# Patient Record
Sex: FEMALE | Race: WHITE | NOT HISPANIC OR LATINO | Employment: OTHER | URBAN - METROPOLITAN AREA
[De-identification: names, ages, dates, MRNs, and addresses within clinical notes are randomized per-mention and may not be internally consistent; named-entity substitution may affect disease eponyms.]

---

## 2017-09-28 ENCOUNTER — OFFICE VISIT (OUTPATIENT)
Dept: OBSTETRICS AND GYNECOLOGY | Facility: CLINIC | Age: 37
End: 2017-09-28
Payer: OTHER GOVERNMENT

## 2017-09-28 VITALS
HEIGHT: 66 IN | HEART RATE: 85 BPM | SYSTOLIC BLOOD PRESSURE: 138 MMHG | WEIGHT: 170.44 LBS | DIASTOLIC BLOOD PRESSURE: 92 MMHG | BODY MASS INDEX: 27.39 KG/M2

## 2017-09-28 DIAGNOSIS — N39.41 URGE INCONTINENCE: ICD-10-CM

## 2017-09-28 DIAGNOSIS — Z01.419 ENCOUNTER FOR WELL WOMAN EXAM: Primary | ICD-10-CM

## 2017-09-28 PROCEDURE — 88175 CYTOPATH C/V AUTO FLUID REDO: CPT

## 2017-09-28 PROCEDURE — 99999 PR PBB SHADOW E&M-NEW PATIENT-LVL III: CPT | Mod: PBBFAC,,, | Performed by: NURSE PRACTITIONER

## 2017-09-28 PROCEDURE — 99385 PREV VISIT NEW AGE 18-39: CPT | Mod: S$PBB,,, | Performed by: NURSE PRACTITIONER

## 2017-10-02 ENCOUNTER — OFFICE VISIT (OUTPATIENT)
Dept: UROLOGY | Facility: CLINIC | Age: 37
End: 2017-10-02
Payer: COMMERCIAL

## 2017-10-02 VITALS
TEMPERATURE: 98 F | BODY MASS INDEX: 29.12 KG/M2 | WEIGHT: 174.81 LBS | SYSTOLIC BLOOD PRESSURE: 139 MMHG | DIASTOLIC BLOOD PRESSURE: 82 MMHG | HEIGHT: 65 IN | HEART RATE: 93 BPM

## 2017-10-02 DIAGNOSIS — N39.41 URGE INCONTINENCE OF URINE: Primary | ICD-10-CM

## 2017-10-02 DIAGNOSIS — R39.15 URINARY URGENCY: ICD-10-CM

## 2017-10-02 LAB
BILIRUB SERPL-MCNC: ABNORMAL MG/DL
BLOOD URINE, POC: ABNORMAL
COLOR, POC UA: YELLOW
GLUCOSE UR QL STRIP: ABNORMAL
KETONES UR QL STRIP: ABNORMAL
LEUKOCYTE ESTERASE URINE, POC: ABNORMAL
NITRITE, POC UA: ABNORMAL
PH, POC UA: 6
PROTEIN, POC: ABNORMAL
SPECIFIC GRAVITY, POC UA: 1.01
UROBILINOGEN, POC UA: ABNORMAL

## 2017-10-02 PROCEDURE — 3008F BODY MASS INDEX DOCD: CPT | Mod: ,,, | Performed by: NURSE PRACTITIONER

## 2017-10-02 PROCEDURE — 81001 URINALYSIS AUTO W/SCOPE: CPT | Mod: PBBFAC,PN | Performed by: NURSE PRACTITIONER

## 2017-10-02 PROCEDURE — 99213 OFFICE O/P EST LOW 20 MIN: CPT | Mod: PBBFAC,PN | Performed by: NURSE PRACTITIONER

## 2017-10-02 PROCEDURE — 99204 OFFICE O/P NEW MOD 45 MIN: CPT | Mod: S$PBB,,, | Performed by: NURSE PRACTITIONER

## 2017-10-02 PROCEDURE — 99999 PR PBB SHADOW E&M-EST. PATIENT-LVL III: CPT | Mod: PBBFAC,,, | Performed by: NURSE PRACTITIONER

## 2017-10-02 PROCEDURE — 87086 URINE CULTURE/COLONY COUNT: CPT

## 2017-10-02 NOTE — LETTER
October 2, 2017      Krysten Lu NP  [address] [place] [address] [address]  [address]  [facility] [address]           [facility]  [address] [place] [address] [address]  [facility] [address]  Phone: [phone]  Fax: [phone]          Patient: Jaclyn Rausch   MR Number: [id number]   YOB: 1980   Date of Visit: 10/2/2017       Dear Krysten Lu:    Thank you for referring Jaclyn Rausch to me for evaluation. Attached you will find relevant portions of my assessment and plan of care.    If you have questions, please do not hesitate to call me. I look forward to following Jaclyn Rausch along with you.    Sincerely,    Eryn Hernandez, [facility]    Enclosure  CC:  No Recipients    If you would like to receive this communication electronically, please contact [email or web address] or [phone] to request more information on [facility] access.    For providers and/or their staff who would like to refer a patient to [facility], please contact us through our one-stop-shop provider referral line, [facility] , at [phone].    If you feel you have received this communication in error or would no longer like to receive these types of communications, please e-mail [email or web address]

## 2017-10-02 NOTE — PROGRESS NOTES
[facility] Note  Staff: BRENDA Phan    Referring provider and please cc:   PCP: None    Chief Complaint: Urge incontinence of urine    Subjective:        HPI: Jaclyn Rausch is a 37 y.o. female new patient to Urology clinic referred by Krysten Lu NP with OB/GYN presents with complaints of urge incontinence on/off for one year.    Questions asked pt during office visit today:  DTF: None, NTF: None  Dysuria:  None  Urgency:Yes, incontinence with urgency? Yes; bothersome Yes; .  Incontinence with laughing or straining: Yes - sneezing/intermittently; bothersome: No; Pt runs most mornings and does not leak  If yes, how many pads/day? None  Feel a bulge?No  G3, P3, vaginal deliveries with no complications  Gross HematuriaNo  History of UTI: No  Sexually active?: Yes - no pain during intercourse  Pt feels that she empties her bladder with each urination.    REVIEW OF SYSTEMS:  Review of Systems   Constitutional: Negative for chills, diaphoresis, fever and weight loss.   HENT: Negative for congestion, hearing loss, nosebleeds and sore throat.    Eyes: Negative for blurred vision and pain.   Respiratory: Negative for cough and wheezing.    Cardiovascular: Negative for chest pain, palpitations and leg swelling.   Gastrointestinal: Negative for abdominal pain, heartburn, nausea and vomiting.   Genitourinary: Positive for urgency. Negative for dysuria, flank pain, frequency and hematuria.        +Urge incontinence   Musculoskeletal: Negative for back pain, joint pain, myalgias and neck pain.   Skin: Negative for itching and rash.   Neurological: Negative for dizziness, tremors, sensory change, seizures, loss of consciousness, weakness and headaches.   Endo/Heme/Allergies: Does not bruise/bleed easily.   Psychiatric/Behavioral: Negative for depression and suicidal ideas. The patient is not nervous/anxious.      PMHx:  Past Medical History:   Diagnosis Date    Atrial septal  aneurysm      Kidney stones: No    PSHx:  Past Surgical History:   Procedure Laterality Date    BUNIONECTOMY Left 2001     Stents/Valves/Foreign Bodies: No  Screening Studies  Colonoscopy: None    Fam Hx:   malignancies: No , gyn malignancies: No .   kidney stones: No     Social History     Social History    Marital status:      Spouse name: N/A    Number of children: N/A    Years of education: N/A     Social History Main Topics    Smoking status: Former Smoker    Smokeless tobacco: Never Used    Alcohol use Yes      Comment: Socially    Drug use: No    Sexual activity: Yes     Partners: Male     Birth control/ protection: Condom      Comment:      Other Topics Concern    None     Social History Narrative    None     Allergies:  Review of patient's allergies indicates no known allergies.    Medications: reviewed   Anticoagulation: No    Objective:     Vitals:    10/02/17 1303   BP: 139/82   Pulse: 93   Temp: 98 °F (36.7 °C)     Physical Exam   Vitals reviewed.  Constitutional: She is oriented to person, place, and time. She appears well-developed and well-nourished.   HENT:   Head: Normocephalic and atraumatic.   Eyes: Conjunctivae and EOM are normal. Pupils are equal, round, and reactive to light.   Neck: Normal range of motion. Neck supple.   Cardiovascular: Normal rate, regular rhythm, normal heart sounds and intact distal pulses.    Pulmonary/Chest: Effort normal and breath sounds normal.   Abdominal: Soft. Bowel sounds are normal.   Musculoskeletal: Normal range of motion.   Neurological: She is alert and oriented to person, place, and time. She has normal reflexes.   Skin: Skin is warm and dry.     Psychiatric: She has a normal mood and affect. Her behavior is normal. Judgment and thought content normal.     Pelvic exam  External Genitalia: normal hair distribution, no lesions  Urethral meatus: normal with mild caruncle observed  Urethra: without tenderness or mass  Bladder: without  fulness or tenderness  Vagina: normal appearing. No discharge or lesions.   Anus and perineum: appear normal  Levator ani tenderness: None  No leakage with coughing, bearing down test.    LABS REVIEW:  UA today:   Color, UA Yellow    Spec Grav UA 1.015    pH, UA 6    WBC, UA +    Nitrite, UA neg    Protein neg    Glucose, UA norm    Ketones, UA neg    Urobilinogen, UA norm    Bilirubin neg    Blood, UA neg      UCx:   Results for orders placed or performed in visit on 05/24/10   Urine culture   Result Value Ref Range    Urine Culture, Routine NO GROWTH AFTER 48 HOURS.    Results for orders placed or performed in visit on 11/05/08   Urine culture   Result Value Ref Range    Urine Culture, Routine       >100,000 ORGANISMS/ML -GRAM POSITIVE GONZALO MORPHOLOGICALLY COMPATIBLE WITH LACTOBACILLUS SPECIES     Cr:   Lab Results   Component Value Date    CREATININE 0.7 05/24/2010     Assessment:       1. Urge incontinence of urine    2. Urinary urgency          Plan:     1.  We will send urine for culture testing today.  2.  Pt deferred trying p.o. meds for her incontinence symptoms.  She does not like taking medications especially right now with her newly diagnosed aneurysm issue.    F/u with Dr. Lelo Gomez for possible UDS and other treatment options r/t her incontinence.    [facility]: Active    Eryn Hernandez, [facility]

## 2017-10-02 NOTE — PATIENT INSTRUCTIONS
Urinary Incontinence, Female (Adult)  Urinary incontinence means loss of control of the bladder. This problem affects many women, especially as they get older. If you have incontinence, you may be embarrassed to ask for help. But know that this problem can be treated.     Types of Incontinence  There are different types of incontinence. Two of the main types are described here. You can have more than one type.  Stress incontinence: With this type, urine leaks when pressure (stress) is put on the bladder. This may happen when you cough, sneeze, or laugh. Stress incontinence most often occurs because the pelvic floor muscles that support the bladder and urethra are weak. This can happen after pregnancy and vaginal childbirth or a hysterectomy. It can also be due to excess body weight or hormone changes.  Urge incontinence (also called overactive bladder): With this type, a sudden urge to urinate is felt often. This may happen even though there may not be much urine in the bladder. The need to urinate often during the night is common. Urge incontinence most often occurs because of bladder spasms. This may be due to bladder irritation or infection. Damage to bladder nerves or pelvic muscles, constipation, and certain medicines can also lead to urge incontinence.  Treatment of urinary incontinence depends on the cause. Further evaluation is needed to find the type you have. This will likely include an exam and certain tests. Based on the results, you and your healthcare provider can then plan treatment. Until a diagnosis is made, the home care tips below can help relieve symptoms.  Home care  · Do pelvic floor muscle (Kegel) exercises, if they are prescribed. The pelvic floor muscles help support the bladder and urethra. Many women find that their symptoms improve when doing special exercises that strengthen these muscles. To do the exercises:  ¨ Contract the muscles you would use to stop your stream of urine, but do  this when youre not urinating. Hold for 10 seconds, then relax. Repeat 10 to 20 times in a row, at least 3 times a day. Your provider may give you other instructions for how to do the exercises and how often.  · Keep a bladder diary. This helps track how often and how much you urinate over a set period of time. Bring this diary with you to your next visit with the provider. The information can help your provider learn more about your bladder problem.  · Lose weight, if advised to by your provider. Excess weight puts pressure on the bladder. Your provider can help you create a weight-loss plan thats right for you. This may include exercising more and making certain diet changes.  · Avoid foods and drinks that may irritate the bladder. These can include alcohol and caffeinated drinks.  · Quit smoking. Smoking and other tobacco use can lead to chronic cough that strains the pelvic floor muscles. Smoking may also damage the bladder and urethra. Talk with your provider about treatments or methods you can use to quit smoking.  · If drinking large amounts of fluid causes you to have symptoms, you may be advised to limit your fluid intake. You may also be advised to drink most of your fluids during the day and to limit fluids at night.  · If youre worried about urine leakage or accidents, you may wear absorbent pads to catch urine. Change the pads often. This helps reduce discomfort. It may also reduce the risk of skin or bladder infections.  Follow-up care  Follow up with your healthcare provider as directed. If testing was done, youll be told the results as soon as they are ready. It may take some to find the right treatment for your problem. Work closely with your provider to ensure you get the best care for your needs. Your treatment plan may include special therapies or medicines. Certain procedures or surgery may also be options. Be sure to discuss any questions you have with your provider.  When to seek medical  advice  Call the healthcare provider right away if any of these occur:  · Fever of 100.4°F (38°C) or higher, or as directed by your provider  · Bladder pain or fullness  · Abdominal swelling  · Nausea or vomiting  · Back pain  · Weakness, dizziness or fainting  Date Last Reviewed: 7/26/2015 © 2000-2017 [employer]. [address] [place] [address], [place] [address]. All rights reserved. This information is not intended as a substitute for professional medical care. Always follow your healthcare professional's instructions.        Kegel Exercises  Kegel exercises dont need special clothing or equipment. Theyre easy to learn and simple to do. And if you do them right, no one can tell youre doing them, so they can be done almost anywhere. Your healthcare provider, nurse, or physical therapist can answer any questions you have and help you get started.    A weak pelvic floor   The pelvic floor muscles may weaken due to aging, pregnancy and vaginal childbirth, injury, surgery, chronic cough, or lack of exercise. If the pelvic floor is weak, your bladder and other pelvic organs may sag out of place. The urethra may also open too easily and allow urine to leak out. Kegel exercises can help you strengthen your pelvic floor muscles. Then they can better support the pelvic organs and control urine flow.  How Kegel exercises are done  Try each of the Kegel exercises described below. When youre doing them, try not to move your leg, buttock, or stomach muscles.  · Contract as if you were stopping your urine stream. But do it when youre not urinating.  · Tighten your rectum as if trying not to pass gas. Contract your anus, but dont move your buttocks.  · You may place a finger or 2 in the vagina and squeeze your finger with your vagina to learn which muscles to tighten.  Try to hold each Kegel for a slow count to 5. You probably wont be able to hold them for that long at first. But keep practicing. It will get easier  as your pelvic floor gets stronger. Eventually, special weights that you place in your vagina may be recommended to help make your Kegels even more effective. Visit your healthcare provider if you have difficulties doing Kegel exercises.  Helpful hints  Here are some tips to follow:  · Do your Kegels as often as you can. The more you do them, the faster youll feel the results.  · Pick an activity you do often as a reminder. For instance, do your Kegels every time you sit down.  · Tighten your pelvic floor before you sneeze, get up from a chair, cough, laugh, or lift. This protects your pelvic floor from injury and can help prevent urine leakage.   Date Last Reviewed: 8/5/2015  © 7266-8941 [employer]. [address] [place] [address], [place] [address]. All rights reserved. This information is not intended as a substitute for professional medical care. Always follow your healthcare professional's instructions.

## 2017-10-03 NOTE — PROGRESS NOTES
Chief Complaint   Patient presents with    Well Woman       History of Present Illness: Jaclyn Rausch is a 37 y.o. female that presents today 10/3/2017 for well gyn visit.  Pt here for well woman exam. She reports that it has been about 3 years since she has had an exam. She denies any abnormal pap smears in the past. She reports that her cycle are regular monthly with no associated complaints. She is not on birth control and does not desire to be on anything. She c/o of have the urge to pee and sometimes not being able to quite make it to bathroom in time. No other complaints or concerns noted.         Past Medical History:   Diagnosis Date    Atrial septal aneurysm        Past Surgical History:   Procedure Laterality Date    BUNIONECTOMY Left        Family History   Problem Relation Age of Onset    Diabetes Maternal Grandmother     Lung cancer Maternal Grandfather     Colon cancer Maternal Grandfather     Hypertension Mother     Breast cancer Neg Hx     Ovarian cancer Neg Hx        Social History     Social History    Marital status:      Spouse name: N/A    Number of children: N/A    Years of education: N/A     Social History Main Topics    Smoking status: Former Smoker    Smokeless tobacco: Never Used    Alcohol use Yes      Comment: Socially    Drug use: No    Sexual activity: Yes     Partners: Male     Birth control/ protection: Condom      Comment:      Other Topics Concern    None     Social History Narrative    None       OB History    Para Term  AB Living   4 3 3 0 1 3   SAB TAB Ectopic Multiple Live Births   1 0 0 0 3      # Outcome Date GA Lbr Alexis/2nd Weight Sex Delivery Anes PTL Lv   4 Term 09 39w0d  2.948 kg (6 lb 8 oz) F INDUCTION EPI N FRANCINE   3 Term / 39w0d  2.722 kg (6 lb) M Vag-Spont EPI N FRANCINE   2 Term     M Vag-Spont   FRANCINE      Complications: Placenta Previa   1 SAB  5w0d       DEC          No current outpatient  "prescriptions on file.     No current facility-administered medications for this visit.        Review of patient's allergies indicates:  No Known Allergies    Review of Symptoms:  GENERAL: Denies weight gain or weight loss. Feeling well overall.   SKIN: Denies rash or lesions.   ABDOMEN: No abdominal pain, constipation, diarrhea, nausea, vomiting or rectal bleeding.   URINARY: No frequency, dysuria, hematuria, or burning on urination.    BP (!) 138/92   Pulse 85   Ht 5' 5.5" (1.664 m)   Wt 77.3 kg (170 lb 6.7 oz)   LMP 09/11/2017 (Exact Date)     Physical Exam:  APPEARANCE: Well nourished, well developed, in no acute distress.  SKIN: Normal skin turgor, no lesions.  RESPIRATORY: Normal respiratory effort with no retractions or use of accessory muscles  ABDOMEN: Soft. No tenderness or masses. No hepatosplenomegaly. No hernias.  BREASTS: Symmetrical, no skin changes or visible lesions. No palpable masses, nipple discharge or adenopathy bilaterally.  PELVIC: Normal external female genitalia without lesions. Normal hair distribution. Adequate perineal body, normal urethral meatus. Urethra with no masses.  Bladder nontender. Vagina moist and well rugated without lesions or discharge. Cervix pink and without lesions. No significant cystocele or rectocele. Bimanual exam showed uterus normal size, shape, position, mobile and nontender. Adnexa without masses or tenderness. Urethra and bladder normal. PAP DONE    ASSESSMENT/PLAN:  Encounter for well woman exam  -     Liquid-based pap smear, screening    Urge incontinence  -     Ambulatory Referral to Urology      -Instructed pt to make an appointment with urology to have the urge incontinence evaluated. Pt verbalized understanding. Appt made.     Patient was counseled today on Pap guidelines, recommendation for pelvic exams, mammograms starting annually at age 40, Colonoscopy after the age of 50, Dexa Bone Scan and calcium and vitamin D supplementation in menopause and to " see her PCP for other health maintenance.       Follow-up:  RTC in 1 year for well woman exam or as needed

## 2017-10-04 LAB
BACTERIA UR CULT: NORMAL
BACTERIA UR CULT: NORMAL

## 2017-10-25 ENCOUNTER — OFFICE VISIT (OUTPATIENT)
Dept: ORTHOPEDICS | Facility: CLINIC | Age: 37
End: 2017-10-25
Payer: COMMERCIAL

## 2017-10-25 ENCOUNTER — HOSPITAL ENCOUNTER (OUTPATIENT)
Dept: RADIOLOGY | Facility: HOSPITAL | Age: 37
Discharge: HOME | End: 2017-10-25
Attending: ORTHOPAEDIC SURGERY
Payer: COMMERCIAL

## 2017-10-25 VITALS — WEIGHT: 174 LBS | BODY MASS INDEX: 28.99 KG/M2 | HEIGHT: 65 IN

## 2017-10-25 DIAGNOSIS — M22.41 CHONDROMALACIA, PATELLA, RIGHT: Primary | ICD-10-CM

## 2017-10-25 DIAGNOSIS — M25.561 ACUTE PAIN OF BOTH KNEES: Primary | ICD-10-CM

## 2017-10-25 DIAGNOSIS — M25.561 ACUTE PAIN OF BOTH KNEES: ICD-10-CM

## 2017-10-25 DIAGNOSIS — M25.562 ACUTE PAIN OF BOTH KNEES: Primary | ICD-10-CM

## 2017-10-25 DIAGNOSIS — M25.562 ACUTE PAIN OF BOTH KNEES: ICD-10-CM

## 2017-10-25 DIAGNOSIS — M22.42 CHONDROMALACIA, PATELLA, LEFT: ICD-10-CM

## 2017-10-25 PROCEDURE — 73564 X-RAY EXAM KNEE 4 OR MORE: CPT | Mod: TC,50,PN

## 2017-10-25 PROCEDURE — 99999 PR PBB SHADOW E&M-EST. PATIENT-LVL II: CPT | Mod: PBBFAC,,, | Performed by: ORTHOPAEDIC SURGERY

## 2017-10-25 PROCEDURE — 99203 OFFICE O/P NEW LOW 30 MIN: CPT | Mod: S$GLB,,, | Performed by: ORTHOPAEDIC SURGERY

## 2017-10-25 PROCEDURE — 73564 X-RAY EXAM KNEE 4 OR MORE: CPT | Mod: 26,50,, | Performed by: RADIOLOGY

## 2017-10-26 ENCOUNTER — LAB VISIT (OUTPATIENT)
Dept: LAB | Facility: HOSPITAL | Age: 37
End: 2017-10-26
Attending: NURSE PRACTITIONER
Payer: COMMERCIAL

## 2017-10-26 ENCOUNTER — OFFICE VISIT (OUTPATIENT)
Dept: FAMILY MEDICINE | Facility: CLINIC | Age: 37
End: 2017-10-26
Payer: COMMERCIAL

## 2017-10-26 VITALS
HEART RATE: 81 BPM | DIASTOLIC BLOOD PRESSURE: 77 MMHG | SYSTOLIC BLOOD PRESSURE: 121 MMHG | BODY MASS INDEX: 28.99 KG/M2 | HEIGHT: 65 IN | TEMPERATURE: 98 F | WEIGHT: 174 LBS

## 2017-10-26 DIAGNOSIS — Z13.1 SCREENING FOR DIABETES MELLITUS: ICD-10-CM

## 2017-10-26 DIAGNOSIS — I25.3 ATRIAL SEPTAL ANEURYSM: ICD-10-CM

## 2017-10-26 DIAGNOSIS — Z13.29 SCREENING FOR THYROID DISORDER: ICD-10-CM

## 2017-10-26 DIAGNOSIS — Z00.00 ANNUAL PHYSICAL EXAM: Primary | ICD-10-CM

## 2017-10-26 DIAGNOSIS — L67.8 ABNORMAL FACIAL HAIR: ICD-10-CM

## 2017-10-26 DIAGNOSIS — Z13.220 SCREENING FOR LIPID DISORDERS: ICD-10-CM

## 2017-10-26 DIAGNOSIS — Z23 NEED FOR IMMUNIZATION AGAINST INFLUENZA: ICD-10-CM

## 2017-10-26 DIAGNOSIS — Z00.00 ANNUAL PHYSICAL EXAM: ICD-10-CM

## 2017-10-26 LAB
ALBUMIN SERPL BCP-MCNC: 3.8 G/DL
ALP SERPL-CCNC: 75 U/L
ALT SERPL W/O P-5'-P-CCNC: 19 U/L
ANION GAP SERPL CALC-SCNC: 6 MMOL/L
AST SERPL-CCNC: 21 U/L
BASOPHILS # BLD AUTO: 0.03 K/UL
BASOPHILS NFR BLD: 0.4 %
BILIRUB SERPL-MCNC: 0.4 MG/DL
BUN SERPL-MCNC: 17 MG/DL
CALCIUM SERPL-MCNC: 9.4 MG/DL
CHLORIDE SERPL-SCNC: 106 MMOL/L
CHOLEST SERPL-MCNC: 269 MG/DL
CHOLEST/HDLC SERPL: 3.5 {RATIO}
CO2 SERPL-SCNC: 25 MMOL/L
CREAT SERPL-MCNC: 0.8 MG/DL
DIFFERENTIAL METHOD: ABNORMAL
EOSINOPHIL # BLD AUTO: 0.2 K/UL
EOSINOPHIL NFR BLD: 2.8 %
ERYTHROCYTE [DISTWIDTH] IN BLOOD BY AUTOMATED COUNT: 12.1 %
EST. GFR  (AFRICAN AMERICAN): >60 ML/MIN/1.73 M^2
EST. GFR  (NON AFRICAN AMERICAN): >60 ML/MIN/1.73 M^2
GLUCOSE SERPL-MCNC: 92 MG/DL
HCT VFR BLD AUTO: 36.4 %
HDLC SERPL-MCNC: 77 MG/DL
HDLC SERPL: 28.6 %
HGB BLD-MCNC: 11.8 G/DL
IMM GRANULOCYTES # BLD AUTO: 0.03 K/UL
IMM GRANULOCYTES NFR BLD AUTO: 0.4 %
LDLC SERPL CALC-MCNC: 170.4 MG/DL
LYMPHOCYTES # BLD AUTO: 2.9 K/UL
LYMPHOCYTES NFR BLD: 34 %
MCH RBC QN AUTO: 29.6 PG
MCHC RBC AUTO-ENTMCNC: 32.4 G/DL
MCV RBC AUTO: 92 FL
MONOCYTES # BLD AUTO: 0.8 K/UL
MONOCYTES NFR BLD: 9.1 %
NEUTROPHILS # BLD AUTO: 4.5 K/UL
NEUTROPHILS NFR BLD: 53.3 %
NONHDLC SERPL-MCNC: 192 MG/DL
NRBC BLD-RTO: 0 /100 WBC
PLATELET # BLD AUTO: 409 K/UL
PMV BLD AUTO: 9.3 FL
POTASSIUM SERPL-SCNC: 4.5 MMOL/L
PROT SERPL-MCNC: 7.6 G/DL
RBC # BLD AUTO: 3.98 M/UL
SODIUM SERPL-SCNC: 137 MMOL/L
TESTOST SERPL-MCNC: 34 NG/DL
TRIGL SERPL-MCNC: 108 MG/DL
TSH SERPL DL<=0.005 MIU/L-ACNC: 1.07 UIU/ML
WBC # BLD AUTO: 8.45 K/UL

## 2017-10-26 PROCEDURE — 80061 LIPID PANEL: CPT

## 2017-10-26 PROCEDURE — 99385 PREV VISIT NEW AGE 18-39: CPT | Mod: 25,S$GLB,, | Performed by: NURSE PRACTITIONER

## 2017-10-26 PROCEDURE — 99999 PR PBB SHADOW E&M-EST. PATIENT-LVL III: CPT | Mod: PBBFAC,,, | Performed by: NURSE PRACTITIONER

## 2017-10-26 PROCEDURE — 85025 COMPLETE CBC W/AUTO DIFF WBC: CPT

## 2017-10-26 PROCEDURE — 80053 COMPREHEN METABOLIC PANEL: CPT

## 2017-10-26 PROCEDURE — 90686 IIV4 VACC NO PRSV 0.5 ML IM: CPT | Mod: S$GLB,,, | Performed by: FAMILY MEDICINE

## 2017-10-26 PROCEDURE — 36415 COLL VENOUS BLD VENIPUNCTURE: CPT | Mod: PO

## 2017-10-26 PROCEDURE — 84403 ASSAY OF TOTAL TESTOSTERONE: CPT

## 2017-10-26 PROCEDURE — 90471 IMMUNIZATION ADMIN: CPT | Mod: S$GLB,,, | Performed by: FAMILY MEDICINE

## 2017-10-26 PROCEDURE — 84443 ASSAY THYROID STIM HORMONE: CPT

## 2017-10-26 NOTE — PROGRESS NOTES
DATE: 10/25/2017  PATIENT: Jaclyn Rausch  REFERRING MD:  CHIEF COMPLAINT:   Chief Complaint   Patient presents with    Left Knee - Pain    Right Knee - Pain       HISTORY:  Jaclyn Rausch is a 37 y.o. female  who presents for initial evaluation of bilateral knee pain right greater than left.  She notes she's had pain since she was 21 years old.  Said previous physical therapy.  She had MRI in 2013 which showed no meniscal tears and chondromalacia patella.  She does use a knee sleeve.  She notes cracking and popping which is painful.  She denies a cement swelling.  Her pain again more severe 2 days ago on the right knee after squatting and kneeling.  She is AGR in the .  Pain is reported at 8/10 today.      PAST MEDICAL/SURGICAL HISTORY:  Past Medical History:   Diagnosis Date    Atrial septal aneurysm      Past Surgical History:   Procedure Laterality Date    BUNIONECTOMY Left 2001       Current Medications: No current outpatient prescriptions on file.    Family History: Noncontributory  Social History:   Social History     Social History    Marital status:      Spouse name: N/A    Number of children: N/A    Years of education: N/A     Occupational History    Not on file.     Social History Main Topics    Smoking status: Former Smoker    Smokeless tobacco: Never Used    Alcohol use Yes      Comment: Socially    Drug use: No    Sexual activity: Yes     Partners: Male     Birth control/ protection: Condom      Comment:      Other Topics Concern    Not on file     Social History Narrative    No narrative on file       ROS:  Constitution: Negative for chills, fever, and sweats. Negative for unexplained weight loss.  HENT: Negative for headaches and blurry vision.   Cardiovascular: Negative for chest pain, irregular heartbeat, leg swelling and palpitations.   Respiratory: Negative for cough and shortness of breath.   Gastrointestinal: Negative for abdominal  "pain, heartburn, nausea and vomiting.   Genitourinary: Negative for bladder incontinence and dysuria.   Musculoskeletal: Negative for systemic arthritis, muscle weakness and myalgias.   Neurological: Negative for numbness.   Psychiatric/Behavioral: Negative for depression.  Endocrine: Negative for polyuria.   Hematologic/Lymphatic: Negative for bleeding disorders.   Skin: Negative for poor wound healing.        PHYSICAL EXAM:  Ht 5' 5" (1.651 m)   Wt 78.9 kg (174 lb)   LMP 09/11/2017 (Exact Date)   BMI 28.96 kg/m²   Jaclyn Rausch is a well developed, well nourished female in no acute distress. Physical examination of the bilateral knee evaluated the following:    Gait and Alignment  Inspection for ecchymosis, swelling, atrophy, or deformity  Inspection for intra-articular and/or bursal effusions  Tenderness to palpation over the bony and soft tissue structures around the knee  Range of Motion and presence of extensor lag/contractures  Sensation and motor strength  Varus/valgus or anterior/posterior/rotatory instability  Flexion pinch and Thelma's Tests  Patellar alignment/tracking/pain to palpation  Vascular exam to include skin temperature/color/capillary refill    Remarkable findings included:  Normal alignment.  No ecchymosis, swelling or effusion.  Crepitus with range of motion right greater than left.  Mild pain with patellofemoral compression.  No instability on exam.  No medial or lateral joint line tenderness of either knee.  Flexion pinch produces knee discomfort right greater than left.  Thelma's test negative        IMAGING:   The patient's bilateral knee radiographs were personally reviewed and compared to the radiologist's report. No acute fractures or dislocations are seen. The medial and lateral compartments are well preserved without degenerative changes or malalignment. The patellofemoral joint is also without degenerative changes or malalignment. No bony or soft tissue lesions " are seen. No loose bodies or calcifications are present.       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 All questions answered and the patient wishes to continue with the knee sleeve and anti-inflammatory medication.  Should her symptoms persist or worsen, she'll contact the office for consideration of injection versus arthroscopy.  Follow-up if not improving or worse..      This note was dictated using voice recognition software. Please excuse any grammatical or typographical errors.

## 2017-10-26 NOTE — PROGRESS NOTES
Subjective:       Patient ID: Jaclyn Rausch is a 37 y.o. female.    Chief Complaint: Annual Exam    HPI   Chief Complaint  Chief Complaint   Patient presents with    Annual Exam       HPI  Jaclyn Rausch is a 37 y.o. female with multiple medical diagnoses as listed in the medical history and problem list that presents to establish care as a new patient and for a physical exam.  BMI today is 28.96.  Patient recently diagnosed with a congenital atrial septum aneurysm, followed by Dr. Frank, no treatment recommended except repeat echocardiogram 1 year.      PAST MEDICAL HISTORY:  Past Medical History:   Diagnosis Date    Atrial septal aneurysm        PAST SURGICAL HISTORY:  Past Surgical History:   Procedure Laterality Date    BUNIONECTOMY Left 2001       SOCIAL HISTORY:  Social History     Social History    Marital status:      Spouse name: N/A    Number of children: N/A    Years of education: N/A     Occupational History    Not on file.     Social History Main Topics    Smoking status: Former Smoker     Packs/day: 0.25     Years: 2.00     Quit date: 01/2003    Smokeless tobacco: Never Used    Alcohol use Yes      Comment: Socially    Drug use: No    Sexual activity: Yes     Partners: Male     Birth control/ protection: Condom      Comment:      Other Topics Concern    Not on file     Social History Narrative    No narrative on file       FAMILY HISTORY:  Family History   Problem Relation Age of Onset    Diabetes Maternal Grandmother     Lung cancer Maternal Grandfather     Colon cancer Maternal Grandfather     Hypertension Mother     Hearing loss Father     Hypertension Brother     Breast cancer Neg Hx     Ovarian cancer Neg Hx        ALLERGIES AND MEDICATIONS: updated and reviewed.  Review of patient's allergies indicates:  No Known Allergies  No current outpatient prescriptions on file.     No current facility-administered medications for this visit.   "        Review of Systems   Constitutional: Negative for activity change, appetite change, chills, fatigue, fever and unexpected weight change.        Regular exercise, runs 6-10 miles a week, yoga   HENT: Positive for sore throat. Negative for congestion, ear pain, hearing loss, rhinorrhea and sinus pain.         Sore throat today   Eyes: Negative for visual disturbance.        Wears glasses for distance vision, goes to optomitrist at [facility]   Respiratory: Negative for cough and shortness of breath.    Cardiovascular: Negative for chest pain, palpitations and leg swelling.   Gastrointestinal: Positive for blood in stool. Negative for abdominal pain, constipation, diarrhea, nausea and vomiting.        Yesterday had a hard stool and noted some blood on toilet tissue and on outside stool   Endocrine:        Increased facial hair   Genitourinary: Negative for difficulty urinating, dysuria, frequency, menstrual problem and urgency.        Periods are regular, are pretty heavy, LMP 10/8/17  Pap smear last month normal.   Musculoskeletal: Negative for arthralgias and myalgias.   Skin: Negative for rash and wound.        Thumb line to right nail with gray discoloration, line to nail x several months   Neurological: Positive for weakness and numbness. Negative for dizziness, tremors and headaches.        Tingling numbness to left hand intermittent   Hematological: Negative for adenopathy.   Psychiatric/Behavioral: Negative for dysphoric mood, sleep disturbance and suicidal ideas. The patient is nervous/anxious.         Has noted some situational anxiety, life stress, dealing with it ok       Objective:       Vitals:    10/26/17 0852   BP: 121/77   BP Location: Right arm   Patient Position: Sitting   BP Method: Medium (Automatic)   Pulse: 81   Temp: 98.4 °F (36.9 °C)   TempSrc: Oral   Weight: 78.9 kg (174 lb)   Height: 5' 5" (1.651 m)     Physical Exam   Constitutional: She is oriented to person, place, and time. Vital " signs are normal. She appears well-developed. No distress.   HENT:   Head: Normocephalic and atraumatic.   Right Ear: Tympanic membrane, external ear and ear canal normal.   Left Ear: Tympanic membrane, external ear and ear canal normal.   Nose: No mucosal edema.   Mouth/Throat: Oropharynx is clear and moist and mucous membranes are normal. No oropharyngeal exudate.   Nasal mucosa pale and boggy without secretions   Eyes: Conjunctivae are normal. Pupils are equal, round, and reactive to light. Right eye exhibits no discharge. Left eye exhibits no discharge. Right conjunctiva is not injected. Left conjunctiva is not injected.   Neck: Normal range of motion. Neck supple. Normal carotid pulses present. Carotid bruit is not present. No thyromegaly present.   Cardiovascular: Normal rate, regular rhythm, S1 normal, S2 normal, normal heart sounds and intact distal pulses.  Exam reveals no gallop and no friction rub.    No murmur heard.  Pulses:       Carotid pulses are 2+ on the right side, and 2+ on the left side.       Radial pulses are 2+ on the right side, and 2+ on the left side.        Posterior tibial pulses are 2+ on the right side, and 2+ on the left side.   No peripheral edema   Pulmonary/Chest: Effort normal and breath sounds normal. No respiratory distress. She has no decreased breath sounds. She has no wheezes. She has no rhonchi. She has no rales.   Abdominal: Soft. Normal appearance and bowel sounds are normal. She exhibits no distension, no abdominal bruit, no pulsatile midline mass and no mass. There is no hepatosplenomegaly. There is no tenderness. No hernia.   Musculoskeletal: Normal range of motion. She exhibits no edema, tenderness or deformity.   Lymphadenopathy:     She has no cervical adenopathy.   Neurological: She is alert and oriented to person, place, and time. She has normal strength.   Skin: Skin is warm, dry and intact. Capillary refill takes less than 2 seconds. No rash noted. She is not  diaphoretic. No pallor.   Psychiatric: She has a normal mood and affect. Her speech is normal and behavior is normal. Judgment and thought content normal. Her mood appears not anxious. Cognition and memory are normal. She does not exhibit a depressed mood.   Nursing note and vitals reviewed.      Assessment:       1. Annual physical exam    2. Need for immunization against influenza    3. Screening for lipid disorders    4. Screening for diabetes mellitus    5. Screening for thyroid disorder    6. Atrial septal aneurysm    7. BMI 28.0-28.9,adult    8. Abnormal facial hair        Plan:     Jaclyn was seen today for annual exam.    Diagnoses and all orders for this visit:    Annual physical exam  -     CBC auto differential; Future  -     Comprehensive metabolic panel; Future  -     Lipid panel; Future  -     TSH; Future    Need for immunization against influenza  -     Influenza - Quadrivalent (3 years & older) (PF)    Screening for lipid disorders  -     Lipid panel; Future    Screening for diabetes mellitus  -     Comprehensive metabolic panel; Future    Screening for thyroid disorder  -     TSH; Future    Atrial septal aneurysm  -     Comprehensive metabolic panel; Future  -     Lipid panel; Future    BMI 28.0-28.9,adult  -     Lipid panel; Future  Weight loss recommended with well balanced diet and portion controlled meals and increased activity.   Patient exercises regularly    Abnormal facial hair  -     Testosterone; Future    Return in about 1 year (around 10/26/2018), or if symptoms worsen or fail to improve.

## 2017-11-03 ENCOUNTER — OFFICE VISIT (OUTPATIENT)
Dept: UROLOGY | Facility: CLINIC | Age: 37
End: 2017-11-03
Payer: OTHER GOVERNMENT

## 2017-11-03 ENCOUNTER — APPOINTMENT (OUTPATIENT)
Dept: LAB | Facility: HOSPITAL | Age: 37
End: 2017-11-03
Attending: UROLOGY
Payer: COMMERCIAL

## 2017-11-03 VITALS
BODY MASS INDEX: 29.99 KG/M2 | SYSTOLIC BLOOD PRESSURE: 133 MMHG | TEMPERATURE: 99 F | WEIGHT: 180 LBS | DIASTOLIC BLOOD PRESSURE: 79 MMHG | HEIGHT: 65 IN | HEART RATE: 78 BPM

## 2017-11-03 DIAGNOSIS — N39.41 URGE INCONTINENCE: Primary | ICD-10-CM

## 2017-11-03 LAB
BILIRUB SERPL-MCNC: ABNORMAL MG/DL
BILIRUB UR QL STRIP: NEGATIVE
BLOOD URINE, POC: ABNORMAL
CLARITY UR: CLEAR
COLOR UR: YELLOW
COLOR, POC UA: ABNORMAL
GLUCOSE UR QL STRIP: ABNORMAL
GLUCOSE UR QL STRIP: NEGATIVE
HGB UR QL STRIP: NEGATIVE
KETONES UR QL STRIP: ABNORMAL
KETONES UR QL STRIP: NEGATIVE
LEUKOCYTE ESTERASE UR QL STRIP: NEGATIVE
LEUKOCYTE ESTERASE URINE, POC: ABNORMAL
NITRITE UR QL STRIP: NEGATIVE
NITRITE, POC UA: ABNORMAL
PH UR STRIP: 7 [PH] (ref 5–8)
PH, POC UA: 7
PROT UR QL STRIP: NEGATIVE
PROTEIN, POC: ABNORMAL
SP GR UR STRIP: 1.01 (ref 1–1.03)
SPECIFIC GRAVITY, POC UA: 1015
URN SPEC COLLECT METH UR: NORMAL
UROBILINOGEN UR STRIP-ACNC: 1 EU/DL
UROBILINOGEN, POC UA: ABNORMAL

## 2017-11-03 PROCEDURE — 81002 URINALYSIS NONAUTO W/O SCOPE: CPT | Mod: PBBFAC,PN | Performed by: UROLOGY

## 2017-11-03 PROCEDURE — 87086 URINE CULTURE/COLONY COUNT: CPT

## 2017-11-03 PROCEDURE — 81003 URINALYSIS AUTO W/O SCOPE: CPT

## 2017-11-03 PROCEDURE — 99213 OFFICE O/P EST LOW 20 MIN: CPT | Mod: PBBFAC,PN | Performed by: UROLOGY

## 2017-11-03 PROCEDURE — 99999 PR PBB SHADOW E&M-EST. PATIENT-LVL III: CPT | Mod: PBBFAC,,, | Performed by: UROLOGY

## 2017-11-03 PROCEDURE — 51701 INSERT BLADDER CATHETER: CPT | Mod: PBBFAC,PN | Performed by: UROLOGY

## 2017-11-03 PROCEDURE — 99214 OFFICE O/P EST MOD 30 MIN: CPT | Mod: S$PBB,,, | Performed by: UROLOGY

## 2017-11-03 NOTE — PROGRESS NOTES
[facility] Note - [place]  Staff: MD Jason    Referring provider and please cc: Hesham  [facility]    Chief Complaint: urge incontinence    Subjective:        HPI: Jaclyn Rausch is a 37 y.o. female presents with     Pt initially seen by maria esther on 10/2/17    She voids q2 hours and denies urgency with each void. Most of the time it may be anxiety related she thinks bc it's when she gets to the store or when she's just getting home and busy trying to get 3 kids in the house. She has a couple drops of UUI. occ ROSE MARY which is not that bothersome, doesn't affect exercise. This has been occurring for the past year.     Drinks 1 cup of coffee in the am. No tea or coke. Takes vitamins but sx occurred before vitamin usage. Denies constipation- has a bm daily. She is , vaginal easily deliveries (small). No pad usage. Doesn't occur at work.     ua today and last visit with jose lujan, but denies any dysuria.     Was seen by maria esther and was told she has an Atrial septal aneurysm that is new and hesistant to try meds. Has tried some behavioral changes, kegels but says that this doesn't help.     ECOG Status: 0    G3, P 3, vaginal   Gross HematuriaNo  History of UTI: No  Sexually active?: Yes - not painful    REVIEW OF SYSTEMS:  General ROS: no fevers, no chills  Psychological ROS: no depression  Endocrine ROS: no heat or cold  Respiratory ROS: no SOB  Cardiovascular ROS: no CP  Gastrointestinal ROS: no abdominal pain, no constipation, no diarrhea, noBRBPR  Musculoskeletal ROS: no muscle pain  Neurological ROS: no headaches  Dermatological ROS: no rashes  HEENT: noglasses, no sinus   ROS: per HPI     PMHx:  Past Medical History:   Diagnosis Date    Atrial septal aneurysm      Kidney stones: No    PSHx:  Past Surgical History:   Procedure Laterality Date    BUNIONECTOMY Left      Urologic or Gynecologic Surgery: none    Stents/Valves/Foreign Bodies:  No  Cardiac Evaluation: Yes -     Screening Studies  Colonoscopy: none    Fam Hx:   malignancies: No , gyn malignancies: No . Mother  from ruptured brain aneurysm at 58. Father  from HF   kidney stones: No     Soc Hx:  No tobacco.   occ alcohol  Lives in [place]  :yes  Children: 3 (9,8,2)  Occupation:  in [employer]    Allergies:  Patient has no known allergies.    Medications: reviewed   Anticoagulation: No    Objective:     Vitals:    17 1131   BP: 133/79   Pulse: 78   Temp: 98.9 °F (37.2 °C)       General:WDWN in NAD  Eyes: PERRLA, normal conjunctiva  Respiratory: no increased work on breathing, clear to auscultation  Cardiovascular: regular rate and rhythm. No obvious extremity edema.  GI: no palpation of masses. No tenderness. No hepatosplenomegaly to palpation.  Musculoskeletal: normal range of motion of bilateral upper extremities. Normal muscle strength and tone.  Skin: no obvious rashes or lesions. No tightening of skin noted.  Neurologic: CN grossly normal. Normal sensation.   Psychiatric: awake, alert and oriented x 3. Mood and affect normal. Cooperative.    Pelvic exam  External Genitalia: normal hair distribution, no lesions  Urethral meatus: normal without prolapse or caruncle  Urethra: without tenderness or mass  Bladder: without fulness or tenderness  Vagina: normal appearing. No discharge or lesions. no prolapse. +vaginal discharge (pt states this occurs close to her period, denies any vaginal pain or itching)  Anus and perineum: appear normal  In and out cath performed with 30 residual  Levator ani tenderness: none    LABS REVIEW:  UA today: 1.015/7/tr leuk - asx   ua cath: send for urinalysis and urine culture     UCx:   10/2/17 Multiple organisms, tr leuk  5/24/10 NG  18 GPR     Cr:   Lab Results   Component Value Date    CREATININE 0.8 10/26/2017       PATHOLOGY REVIEW:  17 - neg pap    RADIOGRAPHIC REVIEW:  none    Assessment:       1. Urge  incontinence          Plan:     Will send cath ua for culture and urinalysis to confirm no infxn. Denies sx but will treat if + bc of incontinence    Recommend she avoid all caffeine (coffee in the am) and other bladder irritants, given list today.     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     F/u 3 months and if no improvement may do stress test and start on oa med but if pt doing well then will f/u 1 year    Lelo Gomez MD

## 2017-11-04 LAB — BACTERIA UR CULT: NO GROWTH

## 2018-01-09 ENCOUNTER — OFFICE VISIT (OUTPATIENT)
Dept: OBSTETRICS AND GYNECOLOGY | Facility: CLINIC | Age: 38
End: 2018-01-09
Payer: COMMERCIAL

## 2018-01-09 VITALS
BODY MASS INDEX: 29.02 KG/M2 | HEIGHT: 66 IN | HEART RATE: 93 BPM | DIASTOLIC BLOOD PRESSURE: 94 MMHG | SYSTOLIC BLOOD PRESSURE: 140 MMHG | WEIGHT: 180.56 LBS

## 2018-01-09 DIAGNOSIS — N92.6 MISSED MENSES: Primary | ICD-10-CM

## 2018-01-09 DIAGNOSIS — Z32.01 POSITIVE URINE PREGNANCY TEST: ICD-10-CM

## 2018-01-09 DIAGNOSIS — Z11.3 SCREEN FOR STD (SEXUALLY TRANSMITTED DISEASE): ICD-10-CM

## 2018-01-09 DIAGNOSIS — N92.6 MISSED PERIOD: ICD-10-CM

## 2018-01-09 LAB
B-HCG UR QL: POSITIVE
BILIRUB SERPL-MCNC: ABNORMAL MG/DL
BLOOD URINE, POC: ABNORMAL
COLOR, POC UA: YELLOW
CTP QC/QA: YES
GLUCOSE UR QL STRIP: ABNORMAL
KETONES UR QL STRIP: ABNORMAL
LEUKOCYTE ESTERASE URINE, POC: ABNORMAL
NITRITE, POC UA: ABNORMAL
PH, POC UA: 8
PROTEIN, POC: ABNORMAL
SPECIFIC GRAVITY, POC UA: 1.01
UROBILINOGEN, POC UA: ABNORMAL

## 2018-01-09 PROCEDURE — 99214 OFFICE O/P EST MOD 30 MIN: CPT | Mod: S$GLB,,, | Performed by: OBSTETRICS & GYNECOLOGY

## 2018-01-09 PROCEDURE — 99999 PR PBB SHADOW E&M-EST. PATIENT-LVL III: CPT | Mod: PBBFAC,,, | Performed by: OBSTETRICS & GYNECOLOGY

## 2018-01-09 PROCEDURE — 81025 URINE PREGNANCY TEST: CPT | Mod: S$GLB,,, | Performed by: OBSTETRICS & GYNECOLOGY

## 2018-01-09 PROCEDURE — 87491 CHLMYD TRACH DNA AMP PROBE: CPT

## 2018-01-09 RX ORDER — NAPROXEN SODIUM 220 MG/1
81 TABLET, FILM COATED ORAL DAILY
COMMUNITY
End: 2018-01-23

## 2018-01-09 NOTE — PROGRESS NOTES
Subjective:       Patient ID: Jaclyn Rausch is a 37 y.o. female.    Chief Complaint:  Possible Pregnancy      History of Present Illness  HPI  Missed Menses/ Possible Pregnancy  Patient complains of menstrual irregularity. She believes she could be pregnant. Patient is ambivalent about pregnancy. Sexual Activity: single partner, contraception: coitus interruptus. Current symptoms also include: positive home pregnancy test. Last period was normal.Patient recently diagnosed with atrial septal aneurysm and was informed of it as a congenital abnormality.     Patient's last menstrual period was 2017 (approximate).     GYN & OB History  Patient's last menstrual period was 2017 (approximate).   Date of Last Pap: 17    OB History    Para Term  AB Living   5 3 3 0 1 3   SAB TAB Ectopic Multiple Live Births   1 0 0 0 3      # Outcome Date GA Lbr Alexis/2nd Weight Sex Delivery Anes PTL Lv   5 Current            4 Term 09 39w0d  2.948 kg (6 lb 8 oz) F INDUCTION EPI N FRANCINE   3 Term / 39w0d  2.722 kg (6 lb) M Vag-Spont EPI N FRANCINE   2 Term     M Vag-Spont   FRANCINE      Complications: Placenta Previa   1 SAB  5w0d       DEC          Review of Systems  Review of Systems   Constitutional: Negative for activity change, appetite change, chills, diaphoresis, fatigue, fever and unexpected weight change.   HENT: Negative for mouth sores and tinnitus.    Eyes: Negative for discharge and visual disturbance.   Respiratory: Negative for cough, shortness of breath and wheezing.    Cardiovascular: Negative for chest pain, palpitations and leg swelling.   Gastrointestinal: Negative for abdominal pain, bloating, blood in stool, constipation, diarrhea, nausea and vomiting.   Endocrine: Negative for diabetes, hair loss, hot flashes, hyperthyroidism and hypothyroidism.   Genitourinary: Negative for decreased libido, dyspareunia, dysuria, flank pain, frequency, genital sores, hematuria, menorrhagia,  menstrual problem, pelvic pain, urgency, vaginal bleeding, vaginal discharge, vaginal pain, dysmenorrhea, urinary incontinence, postcoital bleeding, postmenopausal bleeding and vaginal odor.   Musculoskeletal: Negative for back pain, joint swelling and myalgias.   Skin:  Negative for rash, no acne and hair changes.   Neurological: Negative for seizures, syncope, numbness and headaches.   Hematological: Negative for adenopathy. Does not bruise/bleed easily.   Psychiatric/Behavioral: Negative for depression and sleep disturbance. The patient is not nervous/anxious.    Breast: Negative for breast mass, breast pain, nipple discharge and skin changes          Objective:    Physical Exam:   Constitutional: She is oriented to person, place, and time. She appears well-developed and well-nourished. No distress.    HENT:   Head: Normocephalic.    Eyes: Pupils are equal, round, and reactive to light.    Neck: Normal range of motion.    Cardiovascular: Normal rate.     Pulmonary/Chest: Effort normal.        Abdominal: Soft. She exhibits no distension.     Genitourinary: Vagina normal and uterus normal. No vaginal discharge found.   Genitourinary Comments: Cervix cultures done today           Musculoskeletal: Normal range of motion.       Neurological: She is alert and oriented to person, place, and time.    Skin: Skin is warm and dry.    Psychiatric: Her behavior is normal. Judgment and thought content normal.   Patient appears tearful          Assessment:        1. Missed menses    2. Missed period    3. Screen for STD (sexually transmitted disease)    4. Positive urine pregnancy test                Plan:      Initial prenatal visit in 2-3 weeks with ultrasound and labs   Patient to initiate vitamins  Referral placed for patient to go to Cardiologist

## 2018-01-10 LAB
C TRACH DNA SPEC QL NAA+PROBE: NOT DETECTED
N GONORRHOEA DNA SPEC QL NAA+PROBE: NOT DETECTED

## 2018-01-23 ENCOUNTER — OFFICE VISIT (OUTPATIENT)
Dept: OBSTETRICS AND GYNECOLOGY | Facility: CLINIC | Age: 38
End: 2018-01-23
Payer: COMMERCIAL

## 2018-01-23 ENCOUNTER — LAB VISIT (OUTPATIENT)
Dept: LAB | Facility: HOSPITAL | Age: 38
End: 2018-01-23
Attending: OBSTETRICS & GYNECOLOGY
Payer: COMMERCIAL

## 2018-01-23 VITALS
WEIGHT: 179.69 LBS | SYSTOLIC BLOOD PRESSURE: 126 MMHG | BODY MASS INDEX: 29.44 KG/M2 | HEART RATE: 83 BPM | DIASTOLIC BLOOD PRESSURE: 85 MMHG

## 2018-01-23 DIAGNOSIS — Z34.81 SUPERVISION OF NORMAL INTRAUTERINE PREGNANCY IN MULTIGRAVIDA IN FIRST TRIMESTER: Primary | ICD-10-CM

## 2018-01-23 DIAGNOSIS — Z34.81 PRENATAL CARE, SUBSEQUENT PREGNANCY IN FIRST TRIMESTER: ICD-10-CM

## 2018-01-23 DIAGNOSIS — O09.521 ADVANCED MATERNAL AGE IN MULTIGRAVIDA, FIRST TRIMESTER: ICD-10-CM

## 2018-01-23 DIAGNOSIS — Z34.81 SUPERVISION OF NORMAL INTRAUTERINE PREGNANCY IN MULTIGRAVIDA IN FIRST TRIMESTER: ICD-10-CM

## 2018-01-23 LAB
ABO + RH BLD: NORMAL
ANISOCYTOSIS BLD QL SMEAR: SLIGHT
BASOPHILS # BLD AUTO: 0 K/UL
BASOPHILS NFR BLD: 0 %
BLD GP AB SCN CELLS X3 SERPL QL: NORMAL
DIFFERENTIAL METHOD: ABNORMAL
EOSINOPHIL # BLD AUTO: 0.1 K/UL
EOSINOPHIL NFR BLD: 1.4 %
ERYTHROCYTE [DISTWIDTH] IN BLOOD BY AUTOMATED COUNT: 14.9 %
HCT VFR BLD AUTO: 36.7 %
HGB BLD-MCNC: 12.7 G/DL
LYMPHOCYTES # BLD AUTO: 3.2 K/UL
LYMPHOCYTES NFR BLD: 30.1 %
MCH RBC QN AUTO: 28.6 PG
MCHC RBC AUTO-ENTMCNC: 34.5 G/DL
MCV RBC AUTO: 83 FL
MONOCYTES # BLD AUTO: 0.7 K/UL
MONOCYTES NFR BLD: 6.9 %
NEUTROPHILS # BLD AUTO: 6.6 K/UL
NEUTROPHILS NFR BLD: 61.6 %
PLATELET # BLD AUTO: 343 K/UL
PLATELET BLD QL SMEAR: ABNORMAL
PMV BLD AUTO: 7.5 FL
RBC # BLD AUTO: 4.43 M/UL
TSH SERPL DL<=0.005 MIU/L-ACNC: 1.23 UIU/ML
WBC # BLD AUTO: 10.6 K/UL

## 2018-01-23 PROCEDURE — 86592 SYPHILIS TEST NON-TREP QUAL: CPT

## 2018-01-23 PROCEDURE — 0500F INITIAL PRENATAL CARE VISIT: CPT | Mod: S$GLB,,, | Performed by: OBSTETRICS & GYNECOLOGY

## 2018-01-23 PROCEDURE — 83020 HEMOGLOBIN ELECTROPHORESIS: CPT

## 2018-01-23 PROCEDURE — 99999 PR PBB SHADOW E&M-EST. PATIENT-LVL II: CPT | Mod: PBBFAC,,, | Performed by: OBSTETRICS & GYNECOLOGY

## 2018-01-23 PROCEDURE — 76817 TRANSVAGINAL US OBSTETRIC: CPT | Mod: S$GLB,,, | Performed by: OBSTETRICS & GYNECOLOGY

## 2018-01-23 PROCEDURE — 81220 CFTR GENE COM VARIANTS: CPT

## 2018-01-23 PROCEDURE — 86850 RBC ANTIBODY SCREEN: CPT

## 2018-01-23 PROCEDURE — 84443 ASSAY THYROID STIM HORMONE: CPT

## 2018-01-23 PROCEDURE — 86762 RUBELLA ANTIBODY: CPT

## 2018-01-23 PROCEDURE — 87340 HEPATITIS B SURFACE AG IA: CPT

## 2018-01-23 PROCEDURE — 85025 COMPLETE CBC W/AUTO DIFF WBC: CPT

## 2018-01-23 PROCEDURE — 86703 HIV-1/HIV-2 1 RESULT ANTBDY: CPT

## 2018-01-23 NOTE — PROGRESS NOTES
Subjective:       Patient ID: Jaclyn Rausch is a 37 y.o. female.    Chief Complaint:  Routine Prenatal Visit      History of Present Illness  HPI  37 y.o.  Ab1 for initial prenatal visit. Patient with no complaints today. Patient did consult with Cardiologist regarding her heart and was again informed that it is a congenital defect and to evaluate the fetus at 19 weeks.    GYN & OB History  Patient's last menstrual period was 2017 (approximate).   Date of Last Pap: 10/4/2017    OB History    Para Term  AB Living   5 3 3 0 1 3   SAB TAB Ectopic Multiple Live Births   1 0 0 0 3      # Outcome Date GA Lbr Alexis/2nd Weight Sex Delivery Anes PTL Lv   5 Current            4 Term 09 39w0d  2.948 kg (6 lb 8 oz) F INDUCTION EPI N FRANCINE   3 Term /08 39w0d  2.722 kg (6 lb) M Vag-Spont EPI N FRANCINE   2 Term     M Vag-Spont   FRANCINE      Complications: Placenta Previa   1 SAB  5w0d       DEC          Review of Systems  Review of Systems   Constitutional: Negative for activity change, appetite change, chills, diaphoresis, fatigue, fever and unexpected weight change.   HENT: Negative for mouth sores and tinnitus.    Eyes: Negative for discharge and visual disturbance.   Respiratory: Negative for cough, shortness of breath and wheezing.    Cardiovascular: Negative for chest pain, palpitations and leg swelling.   Gastrointestinal: Negative for abdominal pain, bloating, blood in stool, constipation, diarrhea, nausea and vomiting.   Endocrine: Negative for diabetes, hair loss, hot flashes, hyperthyroidism and hypothyroidism.   Genitourinary: Negative for decreased libido, dyspareunia, dysuria, flank pain, frequency, genital sores, hematuria, menorrhagia, menstrual problem, pelvic pain, urgency, vaginal bleeding, vaginal discharge, vaginal pain, dysmenorrhea, urinary incontinence, postcoital bleeding, postmenopausal bleeding and vaginal odor.   Musculoskeletal: Negative for back pain, joint  swelling and myalgias.   Skin:  Negative for rash, no acne and hair changes.   Neurological: Negative for seizures, syncope, numbness and headaches.   Hematological: Negative for adenopathy. Does not bruise/bleed easily.   Psychiatric/Behavioral: Negative for depression and sleep disturbance. The patient is not nervous/anxious.    Breast: Negative for breast mass, breast pain, nipple discharge and skin changes          Objective:    Physical Exam:   Constitutional: She is oriented to person, place, and time. She appears well-developed and well-nourished. No distress.     Eyes: Pupils are equal, round, and reactive to light.    Neck: Normal range of motion.    Cardiovascular: Normal rate.     Pulmonary/Chest: Effort normal.                  Musculoskeletal: Normal range of motion.       Neurological: She is alert and oriented to person, place, and time.    Skin: Skin is warm and dry.    Psychiatric: She has a normal mood and affect. Her behavior is normal. Judgment and thought content normal.      ULTRASOUND:   Bedside Ultrasound Findings      Narrative     Ultrasound transvaginal performed with the 6.5mhz probe in the usual fashion.    Viable fay intrauterine pregnancy was seen, yolk sac was seen  Crown-rump length = 8.5 mm with flicker, consistent with 6wks 6d and EDC 09/12/2018.  No free fluid in cul-de-sac or adnexal pathology seen      Impression       1.  Viable 6wks 6d IUP              Assessment:        1. Prenatal care, subsequent pregnancy in first trimester    2. Supervision of normal intrauterine pregnancy in multigravida in first trimester                Plan:      Routine prenatal visit and follow up ultrasound in 2 weeks

## 2018-01-24 LAB
HBV SURFACE AG SERPL QL IA: NEGATIVE
HIV 1+2 AB+HIV1 P24 AG SERPL QL IA: NEGATIVE
RPR SER QL: NORMAL
RUBV IGG SER-ACNC: 13.9 IU/ML
RUBV IGG SER-IMP: REACTIVE

## 2018-01-25 ENCOUNTER — PATIENT MESSAGE (OUTPATIENT)
Dept: OBSTETRICS AND GYNECOLOGY | Facility: CLINIC | Age: 38
End: 2018-01-25

## 2018-01-25 LAB
HGB A2 MFR BLD HPLC: 2.4 %
HGB FRACT BLD ELPH-IMP: NORMAL
HGB FRACT BLD ELPH-IMP: NORMAL

## 2018-01-26 LAB — CFTR MUT ANL BLD/T: NORMAL

## 2018-02-06 ENCOUNTER — HOSPITAL ENCOUNTER (OUTPATIENT)
Dept: RADIOLOGY | Facility: HOSPITAL | Age: 38
Discharge: HOME | End: 2018-02-06
Attending: OBSTETRICS & GYNECOLOGY
Payer: OTHER GOVERNMENT

## 2018-02-06 ENCOUNTER — ROUTINE PRENATAL (OUTPATIENT)
Dept: OBSTETRICS AND GYNECOLOGY | Facility: CLINIC | Age: 38
End: 2018-02-06
Payer: OTHER GOVERNMENT

## 2018-02-06 VITALS
DIASTOLIC BLOOD PRESSURE: 88 MMHG | BODY MASS INDEX: 29.48 KG/M2 | WEIGHT: 179.88 LBS | HEART RATE: 92 BPM | SYSTOLIC BLOOD PRESSURE: 130 MMHG

## 2018-02-06 DIAGNOSIS — O02.1 MISSED ABORTION WITH FETAL DEMISE BEFORE 20 COMPLETED WEEKS OF GESTATION: ICD-10-CM

## 2018-02-06 DIAGNOSIS — Z3A.09 9 WEEKS GESTATION OF PREGNANCY: Primary | ICD-10-CM

## 2018-02-06 DIAGNOSIS — Z3A.09 9 WEEKS GESTATION OF PREGNANCY: ICD-10-CM

## 2018-02-06 PROCEDURE — 76801 OB US < 14 WKS SINGLE FETUS: CPT | Mod: TC

## 2018-02-06 PROCEDURE — 99999 PR PBB SHADOW E&M-EST. PATIENT-LVL III: CPT | Mod: PBBFAC,,, | Performed by: OBSTETRICS & GYNECOLOGY

## 2018-02-06 PROCEDURE — 76801 OB US < 14 WKS SINGLE FETUS: CPT | Mod: 26,,, | Performed by: RADIOLOGY

## 2018-02-06 PROCEDURE — 76817 TRANSVAGINAL US OBSTETRIC: CPT | Mod: S$GLB,,, | Performed by: OBSTETRICS & GYNECOLOGY

## 2018-02-06 PROCEDURE — 0502F SUBSEQUENT PRENATAL CARE: CPT | Mod: S$GLB,,, | Performed by: OBSTETRICS & GYNECOLOGY

## 2018-02-06 NOTE — PROGRESS NOTES
Patient with no complaints. Here today for follow up ultrasound.  ULTRASOUND:   Bedside Ultrasound Findings      Narrative     Ultrasound transvaginal performed with the 6.5mhz probe in the usual fashion.     fay intrauterine pregnancy was seen, yolk sac was seen  Crown-rump length = 20.3 mm without flicker, consistent with 8wks4d and EDC 18.  No free fluid in cul-de-sac or adnexal pathology seen      Impression       1.  Possible missed 1st trimester   Plan:  Patient to go to hospital imaging to confirm or rule out

## 2018-02-07 ENCOUNTER — TELEPHONE (OUTPATIENT)
Dept: OBSTETRICS AND GYNECOLOGY | Facility: CLINIC | Age: 38
End: 2018-02-07

## 2018-02-07 PROBLEM — N92.6 MISSED MENSES: Status: RESOLVED | Noted: 2018-01-09 | Resolved: 2018-02-07

## 2018-02-07 PROBLEM — Z32.01 POSITIVE URINE PREGNANCY TEST: Status: RESOLVED | Noted: 2018-01-09 | Resolved: 2018-02-07

## 2018-02-07 PROBLEM — O02.1 MISSED ABORTION WITH FETAL DEMISE BEFORE 20 COMPLETED WEEKS OF GESTATION: Status: ACTIVE | Noted: 2018-02-07

## 2018-02-07 PROBLEM — Z34.81 PRENATAL CARE, SUBSEQUENT PREGNANCY IN FIRST TRIMESTER: Status: RESOLVED | Noted: 2018-01-23 | Resolved: 2018-02-07

## 2018-02-07 NOTE — TELEPHONE ENCOUNTER
Dr enriquez ph#[phone] requested to speak to dr garcia   Placed calls to pod, answer   He's calling with patient results: missed  confirmed

## 2018-02-07 NOTE — TELEPHONE ENCOUNTER
----- Message from Feli Lee sent at 2018  4:12 PM CST -----  Contact: Dr enriquez ph#[phone]  Dr enriquez ph#[phone] requested to speak to dr garcia   Placed calls to pod, answer  He's calling with patient results: missed  confirmed

## 2018-02-15 ENCOUNTER — OFFICE VISIT (OUTPATIENT)
Dept: OBSTETRICS AND GYNECOLOGY | Facility: CLINIC | Age: 38
End: 2018-02-15
Payer: COMMERCIAL

## 2018-02-15 VITALS
WEIGHT: 181.75 LBS | DIASTOLIC BLOOD PRESSURE: 86 MMHG | HEIGHT: 65 IN | SYSTOLIC BLOOD PRESSURE: 134 MMHG | HEART RATE: 80 BPM | BODY MASS INDEX: 30.28 KG/M2

## 2018-02-15 DIAGNOSIS — Z30.011 ENCOUNTER FOR INITIAL PRESCRIPTION OF CONTRACEPTIVE PILLS: ICD-10-CM

## 2018-02-15 DIAGNOSIS — O02.1 MISSED ABORTION WITH FETAL DEMISE BEFORE 20 COMPLETED WEEKS OF GESTATION: Primary | ICD-10-CM

## 2018-02-15 PROBLEM — O09.521 ADVANCED MATERNAL AGE IN MULTIGRAVIDA, FIRST TRIMESTER: Status: RESOLVED | Noted: 2018-01-23 | Resolved: 2018-02-15

## 2018-02-15 PROCEDURE — 99024 POSTOP FOLLOW-UP VISIT: CPT | Mod: S$GLB,,, | Performed by: OBSTETRICS & GYNECOLOGY

## 2018-02-15 PROCEDURE — 99999 PR PBB SHADOW E&M-EST. PATIENT-LVL III: CPT | Mod: PBBFAC,,, | Performed by: OBSTETRICS & GYNECOLOGY

## 2018-02-15 RX ORDER — NORETHINDRONE ACETATE AND ETHINYL ESTRADIOL 1MG-20(21)
1 KIT ORAL DAILY
Qty: 28 TABLET | Refills: 12 | Status: ON HOLD | OUTPATIENT
Start: 2018-02-15 | End: 2018-04-24

## 2018-02-15 NOTE — PROGRESS NOTES
Subjective:       Patient ID: Jaclyn Rausch is a 37 y.o. female.    Chief Complaint:  Post-op Evaluation (D&C)      History of Present Illness  HPI  Postoperative Follow-up  Patient presents to the clinic 1 weeks status post suction D&C for missed 1st trimester . Eating a regular diet without difficulty. Bowel movements are normal. The patient is not having any pain.Patient desires OCP's for contraception till  gets vasectomy.      GYN & OB History  Patient's last menstrual period was 2017 (approximate).   Date of Last Pap: 10/4/2017    OB History    Para Term  AB Living   5 3 3 0 1 3   SAB TAB Ectopic Multiple Live Births   1 0 0 0 3      # Outcome Date GA Lbr Alexis/2nd Weight Sex Delivery Anes PTL Lv   5 Current            4 Term 09 39w0d  2.948 kg (6 lb 8 oz) F INDUCTION EPI N FRANCINE   3 Term //08 39w0d  2.722 kg (6 lb) M Vag-Spont EPI N FRANCINE   2 Term     M Vag-Spont   FRANCINE      Complications: Placenta Previa   1 SAB  5w0d       DEC          Review of Systems  Review of Systems   Constitutional: Negative for activity change, appetite change, chills, diaphoresis, fatigue, fever and unexpected weight change.   HENT: Negative for mouth sores and tinnitus.    Eyes: Negative for discharge and visual disturbance.   Respiratory: Negative for cough, shortness of breath and wheezing.    Cardiovascular: Negative for chest pain, palpitations and leg swelling.   Gastrointestinal: Negative for abdominal pain, bloating, blood in stool, constipation, diarrhea, nausea and vomiting.   Endocrine: Negative for diabetes, hair loss, hot flashes, hyperthyroidism and hypothyroidism.   Genitourinary: Negative for decreased libido, dyspareunia, dysuria, flank pain, frequency, genital sores, hematuria, menorrhagia, menstrual problem, pelvic pain, urgency, vaginal bleeding, vaginal discharge, vaginal pain, dysmenorrhea, urinary incontinence, postcoital bleeding, postmenopausal bleeding  and vaginal odor.   Musculoskeletal: Negative for back pain, joint swelling and myalgias.   Skin:  Negative for rash, no acne and hair changes.   Neurological: Negative for seizures, syncope, numbness and headaches.   Hematological: Negative for adenopathy. Does not bruise/bleed easily.   Psychiatric/Behavioral: Negative for depression and sleep disturbance. The patient is not nervous/anxious.    Breast: Negative for breast mass, breast pain, nipple discharge and skin changes          Objective:    Physical Exam:   Constitutional: She is oriented to person, place, and time. She appears well-developed and well-nourished. No distress.    HENT:   Head: Normocephalic.    Eyes: Pupils are equal, round, and reactive to light.    Neck: Normal range of motion.    Cardiovascular: Normal rate.     Pulmonary/Chest: Effort normal.                  Musculoskeletal: Normal range of motion.       Neurological: She is alert and oriented to person, place, and time.    Skin: Skin is warm and dry.    Psychiatric: She has a normal mood and affect. Her behavior is normal. Judgment and thought content normal.          Assessment:        1. Missed  with fetal demise before 20 completed weeks of gestation    2. Encounter for initial prescription of contraceptive pills                Plan:      Microgestin  Fe

## 2018-02-23 ENCOUNTER — OFFICE VISIT (OUTPATIENT)
Dept: ORTHOPEDICS | Facility: CLINIC | Age: 38
End: 2018-02-23
Payer: COMMERCIAL

## 2018-02-23 ENCOUNTER — HOSPITAL ENCOUNTER (OUTPATIENT)
Dept: RADIOLOGY | Facility: HOSPITAL | Age: 38
Discharge: HOME | End: 2018-02-23
Attending: ORTHOPAEDIC SURGERY
Payer: COMMERCIAL

## 2018-02-23 VITALS — BODY MASS INDEX: 30.16 KG/M2 | HEIGHT: 65 IN | WEIGHT: 181 LBS

## 2018-02-23 DIAGNOSIS — M79.672 PAIN OF LEFT HEEL: ICD-10-CM

## 2018-02-23 DIAGNOSIS — M79.672 LEFT FOOT PAIN: ICD-10-CM

## 2018-02-23 DIAGNOSIS — M79.672 LEFT FOOT PAIN: Primary | ICD-10-CM

## 2018-02-23 PROCEDURE — 99214 OFFICE O/P EST MOD 30 MIN: CPT | Mod: S$GLB,,, | Performed by: ORTHOPAEDIC SURGERY

## 2018-02-23 PROCEDURE — 99999 PR PBB SHADOW E&M-EST. PATIENT-LVL III: CPT | Mod: PBBFAC,,, | Performed by: ORTHOPAEDIC SURGERY

## 2018-02-23 PROCEDURE — 73630 X-RAY EXAM OF FOOT: CPT | Mod: 26,LT,, | Performed by: RADIOLOGY

## 2018-02-23 PROCEDURE — 73630 X-RAY EXAM OF FOOT: CPT | Mod: TC,PN,LT

## 2018-02-23 PROCEDURE — 3008F BODY MASS INDEX DOCD: CPT | Mod: S$GLB,,, | Performed by: ORTHOPAEDIC SURGERY

## 2018-02-24 NOTE — PROGRESS NOTES
DATE: 2/23/2018  PATIENT: Jaclyn Rausch  REFERRING MD:  CHIEF COMPLAINT:   Chief Complaint   Patient presents with    Knee Pain       HISTORY:  Jaclyn Rausch is a 37 y.o. female  who presents for initial evaluation of a new problem regarding her left heel.  She notes symptoms over the last 7 weeks.  She notes pain on the plantar aspect of her heel.  No pain at rest.  He states that she is able to walk but the longer side of the more it hurts.  She is currently in the [employer] and is able to complete her duties.  She notes pain is 2/10 at rest and 5/10 with activity.  Presents for examination today.  He also complains persistent right knee pain and has been previously diagnosed with chondromalacia patella.      PAST MEDICAL/SURGICAL HISTORY:  Past Medical History:   Diagnosis Date    Anemia     slightly    Atrial septal aneurysm     Dr. Frank; last visit 1/2018     Past Surgical History:   Procedure Laterality Date    BUNIONECTOMY Left 2001       Current Medications:   Current Outpatient Prescriptions:     norethindrone-ethinyl estradiol (JUNEL FE 1/20) 1 mg-20 mcg (21)/75 mg (7) per tablet, Take 1 tablet by mouth once daily., Disp: 28 tablet, Rfl: 12    Family History: family history was reviewed and is noncontributory  Social History:   Social History     Social History    Marital status:      Spouse name: N/A    Number of children: N/A    Years of education: N/A     Occupational History    Not on file.     Social History Main Topics    Smoking status: Former Smoker     Packs/day: 0.25     Years: 2.00     Quit date: 01/2003    Smokeless tobacco: Never Used    Alcohol use No      Comment: Socially    Drug use: No    Sexual activity: Yes     Partners: Male      Comment:      Other Topics Concern    Not on file     Social History Narrative    No narrative on file       ROS:  Constitution: Negative for chills, fever, and sweats. Negative for unexplained weight  "loss.  HENT: Negative for headaches and blurry vision.   Cardiovascular: Negative for chest pain, irregular heartbeat, leg swelling and palpitations.   Respiratory: Negative for cough and shortness of breath.   Gastrointestinal: Negative for abdominal pain, heartburn, nausea and vomiting.   Genitourinary: Negative for bladder incontinence and dysuria.   Musculoskeletal: Negative for systemic arthritis, muscle weakness and myalgias.   Neurological: Negative for numbness.   Psychiatric/Behavioral: Negative for depression.  Endocrine: Negative for polyuria.   Hematologic/Lymphatic: Negative for bleeding disorders.   Skin: Negative for poor wound healing.        PHYSICAL EXAM:  Ht 5' 5" (1.651 m)   Wt 82.1 kg (181 lb)   LMP 12/04/2017 (Approximate)   BMI 30.12 kg/m²   Jaclyn Rausch is a well developed, well nourished female in no acute distress. Physical examination of the left foot and ankle evaluated the following:    Gait and Alignment  Inspection for ecchymosis, swelling, atrophy, or deformity  Inspection for intra-articular and/or bursal effusions  Tenderness to palpation over the bony and soft tissue structures around the ankle/foot  Range of Motion and presence of contractures  Sensation and motor strength  Anterior drawer and varus/valgus instability  Vascular exam to include skin temperature/color/capillary refill    Remarkable findings included:  No effusion or swelling noted.  Moderate tenderness on the plantar heel.  No tenderness over the plantar fascia origin.  Range of motion of the ankle and subtalar joint are within normal limits.  Skin is intact.  Sensation intact to the foot.          IMAGING:   X-rays of the left heel are personally reviewed.  No acute fractures or dislocations are seen.  No osteophytes bony abnormalities noted     ASSESSMENT:   Left heel pain, possible stress fracture    PLAN:  The nature of the diagnosis, using models and diagrams when appropriate, was explained to " the patient in detail.Treatment option discussed included observation, Cam Walker mobilization, MRI stress fracture.  As she is in the .  Like to know whether she truly has a stress fracture not as this will impact her work duties.  MRI ordered.  Follow-up after MRI has been performed discussed sulci further treatment recommendations.  Also, will consider cortisone injection right knee at the next visit.      This note was dictated using voice recognition software. Please excuse any grammatical or typographical errors.

## 2018-02-26 ENCOUNTER — HOSPITAL ENCOUNTER (OUTPATIENT)
Dept: RADIOLOGY | Facility: HOSPITAL | Age: 38
Discharge: HOME | End: 2018-02-26
Attending: ORTHOPAEDIC SURGERY
Payer: COMMERCIAL

## 2018-02-26 DIAGNOSIS — M79.672 LEFT FOOT PAIN: ICD-10-CM

## 2018-02-26 PROCEDURE — 73718 MRI LOWER EXTREMITY W/O DYE: CPT | Mod: 26,LT,, | Performed by: RADIOLOGY

## 2018-02-26 PROCEDURE — 73718 MRI LOWER EXTREMITY W/O DYE: CPT | Mod: TC,LT

## 2018-03-08 ENCOUNTER — OFFICE VISIT (OUTPATIENT)
Dept: ORTHOPEDICS | Facility: CLINIC | Age: 38
End: 2018-03-08
Payer: COMMERCIAL

## 2018-03-08 VITALS — BODY MASS INDEX: 30.16 KG/M2 | WEIGHT: 181 LBS | HEIGHT: 65 IN

## 2018-03-08 DIAGNOSIS — M22.41 CHONDROMALACIA, PATELLA, RIGHT: ICD-10-CM

## 2018-03-08 DIAGNOSIS — M25.561 ACUTE PAIN OF RIGHT KNEE: Primary | ICD-10-CM

## 2018-03-08 DIAGNOSIS — M79.672 PAIN OF LEFT HEEL: ICD-10-CM

## 2018-03-08 PROCEDURE — 99999 PR PBB SHADOW E&M-EST. PATIENT-LVL II: CPT | Mod: PBBFAC,,, | Performed by: ORTHOPAEDIC SURGERY

## 2018-03-08 PROCEDURE — 99214 OFFICE O/P EST MOD 30 MIN: CPT | Mod: S$GLB,,, | Performed by: ORTHOPAEDIC SURGERY

## 2018-03-08 PROCEDURE — 20610 DRAIN/INJ JOINT/BURSA W/O US: CPT | Mod: RT,S$GLB,, | Performed by: NURSE PRACTITIONER

## 2018-03-08 RX ORDER — TRIAMCINOLONE ACETONIDE 40 MG/ML
40 INJECTION, SUSPENSION INTRA-ARTICULAR; INTRAMUSCULAR
Status: DISCONTINUED | OUTPATIENT
Start: 2018-03-08 | End: 2018-03-09 | Stop reason: HOSPADM

## 2018-03-08 RX ADMIN — TRIAMCINOLONE ACETONIDE 40 MG: 40 INJECTION, SUSPENSION INTRA-ARTICULAR; INTRAMUSCULAR at 02:03

## 2018-03-09 NOTE — PROGRESS NOTES
"DATE: 3/8/2018  PATIENT: Jaclyn Rausch    Attending Physician: Salvatore Barnes M.D.    HISTORY:  Jaclyn Rausch is a 37 y.o. female who returns for follow up evaluation of  her left heel.  She did undergo an MRI which is negative for stress fracture, bone bruise or other pathology.  She still notes some discomfort.  States her pain at 6/10.  She's also complaining of right knee pain.  She presents today to discuss her MRI results and she requests cortisone injection to the right knee.    PMH/PSH/FamHx/SocHx:  Reviewed and unchanged from prior visit    ROS:  Constitution: Negative for chills, fever, and sweats. Negative for unexplained weight loss.  HENT: Negative for headaches and blurry vision.   Cardiovascular: Negative for chest pain, irregular heartbeat, leg swelling and palpitations.   Respiratory: Negative for cough and shortness of breath.   Gastrointestinal: Negative for abdominal pain, heartburn, nausea and vomiting.   Genitourinary: Negative for bladder incontinence and dysuria.   Musculoskeletal: Negative for systemic arthritis, joint swelling, muscle weakness and myalgias.   Neurological: Negative for numbness.   Psychiatric/Behavioral: Negative for depression.   Endocrine: Negative for polyuria.   Hematologic/Lymphatic: Negative for bleeding disorders.  Skin: Negative for poor wound healing.       EXAM:  Ht 5' 5" (1.651 m)   Wt 82.1 kg (181 lb)   LMP 12/04/2017 (Approximate)   BMI 30.12 kg/m²   Jaclyn Rausch is a well developed, well nourished female in no acute distress. Physical examination of the left foot and ankle evaluated the following:     Gait and Alignment  Inspection for ecchymosis, swelling, atrophy, or deformity  Inspection for intra-articular and/or bursal effusions  Tenderness to palpation over the bony and soft tissue structures around the ankle/foot  Range of Motion and presence of contractures  Sensation and motor strength  Anterior drawer " and varus/valgus instability  Vascular exam to include skin temperature/color/capillary refill     Remarkable findings included:  No effusion or swelling noted.  Moderate tenderness on the plantar heel.  No tenderness over the plantar fascia origin.  Range of motion of the ankle and subtalar joint are within normal limits.  Skin is intact.  Sensation intact to the foot.     Examination right knee reveals no ecchymosis, swelling or effusion.  Mild medial lateral joint line tenderness.  Range of motion 0-130°.  Notes exam.  Pain with patellofemoral compression.  Negative flexion pinch.  Negative    IMAGING:   MRI is personally reviewed and consistent with the radiologist's report.  Negative hindfoot MRI    ASSESSMENT:  Left heel pain, heel pad syndrome.  Chondromalacia patella right knee    PLAN:  The implications of the patient's evolution of symptoms and findings were explained to the patient in detail.  Went over the MRI with Jaclyn.  I recommended modification of activity, gel cups for her shoes and observation.  With regards to her right knee, cortisone injection performed today (see procedure note).  Should she symptomatic, she'll return for further treatment recommendations follow-up if not improving or worse          This note was dictated using voice recognition software. Please excuse any grammatical or typographical errors.

## 2018-04-24 ENCOUNTER — HOSPITAL ENCOUNTER (INPATIENT)
Facility: HOSPITAL | Age: 38
LOS: 2 days | Discharge: HOME | DRG: 065 | End: 2018-04-26
Attending: EMERGENCY MEDICINE | Admitting: INTERNAL MEDICINE
Payer: COMMERCIAL

## 2018-04-24 DIAGNOSIS — Z87.59 HISTORY OF MISCARRIAGE: ICD-10-CM

## 2018-04-24 DIAGNOSIS — I63.9 CVA (CEREBRAL VASCULAR ACCIDENT): ICD-10-CM

## 2018-04-24 DIAGNOSIS — R51.9 ACUTE NONINTRACTABLE HEADACHE, UNSPECIFIED HEADACHE TYPE: ICD-10-CM

## 2018-04-24 DIAGNOSIS — I25.3 ATRIAL SEPTAL ANEURYSM: ICD-10-CM

## 2018-04-24 DIAGNOSIS — R47.01 APHASIA: Primary | ICD-10-CM

## 2018-04-24 DIAGNOSIS — I63.9 ACUTE CVA (CEREBROVASCULAR ACCIDENT): ICD-10-CM

## 2018-04-24 LAB
ANION GAP SERPL CALC-SCNC: 9 MMOL/L
APTT BLDCRRT: 25.8 SEC
B-HCG UR QL: NEGATIVE
BASOPHILS # BLD AUTO: 0 K/UL
BASOPHILS NFR BLD: 0.4 %
BUN SERPL-MCNC: 10 MG/DL
CALCIUM SERPL-MCNC: 9 MG/DL
CHLORIDE SERPL-SCNC: 103 MMOL/L
CO2 SERPL-SCNC: 25 MMOL/L
CREAT SERPL-MCNC: 0.8 MG/DL
CTP QC/QA: YES
D DIMER PPP IA.FEU-MCNC: 0.67 MG/L FEU
DIFFERENTIAL METHOD: ABNORMAL
EOSINOPHIL # BLD AUTO: 0.1 K/UL
EOSINOPHIL NFR BLD: 1.1 %
ERYTHROCYTE [DISTWIDTH] IN BLOOD BY AUTOMATED COUNT: 15 %
EST. GFR  (AFRICAN AMERICAN): >60 ML/MIN/1.73 M^2
EST. GFR  (NON AFRICAN AMERICAN): >60 ML/MIN/1.73 M^2
FIBRINOGEN PPP-MCNC: 224 MG/DL
GLUCOSE SERPL-MCNC: 106 MG/DL
HCT VFR BLD AUTO: 38.2 %
HCYS SERPL-SCNC: 7.7 UMOL/L
HGB BLD-MCNC: 12.6 G/DL
INR PPP: 1
LYMPHOCYTES # BLD AUTO: 2.3 K/UL
LYMPHOCYTES NFR BLD: 31.8 %
MCH RBC QN AUTO: 28.4 PG
MCHC RBC AUTO-ENTMCNC: 33.1 G/DL
MCV RBC AUTO: 86 FL
MONOCYTES # BLD AUTO: 0.5 K/UL
MONOCYTES NFR BLD: 6.9 %
NEUTROPHILS # BLD AUTO: 4.3 K/UL
NEUTROPHILS NFR BLD: 59.8 %
PLATELET # BLD AUTO: 331 K/UL
PMV BLD AUTO: 7.2 FL
POTASSIUM SERPL-SCNC: 3.6 MMOL/L
PROTHROMBIN TIME: 10.3 SEC
RBC # BLD AUTO: 4.45 M/UL
SODIUM SERPL-SCNC: 137 MMOL/L
WBC # BLD AUTO: 7.2 K/UL

## 2018-04-24 PROCEDURE — 92610 EVALUATE SWALLOWING FUNCTION: CPT

## 2018-04-24 PROCEDURE — 99223 1ST HOSP IP/OBS HIGH 75: CPT | Mod: ,,, | Performed by: INTERNAL MEDICINE

## 2018-04-24 PROCEDURE — G8987 SELF CARE CURRENT STATUS: HCPCS | Mod: CH

## 2018-04-24 PROCEDURE — G8978 MOBILITY CURRENT STATUS: HCPCS | Mod: CH

## 2018-04-24 PROCEDURE — 97161 PT EVAL LOW COMPLEX 20 MIN: CPT

## 2018-04-24 PROCEDURE — 81240 F2 GENE: CPT

## 2018-04-24 PROCEDURE — 83695 ASSAY OF LIPOPROTEIN(A): CPT

## 2018-04-24 PROCEDURE — 99285 EMERGENCY DEPT VISIT HI MDM: CPT | Mod: 25

## 2018-04-24 PROCEDURE — G8979 MOBILITY GOAL STATUS: HCPCS | Mod: CH

## 2018-04-24 PROCEDURE — 85280 CLOT FACTOR XII HAGEMAN: CPT

## 2018-04-24 PROCEDURE — 25000003 PHARM REV CODE 250: Performed by: INTERNAL MEDICINE

## 2018-04-24 PROCEDURE — 94761 N-INVAS EAR/PLS OXIMETRY MLT: CPT

## 2018-04-24 PROCEDURE — 85240 CLOT FACTOR VIII AHG 1 STAGE: CPT

## 2018-04-24 PROCEDURE — 85613 RUSSELL VIPER VENOM DILUTED: CPT

## 2018-04-24 PROCEDURE — 96374 THER/PROPH/DIAG INJ IV PUSH: CPT

## 2018-04-24 PROCEDURE — 97165 OT EVAL LOW COMPLEX 30 MIN: CPT

## 2018-04-24 PROCEDURE — 11000001 HC ACUTE MED/SURG PRIVATE ROOM

## 2018-04-24 PROCEDURE — 85260 CLOT FACTOR X STUART-POWER: CPT

## 2018-04-24 PROCEDURE — 85250 CLOT FACTOR IX PTC/CHRSTMAS: CPT

## 2018-04-24 PROCEDURE — 85379 FIBRIN DEGRADATION QUANT: CPT

## 2018-04-24 PROCEDURE — 93005 ELECTROCARDIOGRAM TRACING: CPT

## 2018-04-24 PROCEDURE — G8989 SELF CARE D/C STATUS: HCPCS | Mod: CH

## 2018-04-24 PROCEDURE — 93010 ELECTROCARDIOGRAM REPORT: CPT | Mod: ,,, | Performed by: INTERNAL MEDICINE

## 2018-04-24 PROCEDURE — 85300 ANTITHROMBIN III ACTIVITY: CPT

## 2018-04-24 PROCEDURE — 83090 ASSAY OF HOMOCYSTEINE: CPT

## 2018-04-24 PROCEDURE — 85025 COMPLETE CBC W/AUTO DIFF WBC: CPT

## 2018-04-24 PROCEDURE — 85384 FIBRINOGEN ACTIVITY: CPT

## 2018-04-24 PROCEDURE — 85302 CLOT INHIBIT PROT C ANTIGEN: CPT

## 2018-04-24 PROCEDURE — 85610 PROTHROMBIN TIME: CPT

## 2018-04-24 PROCEDURE — 86147 CARDIOLIPIN ANTIBODY EA IG: CPT | Mod: 59

## 2018-04-24 PROCEDURE — 85303 CLOT INHIBIT PROT C ACTIVITY: CPT

## 2018-04-24 PROCEDURE — 85730 THROMBOPLASTIN TIME PARTIAL: CPT

## 2018-04-24 PROCEDURE — 63600175 PHARM REV CODE 636 W HCPCS: Performed by: EMERGENCY MEDICINE

## 2018-04-24 PROCEDURE — G8980 MOBILITY D/C STATUS: HCPCS | Mod: CH

## 2018-04-24 PROCEDURE — 85305 CLOT INHIBIT PROT S TOTAL: CPT | Mod: 91

## 2018-04-24 PROCEDURE — 86147 CARDIOLIPIN ANTIBODY EA IG: CPT

## 2018-04-24 PROCEDURE — G8988 SELF CARE GOAL STATUS: HCPCS | Mod: CH

## 2018-04-24 PROCEDURE — 85230 CLOT FACTOR VII PROCONVERTIN: CPT

## 2018-04-24 PROCEDURE — 36415 COLL VENOUS BLD VENIPUNCTURE: CPT

## 2018-04-24 PROCEDURE — 81025 URINE PREGNANCY TEST: CPT | Performed by: EMERGENCY MEDICINE

## 2018-04-24 PROCEDURE — 81241 F5 GENE: CPT

## 2018-04-24 PROCEDURE — 80048 BASIC METABOLIC PNL TOTAL CA: CPT

## 2018-04-24 PROCEDURE — 25000003 PHARM REV CODE 250: Performed by: EMERGENCY MEDICINE

## 2018-04-24 PROCEDURE — 85305 CLOT INHIBIT PROT S TOTAL: CPT

## 2018-04-24 RX ORDER — METOCLOPRAMIDE HYDROCHLORIDE 5 MG/ML
10 INJECTION INTRAMUSCULAR; INTRAVENOUS
Status: COMPLETED | OUTPATIENT
Start: 2018-04-24 | End: 2018-04-24

## 2018-04-24 RX ORDER — ASPIRIN 325 MG
325 TABLET ORAL DAILY
Status: DISCONTINUED | OUTPATIENT
Start: 2018-04-24 | End: 2018-04-26 | Stop reason: HOSPADM

## 2018-04-24 RX ORDER — ACETAMINOPHEN 500 MG
1000 TABLET ORAL
Status: COMPLETED | OUTPATIENT
Start: 2018-04-24 | End: 2018-04-24

## 2018-04-24 RX ORDER — ACETAMINOPHEN 325 MG/1
650 TABLET ORAL EVERY 6 HOURS PRN
Status: DISCONTINUED | OUTPATIENT
Start: 2018-04-24 | End: 2018-04-26 | Stop reason: HOSPADM

## 2018-04-24 RX ADMIN — ACETAMINOPHEN 1000 MG: 500 TABLET, FILM COATED ORAL at 08:04

## 2018-04-24 RX ADMIN — ASPIRIN 325 MG ORAL TABLET 325 MG: 325 PILL ORAL at 11:04

## 2018-04-24 RX ADMIN — METOCLOPRAMIDE 10 MG: 5 INJECTION, SOLUTION INTRAMUSCULAR; INTRAVENOUS at 08:04

## 2018-04-24 RX ADMIN — ACETAMINOPHEN 650 MG: 325 TABLET, FILM COATED ORAL at 06:04

## 2018-04-24 NOTE — PLAN OF CARE
Problem: Patient Care Overview  Goal: Plan of Care Review  PT eval completed- pt presents with functional mobility/ good balance- no acute PT needs

## 2018-04-24 NOTE — PT/OT/SLP EVAL
Physical Therapy Evaluation and Discharge Note    Patient Name:  Jaclyn Rausch   MRN:  [id number]    Recommendations:     Discharge Recommendations:  home   Discharge Equipment Recommendations: none   Barriers to discharge: None    Assessment:     Jaclyn Rausch is a 37 y.o. female admitted with a medical diagnosis of <principal problem not specified>. .  At this time, patient is functioning at their prior level of function and does not require further acute PT services.     Recent Surgery: * No surgery found *      Plan:     During this hospitalization, patient does not require further acute PT services.  Please re-consult if situation changes.     Plan of Care Reviewed with: patient, spouse    Subjective     Communicated with nurse hernan prior to session.  Patient found supine upon PT entry to room, agreeable to evaluation.    Stated back to normal with L sided numbness and speech difficulty resolved  Spouse at bedside and supportive  Pt is a  personnel with office at [employer]    Chief Complaint: none  Patient comments/goals: get well  Pain/Comfort:  · Pain Rating 1: 0/10    Patients cultural, spiritual, Moravian conflicts given the current situation:      Living Environment:  Home with spouse and 3 children 10,6 and 2  Prior to admission, patients level of function was independent, employed.  Patient has the following equipment: none.  DME owned (not currently used): none.  Upon discharge, patient will have assistance from family.    Objective:     Patient found with: telemetry     General Precautions: Standard,     Orthopedic Precautions:N/A   Braces: N/A     Exams:  · Gross Motor Coordination:  WFL  · RLE ROM: WFL  · RLE Strength: WFL  · LLE ROM: WFL  · LLE Strength: WFL    Functional Mobility:  · Bed Mobility:     · Rolling Right: independence  · Scooting: independence  · Bridging: independence  · Supine to Sit: independence  · Sit to Supine: independence  · Transfers:      · Sit to Stand:  independence with no AD  · Gait: 250ft with no drift  · Balance: good- completed braiding, tandem, unilateral leg stand with no loss of balance    AM-PAC 6 CLICK MOBILITY  Total Score:21       Therapeutic Activities and Exercises:   gait to hallways  Balance and coordination testing no deficits    Patient left supine with all lines intact, call button in reach and US tech at bedside present.    GOALS:    Physical Therapy Goals     Not on file                History:     Past Medical History:   Diagnosis Date    Anemia     slightly    Atrial septal aneurysm     Dr. Frank; last visit 1/2018       Past Surgical History:   Procedure Laterality Date    BUNIONECTOMY Left 2001       Clinical Decision Making:     History  Co-morbidities and personal factors that may impact the plan of care Examination  Body Structures and Functions, activity limitations and participation restrictions that may impact the plan of care Clinical Presentation   Decision Making/ Complexity Score   Co-morbidities:   [] Time since onset of injury / illness / exacerbation  [] Status of current condition  []Patient's cognitive status and safety concerns    [] Multiple Medical Problems (see med hx)  Personal Factors:   [] Patient's age  [] Prior Level of function   [] Patient's home situation (environment and family support)  [] Patient's level of motivation  [] Expected progression of patient      HISTORY:(criteria)    [] 43262 - no personal factors/history    [] 15169 - has 1-2 personal factor/comorbidity     [] 10439 - has >3 personal factor/comorbidity     Body Regions:  [] Objective examination findings  [] Head     []  Neck  [] Trunk   [] Upper Extremity  [] Lower Extremity    Body Systems:  [] For communication ability, affect, cognition, language, and learning style: the assessment of the ability to make needs known, consciousness, orientation (person, place, and time), expected emotional /behavioral responses, and  learning preferences (eg, learning barriers, education  needs)  [] For the neuromuscular system: a general assessment of gross coordinated movement (eg, balance, gait, locomotion, transfers, and transitions) and motor function  (motor control and motor learning)  [] For the musculoskeletal system: the assessment of gross symmetry, gross range of motion, gross strength, height, and weight  [] For the integumentary system: the assessment of pliability(texture), presence of scar formation, skin color, and skin integrity  [] For cardiovascular/pulmonary system: the assessment of heart rate, respiratory rate, blood pressure, and edema     Activity limitations:    [] Patient's cognitive status and saf ety concerns          [] Status of current condition      [] Weight bearing restriction  [] Cardiopulmunary Restriction    Participation Restrictions:   [] Goals and goal agreement with the patient     [] Rehab potential (prognosis) and probable outcome      Examination of Body System: (criteria)    [] 14169 - addressing 1-2 elements    [] 88295 - addressing a total of 3 or more elements     [] 79301 -  Addressing a total of 4 or more elements         Clinical Presentation: (criteria)  Choose one     On examination of body system using standardized tests and measures patient presents with (CHOOSE ONE) elements from any of the following: body structures and functions, activity limitations, and/or participation restrictions.  Leading to a clinical presentation that is considered (CHOOSE ONE)                              Clinical Decision Making  (Eval Complexity):  Choose One     Time Tracking:     PT Received On: 04/24/18  PT Start Time: 1131     PT Stop Time: 1144  PT Total Time (min): 13 min     Billable Minutes: Evaluation 13      Nakia Brock, PT  04/24/2018

## 2018-04-24 NOTE — ED TRIAGE NOTES
Pt reports waking up with L side facial droop and aphasia.  reports pt was making sound but not making sense. Pt reports hearing was funny during episode. Episode lasted approx 40 min. Pt reports last night she had a headache and seeing spots. Has a hx of migraines but hasn't had one in a while. Pt reports a septic aneurysm that she sees a doctor once a year for. No daily medications and that her blood pressure is typically high at doctors office but at home it is normal. Currently she is speaking in clear sentences with appropriate movements. Equal strength in extremities. No vision loss.

## 2018-04-24 NOTE — H&P
"PCP: Primary Doctor No    History & Physical    Chief Complaint: Episode of left facial droop and aphasia    History of Present Illness:  Patient is a 37 y.o. female admitted to Hospitalist Service from [facility] Room with complaint of episode of left facial droop and aphasia. Patient reportedly has past medical history significant for chronic anemia and history of ASD and history of migraine. Patient presented with difficulty speaking for ~40 minutes with associated left lower face numbness & weakness. The speech difficulty began upon waking up this morning. She reports going to bed at 10 PM last night when a migraine and states that it was similar to her typical migraines including a pre-aura and seeing "spots." The patient was able to make sounds. She reports prior similar symptoms to when she was in highschool that resolved on its own. Currently, the speech difficulty resolved PTA. The patient also states that she felt LUE numbness as well as RUE weakness as she drank lifted her cup of water to drink this morning. She was recently on hormone therapy for a month after a D&C earlier this year secondary to an  in the first trimester in 2018. The patient has taking Imitrex previously for her migraines but states that she had a reaction to it. Patient denied chest pain, shortness of breath, abdominal pain, nausea, vomiting, headache, vision changes, cough or fever.    Past Medical History:   Diagnosis Date    Anemia     slightly    Atrial septal aneurysm     Dr. Frank; last visit 2018     Past Surgical History:   Procedure Laterality Date    BUNIONECTOMY Left      Family History   Problem Relation Age of Onset    Diabetes Maternal Grandmother     Lung cancer Maternal Grandfather     Colon cancer Maternal Grandfather     Hypertension Mother     Hearing loss Father     Hypertension Brother     Breast cancer Neg Hx     Ovarian cancer Neg Hx      Social History "   Substance Use Topics    Smoking status: Former Smoker     Packs/day: 0.25     Years: 2.00     Quit date: 01/2003    Smokeless tobacco: Never Used    Alcohol use No      Comment: Socially      Review of patient's allergies indicates:  No Known Allergies  No prescriptions prior to admission.     Review of Systems:  Constitutional: no fever or chills  Eyes: no visual changes  Ears, nose, mouth, throat, and face: no nasal congestion or sore throat  Respiratory: no cough or shorness of breath  Cardiovascular: no chest pain or palpitations  Gastrointestinal: no nausea or vomiting, no abdominal pain or change in bowel habits  Genitourinary: no hematuria or dysuria  Integument/breast: no rash or pruritis  Hematologic/lymphatic: no easy bruising or lymphadenopathy  Musculoskeletal: no arthralgias or myalgias  Neurological: no seizures or tremors. See HPI.   Behavioral/Psych: no auditory or visual hallucinations  Endocrine: no heat or cold intolerance     OBJECTIVE:     Vital Signs (Most Recent)  Temp: 97.9 °F (36.6 °C) (04/24/18 0944)  Pulse: 60 (04/24/18 0944)  Resp: 16 (04/24/18 0944)  BP: 124/70 (04/24/18 0944)  SpO2: 98 % (04/24/18 0944)    Physical Exam:  General appearance: well developed, appears stated age  Head: normocephalic, atraumatic  Eyes:  conjunctivae/corneas clear. PERRL.  Nose: Nares normal. Septum midline.  Throat: lips, mucosa, and tongue normal; teeth and gums normal, no throat erythema.  Neck: supple, symmetrical, trachea midline, no JVD and thyroid not enlarged, symmetric, no tenderness/mass/nodules  Lungs:  clear to auscultation bilaterally and normal respiratory effort  Chest wall: no tenderness  Heart: regular rate and rhythm, S1, S2 normal, no murmur, click, rub or gallop  Abdomen: soft, non-tender non-distented; bowel sounds normal; no masses,  no organomegaly  Extremities: no cyanosis, clubbing or edema.   Pulses: 2+ and symmetric  Skin: Skin color, texture, turgor normal. No rashes or  lesions.  Lymph nodes: Cervical, supraclavicular, and axillary nodes normal.  Neurologic: Normal strength and tone. No focal numbness or weakness. CNII-XII intact.      Laboratory:   CBC:   Recent Labs  Lab 04/24/18  0804   WBC 7.20   RBC 4.45   HGB 12.6   HCT 38.2      MCV 86   MCH 28.4   MCHC 33.1     CMP:   Recent Labs  Lab 04/24/18  0804      CALCIUM 9.0      K 3.6   CO2 25      BUN 10   CREATININE 0.8   UPT: Negative    Diagnostic Results:  CT Head: There is no acute abnormality.  There is a tiny chronic lacunar infarction in the superior left cerebellum.  There is no intracranial hemorrhage, mass/mass effect, acute edema or ischemia.  It should be noted that MRI is more sensitive in the detection of subtle or acute nonhemorrhagic ischemic disease.    Lower extremity venous doppler scan: No evidence of deep venous thrombosis in either lower extremity.    Carotid US: No evidence of a hemodynamically significant carotid bifurcation stenosis.    MRI Brain:   1. There is a single punctate 4.5 mm focus of restricted diffusion high in the lateral left frontal lobe consistent with an acute nonhemorrhagic infarction.  2. There is a chronic lacunar infarction in the superior left cerebellum.  3. There is no intracranial hemorrhage, mass effect.     Assessment/Plan:     Active Hospital Problems    Diagnosis  POA    *Acute CVA (cerebrovascular accident) -  lateral left frontal lobe, non-hemorrhagic [I63.9]  Yes    Aphasia [R47.01]  Yes    Atrial septal aneurysm [I25.3]  Yes     Dr. Frank; last visit 1/2018     Admit to telemetry floor.  Neurochecks q 4 hrs x 24 hrs.  Fall and seizure precautions.  NPO until evaluated by speech therapist for swallowing assessment.  Start Aspirin 325 mg po q day. No t-PA given.  Dr. Roca to see the patient.  2 D ECHO for evaluation of intra-cardiac thrombus or valvular heart disease. Case discussed with Dr. Frank who is planning to perform JUAN in am. May  need long term anticoagulation. Also consult hematologist for possible hypercoagulable work up. Check ALMA.  Check Lipid Profile.  Consult Physical Therapy and Occupational therapy for evaluation and treatment.      DVT prophylaxis: Use SCD and TEDs. Avoiding anticoagulation for possible hemorrhagic conversion of CVA.    Evelyn Norman MD  Department of Hospital Medicine   [facility]

## 2018-04-24 NOTE — ED PROVIDER NOTES
"Encounter Date: 2018    SCRIBE #1 NOTE: I, Ludy Hancock, am scribing for, and in the presence of, Dr. Mccarthy.       History     Chief Complaint   Patient presents with    Aphasia     40 minute episode this am, resolved now.       2018 7:40 AM     Chief complaint: Speech difficulty      Jaclyn Rausch is a 37 y.o. female with possible HTN and migraine HA's who presents to the ED with an onset of acute difficulty speaking for ~40 minutes with associated left lower face numbness & weakness. The speech difficulty began upon waking up this morning. She reports going to bed at 10 PM last night when a migraine and states that it was similar to her typical migraines including a pre-aura and seeing "spots." The patient was able to make sounds. She reports prior similar symptoms to when she was in highschool that resolved on its own. Currently, the speech difficulty resolved PTA. The patient also states that she felt LUE numbness as well as RUE weakness as she drank lifted her cup of water to drink this morning. She was recently on hormone therapy for a month after a D&C earlier this year secondary to an  in the first trimester in 2018. The patient has taking Imitrex previously for her migraines but states that she had a reaction to it. She denies being on antihypertensive medications, visual changes, or any other symptoms at this time. No additional pertinent SHx noted.       The history is provided by the patient and the spouse ().     Review of patient's allergies indicates:  No Known Allergies  Past Medical History:   Diagnosis Date    Anemia     slightly    Atrial septal aneurysm     Dr. Frank; last visit 2018     Past Surgical History:   Procedure Laterality Date    BUNIONECTOMY Left      Family History   Problem Relation Age of Onset    Diabetes Maternal Grandmother     Lung cancer Maternal Grandfather     Colon cancer Maternal Grandfather     Hypertension " Mother     Hearing loss Father     Hypertension Brother     Breast cancer Neg Hx     Ovarian cancer Neg Hx      Social History   Substance Use Topics    Smoking status: Former Smoker     Packs/day: 0.25     Years: 2.00     Quit date: 01/2003    Smokeless tobacco: Never Used    Alcohol use No      Comment: Socially     Review of Systems   Constitutional: Negative for fever.   HENT: Negative for sore throat.    Eyes: Negative for visual disturbance.   Respiratory: Negative for shortness of breath.    Cardiovascular: Negative for chest pain.   Gastrointestinal: Negative for nausea.   Genitourinary: Negative for dysuria.   Musculoskeletal: Negative for back pain.   Skin: Negative for rash.   Neurological: Positive for speech difficulty (resolved), weakness (left lower face & RUE), numbness (left lower face & LUE) and headaches.   Hematological: Does not bruise/bleed easily.     Physical Exam     Initial Vitals [04/24/18 0709]   BP Pulse Resp Temp SpO2   (!) 180/109 80 14 98.5 °F (36.9 °C) 100 %      MAP       132.67         Physical Exam    Nursing note and vitals reviewed.  Constitutional: She appears well-developed and well-nourished. She is not diaphoretic. No distress.   HENT:   Head: Normocephalic and atraumatic.   Mouth/Throat: Oropharynx is clear and moist.   Eyes: Conjunctivae are normal.   Neck: Neck supple.   Cardiovascular: Normal rate, regular rhythm, normal heart sounds and intact distal pulses. Exam reveals no gallop and no friction rub.    No murmur heard.  Pulmonary/Chest: Breath sounds normal. She has no wheezes. She has no rhonchi. She has no rales.   Abdominal: Soft. She exhibits no distension. There is no tenderness.   Musculoskeletal: Normal range of motion.   Neurological: She is alert and oriented to person, place, and time. A sensory deficit is present.   Mild subjective decreased touch sensation to the left lower face. Otherwise, CN III-XII intact. Fluid speech. No excessive or receptive  aphasia. No pronator or leg drift. Equal sensation to light touch in BLE's.    Skin: No rash noted. No erythema.   Psychiatric: She has a normal mood and affect. Her speech is normal and behavior is normal. Judgment and thought content normal.       ED Course   Procedures  Labs Reviewed   CBC W/ AUTO DIFFERENTIAL - Abnormal; Notable for the following:        Result Value    RDW 15.0 (*)     MPV 7.2 (*)     All other components within normal limits   BASIC METABOLIC PANEL   POCT URINE PREGNANCY      Imaging Results          CT Head Without Contrast (Final result)  Result time 04/24/18 08:38:10    Final result by Go Panda MD (04/24/18 08:38:10)                 Impression:      There is no acute abnormality.  There is a tiny chronic lacunar infarction in the superior left cerebellum.  There is no intracranial hemorrhage, mass/mass effect, acute edema or ischemia.  It should be noted that MRI is more sensitive in the detection of subtle or acute nonhemorrhagic ischemic disease.      Electronically signed by: Go Panda MD  Date:    04/24/2018  Time:    08:38             Narrative:    EXAMINATION:  CT HEAD WITHOUT CONTRAST    CLINICAL HISTORY:  Resolved L facial numbness/weakness and aphasia;    TECHNIQUE:  Routine unenhanced axial images were obtained through the head.  Sagittal and coronal reformatted images were created.  The study is reviewed in bone and soft tissue windows.    COMPARISON:  None    FINDINGS:  Intracranial contents: There is no acute intracranial abnormality.  Brain volume, ventricular size and position are normal.  There is no intracranial hemorrhage or mass effect.  There is a tiny chronic lacunar infarction in the superior left cerebellum.  Otherwise, there are no definite regions of abnormal attenuation in the brain.  There is no hydrocephalus or midline shift.  There is no abnormal extra-axial fluid collection.  The basilar cisterns are open.  Cerebellar tonsils are in normal  position.  Sellar structures are normal.    Extracranial contents, calvarium, soft tissues: The calvarium is normal.  The included paranasal sinuses and mastoid air cells are clear.                            (radiology reading, visualized by me)        Medical Decision Making:   History:   Old Medical Records: I decided to obtain old medical records.  Clinical Tests:   Lab Tests: Reviewed and Ordered  The following lab test(s) were unremarkable: PTT  Radiological Study: Ordered and Reviewed  Medical Tests: Reviewed and Ordered            Scribe Attestation:   Scribe #1: I performed the above scribed service and the documentation accurately describes the services I performed. I attest to the accuracy of the note.    I, Dr. Haroldo Mccarthy, personally performed the services described in this documentation. All medical record entries made by the scribe were at my direction and in my presence.  I have reviewed the chart and agree that the record reflects my personal performance and is accurate and complete. Haroldo Mccarthy MD.  2:07 PM 04/24/2018    Jaclyn Rausch is a 37 y.o. female presenting with transient aphasia and left lower facial weakness and numbness concerning for possible TIA versus complex migraine.  Further observation and imaging planned with neurology consultation.  No sign of intracranial hemorrhage on CT.  No ongoing symptoms or objective findings on exam other than possible slight lower facial paresthesia.  Balance of evidence favors complex migraine.  She does not have an established history of this other than one possible remote episode.  Medications for headache and in the emergency department.  I have spoken with hospitalist service who will assume care.           Clinical Impression:     1. Aphasia    2. Acute nonintractable headache, unspecified headache type    3. CVA (cerebral vascular accident)          Disposition:   Disposition: Admitted                      Haroldo SHIRIN  MD Fabio  04/24/18 0631

## 2018-04-24 NOTE — PLAN OF CARE
Problem: SLP Goal  Goal: SLP Goal    Clinical swallowing evaluation completed. All po trials tolerated with no overt s/s swallow dysfunction. REC Regular textures with thin liquids. Pt reports language back at baseline. No obvious aphasia or cognititve impairment, however, consider full SLP evaluation.      Edwina Jaimes MS, CCC/SLP

## 2018-04-24 NOTE — PT/OT/SLP EVAL
Occupational Therapy   Evaluation and Discharge Note    Name: Jaclyn Rausch  MRN: [id number]  Admitting Diagnosis:  <principal problem not specified>      Recommendations:     Discharge Recommendations: home  Discharge Equipment Recommendations:  none  Barriers to discharge:  None    History:     Occupational Profile:  Living Environment: Pt lives with her  and 3 young children in a [facility].  Previous level of function: Pt was independent with all I/ADL's and worked full time at a desk job.  Roles and Routines: Wife, mother, employee  Equipment Owned:  none  Assistance upon Discharge:  can assist pt.    Past Medical History:   Diagnosis Date    Anemia     slightly    Atrial septal aneurysm     Dr. Frank; last visit 1/2018       Past Surgical History:   Procedure Laterality Date    BUNIONECTOMY Left 2001       Subjective     Chief Complaint: None  Patient/Family stated goals: To go home soon  Communicated with: nurse Selina prior to session.  Pain/Comfort:  · Pain Rating 1: 0/10  · Pain Rating Post-Intervention 1: 0/10    Patients cultural, spiritual, Orthodoxy conflicts given the current situation:      Objective:     Patient found with: telemetry    General Precautions: Standard,     Orthopedic Precautions:N/A   Braces: N/A     Occupational Performance:    Bed Mobility:    · Patient completed Rolling/Turning to Left with  independence  · Patient completed Scooting/Bridging with independence  · Patient completed Supine to Sit with independence  · Patient completed Sit to Supine with independence    Functional Mobility/Transfers:  · Patient completed Sit <> Stand Transfer with independence  with  no assistive device   · Patient completed Toilet Transfer Stand Pivot technique with independence with  no AD  · Patient completed  Shower Transfer Step Transfer technique with independence with no AD  · Functional Mobility: Pt walked independently in room and bathroom with no LOB.    Activities  "of Daily Living:  · Independent for all tasks    Cognitive/Visual Perceptual:  Cognitive/Psychosocial Skills:     -       Oriented to: Person, Place, Time and Situation   -       Follows Commands/attention:Follows multistep  commands  -       Communication: clear/fluent  -       Memory: No Deficits noted  -       Safety awareness/insight to disability: intact   -       Mood/Affect/Coping skills/emotional control: Appropriate to situation, Cooperative and Pleasant  Visual/Perceptual:      -Intact    Physical Exam:  Balance:    -       Normal static and dynamic sitting and standing  Postural examination/scapula alignment:    -       No postural abnormalities identified  Skin integrity: Visible skin intact  Edema:  None noted  Sensation:    -       Intact  Dominant hand:    -       right  Upper Extremity Range of Motion:     -       Right Upper Extremity: WNL  -       Left Upper Extremity: WNL  Upper Extremity Strength:    -       Right Upper Extremity: WNL  -       Left Upper Extremity: WNL   Strength:    -       Right Upper Extremity: WNL  -       Left Upper Extremity: WNL  Fine Motor Coordination:    -       Intact  Gross motor coordination:   WFL    Patient left supine with all lines intact, call button in reach and  present    AMPAC 6 Click:  AMPAC Total Score: 24    Treatment & Education:  OT ed pt on OT role & discharge recommendations.  Education:    Assessment:     Jaclyn Rausch is a 37 y.o. female with a medical diagnosis of <principal problem not specified>. At this time, patient is functioning at their prior level of function and does not require further acute OT services.     Clinical Decision Makin.  OT Low:  "Pt evaluation falls under low complexity for evaluation coding due to performance deficits noted in 1-3 areas as stated above and 0 co-morbities affecting current functional status. Data obtained from problem focused assessments. No modifications or assistance was " "required for completion of evaluation. Only brief occupational profile and history review completed."     Plan:     During this hospitalization, patient does not require further acute OT services.  Please re-consult if situation changes.    · Plan of Care Reviewed with: patient, spouse    This Plan of care has been discussed with the patient who was involved in its development and understands and is in agreement with the identified goals and treatment plan    GOALS:    Occupational Therapy Goals     Not on file                Time Tracking:     OT Date of Treatment: 04/24/18  OT Start Time: 1349  OT Stop Time: 1358  OT Total Time (min): 9 min    Billable Minutes:Evaluation 9    ALYSSA Drew  4/24/2018    "

## 2018-04-24 NOTE — PLAN OF CARE
04/24/18 1000   PRE-TX-[id number]   O2 Device (Oxygen Therapy) room air   SpO2 98 %   Pulse Oximetry Type Intermittent   $ Pulse Oximetry - Multiple Charge Pulse Oximetry - Multiple   Pulse 60   Resp 16

## 2018-04-24 NOTE — PLAN OF CARE
Problem: Patient Care Overview  Goal: Plan of Care Review  Outcome: Ongoing (interventions implemented as appropriate)   04/24/18 9539   Coping/Psychosocial   Plan Of Care Reviewed With patient;spouse   Pt denies pain. NAD noted. POC reviewed w pt and they verbalized understanding. Tele-SR     Pt free from falls, Pt ambulates to the bathroom. Bed in low position, side rails up x 2. Call light in reach,  and wheels locked.   NC intact.   Pt denies numbness or dizziness

## 2018-04-24 NOTE — CONSULTS
[facility]  Hematology/Oncology  Consult Note    Patient Name: Jaclyn Rausch  MRN: [id number]  Admission Date: 2018  Hospital Length of Stay: 0 days  Code Status: Full Code   Attending Provider: Evelyn Norman MD  Consulting Provider: Jesus Sprague MD  Primary Care Physician: Primary Doctor No  Principal Problem: hypercoag workup    Consults  Subjective:     HPI:   38 yo female who presented to ED at [facility] with new onset aphasia and left facial droop. She has been admitted to the hospitalist service of Dr Norman and he has placed consult to hematology for a hypercoagulapathy work-up. She reported having one of her typical migraine-like HA's the night before and awoke in the am with speech difficulty. She has been seen by Dr Frank with cardiology and Dr Mccarthy with neurology has been consulted. She had a recent D&C in 2018 following a first trimester fetal demise/ and she had been on hormone therapy for about a month afterwards. She is currently laying in bed. Her neurological deficits seem to have resolved. She is speaking clearly, no facial droop and no discernable weakness is appreciated at this time. Her  and another male family member are at bedside. She denies any CP, SOB, HA's or N/V. I discussed with her floor nurse.         Medications:  Continuous Infusions:  Scheduled Meds:   aspirin  325 mg Oral Daily     PRN Meds:     Review of patient's allergies indicates:  No Known Allergies     Past Medical History:   Diagnosis Date    Anemia     slightly    Atrial septal aneurysm     Dr. Frank; last visit 2018     Past Surgical History:   Procedure Laterality Date    BUNIONECTOMY Left      Family History     Problem Relation (Age of Onset)    Colon cancer Maternal Grandfather    Diabetes Maternal Grandmother    Hearing loss Father    Hypertension Mother, Brother    Lung cancer Maternal Grandfather        Social History Main  "Topics    Smoking status: Former Smoker     Packs/day: 0.25     Years: 2.00     Quit date: 01/2003    Smokeless tobacco: Never Used    Alcohol use No      Comment: Socially    Drug use: No    Sexual activity: Yes     Partners: Male      Comment:        Review of Systems   Constitutional: Negative.    HENT: Negative.    Eyes: Negative.    Respiratory: Negative.    Cardiovascular: Negative.    Gastrointestinal: Negative.    Genitourinary: Negative.    Musculoskeletal: Negative.    Neurological: Negative.    Psychiatric/Behavioral: Negative.    :    Review of Systems   Constitutional: Negative.    HENT: Negative.    Eyes: Negative.    Respiratory: Negative.    Cardiovascular: Negative.    Gastrointestinal: Negative.    Genitourinary: Negative.    Musculoskeletal: Negative.    Neurological: Negative.    Endo/Heme/Allergies: Negative.    Psychiatric/Behavioral: Negative.      Objective:     Vital Signs (Most Recent):  Temp: 97.9 °F (36.6 °C) (04/24/18 0944)  Pulse: 87 (04/24/18 1416)  Resp: 16 (04/24/18 1000)  BP: 124/70 (04/24/18 1416)  SpO2: 98 % (04/24/18 1000) Vital Signs (24h Range):  Temp:  [97.9 °F (36.6 °C)-98.5 °F (36.9 °C)] 97.9 °F (36.6 °C)  Pulse:  [52-87] 87  Resp:  [14-16] 16  SpO2:  [98 %-100 %] 98 %  BP: (124-180)/() 124/70     Weight: 78 kg (172 lb)  Body mass index is 28.62 kg/m².  Body surface area is 1.89 meters squared.    No intake or output data in the 24 hours ending 04/24/18 1424    Physical Exam:  /70   Pulse 87   Temp 97.9 °F (36.6 °C) (Oral)   Resp 16   Ht 5' 5" (1.651 m)   Wt 78 kg (172 lb)   LMP 04/17/2018 (Approximate)   SpO2 98%   Breastfeeding? No   BMI 28.62 kg/m²   General appearance: alert, appears stated age, cooperative and no distress  Head: Normocephalic, without obvious abnormality, atraumatic  Eyes: conjunctivae/corneas clear. PERRL, EOM's intact. Fundi benign., PERRL, EOMI, nonicteric sclera  Ears: external left and right ears wnl  Nose: Nares " normal. Septum midline. Mucosa normal. No drainage or sinus tenderness.  Neck: no adenopathy, no JVD, supple, symmetrical, trachea midline and thyroid not enlarged, symmetric, no tenderness/mass/nodules  Lungs: clear to auscultation bilaterally  Heart: regular rate and rhythm, S1, S2 normal, no murmur, click, rub or gallop  Abdomen: soft, non-tender; bowel sounds normal; no masses,  no organomegaly  Extremities: extremities normal, atraumatic, no cyanosis or edema  Pulses: 2+ and symmetric  Skin: Skin color, texture, turgor normal. No rashes or lesions  Lymph nodes: Cervical, supraclavicular, and axillary nodes normal.  Neurologic: Grossly normal  : no gonzalez  Psych: normal mood, affect and behavior    Significant Labs:   Lab Results   Component Value Date    WBC 7.20 04/24/2018    HGB 12.6 04/24/2018    HCT 38.2 04/24/2018    MCV 86 04/24/2018     04/24/2018     BMP  Lab Results   Component Value Date     04/24/2018    K 3.6 04/24/2018     04/24/2018    CO2 25 04/24/2018    BUN 10 04/24/2018    CREATININE 0.8 04/24/2018    CALCIUM 9.0 04/24/2018    ANIONGAP 9 04/24/2018    ESTGFRAFRICA >60 04/24/2018    EGFRNONAA >60 04/24/2018     Lab Results   Component Value Date    ALT 19 10/26/2017    AST 21 10/26/2017    ALKPHOS 75 10/26/2017    BILITOT 0.4 10/26/2017         Diagnostic Results:  US Carotid Bilateral [[id number]] Resulted: 04/24/18 1232   Order Status: Completed Updated: 04/24/18 1234   Narrative:     EXAMINATION:  US CAROTID BILATERAL    CLINICAL HISTORY:  CVA;    TECHNIQUE:  Grayscale and color Doppler ultrasound examination of the carotid and vertebral artery systems bilaterally.  Stenosis estimates are per the NASCET measurement criteria.    COMPARISON:  None.    FINDINGS:  Right:    Internal Carotid Artery (ICA) peak systolic velocity 81 cm/sec    ICA/CCA peak systolic velocity ratio: 0.9    Plaque formation: No significant    Vertebral artery: Antegrade flow and normal  waveform.    Left:    Internal Carotid Artery (ICA)  peak systolic velocity 101 cm/sec    ICA/CCA peak systolic velocity ratio: 0.8    Plaque formation: No significant    Vertebral artery: Antegrade flow and normal waveform.   Impression:       No evidence of a hemodynamically significant carotid bifurcation stenosis.     US Lower Extremity Veins Bilateral [[id number]] Resulted: 04/24/18 1230   Order Status: Completed Updated: 04/24/18 1232   Narrative:     EXAMINATION:  US LOWER EXTREMITY VEINS BILATERAL    CLINICAL HISTORY:  CVA w ASD;    TECHNIQUE:  Duplex and color flow Doppler and dynamic compression was performed of the bilateral lower extremity veins was performed.    COMPARISON:  None    FINDINGS:  Right thigh veins: The common femoral, femoral, popliteal, upper greater saphenous, and deep femoral veins are patent and free of thrombus. The veins are normally compressible and have normal phasic flow and augmentation response.    Right calf veins: The visualized calf veins are patent.    Left thigh veins: The common femoral, femoral, popliteal, upper greater saphenous, and deep femoral veins are patent and free of thrombus. The veins are normally compressible and have normal phasic flow and augmentation response.    Left calf veins: The visualized calf veins are patent.    Miscellaneous: None   Impression:       No evidence of deep venous thrombosis in either lower extremity.     MRI Brain Without Contrast [[id number]] Resulted: 04/24/18 1042   Order Status: Completed Updated: 04/24/18 1045   Narrative:     EXAM:  MRI BRAIN WITHOUT CONTRAST    CLINICAL HISTORY:  Transient aphasia and left lower weakness/numbness; Aphasia.    COMPARISON:  Head CT performed earlier this morning at 8:31 a.m.    FINDINGS:  Intracranial contents:The study is abnormal.  There is a small 4.5 mm focus of restricted diffusion high in the lateral left frontal lobe.  This is consistent with an acute punctate nonhemorrhagic infarction.   There is no other acute abnormality.  There is no hydrocephalus or midline shift.  As seen on comparison head CT, there is a chronic lacunar infarction in the superior left cerebellum.  Otherwise, there are just a few scattered punctate foci of subcortical and periventricular white matter T2 prolongation.  These findings likely reflect sequelae of minimal chronic microvascular ischemic disease and are too small to appreciated on CT.  There is no abnormal extra-axial fluid collection.  The basilar cisterns are open.  Flow voids indicating patency are present in the major vessels at the base of the brain.  The cerebellar tonsils are in normal position at the level of the foramen magnum.  The sellar structures are normal.  The orbits are grossly normal.    Extracranial contents, calvarium, soft tissues:Marrow signal intensity in the clivus and calvarium is normal.  There is only trace mucosal thickening in the ethmoid air cells.  The nasal septum is deviated to the right.  The mastoid air cells are clear.   Impression:       1. There is a single punctate 4.5 mm focus of restricted diffusion high in the lateral left frontal lobe consistent with an acute nonhemorrhagic infarction.  2. There is a chronic lacunar infarction in the superior left cerebellum.  3. There is no intracranial hemorrhage, mass effect.  There is mild nonspecific white matter change.  Note: Abnormal results were discussed with the patient's nurse, Selina Chowdhury, at 10:35am.       CT Head Without Contrast [[id number]] Resulted: 04/24/18 0838   Order Status: Completed Updated: 04/24/18 0841   Narrative:     EXAMINATION:  CT HEAD WITHOUT CONTRAST    CLINICAL HISTORY:  Resolved L facial numbness/weakness and aphasia;    TECHNIQUE:  Routine unenhanced axial images were obtained through the head.  Sagittal and coronal reformatted images were created.  The study is reviewed in bone and soft tissue windows.    COMPARISON:  None    FINDINGS:  Intracranial  contents: There is no acute intracranial abnormality.  Brain volume, ventricular size and position are normal.  There is no intracranial hemorrhage or mass effect.  There is a tiny chronic lacunar infarction in the superior left cerebellum.  Otherwise, there are no definite regions of abnormal attenuation in the brain.  There is no hydrocephalus or midline shift.  There is no abnormal extra-axial fluid collection.  The basilar cisterns are open.  Cerebellar tonsils are in normal position.  Sellar structures are normal.    Extracranial contents, calvarium, soft tissues: The calvarium is normal.  The included paranasal sinuses and mastoid air cells are clear.   Impression:       There is no acute abnormality.  There is a tiny chronic lacunar infarction in the superior left cerebellum.  There is no intracranial hemorrhage, mass/mass effect, acute edema or ischemia.  It should be noted that MRI is more sensitive in the detection of subtle or acute nonhemorrhagic ischemic disease.         Assessment/Plan:     Active Diagnoses:    Diagnosis Date Noted POA    Aphasia [R47.01] 04/24/2018 Yes      Problems Resolved During this Admission:    Diagnosis Date Noted Date Resolved POA       IMP:   (1) 36 yo female with acute onset aphasia and left facial droop with MRI showing lateral left frontal lobe acute nonhemorrhagic infarction. She has been seen by Dr Frank with cardiology and Dr Mccarthy with neurology has been consulted  - hematology has been consulted for evaluation for hypercoag workup    (2) Chronic borderline anemia in past - current hgb is within normal limits    (3) Hx/of migraine headaches    (4) Atrial septal aneurysm - followed by Dr Frank with cardiology as outpatient  - she had bubble study dne today and has JUAN planned for tomorrow    (5) Recent loss of pregnancy in Jan 2018 in first trimester    PLAN:  1. Cardiac per cardiology directives  2. Neuro as per neurology directives  3. Will order hypercoag  workup/labs  4. Discussed with floor nursing staff    Thank you for your consult.     Jesus Sprague MD  Hematology/Oncology  [facility]

## 2018-04-24 NOTE — NURSING
Report from Radiologist of MRI report. Results reported to Dr Norman and Dr Roca. New orders placed.

## 2018-04-24 NOTE — CONSULTS
"[facility]  Cardiology  Consult Note    Patient Name: Jaclyn Rausch  MRN: [id number]  Admission Date: 2018  Hospital Length of Stay: 0 days  Code Status: Full Code   Attending Provider: Evelyn Norman MD   Consulting Provider: Inder Frank MD  Primary Care Physician: Primary Doctor No  Principal Problem:<principal problem not specified>    Patient information was obtained from patient and ER records.     Consults  Subjective:     Chief Complaint:  tia     HPI: Patient is a 37 y.o. female admitted to Hospitalist Service from [facility] Room with complaint of episode of left facial droop and aphasia. Patient reportedly has past medical history significant for chronic anemia and history of AS aneurism and history of migraine. Patient presented with difficulty speaking for ~40 minutes with associated left lower face numbness & weakness. The speech difficulty began upon waking up this morning. She reports going to bed at 10 PM last night when a migraine and states that it was similar to her typical migraines including a pre-aura and seeing "spots." The patient was able to make sounds. She reports prior similar symptoms to when she was in highschool that resolved on its own. Currently, the speech difficulty resolved PTA. The patient also states that she felt LUE numbness as well as RUE weakness as she drank lifted her cup of water to drink this morning. She was recently on hormone therapy for a month after a D&C earlier this year secondary to an  in the first trimester in 2018. The patient has taking Imitrex previously for her migraines but states that she had a reaction to it. Patient denied chest pain, shortness of breath, abdominal pain, nausea, vomiting, headache, vision changes, cough or fever will review echo, tomorrow will plan to JUAN.    Past Medical History:   Diagnosis Date    Anemia     slightly    Atrial septal aneurysm     Dr." Zac; last visit 1/2018       Past Surgical History:   Procedure Laterality Date    BUNIONECTOMY Left 2001       Review of patient's allergies indicates:  No Known Allergies    No current facility-administered medications on file prior to encounter.      Current Outpatient Prescriptions on File Prior to Encounter   Medication Sig    [DISCONTINUED] norethindrone-ethinyl estradiol (JUNEL FE 1/20) 1 mg-20 mcg (21)/75 mg (7) per tablet Take 1 tablet by mouth once daily.     Family History     Problem Relation (Age of Onset)    Colon cancer Maternal Grandfather    Diabetes Maternal Grandmother    Hearing loss Father    Hypertension Mother, Brother    Lung cancer Maternal Grandfather        Social History Main Topics    Smoking status: Former Smoker     Packs/day: 0.25     Years: 2.00     Quit date: 01/2003    Smokeless tobacco: Never Used    Alcohol use No      Comment: Socially    Drug use: No    Sexual activity: Yes     Partners: Male      Comment:      Review of Systems   Constitution: Negative.   HENT: Negative.    Eyes: Negative.    Cardiovascular: Negative.    Endocrine: Negative.      Objective:     Vital Signs (Most Recent):  Temp: 97.9 °F (36.6 °C) (04/24/18 0944)  Pulse: 60 (04/24/18 0944)  Resp: 16 (04/24/18 0944)  BP: 124/70 (04/24/18 0944)  SpO2: 98 % (04/24/18 0944) Vital Signs (24h Range):  Temp:  [97.9 °F (36.6 °C)-98.5 °F (36.9 °C)] 97.9 °F (36.6 °C)  Pulse:  [52-80] 60  Resp:  [14-16] 16  SpO2:  [98 %-100 %] 98 %  BP: (124-180)/() 124/70     Weight: 78 kg (172 lb)  Body mass index is 28.62 kg/m².    SpO2: 98 %  O2 Device (Oxygen Therapy): room air    No intake or output data in the 24 hours ending 04/24/18 1321    Lines/Drains/Airways     Peripheral Intravenous Line                 Peripheral IV - Single Lumen 04/24/18 0907 Right Wrist less than 1 day                Physical Exam   Constitutional: She is oriented to person, place, and time. She appears well-developed.   HENT:    Head: Normocephalic.   Neck: Normal range of motion.   Cardiovascular: Normal rate.    Pulmonary/Chest: Effort normal.   Neurological: She is alert and oriented to person, place, and time. She has normal reflexes. No cranial nerve deficit. Coordination normal.       Significant Labs:   BMP:   Recent Labs  Lab 04/24/18  0804         K 3.6      CO2 25   BUN 10   CREATININE 0.8   CALCIUM 9.0   , CMP   Recent Labs  Lab 04/24/18  0804      K 3.6      CO2 25      BUN 10   CREATININE 0.8   CALCIUM 9.0   ANIONGAP 9   ESTGFRAFRICA >60   EGFRNONAA >60   , CBC   Recent Labs  Lab 04/24/18  0804   WBC 7.20   HGB 12.6   HCT 38.2      , Lipid Panel No results for input(s): CHOL, HDL, LDLCALC, TRIG, CHOLHDL in the last 48 hours. and Troponin No results for input(s): TROPONINI in the last 48 hours.    Significant Imaging: CT scan: CT ABDOMEN PELVIS WITH CONTRAST: No results found for this visit on 04/24/18.  Assessment and Plan:     Active Diagnoses:    Diagnosis Date Noted POA    Aphasia [R47.01] 04/24/2018 Yes      Problems Resolved During this Admission:    Diagnosis Date Noted Date Resolved POA       VTE Risk Mitigation         Ordered     IP VTE LOW RISK PATIENT  Once      04/24/18 0669          Thank you for your consult. I will follow-up with patient. Please contact us if you have any additional questions.    Inder Frank MD  Cardiology   [facility]

## 2018-04-24 NOTE — PT/OT/SLP EVAL
"Speech Language Pathology Evaluation  Bedside Swallow    Patient Name:  Jaclyn Rausch   MRN:  [id number]  Admitting Diagnosis: <principal problem not specified>    Recommendations:                 General Recommendations:  Cognitive-linguistic evaluation  Diet recommendations:  Regular, Thin   Aspiration Precautions: Standard aspiration precautions   General Precautions: Standard,    Communication strategies:  none    History:     Past Medical History:   Diagnosis Date    Anemia     slightly    Atrial septal aneurysm     Dr. Frank; last visit 1/2018       Past Surgical History:   Procedure Laterality Date    BUNIONECTOMY Left 2001       Social History: Patient lives with  and three children.    Prior Intubation HX:  none    Modified Barium Swallow: none    Chest X-Rays: none     MRI Brain: "1. There is a single punctate 4.5 mm focus of restricted diffusion high in the lateral left frontal lobe consistent with an acute nonhemorrhagic infarction 2. There is a chronic lacunar infarction in the superior left cerebellum 3. There is no intracranial hemorrhage, mass effect.  There is mild nonspecific white matter change."    Prior diet: Regular    Occupation/hobbies/homemaking: PLOF: independent. Works full time in [employer].     Subjective     Pt alert, awake, pleasant and oriented x4. Family at bedside. Pt able to provide accurate hx. Pt reports speech/language and cognition at baseline; family agrees.    Pain/Comfort:  · Pain Rating 1: 0/10    Objective:     Pt seen for clinical swallow evaluation     Oral Musculature Evaluation  · Oral Musculature: WFL  · Dentition: present and adequate  · Mucosal Quality: good  · Mandibular Strength and Mobility: WNL  · Oral Labial Strength and Mobility: WNL  · Lingual Strength and Mobility: WNL  · Velar Elevation: WNL  · Buccal Strength and Mobility: WNL  · Volitional Cough: effective  · Voice Prior to PO Intake: clear    Bedside Swallow Eval: "   Consistencies Assessed:  · Thin liquids via tsp, cup, straw, continuous via straw  · Puree applesauce  · Soft solids diced peaches  · Solids cookie     Oral Phase:   · WNL    Pharyngeal Phase:   · no overt clinical signs/symptoms of aspiration  · no overt clinical signs/symptoms of pharyngeal dysphagia    Compensatory Strategies  · None    Treatment: Pt/family educated re: results/recs of evaluation, SLP role and POC. Receptive to information provided.     Assessment:     Clinical swallowing evaluation completed. All po trials tolerated with no overt s/s swallow dysfunction. REC Regular textures with thin liquids. Pt reports language back at baseline. No obvious aphasia or cognitive impairment, however, consider full SLP evaluation    Plan:     · Patient to be seen:      · Plan of Care expires:     · Plan of Care reviewed with:  patient, spouse   · SLP Follow-Up:  Yes       Discharge recommendations:  home   Barriers to Discharge:  None    Time Tracking:     SLP Treatment Date:   04/24/18  Speech Start Time:  1443  Speech Stop Time:  1453     Speech Total Time (min):  10 min    Billable Minutes: Eval Swallow and Oral Function 10 and Total Time 10    Edwina Jaimes CCC-SLP  04/24/2018

## 2018-04-25 DIAGNOSIS — I63.9 CVA (CEREBRAL VASCULAR ACCIDENT): ICD-10-CM

## 2018-04-25 PROBLEM — Z87.59 HISTORY OF MISCARRIAGE: Status: ACTIVE | Noted: 2018-02-07

## 2018-04-25 LAB
AORTIC ROOT ANNULUS: 2.6 CM
AORTIC VALVE CUSP SEPERATION: 2.29 CM
AV MEAN GRADIENT: 3.71 MMHG
AV VALVE AREA: 3.36 CM2
BSA FOR ECHO PROCEDURE: 1.89 M2
CHOLEST SERPL-MCNC: 263 MG/DL
CHOLEST SERPL-MCNC: 263 MG/DL
CHOLEST/HDLC SERPL: 4 {RATIO}
CHOLEST/HDLC SERPL: 4 {RATIO}
CV ECHO LV RWT: 0.38 CM
DOP CALC AO PEAK VEL: 1.23 M/S
DOP CALC AO VTI: 0.26 CM
DOP CALC LVOT AREA: 3.8 CM2
DOP CALC LVOT DIAMETER: 2.2 CM
DOP CALC LVOT STROKE VOLUME: 0.87 CM3
DOP CALCLVOT PEAK VEL VTI: 0.23 CM
E WAVE DECELERATION TIME: 177.18 MSEC
E/A RATIO: 1.33
E/E' RATIO: 6.4
ECHO EF ESTIMATED: 60 %
ECHO LV POSTERIOR WALL: 0.78 CM (ref 0.6–1.1)
FACT IX ACT/NOR PPP: 147 %
FACT VII ACT/NOR PPP: 101 %
FACT VIII ACT/NOR PPP: 131 %
FACT X ACT/NOR PPP: 115 %
FACT XII ACT/NOR PPP: 144 %
FRACTIONAL SHORTENING: 45 % (ref 28–44)
HDLC SERPL-MCNC: 66 MG/DL
HDLC SERPL-MCNC: 66 MG/DL
HDLC SERPL: 25.1 %
HDLC SERPL: 25.1 %
INTERVENTRICULAR SEPTUM: 0.79 CM (ref 0.6–1.1)
IVRT: 0.12 MSEC
LDLC SERPL CALC-MCNC: 180 MG/DL
LDLC SERPL CALC-MCNC: 180 MG/DL
LEFT ATRIUM SIZE: 3.37 CM
LEFT INTERNAL DIMENSION IN SYSTOLE: 2.28 CM (ref 2.1–4)
LEFT VENTRICULAR INTERNAL DIMENSION IN DIASTOLE: 4.13 CM (ref 3.5–6)
LV LATERAL E/E' RATIO: 5.33
LV SEPTAL E/E' RATIO: 8
MV PEAK A VEL: 0.72 M/S
MV PEAK E VEL: 0.96 M/S
MV STENOSIS PRESSURE HALF TIME: 51.38 MS
MV VALVE AREA P 1/2 METHOD: 4.28 CM2
NONHDLC SERPL-MCNC: 197 MG/DL
NONHDLC SERPL-MCNC: 197 MG/DL
PISA TR MAX VEL: 2.48 M/S
PV PEAK GRADIENT: 5 MMHG
PV PEAK VELOCITY: 1.12 CM/S
RIGHT VENTRICULAR END-DIASTOLIC DIMENSION: 2.98 CM
TDI LATERAL: 0.18
TDI SEPTAL: 0.12
TDI: 0.15
TR MAX PG: 24.61 MMHG
TRIGL SERPL-MCNC: 85 MG/DL
TRIGL SERPL-MCNC: 85 MG/DL

## 2018-04-25 PROCEDURE — 94761 N-INVAS EAR/PLS OXIMETRY MLT: CPT

## 2018-04-25 PROCEDURE — 63600175 PHARM REV CODE 636 W HCPCS: Performed by: SPECIALIST

## 2018-04-25 PROCEDURE — 93312 ECHO TRANSESOPHAGEAL: CPT

## 2018-04-25 PROCEDURE — 11000001 HC ACUTE MED/SURG PRIVATE ROOM

## 2018-04-25 PROCEDURE — 99152 MOD SED SAME PHYS/QHP 5/>YRS: CPT | Performed by: SPECIALIST

## 2018-04-25 PROCEDURE — G9169 MEMORY GOAL STATUS: HCPCS | Mod: CH

## 2018-04-25 PROCEDURE — 25000003 PHARM REV CODE 250: Performed by: PSYCHIATRY & NEUROLOGY

## 2018-04-25 PROCEDURE — 92523 SPEECH SOUND LANG COMPREHEN: CPT

## 2018-04-25 PROCEDURE — G9170 MEMORY D/C STATUS: HCPCS | Mod: CI

## 2018-04-25 PROCEDURE — 80061 LIPID PANEL: CPT

## 2018-04-25 PROCEDURE — 25000003 PHARM REV CODE 250: Performed by: SPECIALIST

## 2018-04-25 PROCEDURE — 86038 ANTINUCLEAR ANTIBODIES: CPT

## 2018-04-25 PROCEDURE — 99153 MOD SED SAME PHYS/QHP EA: CPT | Performed by: SPECIALIST

## 2018-04-25 PROCEDURE — G9168 MEMORY CURRENT STATUS: HCPCS | Mod: CI

## 2018-04-25 PROCEDURE — 99223 1ST HOSP IP/OBS HIGH 75: CPT | Mod: ,,, | Performed by: PSYCHIATRY & NEUROLOGY

## 2018-04-25 PROCEDURE — 25000003 PHARM REV CODE 250: Performed by: INTERNAL MEDICINE

## 2018-04-25 PROCEDURE — 36415 COLL VENOUS BLD VENIPUNCTURE: CPT

## 2018-04-25 PROCEDURE — 99233 SBSQ HOSP IP/OBS HIGH 50: CPT | Mod: ,,, | Performed by: INTERNAL MEDICINE

## 2018-04-25 RX ORDER — MIDAZOLAM HYDROCHLORIDE 1 MG/ML
INJECTION, SOLUTION INTRAMUSCULAR; INTRAVENOUS
Status: DISCONTINUED | OUTPATIENT
Start: 2018-04-25 | End: 2018-04-26 | Stop reason: HOSPADM

## 2018-04-25 RX ORDER — FENTANYL CITRATE 50 UG/ML
INJECTION, SOLUTION INTRAMUSCULAR; INTRAVENOUS
Status: DISCONTINUED | OUTPATIENT
Start: 2018-04-25 | End: 2018-04-26 | Stop reason: HOSPADM

## 2018-04-25 RX ORDER — ATORVASTATIN CALCIUM 40 MG/1
40 TABLET, FILM COATED ORAL DAILY
Status: DISCONTINUED | OUTPATIENT
Start: 2018-04-25 | End: 2018-04-26 | Stop reason: HOSPADM

## 2018-04-25 RX ADMIN — ASPIRIN 325 MG ORAL TABLET 325 MG: 325 PILL ORAL at 02:04

## 2018-04-25 RX ADMIN — RIVAROXABAN 20 MG: 20 TABLET, FILM COATED ORAL at 06:04

## 2018-04-25 RX ADMIN — ATORVASTATIN CALCIUM 40 MG: 40 TABLET, FILM COATED ORAL at 02:04

## 2018-04-25 NOTE — PROGRESS NOTES
[facility]  Cardiology  Progress Note    Patient Name: Jaclyn Rausch  MRN: [id number]  Admission Date: 4/24/2018  Hospital Length of Stay: 1 days  Code Status: Full Code   Attending Physician: Evelyn Norman MD   Primary Care Physician: Rosa Alas NP  Expected Discharge Date:   Principal Problem:Acute CVA (cerebrovascular accident)    Subjective:     Hospital Course: had papito  Interval History: aneurism of inter atrial septum mild , no shiff. Negative bubble study    Review of Systems   All other systems reviewed and are negative.    Objective:     Vital Signs (Most Recent):  Temp: 96.6 °F (35.9 °C) (04/25/18 1300)  Pulse: 67 (04/25/18 1300)  Resp: 18 (04/25/18 1300)  BP: 125/85 (04/25/18 1300)  SpO2: 97 % (04/25/18 1300) Vital Signs (24h Range):  Temp:  [96.6 °F (35.9 °C)-98.6 °F (37 °C)] 96.6 °F (35.9 °C)  Pulse:  [60-87] 67  Resp:  [18-20] 18  SpO2:  [95 %-100 %] 97 %  BP: (118-137)/(63-85) 125/85     Weight: 78 kg (172 lb)  Body mass index is 28.62 kg/m².    SpO2: 97 %  O2 Device (Oxygen Therapy): nasal cannula      Intake/Output Summary (Last 24 hours) at 04/25/18 1410  Last data filed at 04/24/18 2300   Gross per 24 hour   Intake              690 ml   Output                0 ml   Net              690 ml       Lines/Drains/Airways     Peripheral Intravenous Line                 Peripheral IV - Single Lumen 04/24/18 0907 Right Wrist 1 day                Physical Exam   Constitutional: She is oriented to person, place, and time. She appears well-developed.   Eyes: Pupils are equal, round, and reactive to light.   Cardiovascular: Normal rate and normal heart sounds.    Pulmonary/Chest: Effort normal.   Musculoskeletal: Normal range of motion.   Neurological: She is alert and oriented to person, place, and time.       Significant Labs:   ABG: No results for input(s): PH, PCO2, HCO3, POCSATURATED, BE in the last 48 hours., CMP   Recent Labs  Lab 04/24/18  0804      K 3.6   CL  103   CO2 25      BUN 10   CREATININE 0.8   CALCIUM 9.0   ANIONGAP 9   ESTGFRAFRICA >60   EGFRNONAA >60   , CBC   Recent Labs  Lab 04/24/18  0804   WBC 7.20   HGB 12.6   HCT 38.2      , INR   Recent Labs  Lab 04/24/18  1325   INR 1.0    and Lipid Panel   Recent Labs  Lab 04/25/18  1035   CHOL 263*  263*   HDL 66  66   LDLCALC 180.0*  180.0*   TRIG 85  85   CHOLHDL 25.1  25.1       Significant Imaging: CT scan: CT ABDOMEN PELVIS WITH CONTRAST: No results found for this visit on 04/24/18.  Assessment and Plan:     Brief HPI:     Active Diagnoses:    Diagnosis Date Noted POA    PRINCIPAL PROBLEM:  Acute CVA (cerebrovascular accident) -  lateral left frontal lobe, non-hemorrhagic [I63.9] 04/24/2018 Yes    Aphasia [R47.01] 04/24/2018 Yes    Atrial septal aneurysm [I25.3] 04/24/2018 Yes    History of miscarriage [Z87.59] 02/07/2018 Not Applicable      Problems Resolved During this Admission:    Diagnosis Date Noted Date Resolved POA       VTE Risk Mitigation         Ordered     IP VTE LOW RISK PATIENT  Once      04/24/18 0944      anticoagulation  hypercoaguble work up  Fu op    Inder Frank MD  Cardiology  [facility]

## 2018-04-25 NOTE — ASSESSMENT & PLAN NOTE
MRI confirmed an acute ischemic cortical infarct. The location of this and the description of likely two localizations is highly suspicious for cardioembolic source. Carotid US is negative. She does have a history of an ASD. Cardiology is evaluating--TTE yesterday showed a bulge but no hole and JUAN is planned today. I would favor anticoagulation in her. The stroke is small and she would be safe to start this today if cardiology is in agreement. If hole is seen, there is evidence for closure for her. Counseled her to avoid birth control or hormones in the future and never start smoking again.     - Frequent neuro checks  - 24 hour cardiac monitoring  - Permissive HTN up to 220/110   - Fasting lipid panel and glucose  - JUAN today  - No need for therapy as patient is back to baseline  - Aspirin 325 mg daily for now, strongly consider anticogulation  - Statin for LDL goal <70  - Hyperoagulable workup pending, D-dimer elevated

## 2018-04-25 NOTE — HPI
Mrs. Rausch is a 37 year old woman with history of ASD and two miscarriages who presents with transient aphasia, right arm incoordination, left facial droop, and left sided paresthesias. The patient was accompanied by her  who also contributed to the following history.     She reports that she woke up yesterday at about 6am and was normal. Then at about 6:15am, she had sudden onset of expressive aphasia, right incoordination, left facial droop, and left arm paresthesias. Her  was present and confirmed this. She recalls knowing what she wanted to say but not being able to get any words out. She got in the car to come to the hospital and her  noted she then said she was able to talk again.     She is not on aspirin or anticoagulation. She has a reported history of ASD. She had an episode when she was about 17 where she was confused when she woke up. Her mother sent her back to bed and it resolved. She has never had anything else like this again. She did have some tingling in her fingers/hands bilaterally that was felt to be carpal tunnel. She has been told in the past that she has high cholesterol but tries to control with diet. She previously smoked but no longer does. She has had two miscarriages (one at 6 weeks and one at 9 weeks). She has three living children. She has no history of blood clots. Her grandmother had a blood clot in her 80s. No family history of strokes. She has migraines and had black spots like her aura the night before this event.     Currently she is back to her baseline and has no residual deficits. She was started on aspirin. She has had hypercoagulable workup drawn. Cardiology is following and planning JUAN today.

## 2018-04-25 NOTE — PT/OT/SLP EVAL
Speech Language Pathology Evaluation  Cognitive Communication    Patient Name:  Jaclyn Rausch   MRN:  [id number]  Admitting Diagnosis: Acute CVA (cerebrovascular accident)    Recommendations:     Recommendations:                General Recommendations:  Follow-up not indicated  Diet recommendations:  Regular, Thin   Aspiration Precautions: Standard aspiration precautions   General Precautions: Standard,    Communication strategies:  none    History:     Past Medical History:   Diagnosis Date    Anemia     slightly    Atrial septal aneurysm     Dr. Frank; last visit 1/2018       Past Surgical History:   Procedure Laterality Date    BUNIONECTOMY Left 2001       Social History: Patient lives with family.    Prior Intubation HX:  none    Modified Barium Swallow: none    Chest X-Rays: none available    Prior diet: regular textures & liquids.    Occupation/hobbies/homemaking: indep.      Subjective     I have a masters degree in human resources.  I had an event like this once when I was in high school.  Patient goals: return to work         Objective:   Cognitive Status:    Intact except mild immediate & moderate delayed memory for unrelated words      Albino Cognitive Assessment (MoCA) score - 25 out of 30  indicating mild cognitive-linguistic deficits    Receptive Language:   Comprehension:      WFL    Pragmatics:    intact    Expressive Language:  Verbal:    intact    Motor Speech:  WFL    Voice:   WFL    Visual-Spatial:  WFL    Reading:   WFL     Written Expression:   WFL    Treatment: Education to pt &  re impressions & recommendations.  Education re functional methods to improve memory.    Assessment:   Jaclyn Rausch is a 37 y.o. female with an SLP diagnosis of WFL cognitive-linguistic status.  She presented with very mild deficits (sequential subtraction, delayed memory for 5 unrelated words, exact repetition of 14 syllable sentence,) but was able to perform all functional tasks  indep.  No tx at this time.       Goals:    SLP Goals        Problem: SLP Goal    Goal Priority Disciplines Outcome   SLP Goal     SLP                    Plan:       · Plan of Care reviewed with:  patient, spouse   · SLP Follow-Up:  No       Discharge recommendations:  Discharge Facility/Level Of Care Needs: home   Barriers to Discharge:  None    Time Tracking:   SLP Treatment Date:   04/25/18  Speech Start Time:  1555  Speech Stop Time:  1629     Speech Total Time (min):  34 min    Billable Minutes: Eval 34     Beth Pagan CCC-SLP/A  04/25/2018

## 2018-04-25 NOTE — PLAN OF CARE
04/25/18 0809   Patient Assessment/Suction   Level of Consciousness (AVPU) alert   PRE-TX-O2-ETCO2   O2 Device (Oxygen Therapy) room air   SpO2 98 %   Pulse Oximetry Type Intermittent   $ Pulse Oximetry - Multiple Charge Pulse Oximetry - Multiple   Pulse 60   Resp 18

## 2018-04-25 NOTE — PLAN OF CARE
I met with the pt and her  at bedside. The pt lives with her spouse and 3 children. She reports independence in ADL's and does drive. She denies HH or DME. She uses [facility]. Verified PCP as Rosa Gramajo DNP. I updated Epic. Verified insurance as  and BCBS. I educated the pt on the blue discharge folder and wrote my name and number on the pts white board. Khalida Adorno [facility] [place] [facility] [place]     04/25/18 1047   Discharge Assessment   Assessment Type Discharge Planning Assessment   Confirmed/corrected address and phone number on facesheet? Yes   Assessment information obtained from? Patient   Communicated expected length of stay with patient/caregiver no   Prior to hospitilization cognitive status: Alert/Oriented   Prior to hospitalization functional status: Independent   Current cognitive status: Alert/Oriented   Current Functional Status: Independent   Lives With child(chacha), dependent;spouse   Able to Return to Prior Arrangements yes   Is patient able to care for self after discharge? Yes   Who are your caregiver(s) and their phone number(s)? Que Rausch [phone]   Patient's perception of discharge disposition admitted as an inpatient   Readmission Within The Last 30 Days no previous admission in last 30 days   Patient currently being followed by outpatient case management? No   Patient currently receives any other outside agency services? No   Equipment Currently Used at Home none   Do you have any problems affording any of your prescribed medications? No   Is the patient taking medications as prescribed? yes   Does the patient have transportation home? Yes   Transportation Available family or friend will provide   Does the patient receive services at the Coumadin Clinic? No   Discharge Plan A Home   Discharge Plan B Home with family   Patient/Family In Agreement With Plan yes

## 2018-04-25 NOTE — PLAN OF CARE
Problem: Patient Care Overview  Goal: Plan of Care Review  Alert and oriented. Family member in the room. Denies any headache or numbness and tingling.   Notified of NPO status after midnight. Voiced understanding. Plan of care reviewed with patient.  Understanding voiced. Bed in low position and locked. Side rails up x 2. Call bell in reach.  Telemetry

## 2018-04-25 NOTE — CONSULTS
[facility]  Neurology  Consult Note    Patient Name: Jaclyn Rausch  MRN: [id number]  Admission Date: 4/24/2018  Hospital Length of Stay: 1 days  Code Status: Full Code   Attending Provider: Evelyn Norman MD   Consulting Provider: Pearl Roca MD  Primary Care Physician: Primary Doctor No  Principal Problem:Acute CVA (cerebrovascular accident)    Consults   Subjective:     Chief Complaint:  stroke     HPI:   Mrs. Rausch is a 37 year old woman with history of ASD and two miscarriages who presents with transient aphasia, right arm incoordination, left facial droop, and left sided paresthesias. The patient was accompanied by her  who also contributed to the following history.     She reports that she woke up yesterday at about 6am and was normal. Then at about 6:15am, she had sudden onset of expressive aphasia, right incoordination, left facial droop, and left arm paresthesias. Her  was present and confirmed this. She recalls knowing what she wanted to say but not being able to get any words out. She got in the car to come to the hospital and her  noted she then said she was able to talk again.     She is not on aspirin or anticoagulation. She has a reported history of ASD. She had an episode when she was about 17 where she was confused when she woke up. Her mother sent her back to bed and it resolved. She has never had anything else like this again. She did have some tingling in her fingers/hands bilaterally that was felt to be carpal tunnel. She has been told in the past that she has high cholesterol but tries to control with diet. She previously smoked but no longer does. She has had two miscarriages (one at 6 weeks and one at 9 weeks). She has three living children. She has no history of blood clots. Her grandmother had a blood clot in her 80s. No family history of strokes. She has migraines and had black spots like her aura the night before this event.      Currently she is back to her baseline and has no residual deficits. She was started on aspirin. She has had hypercoagulable workup drawn. Cardiology is following and planning JUAN today.      Past Medical History:   Diagnosis Date    Anemia     slightly    Atrial septal aneurysm     Dr. Frank; last visit 1/2018       Past Surgical History:   Procedure Laterality Date    BUNIONECTOMY Left 2001       Review of patient's allergies indicates:  No Known Allergies      No current facility-administered medications on file prior to encounter.      No current outpatient prescriptions on file prior to encounter.     Family History     Problem Relation (Age of Onset)    Colon cancer Maternal Grandfather    Diabetes Maternal Grandmother    Hearing loss Father    Hypertension Mother, Brother    Lung cancer Maternal Grandfather        Social History Main Topics    Smoking status: Former Smoker     Packs/day: 0.25     Years: 2.00     Quit date: 01/2003    Smokeless tobacco: Never Used    Alcohol use No      Comment: Socially    Drug use: No    Sexual activity: Yes     Partners: Male      Comment:      Review of Systems Comprehensive review of systems is negative except as mentioned in the HPI.   Objective:     Vital Signs (Most Recent):  Temp: 97.2 °F (36.2 °C) (04/25/18 0747)  Pulse: 60 (04/25/18 0809)  Resp: 18 (04/25/18 0809)  BP: 118/63 (04/25/18 0747)  SpO2: 98 % (04/25/18 0809) Vital Signs (24h Range):  Temp:  [97.1 °F (36.2 °C)-98.6 °F (37 °C)] 97.2 °F (36.2 °C)  Pulse:  [60-87] 60  Resp:  [18-20] 18  SpO2:  [95 %-100 %] 98 %  BP: (118-137)/(63-82) 118/63     Weight: 78 kg (172 lb)  Body mass index is 28.62 kg/m².    Physical Exam        Vitals:    04/25/18 0809   BP:    Pulse: 60   Resp: 18   Temp:      Body mass index is 28.62 kg/m².  General: Well developed, well nourished.  No acute distress.  HEENT: Atraumatic, normocephalic.  Musculoskeletal: No obvious joint deformities, moves all extremities  well.  Skin: No obvious rashes    Neurological Exam:  Mental status: Awake, alert, and oriented. Recent and remote memory appear to be intact.   Speech/Language: No dysarthria or aphasia on conversation.   Cranial nerves: Pupils equal round and reactive to light, extraocular movements intact, facial strength and sensation intact bilaterally, palate and tongue midline, hearing grossly intact bilaterally.  Motor: 5 out of 5 strength throughout the upper and lower extremities bilaterally. Normal bulk and tone.   Sensation: Intact to light touch, pinprick, and vibration bilaterally.  DTR: 2+ at the knees and biceps bilaterally.  Coordination: Finger-nose-finger testing and rapid alternating movements normal bilaterally. No tremor.     Significant Labs: All pertinent lab results from the past 24 hours have been reviewed.    Significant Imaging: I have reviewed and interpreted all pertinent imaging results/findings within the past 24 hours.    Assessment and Plan:     * Acute CVA (cerebrovascular accident) -  lateral left frontal lobe, non-hemorrhagic    MRI confirmed an acute ischemic cortical infarct. The location of this and the description of likely two localizations is highly suspicious for cardioembolic source. Carotid US is negative. She does have a history of an ASD. Cardiology is evaluating--TTE yesterday showed a bulge but no hole and JUAN is planned today. I would favor anticoagulation in her. The stroke is small and she would be safe to start this today if cardiology is in agreement. If hole is seen, there is evidence for closure for her. Counseled her to avoid birth control or hormones in the future and never start smoking again.     - Frequent neuro checks  - 24 hour cardiac monitoring  - Permissive HTN up to 220/110   - Fasting lipid panel and glucose  - JUAN today  - No need for therapy as patient is back to baseline  - Aspirin 325 mg daily for now, strongly consider anticogulation  - Statin for LDL goal  <70  - Hyperoagulable workup pending, D-dimer elevated        Atrial septal aneurysm    As above        Aphasia    resolved        History of miscarriage    Hypercoagulable workup as patient has a stroke and has had two miscarriages            VTE Risk Mitigation         Ordered     IP VTE LOW RISK PATIENT  Once      04/24/18 2397          Thank you for your consult. I will follow-up with patient. Please contact us if you have any additional questions.    Pearl Roca MD  Neurology  [facility]

## 2018-04-25 NOTE — PROGRESS NOTES
"Progress Note  Hospital Medicine  Patient Name:Jaclyn Rausch  MRN:  [id number]  Patient Class: IP- Inpatient  Admit Date: 2018  Length of Stay: 1 days  Expected Discharge Date:   Attending Physician: Evelyn Norman MD  Primary Care Provider:  Primary Doctor No    SUBJECTIVE:     Principal Problem: Acute CVA (cerebrovascular accident)  Initial history of present illness: Patient is a 37 y.o. female admitted to Hospitalist Service from [facility] with complaint of episode of left facial droop and aphasia. Patient reportedly has past medical history significant for chronic anemia and history of ASD and history of migraine. Patient presented with difficulty speaking for ~40 minutes with associated left lower face numbness & weakness. The speech difficulty began upon waking up this morning. She reports going to bed at 10 PM last night when a migraine and states that it was similar to her typical migraines including a pre-aura and seeing "spots." The patient was able to make sounds. She reports prior similar symptoms to when she was in highschool that resolved on its own. Currently, the speech difficulty resolved PTA. The patient also states that she felt LUE numbness as well as RUE weakness as she drank lifted her cup of water to drink this morning. She was recently on hormone therapy for a month after a D&C earlier this year secondary to an  in the first trimester in 2018. The patient has taking Imitrex previously for her migraines but states that she had a reaction to it. Patient denied chest pain, shortness of breath, abdominal pain, nausea, vomiting, headache, vision changes, cough or fever.    PMH/PSH/SH/FH/Meds: reviewed.    Symptoms/Review of Systems: No new complaints. Scheduled for JUAN today. No shortness of breath, cough, chest pain or headache, fever or abdominal pain.     Diet:  Adequate intake.    Activity level: Normal.    Pain:  Patient reports no " pain.       OBJECTIVE:   Vital Signs (Most Recent):      Temp: 97.2 °F (36.2 °C) (04/25/18 0747)  Pulse: 60 (04/25/18 0809)  Resp: 18 (04/25/18 0809)  BP: 118/63 (04/25/18 0747)  SpO2: 98 % (04/25/18 0809)       Vital Signs Range (Last 24H):  Temp:  [97.1 °F (36.2 °C)-98.6 °F (37 °C)]   Pulse:  [60-87]   Resp:  [16-20]   BP: (118-137)/(63-82)   SpO2:  [95 %-100 %]     Weight: 78 kg (172 lb)  Body mass index is 28.62 kg/m².    Intake/Output Summary (Last 24 hours) at 04/25/18 0954  Last data filed at 04/24/18 2300   Gross per 24 hour   Intake              690 ml   Output                0 ml   Net              690 ml     Physical Examination:  General appearance: well developed, appears stated age  Head: normocephalic, atraumatic  Eyes:  conjunctivae/corneas clear. PERRL.  Nose: Nares normal. Septum midline.  Throat: lips, mucosa, and tongue normal; teeth and gums normal, no throat erythema.  Neck: supple, symmetrical, trachea midline, no JVD and thyroid not enlarged, symmetric, no tenderness/mass/nodules  Lungs:  clear to auscultation bilaterally and normal respiratory effort  Chest wall: no tenderness  Heart: regular rate and rhythm, S1, S2 normal, no murmur, click, rub or gallop  Abdomen: soft, non-tender non-distented; bowel sounds normal; no masses,  no organomegaly  Extremities: no cyanosis, clubbing or edema.   Pulses: 2+ and symmetric  Skin: Skin color, texture, turgor normal. No rashes or lesions.  Lymph nodes: Cervical, supraclavicular, and axillary nodes normal.  Neurologic: Normal strength and tone. No focal numbness or weakness. CNII-XII intact.      CBC:    Recent Labs  Lab 04/24/18  0804   WBC 7.20   RBC 4.45   HGB 12.6   HCT 38.2      MCV 86   MCH 28.4   MCHC 33.1   BMP    Recent Labs  Lab 04/24/18  0804         K 3.6      CO2 25   BUN 10   CREATININE 0.8   CALCIUM 9.0      Diagnostic Results:  Microbiology Results (last 7 days)     ** No results found for the last 168 hours.  **         CT Head: There is no acute abnormality.  There is a tiny chronic lacunar infarction in the superior left cerebellum.  There is no intracranial hemorrhage, mass/mass effect, acute edema or ischemia.  It should be noted that MRI is more sensitive in the detection of subtle or acute nonhemorrhagic ischemic disease.     Lower extremity venous doppler scan: No evidence of deep venous thrombosis in either lower extremity.     Carotid US: No evidence of a hemodynamically significant carotid bifurcation stenosis.     MRI Brain:   1. There is a single punctate 4.5 mm focus of restricted diffusion high in the lateral left frontal lobe consistent with an acute nonhemorrhagic infarction.  2. There is a chronic lacunar infarction in the superior left cerebellum.  3. There is no intracranial hemorrhage, mass effect.     Assessment/Plan:      *Acute CVA (cerebrovascular accident) -  lateral left frontal lobe, non-hemorrhagic [I63.9]   Yes    Aphasia [R47.01]   Yes    Atrial septal aneurysm [I25.3]   Yes       Dr. Frank; last visit 1/2018      Continue tele-monitoring and frequent neuro-checks.   Fall and seizure precautions.  Continue Aspirin 325 mg po q day. No t-PA given.  Follow 2 D ECHO and JUAN. Case discussed with Dr. Roca. We discussed details about NOACs. Patient does not wish use of Coumadin. CM to find out  preferred anti-coagulations. I answered all questions regarding anti-coagulation. Fall precautions reviewed with the patient and .   D-dimer is mildly elevated, clinically index of suspicion of PE is low. Patient is not hypoxic and tachycardic.  Consult Physical Therapy and Occupational therapy for evaluation and treatment.      Hyperlipidemia  High LDL; started on Lipitor 40 mg daily.     DVT prophylaxis: Use SCD and TEDs. Avoiding anticoagulation for possible hemorrhagic conversion of CVA.      Evelyn Norman MD  Department of Hospital Medicine   [facility]

## 2018-04-25 NOTE — SUBJECTIVE & OBJECTIVE
Past Medical History:   Diagnosis Date    Anemia     slightly    Atrial septal aneurysm     Dr. Frank; last visit 1/2018       Past Surgical History:   Procedure Laterality Date    BUNIONECTOMY Left 2001       Review of patient's allergies indicates:  No Known Allergies      No current facility-administered medications on file prior to encounter.      No current outpatient prescriptions on file prior to encounter.     Family History     Problem Relation (Age of Onset)    Colon cancer Maternal Grandfather    Diabetes Maternal Grandmother    Hearing loss Father    Hypertension Mother, Brother    Lung cancer Maternal Grandfather        Social History Main Topics    Smoking status: Former Smoker     Packs/day: 0.25     Years: 2.00     Quit date: 01/2003    Smokeless tobacco: Never Used    Alcohol use No      Comment: Socially    Drug use: No    Sexual activity: Yes     Partners: Male      Comment:      Review of Systems Comprehensive review of systems is negative except as mentioned in the HPI.   Objective:     Vital Signs (Most Recent):  Temp: 97.2 °F (36.2 °C) (04/25/18 0747)  Pulse: 60 (04/25/18 0809)  Resp: 18 (04/25/18 0809)  BP: 118/63 (04/25/18 0747)  SpO2: 98 % (04/25/18 0809) Vital Signs (24h Range):  Temp:  [97.1 °F (36.2 °C)-98.6 °F (37 °C)] 97.2 °F (36.2 °C)  Pulse:  [60-87] 60  Resp:  [18-20] 18  SpO2:  [95 %-100 %] 98 %  BP: (118-137)/(63-82) 118/63     Weight: 78 kg (172 lb)  Body mass index is 28.62 kg/m².    Physical Exam        Vitals:    04/25/18 0809   BP:    Pulse: 60   Resp: 18   Temp:      Body mass index is 28.62 kg/m².  General: Well developed, well nourished.  No acute distress.  HEENT: Atraumatic, normocephalic.  Musculoskeletal: No obvious joint deformities, moves all extremities well.  Skin: No obvious rashes    Neurological Exam:  Mental status: Awake, alert, and oriented. Recent and remote memory appear to be intact.   Speech/Language: No dysarthria or aphasia on  conversation.   Cranial nerves: Pupils equal round and reactive to light, extraocular movements intact, facial strength and sensation intact bilaterally, palate and tongue midline, hearing grossly intact bilaterally.  Motor: 5 out of 5 strength throughout the upper and lower extremities bilaterally. Normal bulk and tone.   Sensation: Intact to light touch, pinprick, and vibration bilaterally.  DTR: 2+ at the knees and biceps bilaterally.  Coordination: Finger-nose-finger testing and rapid alternating movements normal bilaterally. No tremor.     Significant Labs: All pertinent lab results from the past 24 hours have been reviewed.    Significant Imaging: I have reviewed and interpreted all pertinent imaging results/findings within the past 24 hours.

## 2018-04-25 NOTE — PLAN OF CARE
04/25/18 1706   Discharge Assessment   Assessment Type Discharge Planning Reassessment   MARK verified on that xarelto 20 mg QD is covered on the  formulary.

## 2018-04-25 NOTE — NURSING
JUAN completed with Dr. Frank. VSS. Patient alert and oriented x 3. Back to room via bed. Yazmin at bedside.

## 2018-04-25 NOTE — NURSING
Called and spoke with Dr. Roca to get approval for starting Xarelto and Dr. Roca agreed with Dr. Norman.

## 2018-04-26 ENCOUNTER — TELEPHONE (OUTPATIENT)
Dept: NEUROLOGY | Facility: CLINIC | Age: 38
End: 2018-04-26

## 2018-04-26 VITALS
DIASTOLIC BLOOD PRESSURE: 63 MMHG | OXYGEN SATURATION: 98 % | HEART RATE: 58 BPM | BODY MASS INDEX: 28.66 KG/M2 | SYSTOLIC BLOOD PRESSURE: 112 MMHG | WEIGHT: 172 LBS | HEIGHT: 65 IN | TEMPERATURE: 98 F | RESPIRATION RATE: 15 BRPM

## 2018-04-26 PROBLEM — R51.9 ACUTE NONINTRACTABLE HEADACHE: Status: ACTIVE | Noted: 2018-04-26

## 2018-04-26 PROBLEM — G47.00 INSOMNIA: Status: ACTIVE | Noted: 2018-04-26

## 2018-04-26 LAB
ANA SER QL IF: NORMAL
ANION GAP SERPL CALC-SCNC: 7 MMOL/L
APTT PROTEIN C ACTIVATOR+FV DP/APTT PPP: 119 %
AT III ACT/NOR PPP CHRO: 109 %
BASOPHILS # BLD AUTO: 0 K/UL
BASOPHILS NFR BLD: 0.4 %
BUN SERPL-MCNC: 13 MG/DL
CALCIUM SERPL-MCNC: 8.9 MG/DL
CHLORIDE SERPL-SCNC: 104 MMOL/L
CO2 SERPL-SCNC: 26 MMOL/L
CREAT SERPL-MCNC: 0.8 MG/DL
DEPRECATED CARDIOLIPIN IGA SER: <9 APL
DIFFERENTIAL METHOD: ABNORMAL
ECHO EF ESTIMATED: 60 %
EOSINOPHIL # BLD AUTO: 0.2 K/UL
EOSINOPHIL NFR BLD: 2.5 %
ERYTHROCYTE [DISTWIDTH] IN BLOOD BY AUTOMATED COUNT: 15.1 %
EST. GFR  (AFRICAN AMERICAN): >60 ML/MIN/1.73 M^2
EST. GFR  (NON AFRICAN AMERICAN): >60 ML/MIN/1.73 M^2
GLUCOSE SERPL-MCNC: 96 MG/DL
HCT VFR BLD AUTO: 38.1 %
HGB BLD-MCNC: 12.7 G/DL
LYMPHOCYTES # BLD AUTO: 3 K/UL
LYMPHOCYTES NFR BLD: 34.7 %
MCH RBC QN AUTO: 28.7 PG
MCHC RBC AUTO-ENTMCNC: 33.4 G/DL
MCV RBC AUTO: 86 FL
MONOCYTES # BLD AUTO: 0.7 K/UL
MONOCYTES NFR BLD: 8.1 %
NEUTROPHILS # BLD AUTO: 4.8 K/UL
NEUTROPHILS NFR BLD: 54.3 %
PLATELET # BLD AUTO: 346 K/UL
PMV BLD AUTO: 7.5 FL
POTASSIUM SERPL-SCNC: 4.2 MMOL/L
PROT S ACT/NOR PPP: 116 %
PROT S AG ACT/NOR PPP IA: 101 %
RBC # BLD AUTO: 4.42 M/UL
SODIUM SERPL-SCNC: 137 MMOL/L
WBC # BLD AUTO: 8.8 K/UL

## 2018-04-26 PROCEDURE — 25000003 PHARM REV CODE 250: Performed by: PSYCHIATRY & NEUROLOGY

## 2018-04-26 PROCEDURE — 80048 BASIC METABOLIC PNL TOTAL CA: CPT

## 2018-04-26 PROCEDURE — 99233 SBSQ HOSP IP/OBS HIGH 50: CPT | Mod: ,,, | Performed by: PSYCHIATRY & NEUROLOGY

## 2018-04-26 PROCEDURE — 36415 COLL VENOUS BLD VENIPUNCTURE: CPT

## 2018-04-26 PROCEDURE — 25000003 PHARM REV CODE 250: Performed by: INTERNAL MEDICINE

## 2018-04-26 PROCEDURE — 99239 HOSP IP/OBS DSCHRG MGMT >30: CPT | Mod: ,,, | Performed by: INTERNAL MEDICINE

## 2018-04-26 PROCEDURE — 94761 N-INVAS EAR/PLS OXIMETRY MLT: CPT

## 2018-04-26 PROCEDURE — 85025 COMPLETE CBC W/AUTO DIFF WBC: CPT

## 2018-04-26 RX ORDER — ATORVASTATIN CALCIUM 40 MG/1
40 TABLET, FILM COATED ORAL DAILY
Qty: 30 TABLET | Refills: 0 | Status: SHIPPED | OUTPATIENT
Start: 2018-04-27 | End: 2018-05-01 | Stop reason: SDUPTHER

## 2018-04-26 RX ORDER — ASPIRIN 81 MG/1
81 TABLET ORAL DAILY
Qty: 81 TABLET | Status: SHIPPED | OUTPATIENT
Start: 2018-04-26 | End: 2018-08-21

## 2018-04-26 RX ADMIN — ATORVASTATIN CALCIUM 40 MG: 40 TABLET, FILM COATED ORAL at 10:04

## 2018-04-26 RX ADMIN — ASPIRIN 325 MG ORAL TABLET 325 MG: 325 PILL ORAL at 10:04

## 2018-04-26 NOTE — SUBJECTIVE & OBJECTIVE
Subjective:     Interval History: no new neurologic symptoms. At her baseline. Echo yesterday some atrial septal aneurysm. Now on xarelto.     Current Facility-Administered Medications   Medication Dose Route Frequency Provider Last Rate Last Dose    acetaminophen tablet 650 mg  650 mg Oral Q6H PRN Evelyn Norman MD   650 mg at 04/24/18 1818    aspirin tablet 325 mg  325 mg Oral Daily Evelyn Norman MD   325 mg at 04/26/18 1000    atorvastatin tablet 40 mg  40 mg Oral Daily Pearl Roca MD   40 mg at 04/26/18 1000    benzocaine 20 % oral spray    PRN Inder Frank MD   2 each at 04/25/18 1338    fentaNYL injection    PRN Inder Frank MD   25 mcg at 04/25/18 1342    midazolam injection    PRN Inder Frank MD   1 mg at 04/25/18 1342    rivaroxaban tablet 20 mg  20 mg Oral Daily with dinner Evelyn Norman MD   20 mg at 04/25/18 1851       Review of Systems   Psychiatric/Behavioral: Positive for sleep disturbance. The patient is nervous/anxious.     Comprehensive review of systems is negative except as mentioned in the HPI.   Objective:     Vital Signs (Most Recent):  Temp: 97.8 °F (36.6 °C) (04/26/18 1058)  Pulse: (!) 58 (04/26/18 1058)  Resp: 15 (04/26/18 1058)  BP: 112/63 (04/26/18 1058)  SpO2: 98 % (04/26/18 1058) Vital Signs (24h Range):  Temp:  [96.9 °F (36.1 °C)-97.8 °F (36.6 °C)] 97.8 °F (36.6 °C)  Pulse:  [58-85] 58  Resp:  [15-18] 15  SpO2:  [96 %-99 %] 98 %  BP: (105-125)/(58-77) 112/63     Weight: 78 kg (172 lb)  Body mass index is 28.62 kg/m².    Physical Exam      Mental status: Awake and alert  Speech language: No dysarthria or aphasia on conversation  Cranial nerves: Face symmetric  Motor: Moves all extremities well  Coordination: No ataxia. No tremor.     Significant Labs: All pertinent lab results from the past 24 hours have been reviewed.    Significant Imaging: I have reviewed and interpreted all pertinent imaging results/findings within the past 24 hours.

## 2018-04-26 NOTE — ASSESSMENT & PLAN NOTE
Patient is understandably anxious and is having trouble sleeping. I advised her she could take melatonin 3 mg nightly. Reassured her she is now on appropriate therapy.

## 2018-04-26 NOTE — PROGRESS NOTES
[facility]  Neurology  Progress Note    Patient Name: Jaclyn Rausch  MRN: [id number]  Admission Date: 4/24/2018  Hospital Length of Stay: 2 days  Code Status: Full Code   Attending Provider: Evelyn Norman MD  Primary Care Physician: Rosa Alas NP   Principal Problem:Acute CVA (cerebrovascular accident)      Subjective:     Interval History: no new neurologic symptoms. At her baseline. Echo yesterday some atrial septal aneurysm. Now on xarelto.     Current Facility-Administered Medications   Medication Dose Route Frequency Provider Last Rate Last Dose    acetaminophen tablet 650 mg  650 mg Oral Q6H PRN Evelyn Norman MD   650 mg at 04/24/18 1818    aspirin tablet 325 mg  325 mg Oral Daily Evelyn Norman MD   325 mg at 04/26/18 1000    atorvastatin tablet 40 mg  40 mg Oral Daily Pearl Roca MD   40 mg at 04/26/18 1000    benzocaine 20 % oral spray    PRN Inder Frank MD   2 each at 04/25/18 1338    fentaNYL injection    PRN Inder Frank MD   25 mcg at 04/25/18 1342    midazolam injection    PRN Inder Frank MD   1 mg at 04/25/18 1342    rivaroxaban tablet 20 mg  20 mg Oral Daily with dinner Evelyn Norman MD   20 mg at 04/25/18 1851       Review of Systems   Psychiatric/Behavioral: Positive for sleep disturbance. The patient is nervous/anxious.     Comprehensive review of systems is negative except as mentioned in the HPI.   Objective:     Vital Signs (Most Recent):  Temp: 97.8 °F (36.6 °C) (04/26/18 1058)  Pulse: (!) 58 (04/26/18 1058)  Resp: 15 (04/26/18 1058)  BP: 112/63 (04/26/18 1058)  SpO2: 98 % (04/26/18 1058) Vital Signs (24h Range):  Temp:  [96.9 °F (36.1 °C)-97.8 °F (36.6 °C)] 97.8 °F (36.6 °C)  Pulse:  [58-85] 58  Resp:  [15-18] 15  SpO2:  [96 %-99 %] 98 %  BP: (105-125)/(58-77) 112/63     Weight: 78 kg (172 lb)  Body mass index is 28.62 kg/m².    Physical Exam      Mental status: Awake and alert  Speech language: No dysarthria or aphasia on  conversation  Cranial nerves: Face symmetric  Motor: Moves all extremities well  Coordination: No ataxia. No tremor.     Significant Labs: All pertinent lab results from the past 24 hours have been reviewed.    Significant Imaging: I have reviewed and interpreted all pertinent imaging results/findings within the past 24 hours.    Assessment and Plan:     * Acute CVA (cerebrovascular accident) -  lateral left frontal lobe, non-hemorrhagic    MRI confirmed an acute ischemic cortical infarct. The location of this and the description of likely two localizations is highly suspicious for cardioembolic source. Carotid US is negative. She does have a history of an ASD. Cardiology is evaluating--TTE yesterday showed a bulge but no hole and JUAN is planned today. I would favor anticoagulation in her. The stroke is small and she would be safe to start this today if cardiology is in agreement. If hole is seen, there is evidence for closure for her. Counseled her to avoid birth control or hormones in the future and never start smoking again.     - Frequent neuro checks  - 24 hour cardiac monitoring  - Permissive HTN up to 220/110   - No need for therapy as patient is back to baseline  - Agree with xarelto  - Statin for LDL goal <70  - Hyperoagulable workup pending, D-dimer elevated        Insomnia    Patient is understandably anxious and is having trouble sleeping. I advised her she could take melatonin 3 mg nightly. Reassured her she is now on appropriate therapy.         Atrial septal aneurysm    As above        Aphasia    resolved        History of miscarriage    Hypercoagulable workup as patient has a stroke and has had two miscarriages        Ok with patient discharge. Will follow up with me in clinic in a few weeks. I will sign off.     VTE Risk Mitigation         Ordered     rivaroxaban tablet 20 mg  With dinner      04/25/18 9877     IP VTE LOW RISK PATIENT  Once      04/24/18 1942          Pearl Roca,  MD  Neurology  [facility]

## 2018-04-26 NOTE — PLAN OF CARE
Problem: Patient Care Overview  Goal: Plan of Care Review  Outcome: Ongoing (interventions implemented as appropriate)  Pt alert and oriented. Vitals stable. Ambulates to restroom with no issues. Free from falls. Adequate for discharge. Will continue to monitor.

## 2018-04-26 NOTE — TELEPHONE ENCOUNTER
Do you want the patient to follow up with you or do you want her to see Dr. Thomas? When would you like her to be seen? The next available with Dr Thomas is July.    ----- Message from Tara Armenta sent at 4/26/2018 12:54 PM CDT -----  Contact: [facility] Needs to schedule a hospital follow up for patient     Please call back [phone] (home)

## 2018-04-26 NOTE — PLAN OF CARE
Problem: Patient Care Overview  Goal: Plan of Care Review  Outcome: Ongoing (interventions implemented as appropriate)  Pt resting quietly with no signs of distress. Will continue to monitor.

## 2018-04-26 NOTE — ASSESSMENT & PLAN NOTE
MRI confirmed an acute ischemic cortical infarct. The location of this and the description of likely two localizations is highly suspicious for cardioembolic source. Carotid US is negative. She does have a history of an ASD. Cardiology is evaluating--TTE yesterday showed a bulge but no hole and JUAN is planned today. I would favor anticoagulation in her. The stroke is small and she would be safe to start this today if cardiology is in agreement. If hole is seen, there is evidence for closure for her. Counseled her to avoid birth control or hormones in the future and never start smoking again.     - Frequent neuro checks  - 24 hour cardiac monitoring  - Permissive HTN up to 220/110   - No need for therapy as patient is back to baseline  - Agree with xarelto  - Statin for LDL goal <70  - Hyperoagulable workup pending, D-dimer elevated

## 2018-04-26 NOTE — PLAN OF CARE
04/26/18 0723   Patient Assessment/Suction   Level of Consciousness (AVPU) alert   PRE-TX-O2-ETCO2   O2 Device (Oxygen Therapy) room air   SpO2 99 %   Pulse Oximetry Type Intermittent   $ Pulse Oximetry - Multiple Charge Pulse Oximetry - Multiple   Pulse 72   Resp 16

## 2018-04-26 NOTE — DISCHARGE SUMMARY
"Discharge Summary  Hospital Medicine    Admit Date: 2018    Date and Time: 201812:40 PM    Discharge Attending Physician: Evelyn Norman MD    Primary Care Physician: Rosa Alas NP    Diagnoses:  Active Hospital Problems    Diagnosis  POA    *Acute CVA (cerebrovascular accident) -  lateral left frontal lobe, non-hemorrhagic [I63.9]  Yes    Acute nonintractable headache [R51]  Unknown    Aphasia [R47.01]  Yes    Atrial septal aneurysm [I25.3]  Yes     Dr. Frank; last visit 2018      History of miscarriage [Z87.59]  Not Applicable      Resolved Hospital Problems    Diagnosis Date Resolved POA   No resolved problems to display.     Discharged Condition: Good    Hospital Course:   Patient is a 37 y.o. female admitted to Hospitalist Service from [facility] with complaint of episode of left facial droop and aphasia. Patient reportedly has past medical history significant for chronic anemia and history of ASD and history of migraine. Patient presented with difficulty speaking for ~40 minutes with associated left lower face numbness & weakness. The speech difficulty began upon waking up this morning. She reported going to bed at 10 PM last night when a migraine and states that it was similar to her typical migraines including a pre-aura and seeing "spots." The patient was able to make sounds. She reported prior similar symptoms to when shed was in highschool that resolved on its own. Currently, the speech difficulty resolved PTA. The patient also stated that she felt LUE numbness as well as RUE weakness as she drank lifted her edup of water to drink this morning. She was recently on hormone therapy for a month after a D&C earlier this ear secondary to an  in the first trimester in 2018. The patient has taking Imitrex previously for her migraines but states that she had a reaction to it. Patient denied chest pain, shortness of breath, abdominal pain, nausea, " vomiting, headache, vision changes, cough or fever. Patient was admitted to Hospitalist medicine service. Patient was evaluated by Dr. Frank and Dr. Roca and Dr. Sprague. Hypercoagulable workup ordered. Patient placed on tele-monitoring and routine neuro-checks. Neuro-imaging reviewed, results below. Patient evaluated by neurology team. Patient worked with ST/PT/OT. Symptoms improved. Patient started on anticoagulation. Risks related to anticoagulation discussed. Fall precautions reviewed. Patient to continue close follow up with her doctors. Patient was discharged home in stable condition with following discharge plan of care. Total time with the patient was 30 minutes and greater than 50% was spent in counseling and coordination of care. The assessment and plan have been discussed at length. Physicians' notes reviewed. Labs and procedure reviewed.     Consults: Dr. Dr. Frank, Dr. Roca and Dr. Sprague    Significant Diagnostic Studies:   CT Head: There is no acute abnormality.  There is a tiny chronic lacunar infarction in the superior left cerebellum.  There is no intracranial hemorrhage, mass/mass effect, acute edema or ischemia.  It should be noted that MRI is more sensitive in the detection of subtle or acute nonhemorrhagic ischemic disease.     Lower extremity venous doppler scan: No evidence of deep venous thrombosis in either lower extremity.     Carotid US: No evidence of a hemodynamically significant carotid bifurcation stenosis.     MRI Brain:   1. There is a single punctate 4.5 mm focus of restricted diffusion high in the lateral left frontal lobe consistent with an acute nonhemorrhagic infarction.  2. There is a chronic lacunar infarction in the superior left cerebellum.  3. There is no intracranial hemorrhage, mass effect.      ECHO:  Left Ventricle Normal ejection fraction at 60%. Normal left ventricle diastolic function.      Right Ventricle Normal cavity size, wall thickness and ejection  fraction. Wall motion normal.      Left Atrium Interatrial septal aneurysm present.      Right Atrium Normal cavity size.      Mitral Valve Normal valve structure. No stenosis. No regurgitation. Normal leaflet mobility.      Tricuspid Valve The tricuspid valve is normal. No stenosis. Mild regurgitation.      Aortic Valve Normal valve structure. No stenosis. No regurgitation.         JUAN: "aneurism of inter atrial septum mild , no shiff. Negative bubble study".    Special Treatments/Procedures: as above  Disposition: Home or Self Care    Medications:  Reconciled Home Medications: Current Discharge Medication List      START taking these medications    Details   aspirin (ECOTRIN) 81 MG EC tablet Take 1 tablet (81 mg total) by mouth once daily.  Qty: 81 tablet, Refills: mg      atorvastatin (LIPITOR) 40 MG tablet Take 1 tablet (40 mg total) by mouth once daily.  Qty: 30 tablet, Refills: 0      rivaroxaban (XARELTO) 20 mg Tab Take 1 tablet (20 mg total) by mouth daily with dinner or evening meal.  Qty: 30 tablet, Refills: 0             Discharge Procedure Orders  Diet Cardiac     Activity as tolerated   Order Comments: Observe fall precautions.     Call MD for:   Order Comments: For worsening symptoms, chest pain, shortness of breath, increased abdominal pain, high grade fever, stroke or stroke like symptoms, immediately go to the nearest Emergency Room or call 911 as soon as possible.     Case Request-Cath Lab: TRANSESOPHAGEAL ECHOCARDIOGRAM WITH POSSIBLE CARDIOVERSION (JUAN W/ POSS CARDIOVERSION)   Order Specific Question Answer Comments   CPT Code: [place] ECHO HEART,TRANSESOPHAGEAL,COMPLETE [27197]        Follow-up Information     Rosa Alas NP On 5/1/2018.    Specialty:  Family Medicine  Why:  @11:40am   Contact information:  [address] [address]  [phone]             Pearl Roca MD In 2 weeks.    Specialty:  Neurology  Contact information:  [address] [place]  [address]  [phone]             Jesus Sprague MD In 2 weeks.    Specialties:  Hematology, Hematology and Oncology, Oncology  Contact information:  [address] [facility]  [address]  [place] [address] [phone]             Inder Frank MD In 2 weeks.    Specialty:  Cardiology  Contact information:  [address]  [place] [address]  [phone]

## 2018-04-27 ENCOUNTER — PATIENT MESSAGE (OUTPATIENT)
Dept: NEUROLOGY | Facility: CLINIC | Age: 38
End: 2018-04-27

## 2018-04-27 ENCOUNTER — PATIENT OUTREACH (OUTPATIENT)
Dept: ADMINISTRATIVE | Facility: CLINIC | Age: 38
End: 2018-04-27

## 2018-04-27 LAB
CARDIOLIPIN IGG SER IA-ACNC: <9.4 GPL
CARDIOLIPIN IGM SER IA-ACNC: 17.99 MPL
F2 GENE MUT ANL BLD/T: NORMAL
F5 P.R506Q BLD/T QL: NORMAL
LA PPP-IMP: NEGATIVE
LIPOPROTEIN (A): 103 MG/DL (ref 0–30)
PROT C AG ACT/NOR PPP IA: 107 % (ref 70–150)

## 2018-04-27 NOTE — PLAN OF CARE
04/27/18 0822   Final Note   Assessment Type Final Discharge Note   Discharge Disposition Home

## 2018-05-01 ENCOUNTER — OFFICE VISIT (OUTPATIENT)
Dept: FAMILY MEDICINE | Facility: CLINIC | Age: 38
End: 2018-05-01
Payer: COMMERCIAL

## 2018-05-01 VITALS
BODY MASS INDEX: 28.66 KG/M2 | HEART RATE: 67 BPM | TEMPERATURE: 98 F | SYSTOLIC BLOOD PRESSURE: 134 MMHG | HEIGHT: 65 IN | WEIGHT: 172 LBS | DIASTOLIC BLOOD PRESSURE: 85 MMHG

## 2018-05-01 DIAGNOSIS — I63.81 LEFT SIDED LACUNAR INFARCTION: ICD-10-CM

## 2018-05-01 DIAGNOSIS — Z79.899 HIGH RISK MEDICATION USE: ICD-10-CM

## 2018-05-01 DIAGNOSIS — Z09 HOSPITAL DISCHARGE FOLLOW-UP: Primary | ICD-10-CM

## 2018-05-01 DIAGNOSIS — I25.3 ATRIAL SEPTAL ANEURYSM: ICD-10-CM

## 2018-05-01 DIAGNOSIS — I63.9 ACUTE CVA (CEREBROVASCULAR ACCIDENT): ICD-10-CM

## 2018-05-01 DIAGNOSIS — G47.00 INSOMNIA, UNSPECIFIED TYPE: ICD-10-CM

## 2018-05-01 DIAGNOSIS — Z79.01 ANTICOAGULANT LONG-TERM USE: ICD-10-CM

## 2018-05-01 DIAGNOSIS — E78.01 FAMILIAL HYPERCHOLESTEROLEMIA: ICD-10-CM

## 2018-05-01 DIAGNOSIS — G43.109 MIGRAINE WITH AURA AND WITHOUT STATUS MIGRAINOSUS, NOT INTRACTABLE: ICD-10-CM

## 2018-05-01 PROCEDURE — 99214 OFFICE O/P EST MOD 30 MIN: CPT | Mod: S$GLB,,, | Performed by: NURSE PRACTITIONER

## 2018-05-01 PROCEDURE — 99999 PR PBB SHADOW E&M-EST. PATIENT-LVL III: CPT | Mod: PBBFAC,,, | Performed by: NURSE PRACTITIONER

## 2018-05-01 RX ORDER — ATORVASTATIN CALCIUM 40 MG/1
40 TABLET, FILM COATED ORAL DAILY
Qty: 30 TABLET | Refills: 11 | Status: SHIPPED | OUTPATIENT
Start: 2018-05-01 | End: 2019-04-30 | Stop reason: SDUPTHER

## 2018-05-01 NOTE — PROGRESS NOTES
Subjective:       Patient ID: Jaclyn Rausch is a 37 y.o. female.    Chief Complaint: Hospital Follow Up    HPI   Chief Complaint  Chief Complaint   Patient presents with    Hospital Follow Up             Transitional Care Note    Family and/or Caretaker present at visit?  Yes.  Diagnostic tests reviewed/disposition: No diagnosic tests pending after this hospitalization.  Disease/illness education: CVA, anticoagulant use, side effects: xarelto  Home health/community services discussion/referrals: Patient does not have home health established from hospital visit.  They do not need home health.  If needed, we will set up home health for the patient.   Establishment or re-establishment of referral orders for community resources: No other necessary community resources.   Discussion with other health care providers: No discussion with other health care providers necessary.   Episode of care resolved.      HPI  Jaclyn Rausch is a 37 y.o. female with medical diagnoses as listed in the medical history and problem list that presents for hospital follow up of an acute lateral left frontal lobe non-hemorrhagic CVA.  Patient was admitted to [facility] on 4/24/18 after presenting with left facial droop and aphasia and weakness to the bilateral upper extremitiesthat developed after having a migraine with aura headache the night before for which she took naprosyn 500 mg before going to bed.  Woke up in morning with symptoms, symptoms lasted 40 minutes.  Patient had a CT of the head with no acute abnormality identified; tiny chronic lacunar infarction of the superior left cerebellum; Lower extremity ultrasound negative for DVT; Carotid ultrasound without evidence of hemodynamically significant stenosis; JUAN with aneurysm of inter-atrial septum and negative bubble study; MRI of brain 1. There is a single punctate 4.5 mm focus of restricted diffusion high in the lateral left frontal lobe consistent  with an acute nonhemorrhagic infarction.2. There is a chronic lacunar infarction in the superior left cerebellum.3. There is no intracranial hemorrhage, mass effect.  There is mild nonspecific white matter change.   Patient was discharged on aspirin 81 mg, lipitor 40 mg, and xarelto 20 mg daily.  Patient has a history of an atrial septic defect and has appointment scheduled with Dr. Frank on Thursday.  Patient also has an appointment with Dr. Sprague to follow up on possible hypercoagulable state with elevated Factor 12, elevated Factor 9 and elevated Lipoprotein A. Has follow up scheduled with Dr. Roca, neurology, in .  Patient feels that she is back to baseline functioning. Has had some small headaches since discharge relieved with tylenol. In January patient had an  due to fetal demise at 9 weeks gestation followed by D&C and was prescribed birth control pills which she has discontinued.  Patient's migraine headaches are preceded by a visual aura.  Discussed with patient general contraindication for hormone treatment/OCPs with migraine with aura.  BMI today is 28.62 and weight is stable at 172 pounds      PAST MEDICAL HISTORY:  Past Medical History:   Diagnosis Date    Anemia     slightly    Atrial septal aneurysm     Dr. Frank; last visit 2018       PAST SURGICAL HISTORY:  Past Surgical History:   Procedure Laterality Date    BUNIONECTOMY Left        SOCIAL HISTORY:  Social History     Social History    Marital status:      Spouse name: N/A    Number of children: N/A    Years of education: N/A     Occupational History    Not on file.     Social History Main Topics    Smoking status: Former Smoker     Packs/day: 0.25     Years: 2.00     Quit date: 2003    Smokeless tobacco: Never Used    Alcohol use No      Comment: Socially    Drug use: No    Sexual activity: Yes     Partners: Male      Comment:      Other Topics Concern    Not on file     Social History  Narrative    No narrative on file       FAMILY HISTORY:  Family History   Problem Relation Age of Onset    Diabetes Maternal Grandmother     Lung cancer Maternal Grandfather     Colon cancer Maternal Grandfather     Hypertension Mother     Hearing loss Father     Hypertension Brother     Breast cancer Neg Hx     Ovarian cancer Neg Hx        ALLERGIES AND MEDICATIONS: updated and reviewed.  Review of patient's allergies indicates:  No Known Allergies  Current Outpatient Prescriptions   Medication Sig Dispense Refill    aspirin (ECOTRIN) 81 MG EC tablet Take 1 tablet (81 mg total) by mouth once daily. 81 tablet mg    atorvastatin (LIPITOR) 40 MG tablet Take 1 tablet (40 mg total) by mouth once daily. 30 tablet 11    rivaroxaban (XARELTO) 20 mg Tab Take 1 tablet (20 mg total) by mouth daily with dinner or evening meal. 30 tablet 11     No current facility-administered medications for this visit.      Review of Systems   Constitutional: Positive for fatigue. Negative for appetite change, chills and fever.        Has been more fatigued since CVA.   HENT: Negative for congestion, ear pain, postnasal drip, rhinorrhea, sinus pain, sinus pressure and sore throat.    Eyes: Negative for visual disturbance.   Respiratory: Negative for cough and shortness of breath.    Cardiovascular: Negative for chest pain, palpitations and leg swelling.   Gastrointestinal: Positive for diarrhea. Negative for abdominal pain, constipation, nausea and vomiting.        Has had couple of episodes of urgent diarrhea since discharge from hospital   Genitourinary: Negative for difficulty urinating.   Musculoskeletal: Positive for arthralgias and myalgias.        Has some pain to feet due to plantar fasciitis. Also has a muscle pain to area between shoulder blades   Skin: Negative for rash and wound.   Neurological: Positive for headaches. Negative for dizziness, weakness and numbness.        Some mild headaches since discharge, relieved  "with tylenol   Psychiatric/Behavioral: Positive for sleep disturbance. Negative for dysphoric mood. The patient is nervous/anxious.         Having some difficulty falling asleep due to anxiety about waking up with stroke symptoms       Objective:       Vitals:    05/01/18 1158   BP: 134/85   BP Location: Right arm   Patient Position: Sitting   BP Method: Large (Automatic)   Pulse: 67   Temp: 98 °F (36.7 °C)   TempSrc: Oral   Weight: 78 kg (172 lb)   Height: 5' 5" (1.651 m)     Physical Exam   Constitutional: She is oriented to person, place, and time. Vital signs are normal. She appears well-developed and well-nourished. No distress.   HENT:   Head: Normocephalic and atraumatic.   Right Ear: External ear normal.   Left Ear: External ear normal.   Eyes: Conjunctivae, EOM and lids are normal. Pupils are equal, round, and reactive to light. Right conjunctiva is not injected. Left conjunctiva is not injected. Right eye exhibits normal extraocular motion. Left eye exhibits normal extraocular motion.   Neck: Normal range of motion. Neck supple. Normal carotid pulses present. Carotid bruit is not present.   Cardiovascular: Normal rate, regular rhythm, S1 normal, S2 normal, normal heart sounds, intact distal pulses and normal pulses.    No murmur heard.  Pulses:       Carotid pulses are 2+ on the right side, and 2+ on the left side.       Radial pulses are 2+ on the right side, and 2+ on the left side.        Posterior tibial pulses are 2+ on the right side, and 2+ on the left side.   No edema   Pulmonary/Chest: Effort normal and breath sounds normal. No respiratory distress. She has no decreased breath sounds. She has no wheezes. She has no rhonchi. She has no rales.   Abdominal: Soft. Normal appearance and bowel sounds are normal. There is no hepatosplenomegaly. There is no tenderness. No hernia.   Musculoskeletal: Normal range of motion.   Lymphadenopathy:     She has no cervical adenopathy.   Neurological: She is alert " and oriented to person, place, and time. She has normal strength. No cranial nerve deficit.   Cranial nerve 2-12 grossly intact.  Strength to bilateral upper and lower extremities 5/5 equal, bilateral hand grasps =and strong   Skin: Skin is warm, dry and intact. Capillary refill takes less than 2 seconds. She is not diaphoretic. No pallor.   Psychiatric: She has a normal mood and affect. Her speech is normal and behavior is normal. Judgment and thought content normal. Cognition and memory are normal.   Nursing note and vitals reviewed.      Assessment:       1. Hospital discharge follow-up    2. Acute CVA (cerebrovascular accident) -  lateral left frontal lobe, non-hemorrhagic, 4/24/18    3. Anticoagulant long-term use    4. Left sided lacunar infarction, chronic, identified 4/24/18    5. Atrial septal aneurysm    6. Familial hypercholesterolemia    7. Migraine with aura and without status migrainosus, not intractable    8. Insomnia, unspecified type    9. High risk medication use    10. BMI 28.0-28.9,adult        Plan:   Jaclyn was seen today for hospital follow up.    Diagnoses and all orders for this visit:    Hospital discharge follow-up    Hospital records, discharge summary,  test results, blood work results reviewed, medication list reconciled.    Acute CVA (cerebrovascular accident) -  lateral left frontal lobe, non-hemorrhagic, 4/24/18  -     rivaroxaban (XARELTO) 20 mg Tab; Take 1 tablet (20 mg total) by mouth daily with dinner or evening meal.        -  MRI of brain 4/24/18:  1. There is a single punctate 4.5 mm focus of restricted diffusion high in the lateral left frontal lobe consistent with an acute nonhemorrhagic infarction.2. There is a chronic lacunar infarction in the superior left cerebellum.3. There is no intracranial hemorrhage, mass effect.  There is mild nonspecific white matter change.         -     Appointment scheduled for follow up with Dr. Pearl Roca, neurology, on  6/12/18    Anticoagulant long-term use  - Possible hypercoagulable state  - Labs done in hospital  - Patient has appointment with Dr. Sprague on 5/4/18 to discuss lab results  - Continue Xarelto 20 mg daily, prescription refilled  - Patient with multiple questions about recommended activity level/ability to continue working in her current position while taking anticoagulant, answered for patient and advised to discuss further with Dr. Sprague on Friday.    Left sided lacunar infarction, chronic, identified 4/24/18   MRI of brain 4/24/18:  1. There is a single punctate 4.5 mm focus of restricted diffusion high in the lateral left frontal lobe consistent with an acute nonhemorrhagic infarction.2. There is a chronic lacunar infarction in the superior left cerebellum.3. There is no intracranial hemorrhage, mass effect.  There is mild nonspecific white matter change.   Atrial septal aneurysm   TTE 4/24/18   Left Atrium Interatrial septal aneurysm present    Patient will see Dr. Frank on 5/3/18    Familial hypercholesterolemia  -     atorvastatin (LIPITOR) 40 MG tablet; Take 1 tablet (40 mg total) by mouth once daily.  -     Lipid panel; Future, scheduled 7/1618  -  on 4/25/18    Migraine with aura and without status migrainosus, not intractable   No migraines since hospital discharge   Some mild headaches that have been relieved with tylenol as needed    Insomnia, unspecified type   Related to recent CVA and fear of waking with recurrent symptoms   Monitor for now, patient has started melatonin at bedtime, advised to take about the same time each night and best results when going to bed at about the sme time each night     High risk medication use         -     Hepatic function panel; Future    BMI 28.0-28.9,adult   Overweight discussed.   Weight loss recommended with well balanced diet and portion controlled meals and increased activity if ok with neurology, hematology,and cardiology.    Follow-up in about  6 months (around 11/1/2018) for hyperlipidemia, CVA.

## 2018-05-02 PROBLEM — G43.109 MIGRAINE WITH AURA AND WITHOUT STATUS MIGRAINOSUS, NOT INTRACTABLE: Status: ACTIVE | Noted: 2018-05-02

## 2018-05-02 PROBLEM — Z79.01 ANTICOAGULANT LONG-TERM USE: Status: ACTIVE | Noted: 2018-05-02

## 2018-05-02 PROBLEM — E78.01 FAMILIAL HYPERCHOLESTEROLEMIA: Status: ACTIVE | Noted: 2018-05-02

## 2018-05-02 PROBLEM — I63.81 LEFT SIDED LACUNAR INFARCTION: Status: ACTIVE | Noted: 2018-05-02

## 2018-05-04 ENCOUNTER — OFFICE VISIT (OUTPATIENT)
Dept: HEMATOLOGY/ONCOLOGY | Facility: CLINIC | Age: 38
End: 2018-05-04
Payer: COMMERCIAL

## 2018-05-04 VITALS
HEART RATE: 72 BPM | TEMPERATURE: 99 F | SYSTOLIC BLOOD PRESSURE: 114 MMHG | DIASTOLIC BLOOD PRESSURE: 79 MMHG | WEIGHT: 173.69 LBS | HEIGHT: 65 IN | BODY MASS INDEX: 28.94 KG/M2

## 2018-05-04 DIAGNOSIS — E78.41 ELEVATED LIPOPROTEIN(A): ICD-10-CM

## 2018-05-04 DIAGNOSIS — Z87.59 HISTORY OF MISCARRIAGE: Primary | ICD-10-CM

## 2018-05-04 DIAGNOSIS — Z79.01 ANTICOAGULANT LONG-TERM USE: ICD-10-CM

## 2018-05-04 DIAGNOSIS — R76.0 ANTICARDIOLIPIN ANTIBODY POSITIVE: ICD-10-CM

## 2018-05-04 DIAGNOSIS — I25.3 ATRIAL SEPTAL ANEURYSM: ICD-10-CM

## 2018-05-04 DIAGNOSIS — I63.9 ACUTE CVA (CEREBROVASCULAR ACCIDENT): ICD-10-CM

## 2018-05-04 PROCEDURE — 99215 OFFICE O/P EST HI 40 MIN: CPT | Mod: ,,, | Performed by: INTERNAL MEDICINE

## 2018-05-04 PROCEDURE — 1111F DSCHRG MED/CURRENT MED MERGE: CPT | Mod: ,,, | Performed by: INTERNAL MEDICINE

## 2018-05-04 PROCEDURE — 3008F BODY MASS INDEX DOCD: CPT | Mod: ,,, | Performed by: INTERNAL MEDICINE

## 2018-05-04 NOTE — LETTER
May 4, 2018      Rosa Alas, NP  [address]  [place] [address]           [facility]  [address]  [address]  [facility] [address]  Phone: [phone]  Fax: [phone]          Patient: Jaclyn Rausch   MR Number: [id number]   YOB: 1980   Date of Visit: 5/4/2018       Dear Rosa Alas:    Thank you for referring Jaclyn Rausch to me for evaluation. Attached you will find relevant portions of my assessment and plan of care.    If you have questions, please do not hesitate to call me. I look forward to following Jaclyn Rausch along with you.    Sincerely,    Jesus Sprague MD    Enclosure  CC:  No Recipients    If you would like to receive this communication electronically, please contact [email or web address] or [phone] to request more information on [facility] Link access.    For providers and/or their staff who would like to refer a patient to [facility], please contact us through our one-stop-shop provider referral line, [facility], at [phone].    If you feel you have received this communication in error or would no longer like to receive these types of communications, please e-mail [email or web address]

## 2018-05-04 NOTE — PROGRESS NOTES
[facility]emat[facility]  Hospital Follow-up Note    Subjective:      Patient ID:   NAME: Jaclyn Rausch : 1980     37 y.o. female    Referring Doc: Gauri Vargas (NP)  Other Physicians: Pearl Frank/Gertrudis Ariza (neuro)    Chief Complaint: acute CVA      HPI:  37 y.o. female with diagnosis of Acute CVA who was seen as a consult at [facility]. She was seen by her cardiologist Dr Frank and by Dr Pearl Roca with neurology. I ordered a hypercoag workup on her at that time. She had bubble study and JUAN with Dr Frank. She is currently on xarelto oral anticoagulation. Aspirin and lipitor. She is here with her . Her symptomatolgy seems to have fully recovered. She denies any CP, SOB, HA's or N/V            ROS:   GEN: normal without any fever, night sweats or weight loss  HEENT: normal with no HA's, sore throat, stiff neck, changes in vision  CV: normal with no CP, SOB, PND, BAILEY or orthopnea  PULM: normal with no SOB, cough, hemoptysis, sputum or pleuritic pain  GI: normal with no abdominal pain, nausea, vomiting, constipation, diarrhea, melanotic stools, BRBPR, or hematemesis  : normal with no hematuria, dysuria  BREAST: normal with no mass, discharge, pain  SKIN: normal with no rash, erythema, bruising, or swelling     Past Medical/Surgical History:  Past Medical History:   Diagnosis Date    Anemia     slightly    Anticardiolipin antibody positive 2018    Atrial septal aneurysm     Dr. Frank; last visit 2018    Elevated lipoprotein(a) 2018     Past Surgical History:   Procedure Laterality Date    BUNIONECTOMY Left          Allergies:  Review of patient's allergies indicates:  No Known Allergies    Social/Family History:  Social History     Social History    Marital status:      Spouse name: N/A    Number of children: N/A    Years of education: N/A     Occupational History    Not on file.     Social History Main Topics    Smoking status: Former  "Smoker     Packs/day: 0.25     Years: 2.00     Quit date: 01/2003    Smokeless tobacco: Never Used    Alcohol use No      Comment: Socially    Drug use: No    Sexual activity: Yes     Partners: Male      Comment:      Other Topics Concern    Not on file     Social History Narrative    No narrative on file     Family History   Problem Relation Age of Onset    Diabetes Maternal Grandmother     Lung cancer Maternal Grandfather     Colon cancer Maternal Grandfather     Hypertension Mother     Hearing loss Father     Hypertension Brother     Breast cancer Neg Hx     Ovarian cancer Neg Hx          Medications:    Current Outpatient Prescriptions:     aspirin (ECOTRIN) 81 MG EC tablet, Take 1 tablet (81 mg total) by mouth once daily., Disp: 81 tablet, Rfl: mg    atorvastatin (LIPITOR) 40 MG tablet, Take 1 tablet (40 mg total) by mouth once daily., Disp: 30 tablet, Rfl: 11    rivaroxaban (XARELTO) 20 mg Tab, Take 1 tablet (20 mg total) by mouth daily with dinner or evening meal., Disp: 30 tablet, Rfl: 11      Pathology:  none        Objective:   Vitals:  Blood pressure 114/79, pulse 72, temperature 99 °F (37.2 °C), height 5' 5" (1.651 m), weight 78.8 kg (173 lb 11.2 oz), last menstrual period 04/17/2018.    Physical Examination:   GEN: no apparent distress, comfortable; AAOx3  HEAD: atraumatic and normocephalic  EYES: no pallor, no icterus, PERRLA  ENT: OMM, no pharyngeal erythema, external ears WNL; no nasal discharge; no thrush  NECK: no masses, thyroid normal, trachea midline, no LAD/LN's, supple  CV: RRR with no murmur; normal pulse; normal S1 and S2; no pedal edema  CHEST: Normal respiratory effort; CTAB; normal breath sounds; no wheeze or crackles  ABDOM: nontender and nondistended; soft; normal bowel sounds; no rebound/guarding  MUSC/Skeletal: ROM normal; no crepitus; joints normal; no deformities or arthropathy  EXTREM: no clubbing, cyanosis, inflammation or swelling  SKIN: no rashes, " lesions, ulcers, petechiae or subcutaneous nodules  : no gonzalez  NEURO: grossly intact; motor/sensory WNL; AAOx3; no tremors  PSYCH: normal mood, affect and behavior  LYMPH: normal cervical, supraclavicular, axillary and groin LN's      Labs:     Lab Results   Component Value Date    WBC 8.80 04/26/2018    HGB 12.7 04/26/2018    HCT 38.1 04/26/2018    MCV 86 04/26/2018     04/26/2018     BMP  Lab Results   Component Value Date     04/26/2018    K 4.2 04/26/2018     04/26/2018    CO2 26 04/26/2018    BUN 13 04/26/2018    CREATININE 0.8 04/26/2018    CALCIUM 8.9 04/26/2018    ANIONGAP 7 (L) 04/26/2018    ESTGFRAFRICA >60 04/26/2018    EGFRNONAA >60 04/26/2018     Lab Results   Component Value Date    ALT 19 10/26/2017    AST 21 10/26/2017    ALKPHOS 75 10/26/2017    BILITOT 0.4 10/26/2017       I have reviewed all available lab results and radiology reports.    Radiology/Diagnostic Studies:    MRI Brain 4/24/2018:  Impression       1. There is a single punctate 4.5 mm focus of restricted diffusion high in the lateral left frontal lobe consistent with an acute nonhemorrhagic infarction.  2. There is a chronic lacunar infarction in the superior left cerebellum.  3. There is no intracranial hemorrhage, mass effect.  There is mild nonspecific white matter change.  Note: Abnormal results were discussed with the patient's nurse, Selina Chowdhury, at 10:35am.     US Doppler 4/24/2018:  Impression       No evidence of deep venous thrombosis in either lower extremity     US Carotid: 4/24/2018  Impression       No evidence of a hemodynamically significant carotid bifurcation stenosis         All lab results and imaging results have been reviewed and discussed with the patient    Assessment:   (1) 38 yo female with acute onset aphasia and left facial droop with MRI showing lateral left frontal lobe acute nonhemorrhagic infarction. She has been seen by Dr Frank with cardiology and Dr Pearl Roca with neurology    - hematology was consulted for evaluation for hypercoag workup which I ordered while she was inpatient  - she is currently on xarelto oral anticoagulation  - she is to f/u with Dr Pearl Roca with neurology as outpatient in June 2018  - she saw Evette JURADO on 5/2/2018 for hospital f/u  - factor XII was elevated at 144 and  factor IX was elevated at 147, but mildly and may be reactive in nature only  - Lipoprotein-A was elevated at 103; APA IgM was elevated at 17.99  - homocysteine was WNL; ATIII was normal  - prothrombin II gene was normal, LA was negative; factor V leiden was normal  - protein C and S was adeqaute     (2) Chronic borderline anemia in past - current hgb is within normal limits     (3) Hx/of migraine headaches     (4) Atrial septal aneurysm - followed by Dr Frank with cardiology as outpatient  - she had bubble study and JUAN while in hospital  - there is no opening or hole per patient  - she sees Dr Frank again this Wednesday     (5) Recent loss of pregnancy in Jan 2018 in first trimester    (6) Hypercholesterolemia - now on lipitor        1. History of miscarriage    2. Anticoagulant long-term use    3. Acute CVA (cerebrovascular accident) -  lateral left frontal lobe, non-hemorrhagic    4. Atrial septal aneurysm    5. Anticardiolipin antibody positive    6. Elevated lipoprotein(a)        Plan:     PLAN:  1. Continue xarelto (possibly life-long), aspirin, and anti-hypercholesterolemia  2. She may need her miltatry activities restricted because of the risk of bleeding  3. F/u with PCP, Neuro and cardiology  4.  RTC in  2-3 months  Fax note to Alicia Frank Amy Jones              History of miscarriage    Anticoagulant long-term use    Acute CVA (cerebrovascular accident) -  lateral left frontal lobe, non-hemorrhagic    Atrial septal aneurysm    Anticardiolipin antibody positive    Elevated lipoprotein(a)              I have explained and the patient understands all of  the current  recommendation(s). I have answered all of their questions to the best of my ability and to their complete satisfaction.             Thank you for allowing me to participate in this pleasant patient's care. Please call with any questions or concerns.      Electronically signed Jesus Sprague MD

## 2018-05-11 ENCOUNTER — TELEPHONE (OUTPATIENT)
Dept: HEMATOLOGY/ONCOLOGY | Facility: CLINIC | Age: 38
End: 2018-05-11

## 2018-05-11 NOTE — TELEPHONE ENCOUNTER
Spoke to patient about her period flow.She said she feels ok but just knows that it is heavier now. I recommended she call her GYN and let them know that she is now on xarelto and a baby aspirin. Let them know that her flow is heavier than usual. She said she would. I also asked her to call me back on Monday and I will ask  if she needs to do some blood work now. She will call back the on call doctor this weekend if she feels worse.

## 2018-05-15 ENCOUNTER — PATIENT MESSAGE (OUTPATIENT)
Dept: OBSTETRICS AND GYNECOLOGY | Facility: CLINIC | Age: 38
End: 2018-05-15

## 2018-05-16 ENCOUNTER — TELEPHONE (OUTPATIENT)
Dept: HEMATOLOGY/ONCOLOGY | Facility: CLINIC | Age: 38
End: 2018-05-16

## 2018-05-16 ENCOUNTER — PATIENT MESSAGE (OUTPATIENT)
Dept: NEUROLOGY | Facility: CLINIC | Age: 38
End: 2018-05-16

## 2018-05-16 ENCOUNTER — LAB VISIT (OUTPATIENT)
Dept: LAB | Facility: HOSPITAL | Age: 38
End: 2018-05-16
Attending: INTERNAL MEDICINE
Payer: COMMERCIAL

## 2018-05-16 DIAGNOSIS — Z79.01 ANTICOAGULANT LONG-TERM USE: ICD-10-CM

## 2018-05-16 DIAGNOSIS — Z79.01 ANTICOAGULANT LONG-TERM USE: Primary | ICD-10-CM

## 2018-05-16 LAB
BASOPHILS # BLD AUTO: 0.03 K/UL
BASOPHILS NFR BLD: 0.4 %
DIFFERENTIAL METHOD: ABNORMAL
EOSINOPHIL # BLD AUTO: 0.1 K/UL
EOSINOPHIL NFR BLD: 1.2 %
ERYTHROCYTE [DISTWIDTH] IN BLOOD BY AUTOMATED COUNT: 14.2 %
HCT VFR BLD AUTO: 39.8 %
HGB BLD-MCNC: 12.6 G/DL
IMM GRANULOCYTES # BLD AUTO: 0.02 K/UL
IMM GRANULOCYTES NFR BLD AUTO: 0.3 %
LYMPHOCYTES # BLD AUTO: 3.3 K/UL
LYMPHOCYTES NFR BLD: 45.3 %
MCH RBC QN AUTO: 28.4 PG
MCHC RBC AUTO-ENTMCNC: 31.7 G/DL
MCV RBC AUTO: 90 FL
MONOCYTES # BLD AUTO: 0.5 K/UL
MONOCYTES NFR BLD: 6.4 %
NEUTROPHILS # BLD AUTO: 3.4 K/UL
NEUTROPHILS NFR BLD: 46.4 %
NRBC BLD-RTO: 0 /100 WBC
PLATELET # BLD AUTO: 395 K/UL
PMV BLD AUTO: 9.6 FL
RBC # BLD AUTO: 4.43 M/UL
WBC # BLD AUTO: 7.33 K/UL

## 2018-05-16 PROCEDURE — 36415 COLL VENOUS BLD VENIPUNCTURE: CPT | Mod: PO

## 2018-05-16 PROCEDURE — 85025 COMPLETE CBC W/AUTO DIFF WBC: CPT

## 2018-05-16 NOTE — TELEPHONE ENCOUNTER
Spoke to . He asked her to do a CBC . I spoke to her and she is going to [facility] now to get this blood work now.

## 2018-05-17 ENCOUNTER — TELEPHONE (OUTPATIENT)
Dept: HEMATOLOGY/ONCOLOGY | Facility: CLINIC | Age: 38
End: 2018-05-17

## 2018-05-17 ENCOUNTER — OFFICE VISIT (OUTPATIENT)
Dept: OBSTETRICS AND GYNECOLOGY | Facility: CLINIC | Age: 38
End: 2018-05-17
Payer: COMMERCIAL

## 2018-05-17 VITALS — WEIGHT: 173 LBS | BODY MASS INDEX: 28.82 KG/M2 | HEIGHT: 65 IN

## 2018-05-17 DIAGNOSIS — Z30.9 ENCOUNTER FOR CONTRACEPTIVE MANAGEMENT, UNSPECIFIED TYPE: ICD-10-CM

## 2018-05-17 DIAGNOSIS — N92.0 MENORRHAGIA WITH REGULAR CYCLE: Primary | ICD-10-CM

## 2018-05-17 PROCEDURE — 99213 OFFICE O/P EST LOW 20 MIN: CPT | Mod: S$GLB,,, | Performed by: OBSTETRICS & GYNECOLOGY

## 2018-05-17 PROCEDURE — 99999 PR PBB SHADOW E&M-EST. PATIENT-LVL III: CPT | Mod: PBBFAC,,, | Performed by: OBSTETRICS & GYNECOLOGY

## 2018-05-17 PROCEDURE — 3008F BODY MASS INDEX DOCD: CPT | Mod: CPTII,S$GLB,, | Performed by: OBSTETRICS & GYNECOLOGY

## 2018-05-17 NOTE — TELEPHONE ENCOUNTER
----- Message from Jesus Sprague MD sent at 5/17/2018  9:42 AM CDT -----  Labs look good - no anemia

## 2018-05-17 NOTE — TELEPHONE ENCOUNTER
Called to let patient know hgb hct red blood cell count is good   And to keep appointment with gyno and if she remembers  Name I will fax lab results to them

## 2018-05-17 NOTE — PROGRESS NOTES
Subjective:       Patient ID: Jaclyn Rausch is a 37 y.o. female.    Chief Complaint:  Dysmenorrhea      History of Present Illness  HPI  37 y.o.  Ab2 with complaint of heavy menses for years. Patient had micarriage in 2018 and subsequently experienced a minor CVA on 18. Patient was not on OCP's at that time since her  has now had a vasectomy. Patient was placed on Xarelto and her menses from 18 till 18 was heavy.Patient has now been diagnosed with a clotting disorder.    GYN & OB History  Patient's last menstrual period was 2018 (approximate).   Date of Last Pap: 10/4/2017    OB History    Para Term  AB Living   5 3 3 0 1 3   SAB TAB Ectopic Multiple Live Births   1 0 0 0 3      # Outcome Date GA Lbr Alexis/2nd Weight Sex Delivery Anes PTL Lv   5             4 Term 09 39w0d  2.948 kg (6 lb 8 oz) F INDUCTION EPI N FRANCINE   3 Term 08 39w0d  2.722 kg (6 lb) M Vag-Spont EPI N FRANCINE   2 Term     M Vag-Spont   FRANCINE      Complications: Placenta Previa   1 SAB  5w0d       DEC          Review of Systems  Review of Systems   Constitutional: Negative for activity change, appetite change, chills, diaphoresis, fatigue, fever and unexpected weight change.   HENT: Negative for mouth sores and tinnitus.    Eyes: Negative for discharge and visual disturbance.   Respiratory: Negative for cough, shortness of breath and wheezing.    Cardiovascular: Negative for chest pain, palpitations and leg swelling.   Gastrointestinal: Negative for abdominal pain, bloating, blood in stool, constipation, diarrhea, nausea and vomiting.   Endocrine: Negative for diabetes, hair loss, hot flashes, hyperthyroidism and hypothyroidism.   Genitourinary: Positive for menorrhagia and menstrual problem. Negative for decreased libido, dyspareunia, dysuria, flank pain, frequency, genital sores, hematuria, pelvic pain, urgency, vaginal bleeding, vaginal discharge, vaginal pain,  dysmenorrhea, urinary incontinence, postcoital bleeding, postmenopausal bleeding and vaginal odor.   Musculoskeletal: Negative for back pain, joint swelling and myalgias.   Skin:  Negative for rash, no acne and hair changes.   Neurological: Negative for seizures, syncope, numbness and headaches.   Hematological: Negative for adenopathy. Does not bruise/bleed easily.   Psychiatric/Behavioral: Negative for depression and sleep disturbance. The patient is not nervous/anxious.    Breast: Negative for breast mass, breast pain, nipple discharge and skin changes          Objective:    Physical Exam:   Constitutional: She is oriented to person, place, and time. She appears well-developed and well-nourished. No distress.    HENT:   Head: Normocephalic.    Eyes: Pupils are equal, round, and reactive to light.    Neck: Normal range of motion.    Cardiovascular: Normal rate.     Pulmonary/Chest: Effort normal.          Genitourinary:   Genitourinary Comments: deferred           Musculoskeletal: Normal range of motion.       Neurological: She is alert and oriented to person, place, and time.    Skin: Skin is warm and dry.    Psychiatric: She has a normal mood and affect. Her behavior is normal. Judgment and thought content normal.          Assessment:        1. Menorrhagia with regular cycle                Plan:      Option of Mirena IUD,Depo Provera,or Endometrial ablation discussed

## 2018-05-22 ENCOUNTER — PATIENT MESSAGE (OUTPATIENT)
Dept: FAMILY MEDICINE | Facility: CLINIC | Age: 38
End: 2018-05-22

## 2018-05-22 ENCOUNTER — HOSPITAL ENCOUNTER (EMERGENCY)
Facility: HOSPITAL | Age: 38
Discharge: HOME | End: 2018-05-22
Attending: EMERGENCY MEDICINE
Payer: COMMERCIAL

## 2018-05-22 VITALS
DIASTOLIC BLOOD PRESSURE: 87 MMHG | HEART RATE: 89 BPM | WEIGHT: 170 LBS | TEMPERATURE: 98 F | RESPIRATION RATE: 18 BRPM | OXYGEN SATURATION: 100 % | SYSTOLIC BLOOD PRESSURE: 158 MMHG | BODY MASS INDEX: 28.32 KG/M2 | HEIGHT: 65 IN

## 2018-05-22 DIAGNOSIS — M54.12 CERVICAL RADICULOPATHY: Primary | ICD-10-CM

## 2018-05-22 DIAGNOSIS — R20.0 NUMBNESS: ICD-10-CM

## 2018-05-22 LAB
ALBUMIN SERPL BCP-MCNC: 4 G/DL
ALP SERPL-CCNC: 83 U/L
ALT SERPL W/O P-5'-P-CCNC: 20 U/L
ANION GAP SERPL CALC-SCNC: 7 MMOL/L
AST SERPL-CCNC: 19 U/L
BASOPHILS # BLD AUTO: 0 K/UL
BASOPHILS NFR BLD: 0.5 %
BILIRUB SERPL-MCNC: 0.4 MG/DL
BUN SERPL-MCNC: 8 MG/DL
CALCIUM SERPL-MCNC: 9.3 MG/DL
CHLORIDE SERPL-SCNC: 103 MMOL/L
CO2 SERPL-SCNC: 28 MMOL/L
CREAT SERPL-MCNC: 0.8 MG/DL
DIFFERENTIAL METHOD: ABNORMAL
EOSINOPHIL # BLD AUTO: 0.1 K/UL
EOSINOPHIL NFR BLD: 1.4 %
ERYTHROCYTE [DISTWIDTH] IN BLOOD BY AUTOMATED COUNT: 14.7 %
EST. GFR  (AFRICAN AMERICAN): >60 ML/MIN/1.73 M^2
EST. GFR  (NON AFRICAN AMERICAN): >60 ML/MIN/1.73 M^2
GLUCOSE SERPL-MCNC: 132 MG/DL
HCT VFR BLD AUTO: 37.5 %
HGB BLD-MCNC: 12.5 G/DL
LYMPHOCYTES # BLD AUTO: 2.5 K/UL
LYMPHOCYTES NFR BLD: 35.4 %
MCH RBC QN AUTO: 28.6 PG
MCHC RBC AUTO-ENTMCNC: 33.3 G/DL
MCV RBC AUTO: 86 FL
MONOCYTES # BLD AUTO: 0.5 K/UL
MONOCYTES NFR BLD: 6.8 %
NEUTROPHILS # BLD AUTO: 4 K/UL
NEUTROPHILS NFR BLD: 55.9 %
PLATELET # BLD AUTO: 368 K/UL
PMV BLD AUTO: 7.1 FL
POTASSIUM SERPL-SCNC: 3.6 MMOL/L
PROT SERPL-MCNC: 7.1 G/DL
RBC # BLD AUTO: 4.37 M/UL
SODIUM SERPL-SCNC: 138 MMOL/L
WBC # BLD AUTO: 7.2 K/UL

## 2018-05-22 PROCEDURE — 93010 ELECTROCARDIOGRAM REPORT: CPT | Mod: ,,, | Performed by: INTERNAL MEDICINE

## 2018-05-22 PROCEDURE — 99284 EMERGENCY DEPT VISIT MOD MDM: CPT | Mod: 25

## 2018-05-22 PROCEDURE — 80053 COMPREHEN METABOLIC PANEL: CPT

## 2018-05-22 PROCEDURE — 93005 ELECTROCARDIOGRAM TRACING: CPT

## 2018-05-22 PROCEDURE — 85025 COMPLETE CBC W/AUTO DIFF WBC: CPT

## 2018-05-22 PROCEDURE — 25000003 PHARM REV CODE 250: Performed by: EMERGENCY MEDICINE

## 2018-05-22 PROCEDURE — 36415 COLL VENOUS BLD VENIPUNCTURE: CPT

## 2018-05-22 RX ORDER — ALPRAZOLAM 0.25 MG/1
0.5 TABLET ORAL
Status: COMPLETED | OUTPATIENT
Start: 2018-05-22 | End: 2018-05-22

## 2018-05-22 RX ADMIN — ALPRAZOLAM 0.5 MG: 0.25 TABLET ORAL at 02:05

## 2018-05-22 NOTE — ED PROVIDER NOTES
"Encounter Date: 5/22/2018    SCRIBE #1 NOTE: I, Connie Maya, am scribing for, and in the presence of, Dr. Adame.       History     Chief Complaint   Patient presents with    Numbness     Sudden onset today at 1230 while eating lunch, left arm numbness, like symptoms of last CVA 4/24/2018 05/22/2018 2:04 PM     Chief complaint: Numbness      Jaclyn Rausch is a 37 y.o. female with a hx of CVA and Atrial septal aneurysm  who presents to the ED with c/o sudden onset of left arm numbness. She also has tremors. Pt endorsed similar sx last week. She describes the arm numbness as a tingling sensation. Pt also reports "a feeling of coldness" to her head. The floor looked funny while walking to the restroom. She denies weakness, facial droop, speech changes, neck pain and HA. Pt has a hx of non hemorrhagic CVA. She endorsed right sided weakness at the time and sx spontaneous resolved. NKDA noted.       The history is provided by the patient.     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 Frank; last visit 1/2018    Elevated lipoprotein(a) 5/4/2018     Past Surgical History:   Procedure Laterality Date    BUNIONECTOMY Left 2001     Family History   Problem Relation Age of Onset    Diabetes Maternal Grandmother     Lung cancer Maternal Grandfather     Colon cancer Maternal Grandfather     Hypertension Mother     Hearing loss Father     Hypertension Brother     Breast cancer Neg Hx     Ovarian cancer Neg Hx      Social History   Substance Use Topics    Smoking status: Former Smoker     Packs/day: 0.25     Years: 2.00     Quit date: 01/2003    Smokeless tobacco: Never Used    Alcohol use No      Comment: Socially     Review of Systems   Constitutional: Negative for activity change, appetite change, chills, fatigue and fever.   Eyes: " Positive for visual disturbance.   Respiratory: Negative for apnea and shortness of breath.    Cardiovascular: Negative for chest pain and palpitations.   Gastrointestinal: Negative for abdominal distention and abdominal pain.   Genitourinary: Negative for difficulty urinating.   Musculoskeletal: Negative for neck pain.   Skin: Negative for pallor and rash.   Neurological: Positive for tremors and numbness (L arm ). Negative for facial asymmetry, speech difficulty, weakness and headaches.   Hematological: Does not bruise/bleed easily.   Psychiatric/Behavioral: Negative for agitation.       Physical Exam     Initial Vitals [05/22/18 1356]   BP Pulse Resp Temp SpO2   (!) 160/92 109 18 98.2 °F (36.8 °C) 100 %      MAP       114.67         Physical Exam    Nursing note and vitals reviewed.  Constitutional: She appears well-developed and well-nourished.   HENT:   Head: Normocephalic and atraumatic.   Eyes: Conjunctivae are normal.   Neck: Normal range of motion. Neck supple.   Cardiovascular: Normal rate, regular rhythm and normal heart sounds. Exam reveals no gallop and no friction rub.    No murmur heard.  No carotid bruit.    Pulmonary/Chest: Effort normal and breath sounds normal. No respiratory distress. She has no wheezes. She has no rhonchi. She has no rales.   Abdominal: Soft. She exhibits no distension. There is no tenderness.   Musculoskeletal: Normal range of motion.   Neurological: She is alert and oriented to person, place, and time.   Skin: Skin is warm and dry. No erythema.   Psychiatric: She has a normal mood and affect.         ED Course   Procedures  Labs Reviewed - No data to display  EKG Readings: (Independently Interpreted)   Rhythm: Normal Sinus Rhythm. Heart Rate: 90. Ectopy: No Ectopy. Conduction: Normal. ST Segments: Normal ST Segments. T Waves: Normal. Clinical Impression: Normal Sinus Rhythm          Medical Decision Making:   History:   Old Medical Records: I decided to obtain old medical  records.  Clinical Tests:   Lab Tests: Ordered and Reviewed  Radiological Study: Ordered and Reviewed  Medical Tests: Ordered and Reviewed  ED Management:  37-year-old female presents with transient left upper extremity numbness with no other focal deficits.  She has a history of CVA raising concerns for possible recurrence CVA.  MRI of the brain is obtained with no evidence of ischemia/CVA.  Isolated symptoms in the upper extremity make cervical radiculopathy the overwhelming likely etiology.  She has no ongoing symptoms with no further workup indicated.    Imaging Results    None          APC / Resident Notes:   I, Dr. Edwin Adame III, personally performed the services described in this documentation. All medical record entries made by the scribe were at my direction and in my presence.  I have reviewed the chart and agree that the record reflects my personal performance and is accurate and complete. Edwin Adame III, MD.  5:20 PM 05/22/2018       Scribe Attestation:   Scribe #1: I performed the above scribed service and the documentation accurately describes the services I performed. I attest to the accuracy of the note.               Clinical Impression:     1. Numbness                               Edwin Adame III, MD  05/22/18 1029

## 2018-05-22 NOTE — ED NOTES
"Presents to the ER with c/o numbness to left arm that started earlier today. Associated complaints are "The floor looks like it is moving when I walk." Patient reports these symptoms are similar to when she had a stroke on 4/24/18. Bilateral hand  are strong and equal, + sensation to upper and lower extremities. AAOx4. Patient denies any facial drooping and does not have any upon arrival to the ED. Mucous membranes are pink and moist. Skin is warm, dry and intact. Lungs are clear bilaterally, respirations are regular and unlabored. Denies cough, congestion, rhinorrhea or SOB. BS active x4, no tenderness with palpation, abd is soft and not distended. Denies any appetite or activity change. S1S2, capillary refill is < 2 seconds. Denies dysuria, difficulty urinating, frequency, numbness, tingling or weakness. GARRY COLEMANS    "

## 2018-05-22 NOTE — ED NOTES
Upon discharge, patient is AAOx4, no cardiac or respiratory complications. Follow up care reviewed with patient and has been instructed to return to the ER if needed. Patient verbalized understanding and ambulated to the lobby without difficulty. YAMILET CASTAÑEDA

## 2018-05-22 NOTE — ED NOTES
Patient states that she feels less anxious and is ready for MRI. Maria De Jesus from MRI is aware.

## 2018-05-29 ENCOUNTER — OFFICE VISIT (OUTPATIENT)
Dept: FAMILY MEDICINE | Facility: CLINIC | Age: 38
End: 2018-05-29
Payer: COMMERCIAL

## 2018-05-29 VITALS
TEMPERATURE: 98 F | BODY MASS INDEX: 29.32 KG/M2 | HEIGHT: 65 IN | HEART RATE: 84 BPM | WEIGHT: 176 LBS | SYSTOLIC BLOOD PRESSURE: 126 MMHG | DIASTOLIC BLOOD PRESSURE: 87 MMHG

## 2018-05-29 DIAGNOSIS — E78.01 FAMILIAL HYPERCHOLESTEROLEMIA: ICD-10-CM

## 2018-05-29 DIAGNOSIS — I63.9 ACUTE CVA (CEREBROVASCULAR ACCIDENT): ICD-10-CM

## 2018-05-29 DIAGNOSIS — F41.1 GENERALIZED ANXIETY DISORDER WITH PANIC ATTACKS: Primary | ICD-10-CM

## 2018-05-29 DIAGNOSIS — R76.0 ANTICARDIOLIPIN ANTIBODY POSITIVE: ICD-10-CM

## 2018-05-29 DIAGNOSIS — F41.0 GENERALIZED ANXIETY DISORDER WITH PANIC ATTACKS: Primary | ICD-10-CM

## 2018-05-29 DIAGNOSIS — N92.0 MENORRHAGIA WITH REGULAR CYCLE: ICD-10-CM

## 2018-05-29 DIAGNOSIS — G47.00 INSOMNIA, UNSPECIFIED TYPE: ICD-10-CM

## 2018-05-29 PROCEDURE — 3008F BODY MASS INDEX DOCD: CPT | Mod: CPTII,S$GLB,, | Performed by: NURSE PRACTITIONER

## 2018-05-29 PROCEDURE — 99999 PR PBB SHADOW E&M-EST. PATIENT-LVL III: CPT | Mod: PBBFAC,,, | Performed by: NURSE PRACTITIONER

## 2018-05-29 PROCEDURE — 99214 OFFICE O/P EST MOD 30 MIN: CPT | Mod: S$GLB,,, | Performed by: NURSE PRACTITIONER

## 2018-05-29 RX ORDER — ESCITALOPRAM OXALATE 20 MG/1
20 TABLET ORAL DAILY
Qty: 30 TABLET | Refills: 1 | Status: SHIPPED | OUTPATIENT
Start: 2018-05-29 | End: 2018-07-26 | Stop reason: SDUPTHER

## 2018-05-29 NOTE — PATIENT INSTRUCTIONS
Understanding Generalized Anxiety Disorder (AGGIE)  Anxiety can fill you with worry and fear. Sometimes anxiety is healthy. It alerts you to a potential threat and gets you to respond and take action. But for some people, anxiety gets so bad it causes problems in daily life. If you find yourself in a constant state of anxiety, you may have an anxiety disorder called generalized anxiety disorder (AGGIE). Speak with your healthcare provider or mental health professional to learn more. He or she can help.     What is generalized anxiety disorder?  With AGGIE, you might worry about money, your family and friends, work, or the world in general. You might not even be sure what you're anxious about. But whatever it is, you have an intense fear that the worst will happen. These feelings never really go away. In people age 65 and older, AGGIE is one of the most commonly diagnosed anxiety disorders.  Many times it occurs with depression. This constant worry affects your quality of life and makes it hard to function. AGGIE can cause physical symptoms, too.  What are common symptoms of generalized anxiety disorder?  People with AGGIE often think they have a physical illness. The disorder can cause symptoms, such as:  · Muscle tension, especially in the neck and shoulders  · Nausea and stomach problems  · Frequent headaches  · Feeling lightheaded  · Restlessness, trouble sleeping  · Feeling irritable and on edge all the time  How can generalized anxiety disorder be treated?  AGGIE can be treated with medicine or therapy (also called counseling), or both. Medicine helps to reduce symptoms, so you can continue with your daily routine. Therapy helps you understand the cause of your anxiety and learn how to manage it. Both forms of treatment help you deal with problems that anxiety causes in your life. This helps you to be healthier and happier.  Date Last Reviewed: 5/1/2017  © 8833-7045 [employer]. [address],  [place] [address]. All rights reserved. This information is not intended as a substitute for professional medical care. Always follow your healthcare professional's instructions.        Escitalopram tablets  What is this medicine?  ESCITALOPRAM (es sye KLARISSA oh pram) is used to treat depression and certain types of anxiety.  How should I use this medicine?  Take this medicine by mouth with a glass of water. Follow the directions on the prescription label. You can take it with or without food. If it upsets your stomach, take it with food. Take your medicine at regular intervals. Do not take it more often than directed. Do not stop taking this medicine suddenly except upon the advice of your doctor. Stopping this medicine too quickly may cause serious side effects or your condition may worsen.  A special MedGuide will be given to you by the pharmacist with each prescription and refill. Be sure to read this information carefully each time.  Talk to your pediatrician regarding the use of this medicine in children. Special care may be needed.  What side effects may I notice from receiving this medicine?  Side effects that you should report to your doctor or health care professional as soon as possible:  · allergic reactions like skin rash, itching or hives, swelling of the face, lips, or tongue  · confusion  · feeling faint or lightheaded, falls  · fast talking and excited feelings or actions that are out of control  · hallucination, loss of contact with reality  · seizures  · suicidal thoughts or other mood changes  · unusual bleeding or bruising  Side effects that usually do not require medical attention (report to your doctor or health care professional if they continue or are bothersome):  · blurred vision  · changes in appetite  · change in sex drive or performance  · headache  · increased sweating  · nausea  What may interact with this medicine?  Do not take this medicine with any of the following  medications:  · certain medicines for fungal infections like fluconazole, itraconazole, ketoconazole, posaconazole, voriconazole  · cisapride  · citalopram  · dofetilide  · dronedarone  · linezolid  · MAOIs like Carbex, Eldepryl, Marplan, Nardil, and Parnate  · methylene blue (injected into a vein)  · pimozide  · thioridazine  · ziprasidone  This medicine may also interact with the following medications:  · alcohol  · aspirin and aspirin-like medicines  · carbamazepine  · certain medicines for depression, anxiety, or psychotic disturbances  · certain medicines for migraine headache like almotriptan, eletriptan, frovatriptan, naratriptan, rizatriptan, sumatriptan, zolmitriptan  · certain medicines for sleep  · certain medicines that treat or prevent blood clots like warfarin, enoxaparin, dalteparin  · cimetidine  · diuretics  · fentanyl  · furazolidone  · isoniazid  · lithium  · metoprolol  · NSAIDs, medicines for pain and inflammation, like ibuprofen or naproxen  · other medicines that prolong the QT interval (cause an abnormal heart rhythm)  · procarbazine  · rasagiline  · supplements like [place]'s wort, kava kava, valerian  · tramadol  · tryptophan  What if I miss a dose?  If you miss a dose, take it as soon as you can. If it is almost time for your next dose, take only that dose. Do not take double or extra doses.  Where should I keep my medicine?  Keep out of reach of children.  Store at room temperature between 15 and 30 degrees C (59 and 86 degrees F). Throw away any unused medicine after the expiration date.  What should I tell my health care provider before I take this medicine?  They need to know if you have any of these conditions:  · bipolar disorder or a family history of bipolar disorder  · diabetes  · glaucoma  · heart disease  · kidney or liver disease  · receiving electroconvulsive therapy  · seizures (convulsions)  · suicidal thoughts, plans, or attempt by you or a family member  · an unusual or  allergic reaction to escitalopram, the related drug citalopram, other medicines, foods, dyes, or preservatives  · pregnant or trying to become pregnant  · breast-feeding  What should I watch for while using this medicine?  Tell your doctor if your symptoms do not get better or if they get worse. Visit your doctor or health care professional for regular checks on your progress. Because it may take several weeks to see the full effects of this medicine, it is important to continue your treatment as prescribed by your doctor.  Patients and their families should watch out for new or worsening thoughts of suicide or depression. Also watch out for sudden changes in feelings such as feeling anxious, agitated, panicky, irritable, hostile, aggressive, impulsive, severely restless, overly excited and hyperactive, or not being able to sleep. If this happens, especially at the beginning of treatment or after a change in dose, call your health care professional.  You may get drowsy or dizzy. Do not drive, use machinery, or do anything that needs mental alertness until you know how this medicine affects you. Do not stand or sit up quickly, especially if you are an older patient. This reduces the risk of dizzy or fainting spells. Alcohol may interfere with the effect of this medicine. Avoid alcoholic drinks.  Your mouth may get dry. Chewing sugarless gum or sucking hard candy, and drinking plenty of water may help. Contact your doctor if the problem does not go away or is severe.  NOTE:This sheet is a summary. It may not cover all possible information. If you have questions about this medicine, talk to your doctor, pharmacist, or health care provider. Copyright© 2017 [employer]

## 2018-05-29 NOTE — PROGRESS NOTES
Subjective:       Patient ID: Jaclyn Rausch is a 37 y.o. female.    Chief Complaint: Anxiety    HPI   Chief Complaint  Chief Complaint   Patient presents with    Anxiety       HPI  Jaclyn Rausch is a 37 y.o. female with medical diagnoses as listed in the medical history and problem list that presents with c/o persistent anxiety since having an acute lateral left frontal lobe non-hemorrhagic CVA on 4/24/18.  Patient states 2 anxiety/panic attacks that occurred after lunch with palpitations, tingling to hands, felt like a cold tingling in brain. Was seen in ER once and had MRI was negative. Also states that every night when she lays down to go to sleep finds herself worrying and thinking about stroke symptoms and is unable to fall asleep.  Has been taking Unisom at night which helps her sleep but feels fatigued in the morning.  Patient is anticardiolipin antibody positive and is taking xarelto and lipitor.  Patient also has an atrial septal aneurysm followed by Dr. Frank cardiology.  Established patient with last clinic appointment 5/1/2018.        PAST MEDICAL HISTORY:  Past Medical History:   Diagnosis Date    Anemia     slightly    Anticardiolipin antibody positive 5/4/2018    Atrial septal aneurysm     Dr. Frank; last visit 1/2018    Elevated lipoprotein(a) 5/4/2018    Stroke 04/24/2018       PAST SURGICAL HISTORY:  Past Surgical History:   Procedure Laterality Date    BUNIONECTOMY Left 2001       SOCIAL HISTORY:  Social History     Social History    Marital status:      Spouse name: N/A    Number of children: N/A    Years of education: N/A     Occupational History    Not on file.     Social History Main Topics    Smoking status: Former Smoker     Packs/day: 0.25     Years: 2.00     Quit date: 01/2003    Smokeless tobacco: Never Used    Alcohol use No      Comment: Socially    Drug use: No    Sexual activity: Yes     Partners: Male      Comment:       Other Topics Concern    Not on file     Social History Narrative    No narrative on file       FAMILY HISTORY:  Family History   Problem Relation Age of Onset    Diabetes Maternal Grandmother     Lung cancer Maternal Grandfather     Colon cancer Maternal Grandfather     Hypertension Mother     Hearing loss Father     Hypertension Brother     Breast cancer Neg Hx     Ovarian cancer Neg Hx        ALLERGIES AND MEDICATIONS: updated and reviewed.  Review of patient's allergies indicates:  No Known Allergies  Current Outpatient Prescriptions   Medication Sig Dispense Refill    aspirin (ECOTRIN) 81 MG EC tablet Take 1 tablet (81 mg total) by mouth once daily. 81 tablet mg    atorvastatin (LIPITOR) 40 MG tablet Take 1 tablet (40 mg total) by mouth once daily. 30 tablet 11    rivaroxaban (XARELTO) 20 mg Tab Take 1 tablet (20 mg total) by mouth daily with dinner or evening meal. 30 tablet 11    escitalopram oxalate (LEXAPRO) 20 MG tablet Take 1 tablet (20 mg total) by mouth once daily. Take 1/2 tablet for 6 days then 1 tab QD 30 tablet 1     No current facility-administered medications for this visit.      Review of Systems   Constitutional: Positive for activity change. Negative for appetite change, chills, fever and unexpected weight change.        Has not been exercising and working out like she usually does.    HENT: Negative for hearing loss, rhinorrhea and trouble swallowing.    Eyes: Negative for discharge and visual disturbance.   Respiratory: Negative for cough, chest tightness, shortness of breath and wheezing.    Cardiovascular: Positive for palpitations. Negative for chest pain.        Racing heart and palpitations with 2 anxiety attacks   Gastrointestinal: Negative for blood in stool, constipation, diarrhea and vomiting.   Endocrine: Negative for polydipsia and polyuria.   Genitourinary: Positive for menstrual problem. Negative for difficulty urinating, dysuria and hematuria.        Has had one  period since starting xarelto and it was very heavy, lasted 10 days, saw Dr. Guzmán and discussed options including Mirena IUD, depo-provera, and ablation   Musculoskeletal: Negative for arthralgias, joint swelling and neck pain.   Neurological: Positive for numbness and headaches. Negative for dizziness and weakness.        Headaches which have decreased in frequency. Tingling to left hand with anxiety/panic attack only   Psychiatric/Behavioral: Positive for sleep disturbance. Negative for confusion and dysphoric mood. The patient is nervous/anxious.         Difficulty falling asleep, difficulty controlling worry     AGGIE-7 Anxiety Screening Tool    Over the last 2 weeks, how often have you been bothered by the following problems?   (Not at all = 0, Several Days = 1, More than half the days = 2, Nearly Every Day = 3)     1.  Feeling nervous, anxious, or on edge. 3   2.  Not being able to sleep or control worrying. 3   3.  Worrying too much about different things. 3   4.  Trouble relaxing. 3   5.  Being so restless that it is hard to sit still. 0   6.  Becoming easily annoyed or irritable. 0   7.  Feeling afraid as if something awful might happen. 3    Total score: 15      If you checked any problems, how difficult have they made it for you to do your work, take care of things at home, or get along with other people?    Not difficult at all  Somewhat difficult  Very Difficult  Extremely difficult    Scoring AGGIE-7  0-4: Minimal anxiety  5-9: Mild anxiety  10-14: Moderate anxiety  15-21: Severe anxiety    Patient Health Questionnaire Depression Scale (PHQ-9):    Over the last 2 weeks, how often have you been bothered by any of the following problems?  (Not at all: 0; several days: 1, more than half the days: 2, nearly every day: 3)    1. Little interest or pleasure in doing things: 2  2. Feeling down, depressed, or hopeless: 0  3. Trouble falling or staying asleep, or sleeping too much: 3  4. Feeling tired or having  little energy: 1  5. Poor appetite or overeatin  6. Feeling bad about yourself, or that you are a failure, or have let yourself or your family down: 0  7. Trouble concentrating on things, such as reading the newspaper or watching television: 0  8. Moving or speaking so slowly that other people could have noticed. Or the opposite- being so fidgety or restless that you have been moving       around a lot more than usual: 0  9. Thoughts that you would be better off dead, or of hurting yourself in some way: 0    Total Score: 6    (Score >15 - major depression; Score >20 - severe major depression)  Objective:      Physical Exam   Constitutional: She is oriented to person, place, and time. Vital signs are normal. She appears well-developed and well-nourished. No distress.   HENT:   Head: Normocephalic and atraumatic.   Eyes: Conjunctivae and lids are normal. Right eye exhibits no discharge. Left eye exhibits no discharge. Right conjunctiva is not injected. Left conjunctiva is not injected.   Neck: Normal carotid pulses present. Carotid bruit is not present.   Cardiovascular: Normal rate, regular rhythm, S1 normal, S2 normal, normal heart sounds, intact distal pulses and normal pulses.    No murmur heard.  Pulses:       Carotid pulses are 2+ on the right side, and 2+ on the left side.       Radial pulses are 2+ on the right side, and 2+ on the left side.   No edema   Pulmonary/Chest: Effort normal and breath sounds normal. No respiratory distress. She has no decreased breath sounds. She has no wheezes. She has no rhonchi. She has no rales.   Musculoskeletal: Normal range of motion.   Neurological: She is alert and oriented to person, place, and time. She has normal strength.   Skin: Skin is warm, dry and intact. She is not diaphoretic. No pallor.   Psychiatric: She has a normal mood and affect. Her speech is normal and behavior is normal. Judgment and thought content normal. Cognition and memory are normal.   Nursing  note and vitals reviewed.      Assessment:       1. Generalized anxiety disorder with panic attacks    2. Insomnia, unspecified type    3. Acute CVA (cerebrovascular accident) -  lateral left frontal lobe, non-hemorrhagic    4. Familial hypercholesterolemia    5. Menorrhagia with regular cycle    6. Anticardiolipin antibody positive    7. BMI 29.0-29.9,adult        Plan:   Jaclyn was seen today for anxiety.    Diagnoses and all orders for this visit:    Generalized anxiety disorder with panic attacks  -     escitalopram oxalate (LEXAPRO) 20 MG tablet; Take 1 tablet (20 mg total) by mouth once daily. Take 1/2 tablet for 6 days then 1 tab QD  - PHQ-9 score 6, AGGIE-7 score 15  -     I discussed with the patient the risks, side effects and the benefits of the medication including the black box warning regarding suicidal ideation/risk if applicable.  I counseled the patient on medication titration, length of time before maximum benefits are reached, and duration of treatment expected.  I advised the patient to return to clinic or go to the emergency department if suicidal thoughts occur, thought of hurting others, hallucinations, or other serious symptoms.  Patient voiced no intention of self-harm.  The patient expressed verbal understanding and elected to proceed with treatment.  All questions were answered.    - Educational handouts provided. Generalized Anxiety Disorder. Lexapro  - Follow up in 4 weeks      Insomnia, unspecified type  -     escitalopram oxalate (LEXAPRO) 20 MG tablet; Take 1 tablet (20 mg total) by mouth once daily. Take 1/2 tablet for 6 days then 1 tab QD  - May continue OTC unisom as needed    Acute CVA (cerebrovascular accident) -  lateral left frontal lobe, non-hemorrhagic   Stable an without residual deficits   Continue follow up as directed with neurology  Familial hypercholesterolemia     Lab Results   Component Value Date    CHOL 263 (H) 04/25/2018    CHOL 263 (H) 04/25/2018    CHOL 269 (H)  10/26/2017     Lab Results   Component Value Date    HDL 66 04/25/2018    HDL 66 04/25/2018    HDL 77 (H) 10/26/2017     Lab Results   Component Value Date    LDLCALC 180.0 (H) 04/25/2018    LDLCALC 180.0 (H) 04/25/2018    LDLCALC 170.4 (H) 10/26/2017     Lab Results   Component Value Date    TRIG 85 04/25/2018    TRIG 85 04/25/2018    TRIG 108 10/26/2017     Lab Results   Component Value Date    CHOLHDL 25.1 04/25/2018    CHOLHDL 25.1 04/25/2018    CHOLHDL 28.6 10/26/2017     Continue lipitor 40 mg daily as pescribed  Menorrhagia with regular cycle   Patient had appointment with Dr. Guzmán recently to discuss heavy bleeding with periods since starting xarelto   Possible interventions include IUD, depo-provera or uterine ablation   Patient plans to discuss with hematology at next visit  Anticardiolipin antibody positive   Continue xarelto 20 mg daily and aspirin 81 mg daily   Continue follow up with Dr. Sprague as directed  BMI 29.0-29.9,adult   BMI 29.29 today with recent weight gain of 6 pounds   Encouraged to resume her exercise regimen to maintain healthy weight, decrease anxiety symptoms, improve sleep    Follow-up in about 4 weeks (around 6/26/2018) for follow up anxiety.

## 2018-06-12 ENCOUNTER — OFFICE VISIT (OUTPATIENT)
Dept: NEUROLOGY | Facility: CLINIC | Age: 38
End: 2018-06-12
Payer: COMMERCIAL

## 2018-06-12 VITALS
HEART RATE: 76 BPM | SYSTOLIC BLOOD PRESSURE: 137 MMHG | WEIGHT: 178.69 LBS | HEIGHT: 65 IN | DIASTOLIC BLOOD PRESSURE: 80 MMHG | RESPIRATION RATE: 20 BRPM | BODY MASS INDEX: 29.77 KG/M2

## 2018-06-12 DIAGNOSIS — Z87.59 HISTORY OF MISCARRIAGE: ICD-10-CM

## 2018-06-12 DIAGNOSIS — E78.01 FAMILIAL HYPERCHOLESTEROLEMIA: ICD-10-CM

## 2018-06-12 DIAGNOSIS — R76.0 ANTICARDIOLIPIN ANTIBODY POSITIVE: ICD-10-CM

## 2018-06-12 DIAGNOSIS — Z79.01 ANTICOAGULANT LONG-TERM USE: ICD-10-CM

## 2018-06-12 DIAGNOSIS — G43.109 MIGRAINE WITH AURA AND WITHOUT STATUS MIGRAINOSUS, NOT INTRACTABLE: ICD-10-CM

## 2018-06-12 DIAGNOSIS — I63.9 CEREBROVASCULAR ACCIDENT (CVA), UNSPECIFIED MECHANISM: Primary | ICD-10-CM

## 2018-06-12 DIAGNOSIS — I25.3 ATRIAL SEPTAL ANEURYSM: ICD-10-CM

## 2018-06-12 PROCEDURE — 3008F BODY MASS INDEX DOCD: CPT | Mod: CPTII,S$GLB,, | Performed by: PSYCHIATRY & NEUROLOGY

## 2018-06-12 PROCEDURE — 99999 PR PBB SHADOW E&M-EST. PATIENT-LVL III: CPT | Mod: PBBFAC,,, | Performed by: PSYCHIATRY & NEUROLOGY

## 2018-06-12 PROCEDURE — 99215 OFFICE O/P EST HI 40 MIN: CPT | Mod: S$GLB,,, | Performed by: PSYCHIATRY & NEUROLOGY

## 2018-06-12 NOTE — PROGRESS NOTES
Date: 6/12/2018    Patient ID: Jaclyn Rausch is a 37 y.o. female.    Referring Provider:  No ref. provider found    Chief Complaint: Follow-up (stroke)      History of Present Illness:  Ms. Rausch is a 37 y.o. female who presents for f/u s/p stroke. She did well s/p stroke but has had two episodes concerning for TIA.     In mid-May, she had sudden onset of left arm numbness that radiated up into the left side of her head and resolved after about 1 hour. Then May 22 she had another identical episode that lasted about 1 hour. She felt a little spacey and felt her heart racing. She had a MRI brain that did not show new stroke and they told her it was either a pinched nerve in her neck or anxiety. She describes this as the same symptoms she had with the stroke (but she also had slurred speech and right hand weakness with the stroke).     She is on xarelto and aspirin. She is on lipitor. She feels anxious and has been started on lexapro. This makes her jaw feel tight. She saw Dr. Sprague in hematology for elevated lipoprotein.     Review of Systems:   Comprehensive review of systems is negative except as mentioned in the HPI    Allergies:  Review of patient's allergies indicates:  No Known Allergies    Current Medications:  Current Outpatient Prescriptions   Medication Sig Dispense Refill    aspirin (ECOTRIN) 81 MG EC tablet Take 1 tablet (81 mg total) by mouth once daily. 81 tablet mg    atorvastatin (LIPITOR) 40 MG tablet Take 1 tablet (40 mg total) by mouth once daily. 30 tablet 11    escitalopram oxalate (LEXAPRO) 20 MG tablet Take 1 tablet (20 mg total) by mouth once daily. Take 1/2 tablet for 6 days then 1 tab QD 30 tablet 1    rivaroxaban (XARELTO) 20 mg Tab Take 1 tablet (20 mg total) by mouth daily with dinner or evening meal. 30 tablet 11     No current facility-administered medications for this visit.        Past Medical History:  Past Medical History:   Diagnosis Date    Anemia      "slightly    Anticardiolipin antibody positive 5/4/2018    Atrial septal aneurysm     Dr. Frank; last visit 1/2018    Elevated lipoprotein(a) 5/4/2018    Stroke 04/24/2018       Past Surgical History:  Past Surgical History:   Procedure Laterality Date    BUNIONECTOMY Left 2001       Family History:  family history includes Colon cancer in her maternal grandfather; Diabetes in her maternal grandmother; Hearing loss in her father; Hypertension in her brother and mother; Lung cancer in her maternal grandfather.    Social History:   reports that she quit smoking about 15 years ago. She has a 0.50 pack-year smoking history. She has never used smokeless tobacco. She reports that she does not drink alcohol or use drugs.    Physical Exam:  Vitals:    06/12/18 1523   BP: 137/80   Pulse: 76   Resp: 20   Weight: 81.1 kg (178 lb 11.2 oz)   Height: 5' 5" (1.651 m)   PainSc: 0-No pain     Body mass index is 29.74 kg/m².  General: Well developed, well nourished.  No acute distress.  HEENT: Atraumatic, normocephalic.  Mental status: Awake and alert  Speech language: No dysarthria or aphasia on conversation  Cranial nerves: Face symmetric  Motor: Moves all extremities well  Coordination: No ataxia. No tremor.     Data:  I have personally reviewed the referring provider's notes, labs, & imaging made available to me today.       Labs:  CBC:   Lab Results   Component Value Date    WBC 7.20 05/22/2018    HGB 12.5 05/22/2018    HCT 37.5 05/22/2018     (H) 05/22/2018    MCV 86 05/22/2018    RDW 14.7 (H) 05/22/2018     BMP:   Lab Results   Component Value Date     05/22/2018    K 3.6 05/22/2018     05/22/2018    CO2 28 05/22/2018    BUN 8 05/22/2018    CREATININE 0.8 05/22/2018     (H) 05/22/2018    CALCIUM 9.3 05/22/2018     LFTS;   Lab Results   Component Value Date    PROT 7.1 05/22/2018    ALBUMIN 4.0 05/22/2018    BILITOT 0.4 05/22/2018    AST 19 05/22/2018    ALKPHOS 83 05/22/2018    ALT 20 05/22/2018 "     COAGS:   Lab Results   Component Value Date    INR 1.0 04/24/2018     FLP:   Lab Results   Component Value Date    CHOL 263 (H) 04/25/2018    CHOL 263 (H) 04/25/2018    HDL 66 04/25/2018    HDL 66 04/25/2018    LDLCALC 180.0 (H) 04/25/2018    LDLCALC 180.0 (H) 04/25/2018    TRIG 85 04/25/2018    TRIG 85 04/25/2018    CHOLHDL 25.1 04/25/2018    CHOLHDL 25.1 04/25/2018         Imaging:  I have personally reviewed the imaging, MRI brain from 5/22 shows no acute stroke.     Assessment and Plan:  Ms. Rausch is a 37 y.o. female who returns to clinic for stroke f/u.     I am concerned about these episodes of left sided tingling associated with a spacey feeling. This is the same as her prior stroke symptoms (minus the dysarthria and right hand weakness). Differential include anxiety (has been started on lexapro), TIA, complex migraine aura, or focal seizure. We will obtain CTA of the head to look at the blood vessels of the brain and EEG to rule out epileptiform activity. I have seen some xarelto failures and in light of recent literature that xarelto may not be supreior to aspirin for stroke prevention, I would like to switch her to Eliquis. I will discuss with Dr. Sprague and Dr. Frank to ensure they have no concerns regarding this.       Cerebrovascular accident (CVA), unspecified mechanism  -     CTA Head; Future; Expected date: 06/12/2018  -     EEG,w/awake & asleep record; Future    Atrial septal aneurysm    Migraine with aura and without status migrainosus, not intractable    Anticardiolipin antibody positive    History of miscarriage    Anticoagulant long-term use    Familial hypercholesterolemia

## 2018-06-13 ENCOUNTER — TELEPHONE (OUTPATIENT)
Dept: NEUROLOGY | Facility: CLINIC | Age: 38
End: 2018-06-13

## 2018-06-20 ENCOUNTER — HOSPITAL ENCOUNTER (OUTPATIENT)
Dept: NEUROLOGY | Facility: HOSPITAL | Age: 38
Discharge: HOME | End: 2018-06-20
Attending: PSYCHIATRY & NEUROLOGY
Payer: COMMERCIAL

## 2018-06-20 ENCOUNTER — HOSPITAL ENCOUNTER (OUTPATIENT)
Dept: RADIOLOGY | Facility: HOSPITAL | Age: 38
Discharge: HOME | End: 2018-06-20
Attending: PSYCHIATRY & NEUROLOGY
Payer: COMMERCIAL

## 2018-06-20 DIAGNOSIS — I63.9 CEREBROVASCULAR ACCIDENT (CVA), UNSPECIFIED MECHANISM: ICD-10-CM

## 2018-06-20 PROCEDURE — 25500020 PHARM REV CODE 255

## 2018-06-20 PROCEDURE — 70496 CT ANGIOGRAPHY HEAD: CPT | Mod: TC

## 2018-06-20 PROCEDURE — 70496 CT ANGIOGRAPHY HEAD: CPT | Mod: 26,,, | Performed by: RADIOLOGY

## 2018-06-20 PROCEDURE — 95819 EEG AWAKE AND ASLEEP: CPT

## 2018-06-20 PROCEDURE — 95816 EEG AWAKE AND DROWSY: CPT | Mod: 26,,, | Performed by: PSYCHIATRY & NEUROLOGY

## 2018-06-20 RX ORDER — SODIUM CHLORIDE 9 MG/ML
INJECTION, SOLUTION INTRAVENOUS
Status: DISPENSED
Start: 2018-06-20 | End: 2018-06-20

## 2018-06-20 RX ADMIN — IOHEXOL 75 ML: 350 INJECTION, SOLUTION INTRAVENOUS at 11:06

## 2018-06-21 ENCOUNTER — TELEPHONE (OUTPATIENT)
Dept: NEUROLOGY | Facility: CLINIC | Age: 38
End: 2018-06-21

## 2018-06-21 NOTE — PROCEDURES
ELECTROENCEPHALOGRAM REPORT    DATE OF PROCEDURE:  06/20/2018    DURATION:  26 minutes 39 seconds.    LOCATION:  [facility].    METHODOLOGY:  Electroencephalographic (EEG) recording is recorded with   electrodes placed according to the International 10-20 placement system.  Thirty   two (32) channels of digital signal (sampling rate of 512/sec), including T1   and T2, were simultaneously recorded from the scalp and may include EKG, EMG,   and/or eye monitors.  Recording band pass was 0.1 to 512 Hz.  Digital video   recording of the patient is simultaneously recorded with the EEG.  The patient   is instructed to report clinical symptoms which may occur during the recording   session.  EEG and video recording are stored and archived in digital format.    Activation procedures, which include photic stimulation, hyperventilation and   instructing patients to perform simple tasks, are done in selected patients.  The EEG is displayed on a monitor screen and can be reviewed using different   montages.  Computer assisted-analysis is employed to detect spike and   electrographic seizure activity.   The entire record is submitted for computer   analysis.  The entire recording is visually reviewed, and the times identified   by computer analysis as being spikes or seizures are reviewed again.    Compressed spectral analysis (CSA) is also performed on the activity recorded   from each individual channel.  This is displayed as a power display of   frequencies from 0 to 30 Hz over time.   The CSA is reviewed looking for   asymmetries in power between homologous areas of the scalp, then compared with   the original EEG recording.    [employer] software was also utilized in the review of this study.  This software   suite analyzes the EEG recording in multiple domains.  Coherence and rhythmicity   are computed to identify EEG sections which may contain organized seizures.    Each channel undergoes analysis to detect  the presence of spike and sharp waves   which have special and morphological characteristics of epileptic activity.  The   routine EEG recording is converted from special into frequency domain.  This is   then displayed comparing homologous areas to identify areas of significant   asymmetry.  Algorithm to identify non-cortically generated artifact is used to   separate artifact from the EEG.    EEG FINDINGS:  This record reveals medium amplitude, mixed frequency activity   with a posterior dominant rhythm in the 11 Hz range, occasionally seen, although   not particularly well formed.  Most of the record consists of low amplitude   activity with a mix of beta and alpha seen.  Record is symmetric without signs   of epileptiform disturbances or other lateralizing findings.  In a few brief   moments, a fairly well formed alpha rhythm can be seen at 10-1/2 to 11 Hz, but   these moments are fleeting indicating that the patient may have been having   difficulty relaxing during the study.  Portions of the study were low amplitude   suggesting the patient was drowsy for much of the study as well, but well-formed   stage II sleep architecture was not captured.  Photic stimulation was   performed, which revealed no significant change to the background.    INTERPRETATION:  Essentially normal drowsy EEG, although a lack of normal waking   and sleep landmarks was seen, no abnormalities were seen and record was felt to   reflect predominantly drowsiness throughout much of the study.  There was no   evidence of seizures.      NBB/IN  dd: 06/20/2018 16:26:56 (CDT)  td: 06/20/2018 17:32:03 (CDT)  Doc ID   #[id number]  Job ID #[id number]    CC:

## 2018-06-21 NOTE — TELEPHONE ENCOUNTER
----- Message from Pearl Roca MD sent at 6/21/2018  3:20 PM CDT -----  EEG is normal. No evidence to suggest seizure activity.

## 2018-06-26 ENCOUNTER — PATIENT MESSAGE (OUTPATIENT)
Dept: NEUROLOGY | Facility: CLINIC | Age: 38
End: 2018-06-26

## 2018-07-16 ENCOUNTER — LAB VISIT (OUTPATIENT)
Dept: LAB | Facility: HOSPITAL | Age: 38
End: 2018-07-16
Attending: NURSE PRACTITIONER
Payer: COMMERCIAL

## 2018-07-16 DIAGNOSIS — Z79.01 ANTICOAGULANT LONG-TERM USE: ICD-10-CM

## 2018-07-16 DIAGNOSIS — E78.01 FAMILIAL HYPERCHOLESTEROLEMIA: ICD-10-CM

## 2018-07-16 LAB
ALBUMIN SERPL BCP-MCNC: 3.8 G/DL
ALP SERPL-CCNC: 85 U/L
ALT SERPL W/O P-5'-P-CCNC: 19 U/L
AST SERPL-CCNC: 24 U/L
BILIRUB DIRECT SERPL-MCNC: 0.3 MG/DL
BILIRUB SERPL-MCNC: 0.6 MG/DL
CHOLEST SERPL-MCNC: 168 MG/DL
CHOLEST/HDLC SERPL: 2.5 {RATIO}
HDLC SERPL-MCNC: 68 MG/DL
HDLC SERPL: 40.5 %
LDLC SERPL CALC-MCNC: 85.8 MG/DL
NONHDLC SERPL-MCNC: 100 MG/DL
PROT SERPL-MCNC: 7.1 G/DL
TRIGL SERPL-MCNC: 71 MG/DL

## 2018-07-16 PROCEDURE — 36415 COLL VENOUS BLD VENIPUNCTURE: CPT | Mod: PO

## 2018-07-16 PROCEDURE — 80061 LIPID PANEL: CPT

## 2018-07-16 PROCEDURE — 80076 HEPATIC FUNCTION PANEL: CPT

## 2018-07-26 ENCOUNTER — PATIENT MESSAGE (OUTPATIENT)
Dept: FAMILY MEDICINE | Facility: CLINIC | Age: 38
End: 2018-07-26

## 2018-07-26 DIAGNOSIS — G47.00 INSOMNIA, UNSPECIFIED TYPE: ICD-10-CM

## 2018-07-26 DIAGNOSIS — F41.0 GENERALIZED ANXIETY DISORDER WITH PANIC ATTACKS: ICD-10-CM

## 2018-07-26 DIAGNOSIS — F41.1 GENERALIZED ANXIETY DISORDER WITH PANIC ATTACKS: ICD-10-CM

## 2018-07-26 RX ORDER — ESCITALOPRAM OXALATE 20 MG/1
20 TABLET ORAL DAILY
Qty: 30 TABLET | Refills: 1 | Status: SHIPPED | OUTPATIENT
Start: 2018-07-26 | End: 2018-09-16 | Stop reason: SDUPTHER

## 2018-07-26 NOTE — TELEPHONE ENCOUNTER
I started the lexapro at visit on 5/29, was supposed to follow up in 4 weeks.  I know she has a lot of other appointments.  Would you please call her and be sure that the lexapro is helping her anxiety and that she is sleeping better, that she is doing well with it. I have sent the refill to her pharmacy.   Thanks.  Rosa

## 2018-07-27 NOTE — TELEPHONE ENCOUNTER
Patient states she is busy with other office visits but the Lexapro is helping well with her conditions.

## 2018-08-06 ENCOUNTER — OFFICE VISIT (OUTPATIENT)
Dept: HEMATOLOGY/ONCOLOGY | Facility: CLINIC | Age: 38
End: 2018-08-06
Payer: COMMERCIAL

## 2018-08-06 VITALS
RESPIRATION RATE: 18 BRPM | SYSTOLIC BLOOD PRESSURE: 116 MMHG | WEIGHT: 178.13 LBS | TEMPERATURE: 98 F | HEIGHT: 65 IN | BODY MASS INDEX: 29.68 KG/M2 | HEART RATE: 85 BPM | DIASTOLIC BLOOD PRESSURE: 71 MMHG

## 2018-08-06 DIAGNOSIS — N92.0 MENORRHAGIA WITH REGULAR CYCLE: Primary | ICD-10-CM

## 2018-08-06 DIAGNOSIS — Z87.59 HISTORY OF MISCARRIAGE: ICD-10-CM

## 2018-08-06 DIAGNOSIS — R76.0 ANTICARDIOLIPIN ANTIBODY POSITIVE: ICD-10-CM

## 2018-08-06 DIAGNOSIS — I63.9 ACUTE CVA (CEREBROVASCULAR ACCIDENT): ICD-10-CM

## 2018-08-06 DIAGNOSIS — Z79.01 ANTICOAGULANT LONG-TERM USE: ICD-10-CM

## 2018-08-06 PROCEDURE — 99213 OFFICE O/P EST LOW 20 MIN: CPT | Mod: ,,, | Performed by: INTERNAL MEDICINE

## 2018-08-06 PROCEDURE — 3008F BODY MASS INDEX DOCD: CPT | Mod: ,,, | Performed by: INTERNAL MEDICINE

## 2018-08-06 NOTE — PROGRESS NOTES
[facility] Hematology/Oncology  PROGRESS NOTE      Subjective:       Patient ID:   NAME: Jaclyn Rausch : 1980     37 y.o. female    Referring Doc: Gauri Vargas (NP)  Other Physicians: Pearl Frank/Gertrudis Ariza (neuro); jaime Guzmán (GYN)    Chief Complaint:  Clot disorder f/u    History of Present Illness:     Patient returns today for a regularly scheduled follow-up visit.  The patient is here by herself. She is doing ok with no new issues. She is now on eliquis and off the aspirin. She denies any CP, SOB, HA's or N/V.             ROS:   GEN: normal without any fever, night sweats or weight loss  HEENT: normal with no HA's, sore throat, stiff neck, changes in vision  CV: normal with no CP, SOB, PND, BAILEY or orthopnea  PULM: normal with no SOB, cough, hemoptysis, sputum or pleuritic pain  GI: normal with no abdominal pain, nausea, vomiting, constipation, diarrhea, melanotic stools, BRBPR, or hematemesis  : normal with no hematuria, dysuria  BREAST: normal with no mass, discharge, pain  SKIN: normal with no rash, erythema, bruising, or swelling    Allergies:  Review of patient's allergies indicates:  No Known Allergies    Medications:    Current Outpatient Prescriptions:     apixaban 5 mg Tab, Take 1 tablet (5 mg total) by mouth 2 (two) times daily., Disp: 60 tablet, Rfl: 11    aspirin (ECOTRIN) 81 MG EC tablet, Take 1 tablet (81 mg total) by mouth once daily., Disp: 81 tablet, Rfl: mg    atorvastatin (LIPITOR) 40 MG tablet, Take 1 tablet (40 mg total) by mouth once daily., Disp: 30 tablet, Rfl: 11    escitalopram oxalate (LEXAPRO) 20 MG tablet, Take 1 tablet (20 mg total) by mouth once daily., Disp: 30 tablet, Rfl: 1    PMHx/PSHx Updates:  See patient's last visit with me on 2018.  See H&P on 2018        Pathology:  Cancer Staging  No matching staging information was found for the patient.          Objective:     Vitals:  Blood pressure 116/71, pulse 85, temperature 98 °F  "(36.7 °C), resp. rate 18, height 5' 5" (1.651 m), weight 80.8 kg (178 lb 1.6 oz).    Physical Examination:   GEN: no apparent distress, comfortable; AAOx3  HEAD: atraumatic and normocephalic  EYES: no pallor, no icterus, PERRLA  ENT: OMM, no pharyngeal erythema, external ears WNL; no nasal discharge; no thrush  NECK: no masses, thyroid normal, trachea midline, no LAD/LN's, supple  CV: RRR with no murmur; normal pulse; normal S1 and S2; no pedal edema  CHEST: Normal respiratory effort; CTAB; normal breath sounds; no wheeze or crackles  ABDOM: nontender and nondistended; soft; normal bowel sounds; no rebound/guarding  MUSC/Skeletal: ROM normal; no crepitus; joints normal; no deformities or arthropathy  EXTREM: no clubbing, cyanosis, inflammation or swelling  SKIN: no rashes, lesions, ulcers, petechiae or subcutaneous nodules  : no gonzalez  NEURO: grossly intact; motor/sensory WNL; AAOx3; no tremors  PSYCH: normal mood, affect and behavior  LYMPH: normal cervical, supraclavicular, axillary and groin LN's            Labs:     5/22/2018  Lab Results   Component Value Date    WBC 7.20 05/22/2018    HGB 12.5 05/22/2018    HCT 37.5 05/22/2018    MCV 86 05/22/2018     (H) 05/22/2018     BMP  Lab Results   Component Value Date     05/22/2018    K 3.6 05/22/2018     05/22/2018    CO2 28 05/22/2018    BUN 8 05/22/2018    CREATININE 0.8 05/22/2018    CALCIUM 9.3 05/22/2018    ANIONGAP 7 (L) 05/22/2018    ESTGFRAFRICA >60 05/22/2018    EGFRNONAA >60 05/22/2018     Lab Results   Component Value Date    ALT 19 07/16/2018    AST 24 07/16/2018    ALKPHOS 85 07/16/2018    BILITOT 0.6 07/16/2018           Radiology/Diagnostic Studies:    No results found.    I have reviewed all available lab results and radiology reports.    Assessment/Plan:   (1) 38 yo female with acute onset aphasia and left facial droop with MRI showing lateral left frontal lobe acute nonhemorrhagic infarction. She has been seen by Dr Frank with " cardiology and Dr Pearl Roca with neurology   - hematology was consulted for evaluation for hypercoag workup which I ordered while she was inpatient  - she is currently on Eliquis oral anticoagulation; neurology discontinued the aspirin  - she is to f/u with Dr Pearl Roca with neurology as outpatient in June 2018  - she saw Evette JURADO on 5/2/2018 for hospital f/u  - factor XII was elevated at 144 and  factor IX was elevated at 147, but mildly and may be reactive in nature only  - Lipoprotein-A was elevated at 103; APA IgM was elevated at 17.99  - homocysteine was WNL; ATIII was normal  - prothrombin II gene was normal, LA was negative; factor V leiden was normal  - protein C and S was adeqaute     (2) Chronic borderline anemia in past - current hgb is within normal limits     (3) Hx/of migraine headaches     (4) Atrial septal aneurysm - followed by Dr Frank with cardiology as outpatient  - she had bubble study and JUAN while in hospital  - there is no opening or hole per patient  - she sees Dr Frank again this Wednesday     (5) Recent loss of pregnancy in Jan 2018 in first trimester     (6) Hypercholesterolemia - now on lipitor    (7) GYN following - Dr Federico Polanco with [facility] - heavier menstrual cycles        Menorrhagia with regular cycle    Anticardiolipin antibody positive    Anticoagulant long-term use    Acute CVA (cerebrovascular accident) -  lateral left frontal lobe, non-hemorrhagic    History of miscarriage          PLAN:  1. Continue eliquis (possibly life-long), and anti-hypercholesterolemia  2. She may need her  activities restricted because of the risk of bleeding  3. F/u with PCP, Neuro and cardiology  4.  RTC in  6 months  Fax note to Alicia Frank Amy Jones, [place]    Discussion:     I have explained all of the above in detail and the patient understands all of the current recommendation(s). I have answered all of their questions to the best of my ability and to their complete  satisfaction.   The patient is to continue with the current management plan.            Electronically signed by Jesus Sprague MD

## 2018-08-06 NOTE — LETTER
August 6, 2018      Rosa Alas, ADRIEN  [address]  [place] [address]           [facility] - Hematology Oncology  [address]  [address]  [place] [address]  Phone: [phone]  Fax: [phone]          Patient: Jaclyn Rausch   MR Number: [id number]   YOB: 1980   Date of Visit: 8/6/2018       Dear Rosa Alas:    Thank you for referring Jaclyn Rausch to me for evaluation. Attached you will find relevant portions of my assessment and plan of care.    If you have questions, please do not hesitate to call me. I look forward to following Jaclyn Rausch along with you.    Sincerely,    Jesus Sprague MD    Enclosure  CC:  No Recipients    If you would like to receive this communication electronically, please contact [email or web address] or [phone] to request more information on [employer] Link access.    For providers and/or their staff who would like to refer a patient to [facility], please contact us through our one-stop-shop provider referral line, [facility], at [phone].    If you feel you have received this communication in error or would no longer like to receive these types of communications, please e-mail [email or web address]

## 2018-08-21 ENCOUNTER — LAB VISIT (OUTPATIENT)
Dept: LAB | Facility: HOSPITAL | Age: 38
End: 2018-08-21
Attending: PSYCHIATRY & NEUROLOGY
Payer: COMMERCIAL

## 2018-08-21 ENCOUNTER — OFFICE VISIT (OUTPATIENT)
Dept: NEUROLOGY | Facility: CLINIC | Age: 38
End: 2018-08-21
Payer: COMMERCIAL

## 2018-08-21 VITALS
WEIGHT: 179.13 LBS | BODY MASS INDEX: 29.84 KG/M2 | HEART RATE: 73 BPM | DIASTOLIC BLOOD PRESSURE: 74 MMHG | HEIGHT: 65 IN | SYSTOLIC BLOOD PRESSURE: 115 MMHG | RESPIRATION RATE: 20 BRPM

## 2018-08-21 DIAGNOSIS — R40.0 SLEEPINESS: ICD-10-CM

## 2018-08-21 DIAGNOSIS — E78.41 ELEVATED LIPOPROTEIN(A): ICD-10-CM

## 2018-08-21 DIAGNOSIS — I25.3 ATRIAL SEPTAL ANEURYSM: ICD-10-CM

## 2018-08-21 DIAGNOSIS — G56.03 BILATERAL CARPAL TUNNEL SYNDROME: ICD-10-CM

## 2018-08-21 DIAGNOSIS — R53.83 FATIGUE, UNSPECIFIED TYPE: ICD-10-CM

## 2018-08-21 DIAGNOSIS — R25.1 TREMOR: ICD-10-CM

## 2018-08-21 DIAGNOSIS — R76.0 ANTICARDIOLIPIN ANTIBODY POSITIVE: ICD-10-CM

## 2018-08-21 DIAGNOSIS — Z86.73 HISTORY OF EMBOLIC STROKE: Primary | ICD-10-CM

## 2018-08-21 LAB
25(OH)D3+25(OH)D2 SERPL-MCNC: 29 NG/ML
BASOPHILS # BLD AUTO: 0.03 K/UL
BASOPHILS NFR BLD: 0.4 %
DIFFERENTIAL METHOD: ABNORMAL
EOSINOPHIL # BLD AUTO: 0.2 K/UL
EOSINOPHIL NFR BLD: 2.2 %
ERYTHROCYTE [DISTWIDTH] IN BLOOD BY AUTOMATED COUNT: 13.2 %
FERRITIN SERPL-MCNC: 4 NG/ML
HCT VFR BLD AUTO: 36.4 %
HGB BLD-MCNC: 11.4 G/DL
IMM GRANULOCYTES # BLD AUTO: 0.01 K/UL
IMM GRANULOCYTES NFR BLD AUTO: 0.1 %
LYMPHOCYTES # BLD AUTO: 2.9 K/UL
LYMPHOCYTES NFR BLD: 41.6 %
MCH RBC QN AUTO: 27.9 PG
MCHC RBC AUTO-ENTMCNC: 31.3 G/DL
MCV RBC AUTO: 89 FL
MONOCYTES # BLD AUTO: 0.5 K/UL
MONOCYTES NFR BLD: 7.5 %
NEUTROPHILS # BLD AUTO: 3.3 K/UL
NEUTROPHILS NFR BLD: 48.2 %
NRBC BLD-RTO: 0 /100 WBC
PLATELET # BLD AUTO: 401 K/UL
PMV BLD AUTO: 9.7 FL
RBC # BLD AUTO: 4.09 M/UL
TSH SERPL DL<=0.005 MIU/L-ACNC: 1.09 UIU/ML
VIT B12 SERPL-MCNC: 361 PG/ML
WBC # BLD AUTO: 6.9 K/UL

## 2018-08-21 PROCEDURE — 82728 ASSAY OF FERRITIN: CPT

## 2018-08-21 PROCEDURE — 99215 OFFICE O/P EST HI 40 MIN: CPT | Mod: S$GLB,,, | Performed by: PSYCHIATRY & NEUROLOGY

## 2018-08-21 PROCEDURE — 3008F BODY MASS INDEX DOCD: CPT | Mod: CPTII,S$GLB,, | Performed by: PSYCHIATRY & NEUROLOGY

## 2018-08-21 PROCEDURE — 36415 COLL VENOUS BLD VENIPUNCTURE: CPT | Mod: PO

## 2018-08-21 PROCEDURE — 82306 VITAMIN D 25 HYDROXY: CPT

## 2018-08-21 PROCEDURE — 84443 ASSAY THYROID STIM HORMONE: CPT

## 2018-08-21 PROCEDURE — 82607 VITAMIN B-12: CPT

## 2018-08-21 PROCEDURE — 99999 PR PBB SHADOW E&M-EST. PATIENT-LVL III: CPT | Mod: PBBFAC,,, | Performed by: PSYCHIATRY & NEUROLOGY

## 2018-08-21 PROCEDURE — 85025 COMPLETE CBC W/AUTO DIFF WBC: CPT

## 2018-08-21 NOTE — PROGRESS NOTES
Date: 8/21/2018    Patient ID: Jaclyn Rausch is a 37 y.o. female.    Chief Complaint: Stroke      History of Present Illness:  Ms. Rausch is a 37 y.o. female who presents for followup of stroke.     She has not had any more episodes of feeling foggy or arm numbness. Her CTA head and neck and EEG were normal. We switched from xarelto to Eliquis thinking this could have been a TIA. She has not had any more episodes since that time. In review, she is positive for facto 9 and 12 and LPA abnormalities on her clotting disorder panel. She finds she bruises less on the Eliquis.     She is more tired again. She does not very heavy periods lately. She has not had recent bloodwork.    She is having bilateral wrist numbness. She suspects this is carpal tunnel as she has had it before. She is having the numbness come on at night time when she notices her wrists are bent and with using her hands/gripping the steering wheel. Shaking her hands helps relieve the symptoms. She has braces at home but has not been wearing them. She wore them when she previously had a bout with this and they helped.     She is taking lexapro. She notices a tremor occasionally in her right hand. She finds the lexapro has helped with her anxiety feelings.     Review of Systems:   Comprehensive review of systems is negative except as mentioned in the HPI    Allergies:  Review of patient's allergies indicates:  No Known Allergies    Current Medications:  Current Outpatient Medications   Medication Sig Dispense Refill    apixaban 5 mg Tab Take 1 tablet (5 mg total) by mouth 2 (two) times daily. 60 tablet 11    atorvastatin (LIPITOR) 40 MG tablet Take 1 tablet (40 mg total) by mouth once daily. 30 tablet 11    escitalopram oxalate (LEXAPRO) 20 MG tablet Take 1 tablet (20 mg total) by mouth once daily. 30 tablet 1     No current facility-administered medications for this visit.        Past Medical History:  Past Medical History:  "  Diagnosis Date    Anemia     slightly    Anticardiolipin antibody positive 5/4/2018    Atrial septal aneurysm     Dr. Frank; last visit 1/2018    Elevated lipoprotein(a) 5/4/2018    Stroke 04/24/2018       Past Surgical History:  Past Surgical History:   Procedure Laterality Date    BUNIONECTOMY Left 2001       Family History:  family history includes Colon cancer in her maternal grandfather; Diabetes in her maternal grandmother; Hearing loss in her father; Hypertension in her brother and mother; Lung cancer in her maternal grandfather.    Social History:   reports that she quit smoking about 15 years ago. She has a 0.50 pack-year smoking history. she has never used smokeless tobacco. She reports that she does not drink alcohol or use drugs.    Physical Exam:  Vitals:    08/21/18 1032   BP: 115/74   Pulse: 73   Resp: 20   Weight: 81.2 kg (179 lb 1.6 oz)   Height: 5' 5" (1.651 m)   PainSc: 0-No pain     Body mass index is 29.8 kg/m².  General: Well developed, well nourished.  No acute distress.  HEENT: Atraumatic, normocephalic.  Mental status: Awake and alert  Speech language: No dysarthria or aphasia on conversation  Cranial nerves: Face symmetric  Motor: Moves all extremities well  Coordination: No ataxia. No tremor.   Gait: Normal including heel, toe, and tandem.       Data:  I have personally reviewed the referring provider's notes, labs, & imaging made available to me today.       Labs:  CBC:   Lab Results   Component Value Date    WBC 7.20 05/22/2018    HGB 12.5 05/22/2018    HCT 37.5 05/22/2018     (H) 05/22/2018    MCV 86 05/22/2018    RDW 14.7 (H) 05/22/2018     BMP:   Lab Results   Component Value Date     05/22/2018    K 3.6 05/22/2018     05/22/2018    CO2 28 05/22/2018    BUN 8 05/22/2018    CREATININE 0.8 05/22/2018     (H) 05/22/2018    CALCIUM 9.3 05/22/2018     LFTS;   Lab Results   Component Value Date    PROT 7.1 07/16/2018    ALBUMIN 3.8 07/16/2018    BILITOT " 0.6 07/16/2018    AST 24 07/16/2018    ALKPHOS 85 07/16/2018    ALT 19 07/16/2018     COAGS:   Lab Results   Component Value Date    INR 1.0 04/24/2018     FLP:   Lab Results   Component Value Date    CHOL 168 07/16/2018    HDL 68 07/16/2018    LDLCALC 85.8 07/16/2018    TRIG 71 07/16/2018    CHOLHDL 40.5 07/16/2018         Imaging:  I have personally reviewed the imaging, CTA shows no dissection or vessel abnormality. Fetal left PCA is incidentally noted.     Assessment and Plan:  Ms. Rausch is a 37 y.o. female who presents for stroke followup.     She is doing much better on the Eliquis and has had no more episodes concerning for stroke or TIA. She finds she bruises much less on the Eliquis as well. We will continue this indefinitely given her atrial septal aneurysm and hypercoagulable disorders. She is on statin as well.     In regards to the hand numbness, this does sound like CTS. I recommend she wear the wrist splints nightly for 1 month and see if there is improvement. If not, we will get an EMG and refer to hand clinic for consideration of injection.     In regards to the fatigue, she could be anemic due to heavy bleeding. We will also check TSH and vitamin levels to rule out other causes.     In regards to the tremor, this could be exacerbated by lexapro, caffiene, thyroid, etc. It is intermittent and I reassured her nothing to be too concerned about. She discussed wanting to wean off lexapro but it has helped her. I reassured her that it was fine to stay on long term if it helps her.     History of embolic stroke    Fatigue, unspecified type  -     TSH; Future; Expected date: 08/21/2018  -     CBC auto differential; Future; Expected date: 08/21/2018  -     Ferritin; Future; Expected date: 08/21/2018  -     VITAMIN D; Future; Expected date: 08/21/2018  -     Vitamin B12; Future; Expected date: 08/21/2018    Sleepiness  -     TSH; Future; Expected date: 08/21/2018  -     CBC auto differential; Future;  Expected date: 08/21/2018  -     Ferritin; Future; Expected date: 08/21/2018  -     VITAMIN D; Future; Expected date: 08/21/2018  -     Vitamin B12; Future; Expected date: 08/21/2018    Anticardiolipin antibody positive    Elevated lipoprotein(a)    Atrial septal aneurysm    Tremor    Bilateral carpal tunnel syndrome

## 2018-09-16 DIAGNOSIS — G47.00 INSOMNIA, UNSPECIFIED TYPE: ICD-10-CM

## 2018-09-16 DIAGNOSIS — F41.1 GENERALIZED ANXIETY DISORDER WITH PANIC ATTACKS: ICD-10-CM

## 2018-09-16 DIAGNOSIS — F41.0 GENERALIZED ANXIETY DISORDER WITH PANIC ATTACKS: ICD-10-CM

## 2018-09-16 RX ORDER — ESCITALOPRAM OXALATE 20 MG/1
TABLET ORAL
Qty: 30 TABLET | Refills: 1 | Status: SHIPPED | OUTPATIENT
Start: 2018-09-16 | End: 2019-02-21

## 2018-09-21 ENCOUNTER — PATIENT MESSAGE (OUTPATIENT)
Dept: NEUROLOGY | Facility: CLINIC | Age: 38
End: 2018-09-21

## 2018-09-21 DIAGNOSIS — G56.03 BILATERAL CARPAL TUNNEL SYNDROME: Primary | ICD-10-CM

## 2018-09-26 RX ORDER — MECLIZINE HYDROCHLORIDE 25 MG/1
25 TABLET ORAL 2 TIMES DAILY PRN
Qty: 30 TABLET | Refills: 3 | Status: SHIPPED | OUTPATIENT
Start: 2018-09-26 | End: 2020-01-03

## 2018-09-28 ENCOUNTER — OFFICE VISIT (OUTPATIENT)
Dept: FAMILY MEDICINE | Facility: CLINIC | Age: 38
End: 2018-09-28
Payer: COMMERCIAL

## 2018-09-28 VITALS
SYSTOLIC BLOOD PRESSURE: 122 MMHG | TEMPERATURE: 99 F | BODY MASS INDEX: 30.49 KG/M2 | DIASTOLIC BLOOD PRESSURE: 75 MMHG | HEART RATE: 70 BPM | WEIGHT: 183 LBS | HEIGHT: 65 IN

## 2018-09-28 DIAGNOSIS — G43.109 MIGRAINE WITH AURA AND WITHOUT STATUS MIGRAINOSUS, NOT INTRACTABLE: ICD-10-CM

## 2018-09-28 DIAGNOSIS — I25.3 ATRIAL SEPTAL ANEURYSM: ICD-10-CM

## 2018-09-28 DIAGNOSIS — R42 VERTIGO: ICD-10-CM

## 2018-09-28 DIAGNOSIS — G56.03 BILATERAL CARPAL TUNNEL SYNDROME: ICD-10-CM

## 2018-09-28 DIAGNOSIS — Z79.01 ANTICOAGULANT LONG-TERM USE: ICD-10-CM

## 2018-09-28 DIAGNOSIS — I63.9 ACUTE CVA (CEREBROVASCULAR ACCIDENT): Primary | ICD-10-CM

## 2018-09-28 DIAGNOSIS — F41.9 ANXIETY: ICD-10-CM

## 2018-09-28 DIAGNOSIS — R76.0 ANTICARDIOLIPIN ANTIBODY POSITIVE: ICD-10-CM

## 2018-09-28 DIAGNOSIS — E78.01 FAMILIAL HYPERCHOLESTEROLEMIA: ICD-10-CM

## 2018-09-28 PROCEDURE — 99999 PR PBB SHADOW E&M-EST. PATIENT-LVL IV: CPT | Mod: PBBFAC,,, | Performed by: NURSE PRACTITIONER

## 2018-09-28 PROCEDURE — 3008F BODY MASS INDEX DOCD: CPT | Mod: CPTII,S$GLB,, | Performed by: NURSE PRACTITIONER

## 2018-09-28 PROCEDURE — 99214 OFFICE O/P EST MOD 30 MIN: CPT | Mod: S$GLB,,, | Performed by: NURSE PRACTITIONER

## 2018-09-28 NOTE — PATIENT INSTRUCTIONS
Benign Paroxysmal Positional Vertigo     Your health care provider may move your head in certain ways to treat your BPPV.     Benign paroxysmal positional vertigo (BPPV) is a problem with the inner ear. The inner ear contains the vestibular system. This system is what helps you keep your balance. BPPV causes a feeling of spinning. It is a common problem of the vestibular system.  Understanding the vestibular system  The vestibular system of the ear is made up of very tiny parts. They include the utricle, saccule, and semicircular canals. The utricle is a tiny organ that contains calcium crystals. In some people, the crystals can move into the semicircular canals. When this happens, the system no longer works as it should. This causes BPPV. Benign means it is not life-threatening. Paroxysmal means it happens suddenly. Positional means that it happens when you move your head. Vertigo is a feeling of spinning.  What causes BPPV?  Causes include injury to your head or neck. Other problems with the vestibular system may cause BPPV. In many people, the cause of BPPV is not known.  Symptoms of BPPV  You many have repeated feelings of spinning (vertigo). The vertigo usually lasts less than 1 minute. Some movements, suchas rolling over in bed, can bring on vertigo.  Diagnosing BPPV  Your primary health care provider may diagnose and treat your BPPV. Or you may see an ear, nose, and throat doctor (otolaryngologist). In some cases, you may see a nervous system doctor (neurologist).  The health care provider will ask about your symptoms and your medical history. He or she will examine you. You may have hearing and balance tests. As part of the exam, your health care provider may have you move your head and body in certain ways. If you have BPPV, the movements can bring on vertigo. Your provider will also look for abnormal movements of your eyes. You may have other tests to check your vestibular or nervous systems.  Treatment  for BPPV  Your health care provider may try to move the calcium crystals. This is done by having you move your head and neck in certain ways. This treatment is safe and often works well. You may also be told to do these movements at home. You may still have vertigo for a few weeks. Your health care provider will recheck your symptoms, usually in about a month. Special physical therapy may also be part of treatment. In rare cases surgery may be needed for BPPV that does not go away.     When to call the health care provider  Call your health care provider right away if you have any of these:  · Symptoms that do not go away with treatment  · Symptoms that get worse  · New symptoms   Date Last Reviewed: 3/19/2015  © 2433-1504 [employer]. [address] [place] [address], [place] [address]. All rights reserved. This information is not intended as a substitute for professional medical care. Always follow your healthcare professional's instructions.        Benign Paroxysmal Positional Vertigo    Benign paroxysmal positional vertigo is a common condition. You feel as if the room is spinning after changing position, moving your head quickly, or even just rolling over in bed.  Vertigo is a false feeling of motion plus disorientation that makes it seem as though the room is spinning. A vertigo attack may cause sudden nausea, vomiting, and heavy sweating. Severe vertigo causes a loss of balance. You may even fall down.  Vertigo is caused by a problem with the inner ear. The inner ear is located behind the middle ear. It is a part of the balance center of the body. It contains small calcium particles within fluid-filled canals (semi-circular canals). These particles can move out of position as a result of aging, head trauma, or disease of the inner ear. Once that happens, moving your head in certain ways may cause the particles to stimulate the inner ear. This creates the feeling of vertigo.  An episode of vertigo may last  seconds, minutes, or hours. Once you are over the first episode of vertigo, it may never return. Sometimes symptoms return off and on over several weeks or longer.  Home care  Follow these guidelines when caring for yourself at home:  · Rest quietly in bed if your symptoms are severe. Change position slowly. There is usually one position that will feel best. This might be lying on one side or lying on your back with your head slightly raised on pillows.  · Do not drive or work with dangerous machinery for one week after symptoms disappear. This is in case symptoms return suddenly.  · Take medicine as prescribed to relieve your symptoms. Unless another medicine was prescribed for nausea, vomiting, and vertigo, you may use over-the-counter motion sickness medicine, such as meclizine or dimenhydrinate.  Follow-up care  Follow up with your healthcare provider, or as directed. Tell your provider about any ringing in the ear or hearing loss.  If you had a CT or MRI scan, a specialist will review it. You will be told of any new findings that may affect your care.  When to seek medical advice  Call your healthcare provider right away if any of these occur:  · Vertigo gets worse even after taking prescribed medicine  · Repeated vomiting even after taking prescribed medicine  · Increased weakness or fainting  · Severe headache or unusual drowsiness or confusion  · Weakness of an arm or leg or one side of the face  · Difficulty walking  · Difficulty with speech or vision  · Seizure  · Trouble hearing  · Fever of 100.4ºF (38ºC) or higher, or as directed by your healthcare provider  · Fast heart rate  · Chest pain   Date Last Reviewed: 7/10/2015  © 0584-9266 [employer]. [address] [place] [address], [place] [address]. All rights reserved. This information is not intended as a substitute for professional medical care. Always follow your healthcare professional's instructions.

## 2018-09-28 NOTE — PROGRESS NOTES
Subjective:       Patient ID: Jaclyn Rausch is a 38 y.o. female.    Chief Complaint: Follow-up    Chief Complaint  Chief Complaint   Patient presents with    Follow-up       HPI  Jaclyn Rausch is a 38 y.o. female with medical diagnoses as listed in the medical history and problem list that presents for follow up. Patient had a non-hemorrhagic left frontal lobe stroke in April 2018.  She has no residual effects from the stroke. Work up following stroke by hematology and cardiology revealed positive anticardiolipin antibody and an atrial septal aneurysm.  Patient is taking eliquis 5 mg BID and is followed by Dr. Sprague, hematology; Dr. Frank, cardiology; and Dr. Roca, neurology. She is treated for hyperlipidemia with lipitor 40 mg daily and takes lexapro 10 mg for anxiety with good effect. Today she presents with form from her employer ([employer]) that needs to be completed to list her exercise limitations.  Blood pressure is normal 122/75.  BMI today is 30.45 and she has gained 7 pounds since last visit.  Established patient with last clinic appointment 5/29/2018.        PAST MEDICAL HISTORY:  Past Medical History:   Diagnosis Date    Anemia     slightly    Anticardiolipin antibody positive 5/4/2018    Atrial septal aneurysm     Dr. Frank; last visit 1/2018    Elevated lipoprotein(a) 5/4/2018    Stroke 04/24/2018       PAST SURGICAL HISTORY:  Past Surgical History:   Procedure Laterality Date    BUNIONECTOMY Left 2001    D & C (SUCTION) N/A 2/7/2018    Performed by [facility]erico Guzmán [facility] at [facility]    TRANSESOPHAGEAL ECHOCARDIOGRAM WITH POSSIBLE CARDIOVERSION (JUAN W/ POSS CARDIOVERSION) N/A 4/25/2018    Performed by Inder Frank MD at [facility] CATH LAB       SOCIAL HISTORY:  Social History     Socioeconomic History    Marital status:      Spouse name: Not on file    Number of children: Not on file    Years of education: Not on file    Highest education level: Not on  file   Social Needs    Financial resource strain: Not on file    Food insecurity - worry: Not on file    Food insecurity - inability: Not on file    Transportation needs - medical: Not on file    Transportation needs - non-medical: Not on file   Occupational History    Not on file   Tobacco Use    Smoking status: Former Smoker     Packs/day: 0.25     Years: 2.00     Pack years: 0.50     Last attempt to quit: 01/2003     Years since quitting: 15.7    Smokeless tobacco: Never Used   Substance and Sexual Activity    Alcohol use: No     Comment: Socially    Drug use: No    Sexual activity: Yes     Partners: Male     Comment:    Other Topics Concern    Not on file   Social History Narrative    Not on file       FAMILY HISTORY:  Family History   Problem Relation Age of Onset    Diabetes Maternal Grandmother     Lung cancer Maternal Grandfather     Colon cancer Maternal Grandfather     Hypertension Mother     Hearing loss Father     Hypertension Brother     Breast cancer Neg Hx     Ovarian cancer Neg Hx        ALLERGIES AND MEDICATIONS: updated and reviewed.  Review of patient's allergies indicates:  No Known Allergies  Current Outpatient Medications   Medication Sig Dispense Refill    apixaban 5 mg Tab Take 1 tablet (5 mg total) by mouth 2 (two) times daily. 60 tablet 11    atorvastatin (LIPITOR) 40 MG tablet Take 1 tablet (40 mg total) by mouth once daily. 30 tablet 11    escitalopram oxalate (LEXAPRO) 20 MG tablet TAKE 1 TABLET(20 MG) BY MOUTH EVERY DAY (Patient taking differently: TAKE 1/2 TABLET(20 MG) BY MOUTH EVERY DAY) 30 tablet 1    meclizine (ANTIVERT) 25 mg tablet Take 1 tablet (25 mg total) by mouth 2 (two) times daily as needed for Dizziness. 30 tablet 3     No current facility-administered medications for this visit.        I have reviewed the patient's medical history in detail and updated the computerized patient record.    Review of Systems   Constitutional: Positive for  "fatigue. Negative for activity change, appetite change, chills and fever.        Fatigue that she notes comes and goes and is worse during menstrual period   HENT: Negative for congestion, ear pain, rhinorrhea, sinus pressure, sinus pain and sore throat.    Eyes: Negative for visual disturbance.   Respiratory: Negative for cough and shortness of breath.    Cardiovascular: Negative for chest pain, palpitations and leg swelling.   Gastrointestinal: Positive for constipation. Negative for abdominal pain, diarrhea, nausea and vomiting.        Slight constipation with iron, takes every other day   Genitourinary: Positive for menstrual problem. Negative for difficulty urinating and dysuria.        Periods are heavier and longer than they used to be, using a menstrual cup that has to be emptied every 2 hours.    Musculoskeletal: Negative for arthralgias and myalgias.   Skin: Negative for rash and wound.   Neurological: Positive for light-headedness, numbness and headaches. Negative for dizziness.        Has been experiencing a feeling of being off balance with head repositioning.  Dr. Roca is aware of recent vertigo like symptoms.  She prescribed meclizine for the patient, but the patient has not tried the medication.  Occasional migraine with white spots in peripheral vision, about 4 per month. Numbness to bilateral hands, probable carpal tunnel syndrome, Dr. Roca to order nerve conduction studies.    Hematological: Does not bruise/bleed easily.   Psychiatric/Behavioral: Negative for dysphoric mood and sleep disturbance. The patient is not nervous/anxious.          Objective:      Vitals:    09/28/18 1314   BP: 122/75   BP Location: Right arm   Patient Position: Sitting   BP Method: Large (Automatic)   Pulse: 70   Temp: 98.5 °F (36.9 °C)   TempSrc: Oral   Weight: 83 kg (183 lb)   Height: 5' 5" (1.651 m)     Physical Exam   Constitutional: She is oriented to person, place, and time. Vital signs are normal. She appears " well-developed. No distress.   HENT:   Head: Normocephalic and atraumatic.   Right Ear: Tympanic membrane, external ear and ear canal normal.   Left Ear: Tympanic membrane, external ear and ear canal normal.   Nose: Nose normal. No mucosal edema.   Mouth/Throat: Oropharynx is clear and moist and mucous membranes are normal.   Eyes: Conjunctivae and lids are normal. Pupils are equal, round, and reactive to light. Right eye exhibits no discharge. Left eye exhibits no discharge. Right conjunctiva is not injected. Left conjunctiva is not injected.   Neck: Normal range of motion. Neck supple. Normal carotid pulses present. Carotid bruit is not present. No thyromegaly present.   Cardiovascular: Normal rate, regular rhythm, S1 normal, S2 normal, normal heart sounds, intact distal pulses and normal pulses.   No murmur heard.  Pulses:       Carotid pulses are 2+ on the right side, and 2+ on the left side.       Radial pulses are 2+ on the right side, and 2+ on the left side.   Pulmonary/Chest: Effort normal and breath sounds normal. No respiratory distress. She has no decreased breath sounds. She has no wheezes. She has no rhonchi. She has no rales.   Musculoskeletal: Normal range of motion.   Lymphadenopathy:     She has no cervical adenopathy.   Neurological: She is alert and oriented to person, place, and time. She has normal strength. Gait normal.   Hand grasps are equal and strong   Skin: Skin is warm, dry and intact. She is not diaphoretic. No pallor.   Psychiatric: She has a normal mood and affect. Her speech is normal and behavior is normal. Judgment and thought content normal. Cognition and memory are normal.   Nursing note and vitals reviewed.        Assessment:       1. Acute CVA (cerebrovascular accident) -  lateral left frontal lobe, non-hemorrhagic    2. Migraine with aura and without status migrainosus, not intractable    3. Bilateral carpal tunnel syndrome    4. Atrial septal aneurysm    5. Familial  hypercholesterolemia    6. Anticardiolipin antibody positive    7. Anticoagulant long-term use    8. Anxiety    9. Vertigo    10. BMI 30.0-30.9,adult          Plan:       Jaclyn was seen today for follow-up.  Former for employer was completed and returned to the patient at the visit.  Diagnoses and all orders for this visit:    Acute CVA (cerebrovascular accident) -  lateral left frontal lobe, non-hemorrhagic   No residual effects   Continue eliquis 5 mg BID   Maintain follow up with hematology and neurology  Migraine with aura and without status migrainosus, not intractable   Infrequent migraine reported, about 3-4 per month with visual aura of white spots in periphery   Migraine relieved with rest in a cool dark room, cold compress to head  Bilateral carpal tunnel syndrome   Continue wearing wrist splints at night   Dr. Roca ordering nerve conduction studies for patient, discussed treatment options including steroid injections and surgery.  At this time, patient is not a candidate to stop her Eliquis, so surgery is most likely not an optional treatment.  Atrial septal aneurysm   Asymptomatic, continue follow-up with Cardiology as instructed  Familial hypercholesterolemia     Lab Results   Component Value Date    CHOL 168 07/16/2018    CHOL 263 (H) 04/25/2018    CHOL 263 (H) 04/25/2018     Lab Results   Component Value Date    HDL 68 07/16/2018    HDL 66 04/25/2018    HDL 66 04/25/2018     Lab Results   Component Value Date    LDLCALC 85.8 07/16/2018    LDLCALC 180.0 (H) 04/25/2018    LDLCALC 180.0 (H) 04/25/2018     Lab Results   Component Value Date    TRIG 71 07/16/2018    TRIG 85 04/25/2018    TRIG 85 04/25/2018     Lab Results   Component Value Date    CHOLHDL 40.5 07/16/2018    CHOLHDL 25.1 04/25/2018    CHOLHDL 25.1 04/25/2018      Continue Lipitor 40 mg daily  Anticardiolipin antibody positive   Continue Eliquis 5 mg 2 times daily   Continue follow-up with Hematology as instructed  Anticoagulant  long-term use   Stable on Eliquis 5 mg b.i.d.  Anxiety   Patient reports Lexapro 10 mg daily is effective in controlling symptoms, continue medication  Vertigo   Meclizine as prescribed by Dr. Roca as needed   Discussed home a maneuver for adjusting inner ear crystals, directed to YouTube video, if ineffective may refer to ENT   Educational handouts provided.  Benign paroxysmal positional vertigo  BMI 30.0-30.9,adult   BMI today is 30.45 with a 7 lb weight gain since 5/29/2018   Patient reports regular exercise with inability to lose weight, recommend continuing current exercise program   1200 calorie per day diet reviewed with patient, with educational handout, portion sizes, and food list discussed and provided.    Follow-up if symptoms worsen or fail to improve.        Patient readiness: acceptance and barriers:none    During the course of the visit the patient was educated and counseled about the following:     Obesity:   General weight loss/lifestyle modification strategies discussed (elicit support from others; identify saboteurs; non-food rewards, etc).  Diet interventions: moderate (500 kCal/d) deficit diet.  Regular aerobic exercise program discussed.    Goals: Obesity: Reduce calorie intake and BMI    Did patient meet goals/outcomes: No    The following self management tools provided:  1200 calorie portion control diet provided with education    Patient Instructions (the written plan) was given to the patient/family.     Time spent with patient: 30 minutes    Barriers to medications present (no )    Adverse reactions to current medications (no)    Over the counter medications reviewed (Yes)    A total of 30 minutes was spent with patient with more than 50% of time spent on counseling and coordination of care, health maintenance reviewed with appropriate recommendations made,current BMI discussed, appropriate diet, exercise and lifestyle modification recommendations made.  A 1200 calorie diet was provided  to the patient.  Educational handout was reviewed with the patient with discussion of meal planning, measuring food, portion sizes, number of servings per food group per day.  Meal diary with instruction for use was provided.

## 2018-09-30 PROBLEM — F41.9 ANXIETY: Status: ACTIVE | Noted: 2018-09-30

## 2018-10-16 ENCOUNTER — PATIENT MESSAGE (OUTPATIENT)
Dept: NEUROLOGY | Facility: CLINIC | Age: 38
End: 2018-10-16

## 2018-10-29 ENCOUNTER — OFFICE VISIT (OUTPATIENT)
Dept: FAMILY MEDICINE | Facility: CLINIC | Age: 38
End: 2018-10-29
Payer: COMMERCIAL

## 2018-10-29 VITALS
WEIGHT: 176 LBS | HEART RATE: 65 BPM | TEMPERATURE: 98 F | SYSTOLIC BLOOD PRESSURE: 113 MMHG | DIASTOLIC BLOOD PRESSURE: 76 MMHG | HEIGHT: 65 IN | BODY MASS INDEX: 29.32 KG/M2

## 2018-10-29 DIAGNOSIS — N92.0 MENORRHAGIA WITH REGULAR CYCLE: ICD-10-CM

## 2018-10-29 DIAGNOSIS — I25.3 ATRIAL SEPTAL ANEURYSM: ICD-10-CM

## 2018-10-29 DIAGNOSIS — R76.0 ANTICARDIOLIPIN ANTIBODY POSITIVE: ICD-10-CM

## 2018-10-29 DIAGNOSIS — Z79.01 ANTICOAGULANT LONG-TERM USE: ICD-10-CM

## 2018-10-29 DIAGNOSIS — E78.01 FAMILIAL HYPERCHOLESTEROLEMIA: ICD-10-CM

## 2018-10-29 DIAGNOSIS — T14.8XXA MUSCLE STRAIN: Primary | ICD-10-CM

## 2018-10-29 DIAGNOSIS — I63.81 LEFT SIDED LACUNAR INFARCTION: ICD-10-CM

## 2018-10-29 PROBLEM — R25.1 TREMOR: Status: RESOLVED | Noted: 2018-08-21 | Resolved: 2018-10-29

## 2018-10-29 PROBLEM — R51.9 ACUTE NONINTRACTABLE HEADACHE: Status: RESOLVED | Noted: 2018-04-26 | Resolved: 2018-10-29

## 2018-10-29 PROBLEM — R47.01 APHASIA: Status: RESOLVED | Noted: 2018-04-24 | Resolved: 2018-10-29

## 2018-10-29 PROCEDURE — 99999 PR PBB SHADOW E&M-EST. PATIENT-LVL III: CPT | Mod: PBBFAC,,, | Performed by: NURSE PRACTITIONER

## 2018-10-29 PROCEDURE — 99214 OFFICE O/P EST MOD 30 MIN: CPT | Mod: S$GLB,,, | Performed by: NURSE PRACTITIONER

## 2018-10-29 PROCEDURE — 3008F BODY MASS INDEX DOCD: CPT | Mod: CPTII,S$GLB,, | Performed by: NURSE PRACTITIONER

## 2018-10-29 RX ORDER — METHOCARBAMOL 500 MG/1
500 TABLET, FILM COATED ORAL 3 TIMES DAILY
Qty: 30 TABLET | Refills: 0 | Status: SHIPPED | OUTPATIENT
Start: 2018-10-29 | End: 2018-11-08

## 2018-10-29 NOTE — PROGRESS NOTES
Subjective:       Patient ID: Jaclyn Rausch is a 38 y.o. female.    Chief Complaint: Rib Pain    Chief Complaint  Chief Complaint   Patient presents with    Rib Pain       HPI  Jaclyn Rausch is a 38 y.o. female with medical diagnoses as listed in the medical history and problem list that presents with c/o of rib and back pain for 1 month. Patient localizes the pain to the lower border of the right scapula, occurs intermittently with certain positions or yawn or deep breath and is sharp and lasts only a few seconds.  Patient has not taken any OTC medication for pain. Pain is intermittent.   Patient had an ischemic stroke in April 2014, was found to be anticardiolipin antibody positive and is taking eliquis 5 mg BID, followed by hematology. She is also treated for hyperlipidemia. She has an atrial septal aneurysm followed by Dr. Frank. Blood pressure today is 113/76. BMI is 29.29 today and patient has lost 7 pounds since last visit.  Established patient with last clinic appointment 9/28/2018.          PAST MEDICAL HISTORY:  Past Medical History:   Diagnosis Date    Anemia     slightly    Anticardiolipin antibody positive 5/4/2018    Atrial septal aneurysm     Dr. Frank; last visit 1/2018    Elevated lipoprotein(a) 5/4/2018    Stroke 04/24/2018       PAST SURGICAL HISTORY:  Past Surgical History:   Procedure Laterality Date    BUNIONECTOMY Left 2001    D & C (SUCTION) N/A 2/7/2018    [facility]    TRANSESOPHAGEAL ECHOCARDIOGRAM WITH POSSIBLE CARDIOVERSION (JUAN W/ POSS CARDIOVERSION) N/A 4/25/2018    Performed by Inder [facility] at [facility] CATH LAB       SOCIAL HISTORY:  Social History     Socioeconomic History    Marital status:      Spouse name: Not on file    Number of children: Not on file    Years of education: Not on file    Highest education level: Not on file   Social Needs    Financial resource strain: Not on file    Food  insecurity - worry: Not on file    Food insecurity - inability: Not on file    Transportation needs - medical: Not on file    Transportation needs - non-medical: Not on file   Occupational History    Not on file   Tobacco Use    Smoking status: Former Smoker     Packs/day: 0.25     Years: 2.00     Pack years: 0.50     Last attempt to quit: 01/2003     Years since quitting: 15.8    Smokeless tobacco: Never Used   Substance and Sexual Activity    Alcohol use: No     Comment: Socially    Drug use: No    Sexual activity: Yes     Partners: Male     Comment:    Other Topics Concern    Not on file   Social History Narrative    Not on file       FAMILY HISTORY:  Family History   Problem Relation Age of Onset    Diabetes Maternal Grandmother     Lung cancer Maternal Grandfather     Colon cancer Maternal Grandfather     Hypertension Mother     Hearing loss Father     Hypertension Brother     Breast cancer Neg Hx     Ovarian cancer Neg Hx        ALLERGIES AND MEDICATIONS: updated and reviewed.  Review of patient's allergies indicates:  No Known Allergies  Current Outpatient Medications   Medication Sig Dispense Refill    apixaban 5 mg Tab Take 1 tablet (5 mg total) by mouth 2 (two) times daily. 60 tablet 11    atorvastatin (LIPITOR) 40 MG tablet Take 1 tablet (40 mg total) by mouth once daily. 30 tablet 11    escitalopram oxalate (LEXAPRO) 20 MG tablet TAKE 1 TABLET(20 MG) BY MOUTH EVERY DAY (Patient taking differently: TAKE 1/2 TABLET(20 MG) BY MOUTH EVERY DAY) 30 tablet 1    meclizine (ANTIVERT) 25 mg tablet Take 1 tablet (25 mg total) by mouth 2 (two) times daily as needed for Dizziness. 30 tablet 3    methocarbamol (ROBAXIN) 500 MG Tab Take 1 tablet (500 mg total) by mouth 3 (three) times daily. for 10 days 30 tablet 0     No current facility-administered medications for this visit.        I have reviewed the patient's medical history in detail and updated the computerized patient  "record.    Review of Systems   Constitutional: Negative for activity change, appetite change, chills, fatigue and fever.   Eyes: Negative for visual disturbance.   Respiratory: Negative for cough and shortness of breath.    Cardiovascular: Negative for chest pain and palpitations.   Gastrointestinal: Negative for abdominal pain, constipation, diarrhea, nausea and vomiting.   Genitourinary: Positive for menstrual problem. Negative for difficulty urinating and dysuria.        Menstrual periods are heavy with blood thinner   Musculoskeletal: Positive for back pain.        Pain to posterior right rib cage, lower border of right scapula   Skin: Negative for rash and wound.   Neurological: Positive for dizziness. Negative for numbness and headaches.        Occasional dizziness. No falls.   Hematological: Bruises/bleeds easily.        Bruises easily with eliquis   Psychiatric/Behavioral: Negative for dysphoric mood and sleep disturbance. The patient is not nervous/anxious.          Objective:      Vitals:    10/29/18 1135   BP: 113/76   BP Location: Right arm   Patient Position: Sitting   BP Method: Large (Automatic)   Pulse: 65   Temp: 98.3 °F (36.8 °C)   TempSrc: Oral   Weight: 79.8 kg (176 lb)   Height: 5' 5" (1.651 m)     Physical Exam   Constitutional: She is oriented to person, place, and time. Vital signs are normal. She appears well-developed and well-nourished. No distress.   Blood pressure 113/76   HENT:   Head: Normocephalic and atraumatic.   Right Ear: Tympanic membrane, external ear and ear canal normal.   Left Ear: Tympanic membrane, external ear and ear canal normal.   Nose: Nose normal. No mucosal edema.   Mouth/Throat: Oropharynx is clear and moist and mucous membranes are normal.   Eyes: Conjunctivae and lids are normal. Pupils are equal, round, and reactive to light. Right eye exhibits no discharge. Left eye exhibits no discharge.   Neck: Normal range of motion. Neck supple. Normal carotid pulses " present. Carotid bruit is not present.   Cardiovascular: Normal rate, regular rhythm, S1 normal, S2 normal, normal heart sounds, intact distal pulses and normal pulses.   No murmur heard.  Pulses:       Carotid pulses are 2+ on the right side, and 2+ on the left side.       Radial pulses are 2+ on the right side, and 2+ on the left side.   No edema   Pulmonary/Chest: Effort normal and breath sounds normal. No respiratory distress. She has no decreased breath sounds. She has no wheezes. She has no rhonchi. She has no rales.   Musculoskeletal: Normal range of motion.   No pain to palpation to mid thoracic spine or bilateral thoracic paravertebral areas. No pain with palpation over right lower scapula, posterior rib cage with palpation. Patient is unable to recreate pain in office.   Neurological: She is alert and oriented to person, place, and time. She has normal strength.   Skin: Skin is warm, dry and intact. She is not diaphoretic. No pallor.   Psychiatric: She has a normal mood and affect. Her speech is normal and behavior is normal. Judgment and thought content normal. Cognition and memory are normal.   Nursing note and vitals reviewed.        Assessment:       1. Muscle strain    2. Familial hypercholesterolemia    3. Left sided lacunar infarction, chronic, identified 4/24/18    4. Atrial septal aneurysm    5. Menorrhagia with regular cycle    6. Anticardiolipin antibody positive    7. Anticoagulant long-term use    8. BMI 29.0-29.9,adult          Plan:       Jaclyn was seen today for rib pain.    Diagnoses and all orders for this visit:    Muscle strain  -     methocarbamol (ROBAXIN) 500 MG Tab; Take 1 tablet (500 mg total) by mouth 3 (three) times daily as needed for muscle spasm/muscle pain  - Physical exam is normal, no pain to area in office, unable to reproduce  - Discussed possible muscle strain/sprain and recommend OTC tylenol 500 mg Q 6 hours as needed and use of muscle relaxant robaxin as needed,  see above  - If pain is persistent or changes, will return to office    Familial hypercholesterolemia     Lab Results   Component Value Date    CHOL 168 07/16/2018    CHOL 263 (H) 04/25/2018    CHOL 263 (H) 04/25/2018     Lab Results   Component Value Date    HDL 68 07/16/2018    HDL 66 04/25/2018    HDL 66 04/25/2018     Lab Results   Component Value Date    LDLCALC 85.8 07/16/2018    LDLCALC 180.0 (H) 04/25/2018    LDLCALC 180.0 (H) 04/25/2018     Lab Results   Component Value Date    TRIG 71 07/16/2018    TRIG 85 04/25/2018    TRIG 85 04/25/2018     Lab Results   Component Value Date    CHOLHDL 40.5 07/16/2018    CHOLHDL 25.1 04/25/2018    CHOLHDL 25.1 04/25/2018      Continue lipitor 40 mg daily  Left sided lacunar infarction, chronic, identified 4/24/18   Stable, continue follow up with neurology, Dr. Roca  Atrial septal aneurysm   Stable, continue follow up with cardiology, Dr. Frank  Menorrhagia with regular cycle   Periods are heavy with eliquis   Mild anemia     Lab Results   Component Value Date    WBC 6.90 08/21/2018    HGB 11.4 (L) 08/21/2018    HCT 36.4 (L) 08/21/2018    MCV 89 08/21/2018     (H) 08/21/2018     Anticardiolipin antibody positive   Continue Eliquis 5 mg BID   Continue follow-up with Dr. Sprague as instructed    Anticoagulant long-term use   Continue Eliquis 5 mg BID   Patient is stable on current medication, only issue has been heavier than normal menstrual bleeding   Continue follow-up with Dr. Sprague as instructed    BMI 29.0-29.9,adult   BMI today is 29.29 with a 7 lb weight loss since visit on 9/28/2018   Weight loss recommended with well balanced diet and portion controlled meals and maintain current exercise regimen.    The patient's BMI has been recorded in the chart. The patient has been provided educational materials regarding the benefits of attaining and maintaining a normal weight. We will continue to address and follow this issue during follow up  visits.    Follow-up if symptoms worsen or fail to improve.

## 2018-11-12 ENCOUNTER — OFFICE VISIT (OUTPATIENT)
Dept: DERMATOLOGY | Facility: CLINIC | Age: 38
End: 2018-11-12
Payer: COMMERCIAL

## 2018-11-12 DIAGNOSIS — Z12.83 SKIN CANCER SCREENING: ICD-10-CM

## 2018-11-12 DIAGNOSIS — D22.9 MULTIPLE BENIGN NEVI: Primary | ICD-10-CM

## 2018-11-12 PROCEDURE — 99999 PR PBB SHADOW E&M-EST. PATIENT-LVL II: CPT | Mod: PBBFAC,,, | Performed by: DERMATOLOGY

## 2018-11-12 PROCEDURE — 99203 OFFICE O/P NEW LOW 30 MIN: CPT | Mod: S$GLB,,, | Performed by: DERMATOLOGY

## 2018-11-12 NOTE — PROGRESS NOTES
Subjective:       Patient ID:  Jaclyn Rausch is a 38 y.o. female who presents for   Chief Complaint   Patient presents with    Skin Check     TBSE    Spot     nose     Initial visit  Recent CVA, now on apixaban (post OCP), found to be anticardiolipin ab +  No h/o skin cancer  Requests TBSE for cancer screening  Complaint below        Spot  - Initial  Affected locations: nose  Duration: 1 year  Signs / symptoms: non-healing  Severity: mild  Timing: constant  Aggravated by: nothing  Relieving factors/Treatments tried: nothing        Review of Systems   Constitutional: Negative for fever, chills, weight loss, weight gain, fatigue, night sweats and malaise.   Skin: Positive for daily sunscreen use (moisturizer), activity-related sunscreen use and wears hat.   Hematologic/Lymphatic: Bruises/bleeds easily.        Objective:    Physical Exam   Constitutional: She appears well-developed and well-nourished. No distress.   Neurological: She is alert and oriented to person, place, and time. She is not disoriented.   Psychiatric: She has a normal mood and affect.   Skin:   Areas Examined (abnormalities noted in diagram):   Scalp / Hair Palpated and Inspected  Head / Face Inspection Performed  Neck Inspection Performed  Chest / Axilla Inspection Performed  Abdomen Inspection Performed  Genitals / Buttocks / Groin Inspection Performed  Back Inspection Performed  RUE Inspected  LUE Inspection Performed  RLE Inspected  LLE Inspection Performed  Nails and Digits Inspection Performed                       Diagram Legend     Erythematous scaling macule/papule c/w actinic keratosis       Vascular papule c/w angioma      Pigmented verrucoid papule/plaque c/w seborrheic keratosis      Yellow umbilicated papule c/w sebaceous hyperplasia      Irregularly shaped tan macule c/w lentigo     1-2 mm smooth white papules consistent with Milia      Movable subcutaneous cyst with punctum c/w epidermal inclusion cyst       Subcutaneous movable cyst c/w pilar cyst      Firm pink to brown papule c/w dermatofibroma      Pedunculated fleshy papule(s) c/w skin tag(s)      Evenly pigmented macule c/w junctional nevus     Mildly variegated pigmented, slightly irregular-bordered macule c/w mildly atypical nevus      Flesh colored to evenly pigmented papule c/w intradermal nevus       Pink pearly papule/plaque c/w basal cell carcinoma      Erythematous hyperkeratotic cursted plaque c/w SCC      Surgical scar with no sign of skin cancer recurrence      Open and closed comedones      Inflammatory papules and pustules      Verrucoid papule consistent consistent with wart     Erythematous eczematous patches and plaques     Dystrophic onycholytic nail with subungual debris c/w onychomycosis     Umbilicated papule    Erythematous-base heme-crusted tan verrucoid plaque consistent with inflamed seborrheic keratosis     Erythematous Silvery Scaling Plaque c/w Psoriasis     See annotation      Assessment / Plan:        Multiple benign nevi  Discussed ABCDE's of nevi.  Monitor for new mole or moles that are becoming bigger, darker, irritated, or developing irregular borders. Brochure provided.    Skin cancer screening  Total body skin examination performed today including at least 12 points as noted in physical examination. No lesions suspicious for malignancy noted.    Patient instructed in importance in daily sun protection of at least spf 30. Mineral sunscreen ingredients preferred (Zinc +/- Titanium).   Recommend Elta MD for daily use on face and neck.  Patient encouraged to wear hat for all outdoor exposure.   Also discussed sun avoidance and use of protective clothing.             Follow-up if symptoms worsen or fail to improve.

## 2018-12-06 ENCOUNTER — OFFICE VISIT (OUTPATIENT)
Dept: PHYSICAL MEDICINE AND REHAB | Facility: CLINIC | Age: 38
End: 2018-12-06
Payer: COMMERCIAL

## 2018-12-06 DIAGNOSIS — G56.02 LEFT CARPAL TUNNEL SYNDROME: ICD-10-CM

## 2018-12-06 DIAGNOSIS — G56.22 ULNAR NEUROPATHY AT ELBOW, LEFT: Primary | ICD-10-CM

## 2018-12-06 PROCEDURE — 99999 PR PBB SHADOW E&M-EST. PATIENT-LVL I: CPT | Mod: PBBFAC,,, | Performed by: PHYSICAL MEDICINE & REHABILITATION

## 2018-12-06 PROCEDURE — 99499 UNLISTED E&M SERVICE: CPT | Mod: S$GLB,,, | Performed by: PHYSICAL MEDICINE & REHABILITATION

## 2018-12-06 PROCEDURE — 95911 NRV CNDJ TEST 9-10 STUDIES: CPT | Mod: S$GLB,,, | Performed by: PHYSICAL MEDICINE & REHABILITATION

## 2018-12-06 PROCEDURE — 95886 MUSC TEST DONE W/N TEST COMP: CPT | Mod: S$GLB,,, | Performed by: PHYSICAL MEDICINE & REHABILITATION

## 2018-12-06 NOTE — LETTER
December 6, 2018      Pearl Roca MD  [address] [facility] [address] [place] [address]           [facility]  [address] [facility] [address] [place] [address]  Phone: [phone]  Fax: [phone]          Patient: Jaclyn Rausch   MR Number: [id number]   YOB: 1980   Date of Visit: 12/6/2018       Dear Dr. Pearl Roca:    Thank you for referring Jaclyn Rausch to me for evaluation. Attached you will find relevant portions of my assessment and plan of care.    If you have questions, please do not hesitate to call me. I look forward to following Jaclyn Rausch along with you.    Sincerely,    Larry Caban MD    Enclosure  CC:  No Recipients    If you would like to receive this communication electronically, please contact [email or web address] or [phone] to request more information on [employer] access.    For providers and/or their staff who would like to refer a patient to [facility], please contact us through our one-stop-shop provider referral line, [facility], at [phone].    If you feel you have received this communication in error or would no longer like to receive these types of communications, please e-mail [email or web address]

## 2018-12-07 ENCOUNTER — PATIENT MESSAGE (OUTPATIENT)
Dept: NEUROLOGY | Facility: CLINIC | Age: 38
End: 2018-12-07

## 2018-12-07 NOTE — PROGRESS NOTES
[facility]  [address] [facility]  [place] [address]             Full Name: SABRINA FIGUEREDO Gender: Female  Patient ID: [id number] YOB: 1980  History: Pt c/o bilateral hand numbness.       Visit Date: 12/6/2018 11:36  Age: 38 Years 2 Months Old  Examining Physician: AYLIN  Referring Physician: CHIQUITA NAVARRETE MD      Sensory NCS      Nerve / Sites Rec. Site Onset Lat Peak Lat NP Amp PP Amp Segments Distance Velocity     ms ms µV µV  cm m/s   L Median - Digit III (Antidromic)      Wrist Dig III 2.81 3.59 45.9 70.6 Wrist - Dig III 14 50      Mid palm Dig III 1.20 1.67 72.4 71.0 Mid palm - Dig III 7 58   R Median - Digit III (Antidromic)      Wrist Dig III 2.55 3.13 36.8 58.2 Wrist - Dig III 14 55      Mid palm Dig III 1.20 1.67 53.7 76.3 Mid palm - Dig III 7 58   L Ulnar - Digit V (Antidromic)      Wrist Dig V 2.24 3.02 37.9 49.9 Wrist - Dig V 14 63   R Ulnar - Digit V (Antidromic)      Wrist Dig V 2.34 2.97 28.5 44.4 Wrist - Dig V 14 60       Combined Sensory Index      Nerve / Sites Rec. Site Peak Lat NP Amp PP Amp Segments Peak Diff     ms µV µV  ms   R Median - CSI      Median Thumb 2.76 49.2 255.3 Median - Radial -0.05      Radial Thumb 2.81 32.3 47.0 Median - Ulnar 0.10      Median Ring 3.33 19.8 34.7 Median palm - Ulnar palm 0.10      Ulnar Ring 3.23 29.8 49.0        Median palm Wrist 1.72 68.2 65.6        Ulnar palm Wrist 1.61 19.5 43.2        CSI     CSI 0.16   L Median - CSI      Median Thumb 2.97 40.2 315.5 Median - Radial 0.68      Radial Thumb 2.29 9.8 37.0 Median - Ulnar       Median palm Wrist 2.03 95.6 106.9        Ulnar palm Wrist 1.56 24.3 62.4        CSI     CSI 1.15       Motor NCS      Nerve / Sites Muscle Latency Amplitude Amp % Duration Segments Distance Lat Diff Velocity     ms mV % ms  cm ms m/s   R Median - APB      Wrist APB 2.86 8.0 100 6.72 Wrist - APB 8        Elbow APB 6.98 18.8 237 7.03 Elbow - Wrist 24 4.11 58   L Median - APB      Wrist APB 3.02 5.8 100 5.00  Wrist - APB 8        Elbow APB 7.03 5.8 99.8 5.05 Elbow - Wrist 23 4.01 57   R Ulnar - ADM      Wrist ADM 2.86 6.1 100 5.89 Wrist - ADM 8        B.Elbow ADM 5.78 6.4 106 5.31 B.Elbow - Wrist 17 2.92 58      A.Elbow ADM 7.50 6.1 99.7 6.09 A.Elbow - B.Elbow 10 1.72 58   L Ulnar - ADM      Wrist ADM 2.71 9.8 100 5.16 Wrist - ADM 8        B.Elbow ADM 5.16 8.8 90.1 5.47 B.Elbow - Wrist 16 2.45 65      A.Elbow ADM 6.98 6.6 68 5.57 A.Elbow - B.Elbow 10 1.82 55       EMG         EMG Summary Table     Spontaneous MUAP Recruitment   Muscle IA Fib PSW Fasc H.F. Amp Dur. PPP Pattern   R. Deltoid N None None None None N N N N   R. Biceps brachii N None None None None N N N N   R. Triceps brachii N None None None None N N N N   R. Pronator teres N None None None None N N N N   R. First dorsal interosseous N None None None None N N N N   R. Abductor pollicis brevis N None None None None N N N N   R. Cervical paraspinals N None None None None           Summary    The motor conduction test was normal in all 4 of the tested nerves: R Median - APB, L Median - APB, R Ulnar - ADM, L Ulnar - ADM.    The sensory conduction test was performed on 6 nerve(s). The results were normal in 4 nerve(s): L Median - Digit III (Antidromic), R Median - Digit III (Antidromic), L Ulnar - Digit V (Antidromic), R Ulnar - Digit V (Antidromic). Findings were unremarkable in 2 nerve(s): R Median - CSI, L Median - CSI. There were no results outside the specified normal range.    The needle EMG study was normal in all 7 tested muscles: R. Deltoid, R. Biceps brachii, R. Triceps brachii, R. Pronator teres, R. First dorsal interosseous, R. Abductor pollicis brevis, R. Cervical paraspinals.      Electrodiagnostic Impression:  1. There is electrodiagnostic evidence of borderline mild left median neuropathy at the wrist.  2. There is also evidence of axonal ulnar neuropathy at the left elbow.  3. There was insufficient electrodiagnostic evidence for diagnoses of  peripheral polyneuropathy, myopathy, or cervical radiculopathy/brachial plexopathy of the right upper extremity.    Plan:  Today's test results will be sent to her referring physician, Dr. Roca for further review and direction in her treatment    Thank you very much for the referral. Please call if you have any questions regarding this study or the report.       -------------------------------  Larry Caban M.D.

## 2018-12-11 ENCOUNTER — PATIENT MESSAGE (OUTPATIENT)
Dept: ORTHOPEDICS | Facility: CLINIC | Age: 38
End: 2018-12-11

## 2018-12-11 DIAGNOSIS — M94.261 CHONDROMALACIA OF RIGHT KNEE: ICD-10-CM

## 2018-12-11 DIAGNOSIS — M94.262 CHONDROMALACIA, KNEE, LEFT: ICD-10-CM

## 2018-12-11 DIAGNOSIS — M17.0 BILATERAL PRIMARY OSTEOARTHRITIS OF KNEE: Primary | ICD-10-CM

## 2018-12-11 NOTE — TELEPHONE ENCOUNTER
Euflexxa injections OK to try for one series but if it doesn't help, I would  then recommend arthroscopy. Ok to see me or Eugenia for the injections.

## 2018-12-13 ENCOUNTER — TELEPHONE (OUTPATIENT)
Dept: NEUROLOGY | Facility: CLINIC | Age: 38
End: 2018-12-13

## 2018-12-13 DIAGNOSIS — G56.22 ULNAR NEUROPATHY AT ELBOW, LEFT: Primary | ICD-10-CM

## 2018-12-13 NOTE — TELEPHONE ENCOUNTER
Spoke with Marimar at [facility]. Procedure code needs to be changed to 93467.

## 2018-12-13 NOTE — TELEPHONE ENCOUNTER
----- Message from Marleny Lubin sent at 12/13/2018 12:10 PM CST -----  Type: Needs Medical Advice    Who Called:  Marimar- Pre-Access Dpt    Best Call Back Number: [phone]  Additional Information: Called to speak to the office regarding the patient, call disconnected while on hold

## 2018-12-13 NOTE — TELEPHONE ENCOUNTER
----- Message from Piper Clemente sent at 12/13/2018  2:09 PM CST -----  Contact: Marimar Minaya with PreAccess Dept at [facility] [phone] is calling/patient is scheduled for ultrasound of extremity for 12 17 18 and they a needing a better procedure code/please call

## 2018-12-17 ENCOUNTER — OFFICE VISIT (OUTPATIENT)
Dept: ORTHOPEDICS | Facility: CLINIC | Age: 38
End: 2018-12-17
Payer: COMMERCIAL

## 2018-12-17 VITALS — BODY MASS INDEX: 29.32 KG/M2 | WEIGHT: 176 LBS | HEIGHT: 65 IN

## 2018-12-17 DIAGNOSIS — M94.262 CHONDROMALACIA, KNEE, LEFT: ICD-10-CM

## 2018-12-17 DIAGNOSIS — M94.261 CHONDROMALACIA OF KNEE, RIGHT: Primary | ICD-10-CM

## 2018-12-17 PROCEDURE — 20611 DRAIN/INJ JOINT/BURSA W/US: CPT | Mod: 50,S$GLB,, | Performed by: ORTHOPAEDIC SURGERY

## 2018-12-17 PROCEDURE — 99499 UNLISTED E&M SERVICE: CPT | Mod: S$GLB,,, | Performed by: ORTHOPAEDIC SURGERY

## 2018-12-17 PROCEDURE — 99999 PR PBB SHADOW E&M-EST. PATIENT-LVL III: CPT | Mod: PBBFAC,,, | Performed by: ORTHOPAEDIC SURGERY

## 2018-12-17 NOTE — PROCEDURES
Large Joint Aspiration/Injection: R knee, L knee  Date/Time: 12/17/2018 1:13 PM  Performed by: Salvatore Barnes MD  Authorized by: Salvatore Barnes MD     Consent Done?:  Yes (Verbal)  Indications:  Pain  Timeout: Prior to procedure the correct patient, procedure, and site was verified      Location:  Knee  Site:  R knee and L knee  Prep: Patient was prepped and draped in usual sterile fashion    Ultrasonic Guidance for needle placement: Yes  Images are saved and documented.  Needle size:  22 G  Approach:  Anterolateral  Medications:  20 mg sodium hyaluronate (EUFLEXXA) 10 mg/mL(mw 2.4 -3.6 million); 20 mg sodium hyaluronate (EUFLEXXA) 10 mg/mL(mw 2.4 -3.6 million)  Patient tolerance:  Patient tolerated the procedure well with no immediate complications

## 2018-12-17 NOTE — PROGRESS NOTES
"Because nobody gave her shorts to do the injection when I was DATE: 12/17/2018  PATIENT: Jaclyn Rausch    Attending Physician: Salvatore Barnes M.D.    HISTORY:  Jaclyn Rausch is a 38 y.o. female who returns for follow up evaluation of  Her bilateral knees knee.  She is diagnosed with chondromalacia.  She presents for Euflexxa injection 1.  Today to both knees.  Pain is reported at 5/10 today    PMH/PSH/FamHx/SocHx:  Reviewed and unchanged from prior visit    ROS:  Constitution: Negative for chills, fever, and sweats. Negative for unexplained weight loss.  HENT: Negative for headaches and blurry vision.   Cardiovascular: Negative for chest pain, irregular heartbeat, leg swelling and palpitations.   Respiratory: Negative for cough and shortness of breath.   Gastrointestinal: Negative for abdominal pain, heartburn, nausea and vomiting.   Genitourinary: Negative for bladder incontinence and dysuria.   Musculoskeletal: Negative for systemic arthritis, joint swelling, muscle weakness and myalgias.   Neurological: Negative for numbness.   Psychiatric/Behavioral: Negative for depression.   Endocrine: Negative for polyuria.   Hematologic/Lymphatic: Negative for bleeding disorders.  Skin: Negative for poor wound healing.       EXAM:  Ht 5' 5" (1.651 m)   Wt 79.8 kg (176 lb)   BMI 29.29 kg/m²   Jaclyn Rausch is a well developed, well nourished female in no acute distress. Physical examination of the bilateral knee evaluated the following:    Gait and Alignment  Inspection for ecchymosis, swelling, atrophy, or deformity  Inspection for intra-articular and/or bursal effusions  Tenderness to palpation over the bony and soft tissue structures around the knee  Range of Motion and presence of extensor lag/contractures  Sensation and motor strength  Varus/valgus or anterior/posterior/rotatory instability  Flexion pinch and Thelma's Tests  Patellar alignment/tracking/pain to " palpation  Vascular exam to include skin temperature/color/capillary refill    Remarkable findings included:  Examination bilateral knee reveals no ecchymosis, swelling or effusion.  Mild medial lateral joint line tenderness.  Range of motion 0-130°.  Notes exam.  Pain with patellofemoral compression.  Negative flexion pinch.  Negative McMurrays    IMAGING:   No new x-rays are performed today.    ASSESSMENT:  Chondromalacia bilateral knees    PLAN:  The implications of the patient's evolution of symptoms and findings were explained to the patient in detail. Euflexxa injection 1 Performed to bilateral knees today without complication.  Signs and symptoms of inflammation/infection discussed.  Follow up next week for injection 2.    This note was dictated using voice recognition software. Please excuse any grammatical or typographical errors.

## 2018-12-24 ENCOUNTER — OFFICE VISIT (OUTPATIENT)
Dept: ORTHOPEDICS | Facility: CLINIC | Age: 38
End: 2018-12-24
Payer: COMMERCIAL

## 2018-12-24 VITALS — HEIGHT: 65 IN | WEIGHT: 175.94 LBS | BODY MASS INDEX: 29.31 KG/M2

## 2018-12-24 DIAGNOSIS — M94.262 CHONDROMALACIA, KNEE, LEFT: ICD-10-CM

## 2018-12-24 DIAGNOSIS — M94.261 CHONDROMALACIA OF KNEE, RIGHT: Primary | ICD-10-CM

## 2018-12-24 PROCEDURE — 20611 DRAIN/INJ JOINT/BURSA W/US: CPT | Mod: 50,S$GLB,, | Performed by: ORTHOPAEDIC SURGERY

## 2018-12-24 PROCEDURE — 99499 UNLISTED E&M SERVICE: CPT | Mod: S$GLB,,, | Performed by: ORTHOPAEDIC SURGERY

## 2018-12-24 PROCEDURE — 99999 PR PBB SHADOW E&M-EST. PATIENT-LVL II: CPT | Mod: PBBFAC,,, | Performed by: ORTHOPAEDIC SURGERY

## 2018-12-24 NOTE — PROCEDURES
Large Joint Aspiration/Injection: R knee, L knee  Date/Time: 12/24/2018 11:56 AM  Performed by: Salvatore Barnes MD  Authorized by: Salvatore Barnes MD     Consent Done?:  Yes (Verbal)  Indications:  Pain  Timeout: Prior to procedure the correct patient, procedure, and site was verified      Location:  Knee  Site:  R knee and L knee  Prep: Patient was prepped and draped in usual sterile fashion    Ultrasonic Guidance for needle placement: Yes  Images are saved and documented.  Needle size:  22 G  Approach:  Anterolateral  Medications:  20 mg sodium hyaluronate (EUFLEXXA) 10 mg/mL(mw 2.4 -3.6 million); 20 mg sodium hyaluronate (EUFLEXXA) 10 mg/mL(mw 2.4 -3.6 million)  Patient tolerance:  Patient tolerated the procedure well with no immediate complications

## 2018-12-24 NOTE — PROGRESS NOTES
"Because nobody gave her shorts to do the injection when I was DATE: 12/24/2018  PATIENT: Jaclyn Rausch    Attending Physician: Salvatore Barnes M.D.    HISTORY:  Jaclyn Rausch is a 38 y.o. female who returns for follow up evaluation of her bilateral knees.  She is diagnosed with chondromalacia.  Last week she underwent Euflexxa injection #1 to both knees without complication. She presents today for Euflexxa injection #2 to both knees.  Pain is reported at 0/10 today    PMH/PSH/FamHx/SocHx:  Reviewed and unchanged from prior visit    ROS:  Constitution: Negative for chills, fever, and sweats. Negative for unexplained weight loss.  HENT: Negative for headaches and blurry vision.   Cardiovascular: Negative for chest pain, irregular heartbeat, leg swelling and palpitations.   Respiratory: Negative for cough and shortness of breath.   Gastrointestinal: Negative for abdominal pain, heartburn, nausea and vomiting.   Genitourinary: Negative for bladder incontinence and dysuria.   Musculoskeletal: Negative for systemic arthritis, joint swelling, muscle weakness and myalgias.   Neurological: Negative for numbness.   Psychiatric/Behavioral: Negative for depression.   Endocrine: Negative for polyuria.   Hematologic/Lymphatic: Negative for bleeding disorders.  Skin: Negative for poor wound healing.       EXAM:  Ht 5' 5" (1.651 m)   Wt 79.8 kg (175 lb 14.8 oz)   BMI 29.28 kg/m²   Jaclyn Rausch is a well developed, well nourished female in no acute distress. Physical examination of the bilateral knee evaluated the following:    Gait and Alignment  Inspection for ecchymosis, swelling, atrophy, or deformity  Inspection for intra-articular and/or bursal effusions  Tenderness to palpation over the bony and soft tissue structures around the knee  Range of Motion and presence of extensor lag/contractures  Sensation and motor strength  Varus/valgus or anterior/posterior/rotatory " instability  Flexion pinch and Thelma's Tests  Patellar alignment/tracking/pain to palpation  Vascular exam to include skin temperature/color/capillary refill    Remarkable findings included:  Examination bilateral knee reveals no ecchymosis, swelling or effusion.  Mild medial lateral joint line tenderness.  Range of motion 0-130°.  Notes exam.  Pain with patellofemoral compression.  Negative flexion pinch.  Negative McMurrays    IMAGING:   No new x-rays are performed today.    ASSESSMENT:  Chondromalacia bilateral knees    PLAN:  The implications of the patient's evolution of symptoms and findings were explained to the patient in detail. Euflexxa injection #2 performed to bilateral knees today without complication.  Signs and symptoms of inflammation/infection discussed.  Follow up next week for injection #3 to both knees    This note was dictated using voice recognition software. Please excuse any grammatical or typographical errors.

## 2018-12-31 ENCOUNTER — OFFICE VISIT (OUTPATIENT)
Dept: ORTHOPEDICS | Facility: CLINIC | Age: 38
End: 2018-12-31
Payer: COMMERCIAL

## 2018-12-31 VITALS — BODY MASS INDEX: 29.16 KG/M2 | WEIGHT: 175 LBS | HEIGHT: 65 IN

## 2018-12-31 DIAGNOSIS — M94.261 CHONDROMALACIA OF KNEE, RIGHT: ICD-10-CM

## 2018-12-31 DIAGNOSIS — M94.262 CHONDROMALACIA, KNEE, LEFT: Primary | ICD-10-CM

## 2018-12-31 PROCEDURE — 99499 UNLISTED E&M SERVICE: CPT | Mod: S$GLB,,, | Performed by: ORTHOPAEDIC SURGERY

## 2018-12-31 PROCEDURE — 20611 DRAIN/INJ JOINT/BURSA W/US: CPT | Mod: 50,S$GLB,, | Performed by: ORTHOPAEDIC SURGERY

## 2018-12-31 PROCEDURE — 99999 PR PBB SHADOW E&M-EST. PATIENT-LVL II: CPT | Mod: PBBFAC,,, | Performed by: ORTHOPAEDIC SURGERY

## 2018-12-31 NOTE — PROCEDURES
Large Joint Aspiration/Injection: R knee, L knee  Date/Time: 12/31/2018 11:59 AM  Performed by: Salvatore Barnes MD  Authorized by: Salvatore Barnes MD     Consent Done?:  Yes (Written)  Indications:  Pain  Timeout: Prior to procedure the correct patient, procedure, and site was verified      Location:  Knee  Site:  R knee and L knee  Prep: Patient was prepped and draped in usual sterile fashion    Ultrasonic Guidance for needle placement: Yes  Images are saved and documented.  Needle size:  22 G  Approach:  Anterolateral  Medications:  20 mg sodium hyaluronate (EUFLEXXA) 10 mg/mL(mw 2.4 -3.6 million); 20 mg sodium hyaluronate (EUFLEXXA) 10 mg/mL(mw 2.4 -3.6 million)  Patient tolerance:  Patient tolerated the procedure well with no immediate complications

## 2018-12-31 NOTE — PROGRESS NOTES
"Because nobody gave her shorts to do the injection when I was DATE: 12/31/2018  PATIENT: Jaclyn Rausch    Attending Physician: Salvatore Barnes M.D.    HISTORY:  Jaclyn Rausch is a 38 y.o. female who returns for follow up evaluation of her bilateral knees.  She is diagnosed with chondromalacia.  Last week she underwent Euflexxa injection #2 to both knees without complication. She presents today for Euflexxa injection #3 to both knees.  Pain is reported at 0/10 today    PMH/PSH/FamHx/SocHx:  Reviewed and unchanged from prior visit    ROS:  Constitution: Negative for chills, fever, and sweats. Negative for unexplained weight loss.  HENT: Negative for headaches and blurry vision.   Cardiovascular: Negative for chest pain, irregular heartbeat, leg swelling and palpitations.   Respiratory: Negative for cough and shortness of breath.   Gastrointestinal: Negative for abdominal pain, heartburn, nausea and vomiting.   Genitourinary: Negative for bladder incontinence and dysuria.   Musculoskeletal: Negative for systemic arthritis, joint swelling, muscle weakness and myalgias.   Neurological: Negative for numbness.   Psychiatric/Behavioral: Negative for depression.   Endocrine: Negative for polyuria.   Hematologic/Lymphatic: Negative for bleeding disorders.  Skin: Negative for poor wound healing.       EXAM:  Ht 5' 5" (1.651 m)   Wt 79.4 kg (175 lb)   BMI 29.12 kg/m²   Jaclyn Rausch is a well developed, well nourished female in no acute distress. Physical examination of the bilateral knee evaluated the following:    Gait and Alignment  Inspection for ecchymosis, swelling, atrophy, or deformity  Inspection for intra-articular and/or bursal effusions  Tenderness to palpation over the bony and soft tissue structures around the knee  Range of Motion and presence of extensor lag/contractures  Sensation and motor strength  Varus/valgus or anterior/posterior/rotatory instability  Flexion " pinch and Thelma's Tests  Patellar alignment/tracking/pain to palpation  Vascular exam to include skin temperature/color/capillary refill    Remarkable findings included:  Examination bilateral knee reveals no ecchymosis, swelling or effusion.  Mild medial lateral joint line tenderness.  Range of motion 0-130°.  Notes exam.  Pain with patellofemoral compression.  Negative flexion pinch.  Negative McMurrays    IMAGING:   No new x-rays are performed today.    ASSESSMENT:  Chondromalacia bilateral knees    PLAN:  The implications of the patient's evolution of symptoms and findings were explained to the patient in detail. Euflexxa injection #3 performed to bilateral knees today without complication.  Signs and symptoms of inflammation/infection discussed.  Activities may be performed as tolerated.  Should she have further symptoms, I recommended follow-up appointment with my nurse practitioner, Sameera Graham NP to discuss further treatment recommendations    This note was dictated using voice recognition software. Please excuse any grammatical or typographical errors.

## 2018-12-31 NOTE — LETTER
December 31, 2018      Pearl Roca MD  [address] [facility] [address] [place] [address]           [facility]  [address] [facility] [address] [place] [address]  Phone: [phone]          Patient: Jaclyn Rausch   MR Number: [id number]   YOB: 1980   Date of Visit: 12/31/2018       Dear Dr. Pearl Roca:    Thank you for referring Jaclyn Rausch to me for evaluation. Attached you will find relevant portions of my assessment and plan of care.    If you have questions, please do not hesitate to call me. I look forward to following Jaclyn Rausch along with you.    Sincerely,    Salvatore Barnes MD    Enclosure  CC:  No Recipients    If you would like to receive this communication electronically, please contact [email or web address] or [phone] to request more information on [employer] access.    For providers and/or their staff who would like to refer a patient to [facility], please contact us through our one-stop-shop provider referral line, [facility] , at [phone].    If you feel you have received this communication in error or would no longer like to receive these types of communications, please e-mail [email or web address]

## 2019-01-16 ENCOUNTER — PATIENT MESSAGE (OUTPATIENT)
Dept: NEUROLOGY | Facility: CLINIC | Age: 39
End: 2019-01-16

## 2019-01-16 ENCOUNTER — PATIENT MESSAGE (OUTPATIENT)
Dept: HEMATOLOGY/ONCOLOGY | Facility: CLINIC | Age: 39
End: 2019-01-16

## 2019-01-17 ENCOUNTER — TELEPHONE (OUTPATIENT)
Dept: NEUROLOGY | Facility: CLINIC | Age: 39
End: 2019-01-17

## 2019-01-17 DIAGNOSIS — G56.22 ULNAR NEUROPATHY AT ELBOW OF LEFT UPPER EXTREMITY: Primary | ICD-10-CM

## 2019-01-24 ENCOUNTER — OFFICE VISIT (OUTPATIENT)
Dept: PHYSICAL MEDICINE AND REHAB | Facility: CLINIC | Age: 39
End: 2019-01-24
Payer: COMMERCIAL

## 2019-01-24 VITALS
SYSTOLIC BLOOD PRESSURE: 151 MMHG | WEIGHT: 175 LBS | HEART RATE: 71 BPM | BODY MASS INDEX: 29.16 KG/M2 | DIASTOLIC BLOOD PRESSURE: 79 MMHG | HEIGHT: 65 IN

## 2019-01-24 DIAGNOSIS — G56.22 ULNAR NEUROPATHY AT ELBOW, LEFT: ICD-10-CM

## 2019-01-24 DIAGNOSIS — M79.602 PAIN OF LEFT UPPER EXTREMITY: Primary | ICD-10-CM

## 2019-01-24 PROCEDURE — 3008F PR BODY MASS INDEX (BMI) DOCUMENTED: ICD-10-PCS | Mod: CPTII,S$GLB,, | Performed by: PHYSICAL MEDICINE & REHABILITATION

## 2019-01-24 PROCEDURE — 76882 PR  US,EXTREMITY,NONVASCULAR,REAL-TIME IMAGE,LIMITED: ICD-10-PCS | Mod: S$GLB,,, | Performed by: PHYSICAL MEDICINE & REHABILITATION

## 2019-01-24 PROCEDURE — 99213 PR OFFICE/OUTPT VISIT, EST, LEVL III, 20-29 MIN: ICD-10-PCS | Mod: 25,S$GLB,, | Performed by: PHYSICAL MEDICINE & REHABILITATION

## 2019-01-24 PROCEDURE — 99999 PR PBB SHADOW E&M-EST. PATIENT-LVL III: CPT | Mod: PBBFAC,,, | Performed by: PHYSICAL MEDICINE & REHABILITATION

## 2019-01-24 PROCEDURE — 3008F BODY MASS INDEX DOCD: CPT | Mod: CPTII,S$GLB,, | Performed by: PHYSICAL MEDICINE & REHABILITATION

## 2019-01-24 PROCEDURE — 99213 OFFICE O/P EST LOW 20 MIN: CPT | Mod: 25,S$GLB,, | Performed by: PHYSICAL MEDICINE & REHABILITATION

## 2019-01-24 PROCEDURE — 99999 PR PBB SHADOW E&M-EST. PATIENT-LVL III: ICD-10-PCS | Mod: PBBFAC,,, | Performed by: PHYSICAL MEDICINE & REHABILITATION

## 2019-01-24 PROCEDURE — 76882 US LMTD JT/FCL EVL NVASC XTR: CPT | Mod: S$GLB,,, | Performed by: PHYSICAL MEDICINE & REHABILITATION

## 2019-02-03 PROBLEM — D68.9 CLOTTING DISORDER: Status: ACTIVE | Noted: 2019-02-03

## 2019-02-04 ENCOUNTER — OFFICE VISIT (OUTPATIENT)
Dept: HEMATOLOGY/ONCOLOGY | Facility: CLINIC | Age: 39
End: 2019-02-04
Payer: COMMERCIAL

## 2019-02-04 ENCOUNTER — LAB VISIT (OUTPATIENT)
Dept: LAB | Facility: HOSPITAL | Age: 39
End: 2019-02-04
Attending: INTERNAL MEDICINE
Payer: COMMERCIAL

## 2019-02-04 VITALS
RESPIRATION RATE: 20 BRPM | WEIGHT: 178 LBS | TEMPERATURE: 99 F | SYSTOLIC BLOOD PRESSURE: 137 MMHG | HEART RATE: 85 BPM | BODY MASS INDEX: 29.62 KG/M2 | DIASTOLIC BLOOD PRESSURE: 90 MMHG

## 2019-02-04 DIAGNOSIS — D68.9 CLOTTING DISORDER: ICD-10-CM

## 2019-02-04 DIAGNOSIS — Z87.59 HISTORY OF MISCARRIAGE: ICD-10-CM

## 2019-02-04 DIAGNOSIS — Z79.01 ANTICOAGULANT LONG-TERM USE: ICD-10-CM

## 2019-02-04 DIAGNOSIS — R76.0 ANTICARDIOLIPIN ANTIBODY POSITIVE: ICD-10-CM

## 2019-02-04 DIAGNOSIS — R76.0 ANTICARDIOLIPIN ANTIBODY POSITIVE: Primary | ICD-10-CM

## 2019-02-04 LAB
ALBUMIN SERPL BCP-MCNC: 4.2 G/DL
ALP SERPL-CCNC: 86 U/L
ALT SERPL W/O P-5'-P-CCNC: 20 U/L
ANION GAP SERPL CALC-SCNC: 7 MMOL/L
AST SERPL-CCNC: 23 U/L
BASOPHILS # BLD AUTO: 0.04 K/UL
BASOPHILS NFR BLD: 0.6 %
BILIRUB SERPL-MCNC: 0.6 MG/DL
BUN SERPL-MCNC: 12 MG/DL
CALCIUM SERPL-MCNC: 9.9 MG/DL
CHLORIDE SERPL-SCNC: 106 MMOL/L
CO2 SERPL-SCNC: 25 MMOL/L
CREAT SERPL-MCNC: 0.9 MG/DL
DIFFERENTIAL METHOD: ABNORMAL
EOSINOPHIL # BLD AUTO: 0.2 K/UL
EOSINOPHIL NFR BLD: 3.1 %
ERYTHROCYTE [DISTWIDTH] IN BLOOD BY AUTOMATED COUNT: 13.8 %
EST. GFR  (AFRICAN AMERICAN): >60 ML/MIN/1.73 M^2
EST. GFR  (NON AFRICAN AMERICAN): >60 ML/MIN/1.73 M^2
GLUCOSE SERPL-MCNC: 107 MG/DL
HCT VFR BLD AUTO: 42.4 %
HGB BLD-MCNC: 14 G/DL
IMM GRANULOCYTES # BLD AUTO: 0.01 K/UL
IMM GRANULOCYTES NFR BLD AUTO: 0.1 %
LYMPHOCYTES # BLD AUTO: 2.7 K/UL
LYMPHOCYTES NFR BLD: 40.8 %
MCH RBC QN AUTO: 30.4 PG
MCHC RBC AUTO-ENTMCNC: 33 G/DL
MCV RBC AUTO: 92 FL
MONOCYTES # BLD AUTO: 0.5 K/UL
MONOCYTES NFR BLD: 7.3 %
NEUTROPHILS # BLD AUTO: 3.2 K/UL
NEUTROPHILS NFR BLD: 48.1 %
NRBC BLD-RTO: 0 /100 WBC
PLATELET # BLD AUTO: 391 K/UL
PMV BLD AUTO: 9.5 FL
POTASSIUM SERPL-SCNC: 4.5 MMOL/L
PROT SERPL-MCNC: 7.6 G/DL
RBC # BLD AUTO: 4.6 M/UL
SODIUM SERPL-SCNC: 138 MMOL/L
WBC # BLD AUTO: 6.72 K/UL

## 2019-02-04 PROCEDURE — 3008F PR BODY MASS INDEX (BMI) DOCUMENTED: ICD-10-PCS | Mod: ,,, | Performed by: INTERNAL MEDICINE

## 2019-02-04 PROCEDURE — 36415 COLL VENOUS BLD VENIPUNCTURE: CPT | Mod: PO

## 2019-02-04 PROCEDURE — 85025 COMPLETE CBC W/AUTO DIFF WBC: CPT

## 2019-02-04 PROCEDURE — 99213 OFFICE O/P EST LOW 20 MIN: CPT | Mod: ,,, | Performed by: INTERNAL MEDICINE

## 2019-02-04 PROCEDURE — 3008F BODY MASS INDEX DOCD: CPT | Mod: ,,, | Performed by: INTERNAL MEDICINE

## 2019-02-04 PROCEDURE — 99213 PR OFFICE/OUTPT VISIT, EST, LEVL III, 20-29 MIN: ICD-10-PCS | Mod: ,,, | Performed by: INTERNAL MEDICINE

## 2019-02-04 PROCEDURE — 80053 COMPREHEN METABOLIC PANEL: CPT

## 2019-02-04 NOTE — PROGRESS NOTES
[facility] Hematology/Oncology  PROGRESS NOTE      Subjective:       Patient ID:   NAME: Jaclyn Rausch : 1980     38 y.o. female    Referring Doc: Gauri Vargas (NP)  Other Physicians: Pearl Frank/Gertrudis Ariza (neuro); Federico Guzmán (GYN)    Chief Complaint:  Clot disorder f/u    History of Present Illness:     Patient returns today for a regularly scheduled follow-up visit.  The patient is here by herself. She is doing ok with no new issues. She is on eliquis and has been off the aspirin. She denies any CP, SOB, HA's or N/V. She last had labs in Aug 2018. She saw neurology in Dec 2018 and her PCP in oct 2018. She denies any excessive bruising or bleeding. She does have some heavy menstrual cycles.             ROS:   GEN: normal without any fever, night sweats or weight loss  HEENT: normal with no HA's, sore throat, stiff neck, changes in vision  CV: normal with no CP, SOB, PND, BAILEY or orthopnea  PULM: normal with no SOB, cough, hemoptysis, sputum or pleuritic pain  GI: normal with no abdominal pain, nausea, vomiting, constipation, diarrhea, melanotic stools, BRBPR, or hematemesis  : normal with no hematuria, dysuria  BREAST: normal with no mass, discharge, pain  SKIN: normal with no rash, erythema, bruising, or swelling    Allergies:  Review of patient's allergies indicates:  No Known Allergies    Medications:    Current Outpatient Medications:     apixaban 5 mg Tab, Take 1 tablet (5 mg total) by mouth 2 (two) times daily., Disp: 60 tablet, Rfl: 11    atorvastatin (LIPITOR) 40 MG tablet, Take 1 tablet (40 mg total) by mouth once daily., Disp: 30 tablet, Rfl: 11    escitalopram oxalate (LEXAPRO) 20 MG tablet, TAKE 1 TABLET(20 MG) BY MOUTH EVERY DAY (Patient taking differently: TAKE 1/2 TABLET(20 MG) BY MOUTH EVERY DAY), Disp: 30 tablet, Rfl: 1    meclizine (ANTIVERT) 25 mg tablet, Take 1 tablet (25 mg total) by mouth 2 (two) times daily as needed for Dizziness., Disp: 30 tablet, Rfl:  3    PMHx/PSHx Updates:  See patient's last visit with me on 8/6/2018.  See H&P on 5/4/2018        Pathology:  Cancer Staging  No matching staging information was found for the patient.          Objective:     Vitals:  Blood pressure (!) 137/90, pulse 85, temperature 98.5 °F (36.9 °C), resp. rate 20, weight 80.7 kg (178 lb).    Physical Examination:   GEN: no apparent distress, comfortable; AAOx3  HEAD: atraumatic and normocephalic  EYES: no pallor, no icterus, PERRLA  ENT: OMM, no pharyngeal erythema, external ears WNL; no nasal discharge; no thrush  NECK: no masses, thyroid normal, trachea midline, no LAD/LN's, supple  CV: RRR with no murmur; normal pulse; normal S1 and S2; no pedal edema  CHEST: Normal respiratory effort; CTAB; normal breath sounds; no wheeze or crackles  ABDOM: nontender and nondistended; soft; normal bowel sounds; no rebound/guarding  MUSC/Skeletal: ROM normal; no crepitus; joints normal; no deformities or arthropathy  EXTREM: no clubbing, cyanosis, inflammation or swelling  SKIN: no rashes, lesions, ulcers, petechiae or subcutaneous nodules  : no gonzalez  NEURO: grossly intact; motor/sensory WNL; AAOx3; no tremors  PSYCH: normal mood, affect and behavior  LYMPH: normal cervical, supraclavicular, axillary and groin LN's            Labs:     8/21/2018  Lab Results   Component Value Date    WBC 6.90 08/21/2018    HGB 11.4 (L) 08/21/2018    HCT 36.4 (L) 08/21/2018    MCV 89 08/21/2018     (H) 08/21/2018     BMP  Lab Results   Component Value Date     05/22/2018    K 3.6 05/22/2018     05/22/2018    CO2 28 05/22/2018    BUN 8 05/22/2018    CREATININE 0.8 05/22/2018    CALCIUM 9.3 05/22/2018    ANIONGAP 7 (L) 05/22/2018    ESTGFRAFRICA >60 05/22/2018    EGFRNONAA >60 05/22/2018     Lab Results   Component Value Date    ALT 19 07/16/2018    AST 24 07/16/2018    ALKPHOS 85 07/16/2018    BILITOT 0.6 07/16/2018           Radiology/Diagnostic Studies:    No results found.    I have  reviewed all available lab results and radiology reports.    Assessment/Plan:   (1) 39 yo female with prior acute onset aphasia and left facial droop with MRI showing lateral left frontal lobe acute nonhemorrhagic infarction. She has been seen by Dr Frank with cardiology and Dr Pearl Roca with neurology   - hematology was previously consulted for evaluation for hypercoag workup which I ordered while she was inpatient  - she is currently on Eliquis oral anticoagulation; neurology discontinued the aspirin  - she saw for f/u with Dr Pearl Roca with neurology as outpatient in Dec 2018  - she saw Evette NP in Oct 2018  - factor XII was elevated at 144 and  factor IX was elevated at 147, but mildly and may be reactive in nature only  - Lipoprotein-A was elevated at 103; APA IgM was elevated at 17.99  - homocysteine was WNL; ATIII was normal  - prothrombin II gene was normal, LA was negative; factor V leiden was normal  - protein C and S was adeqaute     (2) Chronic borderline anemia in past - current hgb is within normal limits     (3) Hx/of migraine headaches     (4) Atrial septal aneurysm - followed by Dr Frank with cardiology as outpatient  - she had bubble study and JUAN while in hospital  - there is no opening or hole per patient  - she sees Dr Frank again this Wednesday     (5) Recent loss of pregnancy in Jan 2018 in first trimester     (6) Hypercholesterolemia - now on lipitor    (7) GYN following - Dr Federico Polanco with [facility] - heavier menstrual cycles        Anticardiolipin antibody positive    Anticoagulant long-term use    History of miscarriage    Clotting disorder          PLAN:  1. Continue eliquis (possibly life-long), and anti-hypercholesterol meds  2. She may need her  activities restricted because of the risk of bleeding  3. F/u with PCP, Neuro and cardiology  4.  RTC in  6 months  5. Check up to date labs  Fax note to Evette Frank Amy Jones, [place]    Discussion:     I have explained  all of the above in detail and the patient understands all of the current recommendation(s). I have answered all of their questions to the best of my ability and to their complete satisfaction.   The patient is to continue with the current management plan.            Electronically signed by Jesus Sprague MD

## 2019-02-04 NOTE — PATIENT INSTRUCTIONS
Cardiolipin Antibody  Does this test have other names?  Cardiolipin antibodies (IgG, IgM, IgA), anticardiolipin   What is this test?  A cardiolipin antibodies test looks for a certain kind of antibody in your blood. The antibodies are IgG (immunoglobulin G), IgA (immunoglobulin A), and IgM (immunoglobulin M). They are antibodies that form in response to cardiolipins. Cardiolipin is a phospholipid, or a kind of fat in the blood. The levels of these antibodies are often high in people with abnormal blood clotting, autoimmune diseases like lupus, or repeated miscarriages.   Why do I need this test?  Your healthcare provider may order a cardiolipin antibodies test if you often have abnormal blood clotting or abnormal bleeding. If you have had frequent miscarriages, the test might help doctors figure out why. Also people with some autoimmune diseases such as lupus have cardiolipin antibodies in their blood. This test may be used to help diagnose this disease. When high levels of cardiolipins are found in people with these or other issues, it is known as cardiolipin antibody syndrome. This test helps diagnose this condition.   What other tests might I have along with this test?  The cardiolipin antibodies test is usually just one of many tests given. The tests you get will depend on what your healthcare provider is looking for. For example, if your doctor thinks you have lupus, you will probably need other blood tests. You will also need imaging tests and tissue biopsies.   These other tests may include:  · Complete blood cell count  · Partial thromboplastin time and activated prothromboplastin time. These tests see how your blood clots.  · Antinuclear antibody test. These antibodies are found in people with lupus.  · Antiphospholipid antibody test. These antibodies are found in people with abnormal blood clots.  · Ultrasound, to look for clots in your arteries or veins  What do my test results mean?  A result for a lab  test may be affected by many things, including the method the laboratory uses to do the test. Even if your test results are different from the normal value, you may not have a problem. To learn what the results mean for you, talk with your healthcare provider.  The results of a cardiolipin antibodies test are easy to understand. If you're negative for cardiolipin antibodies, that is normal. If you're positive, you might have cardiolipin antibody syndrome. You will probably be retested to see if the antibody stays in your blood. You may need to wait as long as 6 weeks between each test period. This is to give accurate results for lupus or cardiolipin antibody syndrome.  How is this test done?  The test requires a blood sample, which is drawn through a needle from a vein in your arm.  Does this test pose any risks?  Taking a blood sample with a needle carries risks that include bleeding, infection, bruising, or feeling dizzy. When the needle pricks your arm, you may feel a slight stinging sensation or pain. Afterward, the site may be slightly sore.  What might affect my test results?  Although cardiolipin antibodies are commonly related to lupus, a positive test for them doesn't mean you have lupus. Other tests will be needed to confirm the diagnosis. When you have problems related to blood clotting, miscarriages, or other issues, this test result can help healthcare providers figure out the best way to treat your condition.  How do I get ready for this test?  You don't need to prepare for this test.     © 2898-0755 [employer] [address] [place] [address], [place] [address]. All rights reserved. This information is not intended as a substitute for professional medical care. Always follow your healthcare professional's instructions.

## 2019-02-14 ENCOUNTER — PATIENT MESSAGE (OUTPATIENT)
Dept: FAMILY MEDICINE | Facility: CLINIC | Age: 39
End: 2019-02-14

## 2019-02-21 DIAGNOSIS — F41.9 ANXIETY: Primary | ICD-10-CM

## 2019-02-21 RX ORDER — HYDROXYZINE PAMOATE 25 MG/1
25 CAPSULE ORAL EVERY 8 HOURS PRN
Qty: 60 CAPSULE | Refills: 1 | Status: SHIPPED | OUTPATIENT
Start: 2019-02-21 | End: 2020-01-03

## 2019-02-26 ENCOUNTER — OFFICE VISIT (OUTPATIENT)
Dept: ORTHOPEDICS | Facility: CLINIC | Age: 39
End: 2019-02-26
Payer: COMMERCIAL

## 2019-02-26 ENCOUNTER — HOSPITAL ENCOUNTER (OUTPATIENT)
Dept: RADIOLOGY | Facility: HOSPITAL | Age: 39
Discharge: HOME | End: 2019-02-26
Attending: ORTHOPAEDIC SURGERY
Payer: COMMERCIAL

## 2019-02-26 VITALS
HEART RATE: 73 BPM | WEIGHT: 178 LBS | DIASTOLIC BLOOD PRESSURE: 86 MMHG | SYSTOLIC BLOOD PRESSURE: 142 MMHG | HEIGHT: 65 IN | BODY MASS INDEX: 29.66 KG/M2

## 2019-02-26 DIAGNOSIS — M79.671 RIGHT FOOT PAIN: ICD-10-CM

## 2019-02-26 DIAGNOSIS — M79.671 RIGHT FOOT PAIN: Primary | ICD-10-CM

## 2019-02-26 PROCEDURE — 3008F BODY MASS INDEX DOCD: CPT | Mod: CPTII,S$GLB,, | Performed by: ORTHOPAEDIC SURGERY

## 2019-02-26 PROCEDURE — 99213 PR OFFICE/OUTPT VISIT, EST, LEVL III, 20-29 MIN: ICD-10-PCS | Mod: S$GLB,,, | Performed by: ORTHOPAEDIC SURGERY

## 2019-02-26 PROCEDURE — 73630 X-RAY EXAM OF FOOT: CPT | Mod: 26,RT,, | Performed by: RADIOLOGY

## 2019-02-26 PROCEDURE — 73630 X-RAY EXAM OF FOOT: CPT | Mod: TC,PN,RT

## 2019-02-26 PROCEDURE — 99999 PR PBB SHADOW E&M-EST. PATIENT-LVL III: CPT | Mod: PBBFAC,,, | Performed by: ORTHOPAEDIC SURGERY

## 2019-02-26 PROCEDURE — 3008F PR BODY MASS INDEX (BMI) DOCUMENTED: ICD-10-PCS | Mod: CPTII,S$GLB,, | Performed by: ORTHOPAEDIC SURGERY

## 2019-02-26 PROCEDURE — 99213 OFFICE O/P EST LOW 20 MIN: CPT | Mod: S$GLB,,, | Performed by: ORTHOPAEDIC SURGERY

## 2019-02-26 PROCEDURE — 99999 PR PBB SHADOW E&M-EST. PATIENT-LVL III: ICD-10-PCS | Mod: PBBFAC,,, | Performed by: ORTHOPAEDIC SURGERY

## 2019-02-26 PROCEDURE — 73630 XR FOOT COMPLETE 3 VIEW RIGHT: ICD-10-PCS | Mod: 26,RT,, | Performed by: RADIOLOGY

## 2019-02-28 NOTE — PROGRESS NOTES
Past Medical History:   Diagnosis Date    Anemia     slightly    Anticardiolipin antibody positive 5/4/2018    Atrial septal aneurysm     Dr. Frank; last visit 1/2018    Clotting disorder 2/3/2019    Elevated lipoprotein(a) 5/4/2018    Stroke 04/24/2018       Past Surgical History:   Procedure Laterality Date    BUNIONECTOMY Left 2001    D & C (SUCTION) N/A 2/7/2018    Performed [facility] at [facility]    TRANSESOPHAGEAL ECHOCARDIOGRAM WITH POSSIBLE CARDIOVERSION (JUAN W/ POSS CARDIOVERSION) N/A 4/25/2018    Performed by Inder Frank [facility] at [facility] CATH LAB       Current Outpatient Medications   Medication Sig    apixaban 5 mg Tab Take 1 tablet (5 mg total) by mouth 2 (two) times daily.    atorvastatin (LIPITOR) 40 MG tablet Take 1 tablet (40 mg total) by mouth once daily.    hydrOXYzine pamoate (VISTARIL) 25 MG Cap Take 1 capsule (25 mg total) by mouth every 8 (eight) hours as needed (anxiety).    meclizine (ANTIVERT) 25 mg tablet Take 1 tablet (25 mg total) by mouth 2 (two) times daily as needed for Dizziness.     No current facility-administered medications for this visit.        Review of patient's allergies indicates:  No Known Allergies    Family History   Problem Relation Age of Onset    Diabetes Maternal Grandmother     Lung cancer Maternal Grandfather     Colon cancer Maternal Grandfather     Hypertension Mother     Hearing loss Father     Hypertension Brother     Eczema Son     Breast cancer Neg Hx     Ovarian cancer Neg Hx     Melanoma Neg Hx     Psoriasis Neg Hx     Lupus Neg Hx        Social History     Socioeconomic History    Marital status:      Spouse name: Not on file    Number of children: Not on file    Years of education: Not on file    Highest education level: Not on file   Social Needs    Financial resource strain: Not on file    Food insecurity - worry: Not on file    Food insecurity - inability: Not on file    Transportation needs -  "medical: Not on file    Transportation needs - non-medical: Not on file   Occupational History    Not on file   Tobacco Use    Smoking status: Former Smoker     Packs/day: 0.25     Years: 2.00     Pack years: 0.50     Last attempt to quit: 2003     Years since quittin.1    Smokeless tobacco: Never Used   Substance and Sexual Activity    Alcohol use: No     Comment: Socially    Drug use: No    Sexual activity: Yes     Partners: Male     Comment:    Other Topics Concern    Are you pregnant or think you may be? Not Asked    Breast-feeding Not Asked   Social History Narrative    Not on file       Chief Complaint:   Chief Complaint   Patient presents with    Right Foot - Pain       Consulting Physician: Self, Aaareferral    History of present illness:    This is a 38 y.o. female who complains of right foot pain since injuring on trampolene one month ago. Pain 4/10 and worse with use.    Review of Systems:    Constitution: Denies chills, fever, and sweats.  HENT: Denies headaches or blurry vision.  Cardiovascular: Denies chest pain or irregular heart beat.  Respiratory: Denies cough or shortness of breath.  Gastrointestinal: Denies abdominal pain, nausea, or vomiting.  Musculoskeletal:  Denies muscle cramps.  Neurological: Denies dizziness or focal weakness.  Psychiatric/Behavioral: Normal mental status.  Hematologic/Lymphatic: Denies bleeding problem or easy bruising/bleeding.  Skin: Denies rash or suspicious lesions.    Examination:    Vital Signs:    Vitals:    19 1524   BP: (!) 142/86   Pulse: 73   Weight: 80.7 kg (178 lb)   Height: 5' 5" (1.651 m)   PainSc:   4       Body mass index is 29.62 kg/m².    This a well-developed, well nourished patient in no acute distress.    Alert and oriented x 3 and cooperative to examination.       Physical Exam: Right Foot Exam     Gait:   Normal    Skin  Rash:   None  Scars:   None    Inspection   Deformity:   None  Erythema:   None  Bruising: "   None  Swelling:   None  Masses:  None  Lymphadenopathy: None    Range of Motion   Ankle Joint   Normal  Subtalar Joint   Normal  Toes:   Normal    Muscle Strength   Ankle:   Normal  Toes:   Normal    Other   Tenderness:  Medial 1st MTP.  Instability:  None  Ankle Crepitus:  None  Sensation:   Normal  Achilles:  Normal  Forefoot:  Normal    Vascular Exam   Dorsalis Pedis:       Palpable  Capillary refill:     Normal          Imaging: X-rays ordered and images interpreted today personally by me of right foot shows an apparent small avulsion fracture distal first metatarsal.        Assessment: Right foot pain        Plan:  She is getting better. Declined boot and PT. PRN.      DISCLAIMER: This note may have been dictated using voice recognition software and may contain grammatical errors.     NOTE: Consult report sent to referring provider via [employer] EMR.

## 2019-04-30 DIAGNOSIS — E78.01 FAMILIAL HYPERCHOLESTEROLEMIA: ICD-10-CM

## 2019-04-30 RX ORDER — ATORVASTATIN CALCIUM 40 MG/1
40 TABLET, FILM COATED ORAL DAILY
Qty: 30 TABLET | Refills: 11 | Status: SHIPPED | OUTPATIENT
Start: 2019-04-30 | End: 2019-04-30 | Stop reason: SDUPTHER

## 2019-04-30 RX ORDER — ATORVASTATIN CALCIUM 40 MG/1
40 TABLET, FILM COATED ORAL DAILY
Qty: 30 TABLET | Refills: 11 | Status: SHIPPED | OUTPATIENT
Start: 2019-04-30 | End: 2020-05-20 | Stop reason: SDUPTHER

## 2019-05-15 ENCOUNTER — TELEPHONE (OUTPATIENT)
Dept: HEMATOLOGY/ONCOLOGY | Facility: CLINIC | Age: 39
End: 2019-05-15

## 2019-05-15 NOTE — TELEPHONE ENCOUNTER
Called talked to patient let her know to hold eliquis for 2 days before procedure  And start the day after unless bleeding     ----- Message from Tami Dugan sent at 5/15/2019 11:32 AM CDT -----  The patient needs clearance for dental work. She is on Eliquis. She is seeing Dr Lorenz in [place]. Please give her a call back at [phone].

## 2019-05-26 RX ORDER — APIXABAN 5 MG/1
TABLET, FILM COATED ORAL
Qty: 60 TABLET | Refills: 11 | Status: SHIPPED | OUTPATIENT
Start: 2019-05-26 | End: 2022-02-14

## 2019-06-01 ENCOUNTER — PATIENT MESSAGE (OUTPATIENT)
Dept: NEUROLOGY | Facility: CLINIC | Age: 39
End: 2019-06-01

## 2019-06-01 DIAGNOSIS — R43.1 PARAOSMIA: Primary | ICD-10-CM

## 2019-06-03 ENCOUNTER — PATIENT MESSAGE (OUTPATIENT)
Dept: NEUROLOGY | Facility: CLINIC | Age: 39
End: 2019-06-03

## 2019-06-03 DIAGNOSIS — R43.1 PARAOSMIA: Primary | ICD-10-CM

## 2019-06-03 NOTE — TELEPHONE ENCOUNTER
Patient sent message reporting she is having spells of smelling smoke. Routine EEG in 2018 was normal. Will order a 48 hour ambulatory EEG for further evaluation.

## 2019-06-20 ENCOUNTER — PATIENT MESSAGE (OUTPATIENT)
Dept: HEMATOLOGY/ONCOLOGY | Facility: CLINIC | Age: 39
End: 2019-06-20

## 2019-06-20 ENCOUNTER — PATIENT MESSAGE (OUTPATIENT)
Dept: FAMILY MEDICINE | Facility: CLINIC | Age: 39
End: 2019-06-20

## 2019-06-20 ENCOUNTER — PATIENT MESSAGE (OUTPATIENT)
Dept: NEUROLOGY | Facility: CLINIC | Age: 39
End: 2019-06-20

## 2019-06-20 NOTE — LETTER
June 20, 2019      [facility]  [address]  [place] [address]  Phone: [phone]  Fax: [phone]       Patient: Jaclyn Rausch   YOB: 1980    To Whom It May Concern:    I was provided a description of the following activities Mrs. Rausch needs to return to work: run, walk, sit-ups, push-ups, swim, and bike She may perform the following activities with no restrictions from a neurologic standpoint. Cardiology clearance for their perspective should also be obtained. If you have any questions or concerns, or if I can be of further assistance, please do not hesitate to contact me.    Sincerely,      Pearl Roca MD

## 2019-06-25 ENCOUNTER — PATIENT MESSAGE (OUTPATIENT)
Dept: PHYSICAL MEDICINE AND REHAB | Facility: CLINIC | Age: 39
End: 2019-06-25

## 2019-06-25 ENCOUNTER — OFFICE VISIT (OUTPATIENT)
Dept: ORTHOPEDICS | Facility: CLINIC | Age: 39
End: 2019-06-25
Payer: COMMERCIAL

## 2019-06-25 ENCOUNTER — HOSPITAL ENCOUNTER (OUTPATIENT)
Dept: RADIOLOGY | Facility: HOSPITAL | Age: 39
Discharge: HOME | End: 2019-06-25
Attending: ORTHOPAEDIC SURGERY
Payer: COMMERCIAL

## 2019-06-25 ENCOUNTER — PATIENT MESSAGE (OUTPATIENT)
Dept: ORTHOPEDICS | Facility: CLINIC | Age: 39
End: 2019-06-25

## 2019-06-25 VITALS
HEART RATE: 66 BPM | HEIGHT: 65 IN | WEIGHT: 178 LBS | SYSTOLIC BLOOD PRESSURE: 160 MMHG | BODY MASS INDEX: 29.66 KG/M2 | DIASTOLIC BLOOD PRESSURE: 93 MMHG

## 2019-06-25 DIAGNOSIS — M25.562 PAIN IN BOTH KNEES, UNSPECIFIED CHRONICITY: ICD-10-CM

## 2019-06-25 DIAGNOSIS — M22.42 CHONDROMALACIA, PATELLA, LEFT: ICD-10-CM

## 2019-06-25 DIAGNOSIS — M25.561 PAIN IN BOTH KNEES, UNSPECIFIED CHRONICITY: Primary | ICD-10-CM

## 2019-06-25 DIAGNOSIS — M25.562 PAIN IN BOTH KNEES, UNSPECIFIED CHRONICITY: Primary | ICD-10-CM

## 2019-06-25 DIAGNOSIS — M25.561 PAIN IN BOTH KNEES, UNSPECIFIED CHRONICITY: ICD-10-CM

## 2019-06-25 DIAGNOSIS — M22.41 CHONDROMALACIA, PATELLA, RIGHT: Primary | ICD-10-CM

## 2019-06-25 PROCEDURE — 3008F BODY MASS INDEX DOCD: CPT | Mod: CPTII,S$GLB,, | Performed by: ORTHOPAEDIC SURGERY

## 2019-06-25 PROCEDURE — 99999 PR PBB SHADOW E&M-EST. PATIENT-LVL III: ICD-10-PCS | Mod: PBBFAC,,, | Performed by: ORTHOPAEDIC SURGERY

## 2019-06-25 PROCEDURE — 73564 X-RAY EXAM KNEE 4 OR MORE: CPT | Mod: 26,,, | Performed by: RADIOLOGY

## 2019-06-25 PROCEDURE — 99999 PR PBB SHADOW E&M-EST. PATIENT-LVL III: CPT | Mod: PBBFAC,,, | Performed by: ORTHOPAEDIC SURGERY

## 2019-06-25 PROCEDURE — 3008F PR BODY MASS INDEX (BMI) DOCUMENTED: ICD-10-PCS | Mod: CPTII,S$GLB,, | Performed by: ORTHOPAEDIC SURGERY

## 2019-06-25 PROCEDURE — 73564 X-RAY EXAM KNEE 4 OR MORE: CPT | Mod: TC,50,PN

## 2019-06-25 PROCEDURE — 73564 XR KNEE ORTHO BILAT WITH FLEXION: ICD-10-PCS | Mod: 26,,, | Performed by: RADIOLOGY

## 2019-06-25 PROCEDURE — 99204 PR OFFICE/OUTPT VISIT, NEW, LEVL IV, 45-59 MIN: ICD-10-PCS | Mod: S$GLB,,, | Performed by: ORTHOPAEDIC SURGERY

## 2019-06-25 PROCEDURE — 99204 OFFICE O/P NEW MOD 45 MIN: CPT | Mod: S$GLB,,, | Performed by: ORTHOPAEDIC SURGERY

## 2019-06-25 NOTE — LETTER
June 26, 2019    Jaclyn Rausch  [address] [place] [id number]         [facility]  [address] [place] [address] [address]  [place] [address]  Phone: [phone] June 26, 2019     Patient: Jaclyn Rausch   YOB: 1980   Date of Visit: 6/25/2019       To Whom It May Concern:    It is my medical opinion that Jaclyn Rausch may return to light duty immediately with the following restrictions: no bending, no stooping, no kneeling; limited running. May bike, due to the following medical diagnosis of Chondromalacia of Patella. .    If you have any questions or concerns, please don't hesitate to call.    Sincerely,        Luzma Daniels LPN ROT on behalf of SARTHAK Galaviz II MD [facility]

## 2019-06-25 NOTE — LETTER
June 26, 2019    Jaclyn Rausch  [address] [place] [address]         [facility]  [address] [place] [address] [address]  [place] [address]  Phone: [phone] June 26, 2019     Patient: Jaclyn Rausch   YOB: 1980   Date of Visit: 6/25/2019       To Whom It May Concern:    It is my medical opinion that Jaclyn Rausch may return to light duty immediately with the following restrictions: no squating, no stooping, no kneeling; limited running, due to the following medical diagnosis: Chondromalacia of Patella.    If you have any questions or concerns, please don't hesitate to call.    Sincerely,        Luzma Daniels LPN ROT on behalf of SARTHAK Galaviz II MD [facility]

## 2019-06-25 NOTE — PROGRESS NOTES
CC:  38-year-old female presents for evaluation of bilateral knee pain. She has a history of chondromalacia patella.  She is in the  and between her knee problems and the fact that she had a stroke she is having difficulty performing PT at full capacity.  She reports the pain in her knee is usually worse when she runs or if she tries to squat, stoop, or kneel.    ROS:    Constitution: Denies chills, fever, and sweats.  HENT: Denies headaches or blurry vision.  Cardiovascular: Denies chest pain or irregular heart beat.  Respiratory: Denies cough or shortness of breath.  Gastrointestinal: Denies abdominal pain, nausea, or vomiting.  Genitourinary:  Denies urinary incontinence, bladder and kidney issues  Musculoskeletal:  Denies muscle cramps.  Positive for bilateral knee pain  Neurological: Denies dizziness or focal weakness.  Psychiatric/Behavioral: Normal mental status.  Hematologic/Lymphatic: Denies bleeding problem or easy bruising/bleeding.  Skin: Denies rash or suspicious lesions.    Physical examination     Gen - No acute distress   Eyes - Extraoccular motions intact, pupils equally round and reactive to light and accommodation   ENT - normocephalic, atruamtic, oropharynx clear   Neck - Supple, no abnormal masses   Cardiovascular - regular rate and rhythm   Pulmonary - clear to auscultation bilaterally   Abdomen - soft, non-tender, non-distended, positive bowel sounds   Psych - The patient is alert and oriented x3 with normal mood and affect    Examination of the Right Lower Extremity:     Motor function is intact distally EHL/FHL/TA/kristian   +2 dorsalis pedis and posterior tibial pulses   Sensation to light touch intact distally dorsal, plantar, and first web space     Examination of the Right knee:    ROM 0 - 150   Effusion positive  Tenderness to palpation at the joint line negative  Pain during range of motion negative  Crepitation during range of motion positive     negative increased pain noted with  flexion past 90   negative antalgic gait noted   negative Lachman's Test   negative Anterior Drawer Test   negative Posterior Drawer Test   negative McMurrays Test   negative Disco Test   negative Varus/Valgus instability\    Examination of the Left Lower Extremity:     Motor function is intact distally EHL/FHL/TA/kristian   +2 dorsalis pedis and posterior tibial pulses   Sensation to light touch intact distally dorsal, plantar, and first web space     Examination of the Left knee:    ROM 0 - 150   Effusion positive  Tenderness to palpation at the joint line negative  Pain during range of motion negative  Crepitation during range of motion positive     negative increased pain noted with flexion past 90   negative antalgic gait noted   negative Lachman's Test   negative Anterior Drawer Test   negative Posterior Drawer Test   negative McMurrays Test   negative Disco Test   negative Varus/Valgus instability    X-rays were examined and personally reviewed by me.  X-rays of bilateral knees were obtained.  There is some slight narrowing of the patellofemoral joint on the lateral view bilaterally. Otherwise, the joint space is well maintained and there are no acute fractures.    Dx:  Chondromalacia patella of bilateral knees    Plan:  Recommendation is for no squatting no stooping and no kneeling.  The patient can bike for an unlimited amount of time but running should be limited.  Follow-up p.r.n..

## 2019-06-28 ENCOUNTER — PATIENT MESSAGE (OUTPATIENT)
Dept: PHYSICAL MEDICINE AND REHAB | Facility: CLINIC | Age: 39
End: 2019-06-28

## 2019-06-28 ENCOUNTER — PATIENT MESSAGE (OUTPATIENT)
Dept: HEMATOLOGY/ONCOLOGY | Facility: CLINIC | Age: 39
End: 2019-06-28

## 2019-07-01 ENCOUNTER — HOSPITAL ENCOUNTER (OUTPATIENT)
Dept: NEUROLOGY | Facility: HOSPITAL | Age: 39
Discharge: HOME | End: 2019-07-01
Attending: PSYCHIATRY & NEUROLOGY
Payer: COMMERCIAL

## 2019-07-01 DIAGNOSIS — R43.1 PARAOSMIA: ICD-10-CM

## 2019-07-01 PROCEDURE — 95953 PR EEG MONITORING/COMPUTER, EA 24 HOURS, UNATTENDED: CPT | Mod: 26,,, | Performed by: PSYCHIATRY & NEUROLOGY

## 2019-07-01 PROCEDURE — 95953 PR EEG MONITORING/COMPUTER, EA 24 HOURS, UNATTENDED: ICD-10-PCS | Mod: 26,,, | Performed by: PSYCHIATRY & NEUROLOGY

## 2019-07-01 PROCEDURE — 95950 HC 24 HR AMBUL EEG MONITORING: CPT

## 2019-07-02 PROCEDURE — 95953 PR EEG MONITORING/COMPUTER, EA 24 HOURS, UNATTENDED: ICD-10-PCS | Mod: 26,,, | Performed by: PSYCHIATRY & NEUROLOGY

## 2019-07-02 PROCEDURE — 95953 PR EEG MONITORING/COMPUTER, EA 24 HOURS, UNATTENDED: CPT | Mod: 26,,, | Performed by: PSYCHIATRY & NEUROLOGY

## 2019-07-09 ENCOUNTER — TELEPHONE (OUTPATIENT)
Dept: HEMATOLOGY/ONCOLOGY | Facility: CLINIC | Age: 39
End: 2019-07-09

## 2019-07-09 NOTE — TELEPHONE ENCOUNTER
Called to let her department know that we have received the the request our doctor is out of town and we will get the approval signed off and sent in to there office.

## 2019-07-18 ENCOUNTER — PATIENT MESSAGE (OUTPATIENT)
Dept: NEUROLOGY | Facility: CLINIC | Age: 39
End: 2019-07-18

## 2019-07-20 NOTE — PROCEDURES
AMBULATORY EEG REPORT    Date of service: 07/1/19-07/3/19  Requesting Physician: Pearl Roca MD  EEG Number: ON     Introduction  Electroencephalographic (EEG) recording is performed with electrodes placed according to the International 10-20 placement system. Thirty two (32) channels of digital signal (sampling rate of 512/sec) including T1 and T2 was simultaneously recorded from the scalp and may include EKG, EMG, and/or eye monitors. Recording band pass was 0.1 to 512 Hz. Digital video recording of the patient is simultaneously recorded with the EEG. The patient is instructed to report clinical symptoms which may occur during the recording session. EEG and video recording is stored and archived in digital format. Activation procedures which include photic stimulation, hyperventilation and instructing patients to perform simple tasks are done in selected patients.    The EEG is displayed on a monitor screen and can be reviewed using different montages. Computer assisted analysis is employed to detect spike and electrographic seizure activity. The entire record is submitted for computer analysis. The entire recording is visually reviewed and, the times identified by computer analysis as being spikes or seizures are reviewed again.     Compressed spectral analysis (CSA) is also performed on the activity recorded from each individual channel. This is displayed as a power display of frequencies from 0 to 30 Hz over time. The CSA is reviewed looking for asymmetries in power between homologous areas of the scalp and then compared with the original EEG recording.     Recording Times  Start on 7/1/19 at 09:43  Stop on 7/3/19 at 10:06  Total recording time is 48 hours and 8 minutes    Findings  The patient's background consists of a posteriorly dominant 9 Hz alpha rhythm, which is well-formed, modulated, and abolishes with eye opening.  Anteriorly, the patient's background consists of predominantly beta range  frequencies.    Hyperventilation and photic stimulation were not performed.     During the course of the recording, the patient is noted to be awake, subsequently becomes drowsy, and falls asleep with normal, symmetric sleep architecture seen.     There is no focal slowing.  There are no focal, lateralized, or epileptiform transients.  No electrographic seizures are seen.    There was one clinical event on 7/2/19 at 10:35. The patient indicated she had a headache that lasted 30 minutes. This was without EEG correlate.     EKG demonstrates normal rhythm.     Interpretation  This is a normal ambulatory EEG during wakefulness and sleep. No potentially epileptiform discharges were seen. A clinical event of headache was captured and without EEG correlate. Please be aware that a normal EEG does not exclude the possibility of an underlying seizure disorder.

## 2019-07-29 ENCOUNTER — PATIENT MESSAGE (OUTPATIENT)
Dept: HEMATOLOGY/ONCOLOGY | Facility: CLINIC | Age: 39
End: 2019-07-29

## 2019-08-02 ENCOUNTER — TELEPHONE (OUTPATIENT)
Dept: HEMATOLOGY/ONCOLOGY | Facility: CLINIC | Age: 39
End: 2019-08-02

## 2019-08-02 ENCOUNTER — LAB VISIT (OUTPATIENT)
Dept: LAB | Facility: HOSPITAL | Age: 39
End: 2019-08-02
Attending: INTERNAL MEDICINE
Payer: COMMERCIAL

## 2019-08-02 DIAGNOSIS — Z87.59 HISTORY OF MISCARRIAGE: ICD-10-CM

## 2019-08-02 DIAGNOSIS — D68.9 CLOTTING DISORDER: ICD-10-CM

## 2019-08-02 DIAGNOSIS — Z79.01 ANTICOAGULANT LONG-TERM USE: ICD-10-CM

## 2019-08-02 DIAGNOSIS — R76.0 ANTICARDIOLIPIN ANTIBODY POSITIVE: ICD-10-CM

## 2019-08-02 LAB
ALBUMIN SERPL BCP-MCNC: 4.1 G/DL (ref 3.5–5.2)
ALP SERPL-CCNC: 96 U/L (ref 55–135)
ALT SERPL W/O P-5'-P-CCNC: 18 U/L (ref 10–44)
ANION GAP SERPL CALC-SCNC: 7 MMOL/L (ref 8–16)
AST SERPL-CCNC: 19 U/L (ref 10–40)
BASOPHILS # BLD AUTO: 0.05 K/UL (ref 0–0.2)
BASOPHILS NFR BLD: 0.6 % (ref 0–1.9)
BILIRUB SERPL-MCNC: 0.5 MG/DL (ref 0.1–1)
BUN SERPL-MCNC: 10 MG/DL (ref 6–20)
CALCIUM SERPL-MCNC: 9.3 MG/DL (ref 8.7–10.5)
CHLORIDE SERPL-SCNC: 103 MMOL/L (ref 95–110)
CO2 SERPL-SCNC: 26 MMOL/L (ref 23–29)
CREAT SERPL-MCNC: 0.8 MG/DL (ref 0.5–1.4)
DIFFERENTIAL METHOD: ABNORMAL
EOSINOPHIL # BLD AUTO: 0.3 K/UL (ref 0–0.5)
EOSINOPHIL NFR BLD: 3.5 % (ref 0–8)
ERYTHROCYTE [DISTWIDTH] IN BLOOD BY AUTOMATED COUNT: 13.2 % (ref 11.5–14.5)
EST. GFR  (AFRICAN AMERICAN): >60 ML/MIN/1.73 M^2
EST. GFR  (NON AFRICAN AMERICAN): >60 ML/MIN/1.73 M^2
GLUCOSE SERPL-MCNC: 86 MG/DL (ref 70–110)
HCT VFR BLD AUTO: 43.3 % (ref 37–48.5)
HGB BLD-MCNC: 13.7 G/DL (ref 12–16)
IMM GRANULOCYTES # BLD AUTO: 0.03 K/UL (ref 0–0.04)
IMM GRANULOCYTES NFR BLD AUTO: 0.3 % (ref 0–0.5)
LYMPHOCYTES # BLD AUTO: 2.8 K/UL (ref 1–4.8)
LYMPHOCYTES NFR BLD: 31.6 % (ref 18–48)
MCH RBC QN AUTO: 29.8 PG (ref 27–31)
MCHC RBC AUTO-ENTMCNC: 31.6 G/DL (ref 32–36)
MCV RBC AUTO: 94 FL (ref 82–98)
MONOCYTES # BLD AUTO: 0.9 K/UL (ref 0.3–1)
MONOCYTES NFR BLD: 9.8 % (ref 4–15)
NEUTROPHILS # BLD AUTO: 4.8 K/UL (ref 1.8–7.7)
NEUTROPHILS NFR BLD: 54.2 % (ref 38–73)
NRBC BLD-RTO: 0 /100 WBC
PLATELET # BLD AUTO: 329 K/UL (ref 150–350)
PMV BLD AUTO: 9.8 FL (ref 9.2–12.9)
POTASSIUM SERPL-SCNC: 4.1 MMOL/L (ref 3.5–5.1)
PROT SERPL-MCNC: 7.3 G/DL (ref 6–8.4)
RBC # BLD AUTO: 4.59 M/UL (ref 4–5.4)
SODIUM SERPL-SCNC: 136 MMOL/L (ref 136–145)
WBC # BLD AUTO: 8.8 K/UL (ref 3.9–12.7)

## 2019-08-02 PROCEDURE — 85025 COMPLETE CBC W/AUTO DIFF WBC: CPT

## 2019-08-02 PROCEDURE — 80053 COMPREHEN METABOLIC PANEL: CPT

## 2019-08-02 PROCEDURE — 36415 COLL VENOUS BLD VENIPUNCTURE: CPT | Mod: PO

## 2019-08-04 NOTE — PROGRESS NOTES
[facility] Hematology/Oncology  PROGRESS NOTE      Subjective:       Patient ID:   NAME: Jaclyn Rausch : 1980     38 y.o. female    Referring Doc: Gauri Vargas (NP)  Other Physicians: Pearl Frank/Gertrudis Ariza (neuro); Federico Guzmán (GYN)    Chief Complaint:  Clot disorder f/u    History of Present Illness:     Patient returns today for a regularly scheduled follow-up visit.  The patient is here by herself. She is doing ok with no new issues. She is on eliquis. She denies any CP, SOB, HA's or N/V.  She denies any excessive bruising or bleeding. She does have some heavy menstrual cycles.                 ROS:   GEN: normal without any fever, night sweats or weight loss  HEENT: normal with no HA's, sore throat, stiff neck, changes in vision  CV: normal with no CP, SOB, PND, BAILEY or orthopnea  PULM: normal with no SOB, cough, hemoptysis, sputum or pleuritic pain  GI: normal with no abdominal pain, nausea, vomiting, constipation, diarrhea, melanotic stools, BRBPR, or hematemesis  : normal with no hematuria, dysuria  BREAST: normal with no mass, discharge, pain  SKIN: normal with no rash, erythema, bruising, or swelling    Allergies:  Review of patient's allergies indicates:  No Known Allergies    Medications:    Current Outpatient Medications:     atorvastatin (LIPITOR) 40 MG tablet, Take 1 tablet (40 mg total) by mouth once daily., Disp: 30 tablet, Rfl: 11    ELIQUIS 5 mg Tab, TAKE 1 TABLET(5 MG) BY MOUTH TWICE DAILY, Disp: 60 tablet, Rfl: 11    hydrOXYzine pamoate (VISTARIL) 25 MG Cap, Take 1 capsule (25 mg total) by mouth every 8 (eight) hours as needed (anxiety)., Disp: 60 capsule, Rfl: 1    meclizine (ANTIVERT) 25 mg tablet, Take 1 tablet (25 mg total) by mouth 2 (two) times daily as needed for Dizziness., Disp: 30 tablet, Rfl: 3    PMHx/PSHx Updates:  See patient's last visit with me on 2019.  See H&P on 2018        Pathology:  Cancer Staging  No matching staging information  was found for the patient.          Objective:     Vitals:  Blood pressure 119/76, pulse 78, temperature 98.4 °F (36.9 °C), temperature source Oral, resp. rate 20, weight 82.6 kg (182 lb 3.2 oz).    Physical Examination:   GEN: no apparent distress, comfortable; AAOx3  HEAD: atraumatic and normocephalic  EYES: no pallor, no icterus, PERRLA  ENT: OMM, no pharyngeal erythema, external ears WNL; no nasal discharge; no thrush  NECK: no masses, thyroid normal, trachea midline, no LAD/LN's, supple  CV: RRR with no murmur; normal pulse; normal S1 and S2; no pedal edema  CHEST: Normal respiratory effort; CTAB; normal breath sounds; no wheeze or crackles  ABDOM: nontender and nondistended; soft; normal bowel sounds; no rebound/guarding  MUSC/Skeletal: ROM normal; no crepitus; joints normal; no deformities or arthropathy  EXTREM: no clubbing, cyanosis, inflammation or swelling  SKIN: no rashes, lesions, ulcers, petechiae or subcutaneous nodules  : no gonzalez  NEURO: grossly intact; motor/sensory WNL; AAOx3; no tremors  PSYCH: normal mood, affect and behavior  LYMPH: normal cervical, supraclavicular, axillary and groin LN's            Labs:     8/2/2019  Lab Results   Component Value Date    WBC 8.80 08/02/2019    HGB 13.7 08/02/2019    HCT 43.3 08/02/2019    MCV 94 08/02/2019     08/02/2019     BMP  Lab Results   Component Value Date     08/02/2019    K 4.1 08/02/2019     08/02/2019    CO2 26 08/02/2019    BUN 10 08/02/2019    CREATININE 0.8 08/02/2019    CALCIUM 9.3 08/02/2019    ANIONGAP 7 (L) 08/02/2019    ESTGFRAFRICA >60.0 08/02/2019    EGFRNONAA >60.0 08/02/2019     Lab Results   Component Value Date    ALT 18 08/02/2019    AST 19 08/02/2019    ALKPHOS 96 08/02/2019    BILITOT 0.5 08/02/2019           Radiology/Diagnostic Studies:    No results found.    I have reviewed all available lab results and radiology reports.    Assessment/Plan:   (1) 37 yo female with prior acute onset aphasia and left  facial droop with MRI showing lateral left frontal lobe acute nonhemorrhagic infarction. She has been seen by Dr Frank with cardiology and Dr Pearl Roca with neurology   - hematology was previously consulted for evaluation for hypercoag workup which I ordered while she was inpatient  - she is currently on Eliquis oral anticoagulation; neurology discontinued the aspirin  - she saw for f/u with Dr Pearl Roca with neurology as outpatient in Dec 2018  - she saw Evette NP in Oct 2018  - factor XII was elevated at 144 and  factor IX was elevated at 147, but mildly and may be reactive in nature only  - Lipoprotein-A was elevated at 103; APA IgM was elevated at 17.99  - homocysteine was WNL; ATIII was normal  - prothrombin II gene was normal, LA was negative; factor V leiden was normal  - protein C and S was adeqaute     (2) Chronic borderline anemia in past - current hgb is within normal limits  - prior ferritin levels were low  - she has some stomach issue with OTC iron  - check up to date iron panel  - add ICAR_C     (3) Hx/of migraine headaches     (4) Atrial septal aneurysm - followed by Dr Frank with cardiology as outpatient  - she had bubble study and JUAN while in hospital  - there is no opening or hole per patient  - she sees Dr Frank again this Wednesday     (5) Recent loss of pregnancy in Jan 2018 in first trimester     (6) Hypercholesterolemia - now on lipitor    (7) GYN following - Dr Federico Polanco with [facility] - heavier menstrual cycles        Acute CVA (cerebrovascular accident) -  lateral left frontal lobe, non-hemorrhagic    Menorrhagia with regular cycle    Anticardiolipin antibody positive    Anticoagulant long-term use    Clotting disorder          PLAN:  1. Continue eliquis (possibly life-long), and anti-hypercholesterol meds  2. She may need her  activities restricted because of the risk of bleeding  3. F/u with PCP, Neuro and cardiology  4.  RTC in  6 months with labs every 3 months  5.  Check iron panel and add ICAR-C  Fax note to Evette Frank Amy Jones, [place]    Discussion:     I have explained all of the above in detail and the patient understands all of the current recommendation(s). I have answered all of their questions to the best of my ability and to their complete satisfaction.   The patient is to continue with the current management plan.            Electronically signed by Jesus Sprague MD

## 2019-08-05 ENCOUNTER — LAB VISIT (OUTPATIENT)
Dept: LAB | Facility: HOSPITAL | Age: 39
End: 2019-08-05
Attending: INTERNAL MEDICINE
Payer: COMMERCIAL

## 2019-08-05 ENCOUNTER — OFFICE VISIT (OUTPATIENT)
Dept: HEMATOLOGY/ONCOLOGY | Facility: CLINIC | Age: 39
End: 2019-08-05
Payer: COMMERCIAL

## 2019-08-05 VITALS
HEART RATE: 78 BPM | TEMPERATURE: 98 F | BODY MASS INDEX: 30.32 KG/M2 | WEIGHT: 182.19 LBS | DIASTOLIC BLOOD PRESSURE: 76 MMHG | SYSTOLIC BLOOD PRESSURE: 119 MMHG | RESPIRATION RATE: 20 BRPM

## 2019-08-05 DIAGNOSIS — R76.0 ANTICARDIOLIPIN ANTIBODY POSITIVE: ICD-10-CM

## 2019-08-05 DIAGNOSIS — N92.0 MENORRHAGIA WITH REGULAR CYCLE: ICD-10-CM

## 2019-08-05 DIAGNOSIS — E61.1 IRON DEFICIENCY: ICD-10-CM

## 2019-08-05 DIAGNOSIS — I63.9 ACUTE CVA (CEREBROVASCULAR ACCIDENT): Primary | ICD-10-CM

## 2019-08-05 DIAGNOSIS — D68.9 CLOTTING DISORDER: ICD-10-CM

## 2019-08-05 DIAGNOSIS — Z79.01 ANTICOAGULANT LONG-TERM USE: ICD-10-CM

## 2019-08-05 LAB
FERRITIN SERPL-MCNC: 12 NG/ML (ref 20–300)
IRON SERPL-MCNC: 61 UG/DL (ref 30–160)
SATURATED IRON: 13 % (ref 20–50)
TOTAL IRON BINDING CAPACITY: 465 UG/DL (ref 250–450)
TRANSFERRIN SERPL-MCNC: 314 MG/DL (ref 200–375)

## 2019-08-05 PROCEDURE — 99213 PR OFFICE/OUTPT VISIT, EST, LEVL III, 20-29 MIN: ICD-10-PCS | Mod: S$GLB,,, | Performed by: INTERNAL MEDICINE

## 2019-08-05 PROCEDURE — 3008F PR BODY MASS INDEX (BMI) DOCUMENTED: ICD-10-PCS | Mod: S$GLB,,, | Performed by: INTERNAL MEDICINE

## 2019-08-05 PROCEDURE — 3008F BODY MASS INDEX DOCD: CPT | Mod: S$GLB,,, | Performed by: INTERNAL MEDICINE

## 2019-08-05 PROCEDURE — 82728 ASSAY OF FERRITIN: CPT

## 2019-08-05 PROCEDURE — 36415 COLL VENOUS BLD VENIPUNCTURE: CPT | Mod: PO

## 2019-08-05 PROCEDURE — 99213 OFFICE O/P EST LOW 20 MIN: CPT | Mod: S$GLB,,, | Performed by: INTERNAL MEDICINE

## 2019-08-05 PROCEDURE — 83540 ASSAY OF IRON: CPT

## 2019-08-05 RX ORDER — IRON,CARBONYL/ASCORBIC ACID 100-250 MG
1 TABLET ORAL DAILY
Qty: 30 EACH | Refills: 6 | Status: SHIPPED | OUTPATIENT
Start: 2019-08-05 | End: 2020-08-16

## 2019-08-06 ENCOUNTER — TELEPHONE (OUTPATIENT)
Dept: HEMATOLOGY/ONCOLOGY | Facility: CLINIC | Age: 39
End: 2019-08-06

## 2019-08-06 NOTE — TELEPHONE ENCOUNTER
Dr. Sprague  Prescribed  [facility]  For patient 08/05/2019    ----- Message from Jesus Sprague MD sent at 8/6/2019  8:13 AM CDT -----  Ferritin is a little low - I believe I put her on ICAr-C yesterday - just check for me

## 2019-11-04 ENCOUNTER — LAB VISIT (OUTPATIENT)
Dept: LAB | Facility: HOSPITAL | Age: 39
End: 2019-11-04
Attending: NURSE PRACTITIONER
Payer: COMMERCIAL

## 2019-11-04 DIAGNOSIS — E78.5 HYPERLIPEMIA: ICD-10-CM

## 2019-11-04 DIAGNOSIS — I25.3 ANEURYSM OF HEART WALL: Primary | ICD-10-CM

## 2019-11-04 LAB
CHOLEST SERPL-MCNC: 155 MG/DL (ref 120–199)
CHOLEST/HDLC SERPL: 2.5 {RATIO} (ref 2–5)
HDLC SERPL-MCNC: 62 MG/DL (ref 40–75)
HDLC SERPL: 40 % (ref 20–50)
LDLC SERPL CALC-MCNC: 73.8 MG/DL (ref 63–159)
MAGNESIUM SERPL-MCNC: 2.2 MG/DL (ref 1.6–2.6)
NONHDLC SERPL-MCNC: 93 MG/DL
TRIGL SERPL-MCNC: 96 MG/DL (ref 30–150)
TSH SERPL DL<=0.005 MIU/L-ACNC: 1.22 UIU/ML (ref 0.4–4)

## 2019-11-04 PROCEDURE — 83735 ASSAY OF MAGNESIUM: CPT

## 2019-11-04 PROCEDURE — 84443 ASSAY THYROID STIM HORMONE: CPT

## 2019-11-04 PROCEDURE — 80061 LIPID PANEL: CPT

## 2019-11-05 ENCOUNTER — PATIENT MESSAGE (OUTPATIENT)
Dept: HEMATOLOGY/ONCOLOGY | Facility: CLINIC | Age: 39
End: 2019-11-05

## 2019-11-22 ENCOUNTER — LAB VISIT (OUTPATIENT)
Dept: LAB | Facility: HOSPITAL | Age: 39
End: 2019-11-22
Attending: PSYCHIATRY & NEUROLOGY
Payer: COMMERCIAL

## 2019-11-22 ENCOUNTER — PATIENT MESSAGE (OUTPATIENT)
Dept: NEUROLOGY | Facility: CLINIC | Age: 39
End: 2019-11-22

## 2019-11-22 DIAGNOSIS — E55.9 VITAMIN D DEFICIENCY: Primary | ICD-10-CM

## 2019-11-22 DIAGNOSIS — Z86.73 HISTORY OF STROKE: ICD-10-CM

## 2019-11-22 DIAGNOSIS — E55.9 VITAMIN D DEFICIENCY: ICD-10-CM

## 2019-11-22 DIAGNOSIS — R53.83 FATIGUE, UNSPECIFIED TYPE: ICD-10-CM

## 2019-11-22 LAB
25(OH)D3+25(OH)D2 SERPL-MCNC: 28 NG/ML (ref 30–96)
VIT B12 SERPL-MCNC: 458 PG/ML (ref 210–950)

## 2019-11-22 PROCEDURE — 36415 COLL VENOUS BLD VENIPUNCTURE: CPT | Mod: PO

## 2019-11-22 PROCEDURE — 82306 VITAMIN D 25 HYDROXY: CPT

## 2019-11-22 PROCEDURE — 82607 VITAMIN B-12: CPT

## 2019-11-23 ENCOUNTER — PATIENT MESSAGE (OUTPATIENT)
Dept: NEUROLOGY | Facility: CLINIC | Age: 39
End: 2019-11-23

## 2019-11-27 DIAGNOSIS — M25.559 ARTHRALGIA OF HIP, UNSPECIFIED LATERALITY: Primary | ICD-10-CM

## 2019-12-02 ENCOUNTER — OFFICE VISIT (OUTPATIENT)
Dept: ORTHOPEDICS | Facility: CLINIC | Age: 39
End: 2019-12-02
Payer: COMMERCIAL

## 2019-12-02 ENCOUNTER — HOSPITAL ENCOUNTER (OUTPATIENT)
Dept: RADIOLOGY | Facility: HOSPITAL | Age: 39
Discharge: HOME | End: 2019-12-02
Attending: ORTHOPAEDIC SURGERY
Payer: COMMERCIAL

## 2019-12-02 VITALS — WEIGHT: 182 LBS | HEIGHT: 65 IN | BODY MASS INDEX: 30.32 KG/M2

## 2019-12-02 DIAGNOSIS — M25.559 ARTHRALGIA OF HIP, UNSPECIFIED LATERALITY: ICD-10-CM

## 2019-12-02 DIAGNOSIS — M25.561 PAIN IN BOTH KNEES, UNSPECIFIED CHRONICITY: Primary | ICD-10-CM

## 2019-12-02 DIAGNOSIS — M25.552 LEFT HIP PAIN: ICD-10-CM

## 2019-12-02 DIAGNOSIS — M25.562 PAIN IN BOTH KNEES, UNSPECIFIED CHRONICITY: Primary | ICD-10-CM

## 2019-12-02 DIAGNOSIS — M25.552 LEFT HIP PAIN: Primary | ICD-10-CM

## 2019-12-02 DIAGNOSIS — M22.42 CHONDROMALACIA, PATELLA, LEFT: ICD-10-CM

## 2019-12-02 DIAGNOSIS — M22.41 CHONDROMALACIA, PATELLA, RIGHT: ICD-10-CM

## 2019-12-02 PROCEDURE — 73521 X-RAY EXAM HIPS BI 2 VIEWS: CPT | Mod: 26,,, | Performed by: RADIOLOGY

## 2019-12-02 PROCEDURE — 3008F PR BODY MASS INDEX (BMI) DOCUMENTED: ICD-10-PCS | Mod: CPTII,S$GLB,, | Performed by: ORTHOPAEDIC SURGERY

## 2019-12-02 PROCEDURE — 99999 PR PBB SHADOW E&M-EST. PATIENT-LVL III: ICD-10-PCS | Mod: PBBFAC,,, | Performed by: ORTHOPAEDIC SURGERY

## 2019-12-02 PROCEDURE — 99214 OFFICE O/P EST MOD 30 MIN: CPT | Mod: S$GLB,,, | Performed by: ORTHOPAEDIC SURGERY

## 2019-12-02 PROCEDURE — 99999 PR PBB SHADOW E&M-EST. PATIENT-LVL III: CPT | Mod: PBBFAC,,, | Performed by: ORTHOPAEDIC SURGERY

## 2019-12-02 PROCEDURE — 3008F BODY MASS INDEX DOCD: CPT | Mod: CPTII,S$GLB,, | Performed by: ORTHOPAEDIC SURGERY

## 2019-12-02 PROCEDURE — 73521 X-RAY EXAM HIPS BI 2 VIEWS: CPT | Mod: TC,PN

## 2019-12-02 PROCEDURE — 99214 PR OFFICE/OUTPT VISIT, EST, LEVL IV, 30-39 MIN: ICD-10-PCS | Mod: S$GLB,,, | Performed by: ORTHOPAEDIC SURGERY

## 2019-12-02 PROCEDURE — 73521 XR HIPS BILATERAL 2 VIEW INCL AP PELVIS: ICD-10-PCS | Mod: 26,,, | Performed by: RADIOLOGY

## 2019-12-02 NOTE — PROGRESS NOTES
CC:  39-year-old female presents for evaluation of left hip pain and bilateral knee pain. We previously saw the patient about 6 months ago for bilateral knee pain.  She states that she still having problems exercising and she can't stoop, squat, bend, or kneel because of bilateral knee pain. She reports both knees pop.  They occasionally buckle and give way.  She denies any recurrent locking.    She also has a new complaint of left hip pain.  She states she was trying to dance with her child when she had a sharp severe pain in the left hip. She can no longer run because of pain in the left hip. She states she can do go and she can bike but she cannot jog or run because of pain. She has pain with flexion activities like getting in and out of a car and going from a seated to a standing position.    Between the pain in her knees and the pain in her left hip that is severely inhibiting her ability to exercise.    ROS:    Constitution: Denies chills, fever, and sweats.  HENT: Denies headaches or blurry vision.  Cardiovascular: Denies chest pain or irregular heart beat.  Respiratory: Denies cough or shortness of breath.  Gastrointestinal: Denies abdominal pain, nausea, or vomiting.  Genitourinary:  Denies urinary incontinence, bladder and kidney issues  Musculoskeletal:  Denies muscle cramps.  Positive for bilateral knee and left hip pain.  Neurological: Denies dizziness or focal weakness.  Psychiatric/Behavioral: Normal mental status.  Hematologic/Lymphatic: Denies bleeding problem or easy bruising/bleeding.  Skin: Denies rash or suspicious lesions.    Physical examination     Gen - No acute distress   Eyes - Extraoccular motions intact, pupils equally round and reactive to light and accommodation   ENT - normocephalic, atruamtic, oropharynx clear   Neck - Supple, no abnormal masses   Cardiovascular - regular rate and rhythm   Pulmonary - clear to auscultation bilaterally   Abdomen - soft, non-tender, non-distended,  positive bowel sounds   Psych - The patient is alert and oriented x3 with normal mood and affect    Examination of the Left Lower Extremity    Skin is intact throughout  Motor in intact EHL,FHL,TA,kristian  +2 DP/PT  Sensation LT intact D/P/1st    Examination of the Left Hip    C-Sign positive  Logroll negative  Stenchfield positive    Pain with ROM positive    ROM:    Flexion   150  Extension   10  Abduction   50  Adduction   10  External Rotation 90  Internal Rotation 10    Flexion contracture negative    FADIR positive  FADER negative     Examination of the Left knee:    ROM 0 - 150   Effusion negative  Tenderness to palpation at the joint line positive  Pain during range of motion positive  Crepitation during range of motion positive     positive increased pain noted with flexion past 90   positive antalgic gait noted   negative Lachman's Test   negative Anterior Drawer Test   negative Posterior Drawer Test   positive McMurrays Test   positive Disco Test   negative Varus/Valgus instability    Examination of the Right Lower Extremity:     Motor function is intact distally EHL/FHL/TA/kristian   +2 dorsalis pedis and posterior tibial pulses   Sensation to light touch intact distally dorsal, plantar, and first web space     Examination of the Right knee:    ROM 0 - 150   Effusion positive  Tenderness to palpation at the joint line positive  Pain during range of motion positive  Crepitation during range of motion positive     positive increased pain noted with flexion past 90   positive antalgic gait noted   negative Lachman's Test   negative Anterior Drawer Test   negative Posterior Drawer Test   positive McMurrays Test   positive Disco Test   negative Varus/Valgus instability      X-rays were examined and personally reviewed by me.  Two views of bilateral hips dated 12/02/2019 are available for review.  The femoral head well reduced within the acetabuli.  The joint spaces are well maintained.  There are no acute fractures.   No advanced arthritic changes. Three views of bilateral knees dated 06/25/2019 are also available for review.  It showed well-maintained joint spaces with no acute fractures and normal bony anatomy in each knee.    Dx:  Bilateral knee pain and mechanical symptoms that have failed steroid injections and p.o. NSAIDs and activity modification.  Patient also has a new onset of pain in the left hip with normal x-rays.  Concerned there is for labral tear in the left hip based on history and physical exam findings.    Plan:  Recommendations for MRI arthrogram of the left hip and MRIs of each knee.  We will try to get this approved by her insurance carrier and she can follow up when the studies are complete.

## 2019-12-04 DIAGNOSIS — M25.552 LEFT HIP PAIN: Primary | ICD-10-CM

## 2019-12-05 ENCOUNTER — PATIENT MESSAGE (OUTPATIENT)
Dept: ORTHOPEDICS | Facility: CLINIC | Age: 39
End: 2019-12-05

## 2019-12-09 ENCOUNTER — LAB VISIT (OUTPATIENT)
Dept: LAB | Facility: HOSPITAL | Age: 39
End: 2019-12-09
Attending: INTERNAL MEDICINE
Payer: COMMERCIAL

## 2019-12-09 DIAGNOSIS — N92.0 MENORRHAGIA WITH REGULAR CYCLE: ICD-10-CM

## 2019-12-09 LAB
BASOPHILS # BLD AUTO: 0.04 K/UL (ref 0–0.2)
BASOPHILS NFR BLD: 0.4 % (ref 0–1.9)
DIFFERENTIAL METHOD: ABNORMAL
EOSINOPHIL # BLD AUTO: 0.2 K/UL (ref 0–0.5)
EOSINOPHIL NFR BLD: 2.6 % (ref 0–8)
ERYTHROCYTE [DISTWIDTH] IN BLOOD BY AUTOMATED COUNT: 12.6 % (ref 11.5–14.5)
HCT VFR BLD AUTO: 42.8 % (ref 37–48.5)
HGB BLD-MCNC: 13.2 G/DL (ref 12–16)
IMM GRANULOCYTES # BLD AUTO: 0.02 K/UL (ref 0–0.04)
IMM GRANULOCYTES NFR BLD AUTO: 0.2 % (ref 0–0.5)
LYMPHOCYTES # BLD AUTO: 3.3 K/UL (ref 1–4.8)
LYMPHOCYTES NFR BLD: 36.5 % (ref 18–48)
MCH RBC QN AUTO: 29.5 PG (ref 27–31)
MCHC RBC AUTO-ENTMCNC: 30.8 G/DL (ref 32–36)
MCV RBC AUTO: 96 FL (ref 82–98)
MONOCYTES # BLD AUTO: 0.6 K/UL (ref 0.3–1)
MONOCYTES NFR BLD: 7.2 % (ref 4–15)
NEUTROPHILS # BLD AUTO: 4.7 K/UL (ref 1.8–7.7)
NEUTROPHILS NFR BLD: 53.1 % (ref 38–73)
NRBC BLD-RTO: 0 /100 WBC
PLATELET # BLD AUTO: 372 K/UL (ref 150–350)
PMV BLD AUTO: 10.1 FL (ref 9.2–12.9)
RBC # BLD AUTO: 4.48 M/UL (ref 4–5.4)
WBC # BLD AUTO: 8.9 K/UL (ref 3.9–12.7)

## 2019-12-09 PROCEDURE — 36415 COLL VENOUS BLD VENIPUNCTURE: CPT | Mod: PO

## 2019-12-09 PROCEDURE — 83540 ASSAY OF IRON: CPT

## 2019-12-09 PROCEDURE — 80053 COMPREHEN METABOLIC PANEL: CPT

## 2019-12-09 PROCEDURE — 85025 COMPLETE CBC W/AUTO DIFF WBC: CPT

## 2019-12-09 PROCEDURE — 82728 ASSAY OF FERRITIN: CPT

## 2019-12-10 ENCOUNTER — HOSPITAL ENCOUNTER (OUTPATIENT)
Dept: RADIOLOGY | Facility: HOSPITAL | Age: 39
Discharge: HOME | End: 2019-12-10
Attending: ORTHOPAEDIC SURGERY
Payer: COMMERCIAL

## 2019-12-10 DIAGNOSIS — M25.561 PAIN IN BOTH KNEES, UNSPECIFIED CHRONICITY: ICD-10-CM

## 2019-12-10 DIAGNOSIS — M25.562 PAIN IN BOTH KNEES, UNSPECIFIED CHRONICITY: ICD-10-CM

## 2019-12-10 LAB
ALBUMIN SERPL BCP-MCNC: 4 G/DL (ref 3.5–5.2)
ALP SERPL-CCNC: 85 U/L (ref 55–135)
ALT SERPL W/O P-5'-P-CCNC: 15 U/L (ref 10–44)
ANION GAP SERPL CALC-SCNC: 6 MMOL/L (ref 8–16)
AST SERPL-CCNC: 19 U/L (ref 10–40)
BILIRUB SERPL-MCNC: 0.7 MG/DL (ref 0.1–1)
BUN SERPL-MCNC: 14 MG/DL (ref 6–20)
CALCIUM SERPL-MCNC: 9.1 MG/DL (ref 8.7–10.5)
CHLORIDE SERPL-SCNC: 104 MMOL/L (ref 95–110)
CO2 SERPL-SCNC: 27 MMOL/L (ref 23–29)
CREAT SERPL-MCNC: 0.8 MG/DL (ref 0.5–1.4)
EST. GFR  (AFRICAN AMERICAN): >60 ML/MIN/1.73 M^2
EST. GFR  (NON AFRICAN AMERICAN): >60 ML/MIN/1.73 M^2
FERRITIN SERPL-MCNC: 17 NG/ML (ref 20–300)
GLUCOSE SERPL-MCNC: 88 MG/DL (ref 70–110)
IRON SERPL-MCNC: 163 UG/DL (ref 30–160)
POTASSIUM SERPL-SCNC: 4.1 MMOL/L (ref 3.5–5.1)
PROT SERPL-MCNC: 6.9 G/DL (ref 6–8.4)
SATURATED IRON: 37 % (ref 20–50)
SODIUM SERPL-SCNC: 137 MMOL/L (ref 136–145)
TOTAL IRON BINDING CAPACITY: 444 UG/DL (ref 250–450)
TRANSFERRIN SERPL-MCNC: 300 MG/DL (ref 200–375)

## 2019-12-10 PROCEDURE — 73721 MRI KNEE WITHOUT CONTRAST RIGHT: ICD-10-PCS | Mod: 26,RT,, | Performed by: RADIOLOGY

## 2019-12-10 PROCEDURE — 73721 MRI JNT OF LWR EXTRE W/O DYE: CPT | Mod: TC,RT

## 2019-12-10 PROCEDURE — 73721 MRI JNT OF LWR EXTRE W/O DYE: CPT | Mod: 26,RT,, | Performed by: RADIOLOGY

## 2019-12-10 PROCEDURE — 73721 MRI JNT OF LWR EXTRE W/O DYE: CPT | Mod: 26,LT,, | Performed by: RADIOLOGY

## 2019-12-10 PROCEDURE — 73721 MRI KNEE WITHOUT CONTRAST LEFT: ICD-10-PCS | Mod: 26,LT,, | Performed by: RADIOLOGY

## 2019-12-10 PROCEDURE — 73721 MRI JNT OF LWR EXTRE W/O DYE: CPT | Mod: TC,LT

## 2019-12-12 ENCOUNTER — HOSPITAL ENCOUNTER (OUTPATIENT)
Dept: RADIOLOGY | Facility: HOSPITAL | Age: 39
Discharge: HOME | End: 2019-12-12
Attending: ORTHOPAEDIC SURGERY
Payer: COMMERCIAL

## 2019-12-12 DIAGNOSIS — M25.552 LEFT HIP PAIN: ICD-10-CM

## 2019-12-12 PROCEDURE — 73722 MRI ARTHROGRAM HIP LEFT: ICD-10-PCS | Mod: 26,LT,, | Performed by: RADIOLOGY

## 2019-12-12 PROCEDURE — 27093 INJECTION FOR HIP X-RAY: CPT | Mod: ,,, | Performed by: RADIOLOGY

## 2019-12-12 PROCEDURE — 73722 MRI JOINT OF LWR EXTR W/DYE: CPT | Mod: 26,LT,, | Performed by: RADIOLOGY

## 2019-12-12 PROCEDURE — 73722 MRI JOINT OF LWR EXTR W/DYE: CPT | Mod: TC,LT

## 2019-12-12 PROCEDURE — 27093 INJECTION FOR HIP X-RAY: CPT

## 2019-12-12 PROCEDURE — 25500020 PHARM REV CODE 255: Performed by: ORTHOPAEDIC SURGERY

## 2019-12-12 PROCEDURE — 73525 XR ARTHROGRAM HIP LEFT WITH MRI TO FOLLOW: ICD-10-PCS | Mod: 26,LT,, | Performed by: RADIOLOGY

## 2019-12-12 PROCEDURE — 27093 XR ARTHROGRAM HIP LEFT WITH MRI TO FOLLOW: ICD-10-PCS | Mod: ,,, | Performed by: RADIOLOGY

## 2019-12-12 PROCEDURE — A9577 INJ MULTIHANCE: HCPCS | Performed by: ORTHOPAEDIC SURGERY

## 2019-12-12 PROCEDURE — 73525 CONTRAST X-RAY OF HIP: CPT | Mod: 26,LT,, | Performed by: RADIOLOGY

## 2019-12-12 RX ADMIN — GADOBENATE DIMEGLUMINE 0.15 ML: 529 INJECTION, SOLUTION INTRAVENOUS at 01:12

## 2019-12-12 RX ADMIN — IOHEXOL 10 ML: 350 INJECTION, SOLUTION INTRAVENOUS at 01:12

## 2019-12-16 ENCOUNTER — OFFICE VISIT (OUTPATIENT)
Dept: ORTHOPEDICS | Facility: CLINIC | Age: 39
End: 2019-12-16
Payer: COMMERCIAL

## 2019-12-16 VITALS
BODY MASS INDEX: 30.32 KG/M2 | SYSTOLIC BLOOD PRESSURE: 140 MMHG | DIASTOLIC BLOOD PRESSURE: 80 MMHG | HEIGHT: 65 IN | WEIGHT: 182 LBS

## 2019-12-16 DIAGNOSIS — S83.242A TEAR OF MEDIAL MENISCUS OF LEFT KNEE, CURRENT, INITIAL ENCOUNTER: Primary | ICD-10-CM

## 2019-12-16 DIAGNOSIS — S83.241A TEAR OF MEDIAL MENISCUS OF RIGHT KNEE, CURRENT, INITIAL ENCOUNTER: ICD-10-CM

## 2019-12-16 PROCEDURE — 99214 OFFICE O/P EST MOD 30 MIN: CPT | Mod: S$GLB,,, | Performed by: ORTHOPAEDIC SURGERY

## 2019-12-16 PROCEDURE — 99999 PR PBB SHADOW E&M-EST. PATIENT-LVL III: ICD-10-PCS | Mod: PBBFAC,,, | Performed by: ORTHOPAEDIC SURGERY

## 2019-12-16 PROCEDURE — 99214 PR OFFICE/OUTPT VISIT, EST, LEVL IV, 30-39 MIN: ICD-10-PCS | Mod: S$GLB,,, | Performed by: ORTHOPAEDIC SURGERY

## 2019-12-16 PROCEDURE — 3008F BODY MASS INDEX DOCD: CPT | Mod: CPTII,S$GLB,, | Performed by: ORTHOPAEDIC SURGERY

## 2019-12-16 PROCEDURE — 3008F PR BODY MASS INDEX (BMI) DOCUMENTED: ICD-10-PCS | Mod: CPTII,S$GLB,, | Performed by: ORTHOPAEDIC SURGERY

## 2019-12-16 PROCEDURE — 99999 PR PBB SHADOW E&M-EST. PATIENT-LVL III: CPT | Mod: PBBFAC,,, | Performed by: ORTHOPAEDIC SURGERY

## 2019-12-16 NOTE — H&P (VIEW-ONLY)
CC:  39-year-old female follows up with multiple complaints the patient has pain in both of her knees and her left hip. She states that she is having problems exercising and she can't stoop, squat, bend, or kneel because of bilateral knee pain. She reports both knees pop.  They occasionally buckle and give way.  She denies any recurrent locking.     She also has a complaint of left hip pain.  She states she was trying to dance with her child when she had a sharp severe pain in the left hip. She can no longer run because of pain in the left hip. She states she can do go and she can bike but she cannot jog or run because of pain. She has pain with flexion activities like getting in and out of a car and going from a seated to a standing position.    ROS:     Constitution: Denies chills, fever, and sweats.  HENT: Denies headaches or blurry vision.  Cardiovascular: Denies chest pain or irregular heart beat.  Respiratory: Denies cough or shortness of breath.  Gastrointestinal: Denies abdominal pain, nausea, or vomiting.  Genitourinary:  Denies urinary incontinence, bladder and kidney issues  Musculoskeletal:  Denies muscle cramps.  Positive for bilateral knee and left hip pain.  Neurological: Denies dizziness or focal weakness.  Psychiatric/Behavioral: Normal mental status.  Hematologic/Lymphatic: Denies bleeding problem or easy bruising/bleeding.  Skin: Denies rash or suspicious lesions.     Physical examination     Gen - No acute distress   Eyes - Extraoccular motions intact, pupils equally round and reactive to light and accommodation   ENT - normocephalic, atruamtic, oropharynx clear   Neck - Supple, no abnormal masses   Cardiovascular - regular rate and rhythm   Pulmonary - clear to auscultation bilaterally   Abdomen - soft, non-tender, non-distended, positive bowel sounds   Psych - The patient is alert and oriented x3 with normal mood and affect     Examination of the Left Lower Extremity     Skin is intact  throughout  Motor in intact EHL,FHL,TA,kristian  +2 DP/PT  Sensation LT intact D/P/1st     Examination of the Left Hip     C-Sign positive  Logroll negative  Stenchfield positive     Pain with ROM positive     ROM:     Flexion                        150  Extension                    10  Abduction                    50  Adduction                    10  External Rotation        90  Internal Rotation         10     Flexion contracture negative     FADIR positive  FADER negative      Examination of the Left knee:     ROM 0 - 150   Effusion negative  Tenderness to palpation at the joint line positive  Pain during range of motion positive  Crepitation during range of motion positive     positive increased pain noted with flexion past 90   positive antalgic gait noted   negative Lachman's Test   negative Anterior Drawer Test   negative Posterior Drawer Test   positive McMurrays Test   positive Disco Test   negative Varus/Valgus instability     Examination of the Right Lower Extremity:      Motor function is intact distally EHL/FHL/TA/kristian   +2 dorsalis pedis and posterior tibial pulses   Sensation to light touch intact distally dorsal, plantar, and first web space      Examination of the Right knee:     ROM 0 - 150   Effusion positive  Tenderness to palpation at the joint line positive  Pain during range of motion positive  Crepitation during range of motion positive     positive increased pain noted with flexion past 90   positive antalgic gait noted   negative Lachman's Test   negative Anterior Drawer Test   negative Posterior Drawer Test   positive McMurrays Test   positive Disco Test   negative Varus/Valgus instability    MRI images were examined and personally reviewed by me.  An MRI arthrogram of the left hip dated 12/12/2019 is available for review.  It shows mild gluteus minimus tendinitis.  No tearing of the acetabular labrum was noted.    An MRI of the left knee dated 12/10/2019 is also available for review.  It  shows a hard all tear in the body and posterior horn of the medial meniscus of the left knee, so chondral damage along the patella, and edema in the suprapatellar fat pad.    An MRI of the right knee dated 12/10/2019 is also available for review.  It shows an oblique undersurface tear in the posterior horn the medial meniscus and some grade 2 chondromalacia along the patella.    Dx:  Tears in the posterior horn of the medial meniscus in both the left and right knee.    Plan:  We discussed treatment options.  The patient has had injections, therapy, and activity modification without relief.  My recommendation was for knee arthroscopy with partial medial meniscectomy for the left and right knee.  We would have to do 1 at a time.  The patient would like to do the left knee 1st.  Risks benefits of left knee arthroscopy with partial medial meniscectomy were explained the patient. She verbalized understanding and does wish to proceed. We will schedule that for the next available date that is convenient for her.  She does have a history of stroke and is on blood thinner so we will need to coordinate with her hematologist.      Answers for HPI/ROS submitted by the patient on 12/14/2019   Hip pain  unexpected weight change: No  appetite change : No  sleep disturbance: No  IMMUNOCOMPROMISED: No  nervous/ anxious: Yes  dysphoric mood: No  rash: No  visual disturbance: No  eye redness: No  eye pain: No  ear pain: No  tinnitus: No  hearing loss: No  sinus pressure : No  nosebleeds: No  enviro allergies: No  food allergies: No  cough: Yes  shortness of breath: No  sweating: No  dysuria: No  frequency: No  difficulty urinating: No  hematuria: No  painful intercourse: No  chest pain: No  palpitations: No  nausea: No  vomiting: No  diarrhea: No  blood in stool: No  constipation: No  headaches: Yes  dizziness: No  numbness: No  seizures: No  joint swelling: Yes  myalgia: Yes  weakness: No  back pain: Yes  Pain Chronicity:  recurrent  History of trauma: No  Onset: more than 1 month ago  Frequency: constantly  Progression since onset: waxing and waning  Injury mechanism: running  injury location: exercising  pain- numeric: 3/10  pain location: left hip  pain quality: aching  Radiating Pain: No  Aggravating factors: activity, exercise, twisting  fever: No  inability to bear weight: No  itching: No  joint locking: No  limited range of motion: Yes  stiffness: Yes  tingling: No  Treatments tried: rest, nothing  physical therapy: not tried  Improvement on treatment: mild

## 2019-12-17 DIAGNOSIS — S83.242A TEAR OF MEDIAL MENISCUS OF LEFT KNEE, CURRENT, INITIAL ENCOUNTER: ICD-10-CM

## 2019-12-17 DIAGNOSIS — Z01.818 PREOP TESTING: Primary | ICD-10-CM

## 2019-12-17 RX ORDER — MUPIROCIN 20 MG/G
OINTMENT TOPICAL
Status: CANCELLED | OUTPATIENT
Start: 2019-12-17

## 2019-12-17 RX ORDER — CEFAZOLIN SODIUM 2 G/50ML
2 SOLUTION INTRAVENOUS
Status: CANCELLED | OUTPATIENT
Start: 2019-12-17

## 2020-01-03 ENCOUNTER — PATIENT MESSAGE (OUTPATIENT)
Dept: HEMATOLOGY/ONCOLOGY | Facility: CLINIC | Age: 40
End: 2020-01-03

## 2020-01-03 ENCOUNTER — PATIENT MESSAGE (OUTPATIENT)
Dept: NEUROLOGY | Facility: CLINIC | Age: 40
End: 2020-01-03

## 2020-01-03 ENCOUNTER — TELEPHONE (OUTPATIENT)
Dept: INFUSION THERAPY | Facility: HOSPITAL | Age: 40
End: 2020-01-03

## 2020-01-03 ENCOUNTER — HOSPITAL ENCOUNTER (OUTPATIENT)
Dept: PREADMISSION TESTING | Facility: HOSPITAL | Age: 40
Discharge: HOME | End: 2020-01-03
Attending: ORTHOPAEDIC SURGERY
Payer: COMMERCIAL

## 2020-01-03 VITALS — BODY MASS INDEX: 29.99 KG/M2 | HEIGHT: 65 IN | WEIGHT: 180 LBS

## 2020-01-03 DIAGNOSIS — Z01.818 PREOP TESTING: ICD-10-CM

## 2020-01-03 DIAGNOSIS — S83.242A TEAR OF MEDIAL MENISCUS OF LEFT KNEE, CURRENT, INITIAL ENCOUNTER: ICD-10-CM

## 2020-01-03 LAB
ANION GAP SERPL CALC-SCNC: 7 MMOL/L (ref 8–16)
BASOPHILS # BLD AUTO: 0.04 K/UL (ref 0–0.2)
BASOPHILS NFR BLD: 0.6 % (ref 0–1.9)
BUN SERPL-MCNC: 10 MG/DL (ref 6–20)
CALCIUM SERPL-MCNC: 8.9 MG/DL (ref 8.7–10.5)
CHLORIDE SERPL-SCNC: 106 MMOL/L (ref 95–110)
CO2 SERPL-SCNC: 26 MMOL/L (ref 23–29)
CREAT SERPL-MCNC: 0.8 MG/DL (ref 0.5–1.4)
DIFFERENTIAL METHOD: NORMAL
EOSINOPHIL # BLD AUTO: 0.3 K/UL (ref 0–0.5)
EOSINOPHIL NFR BLD: 4 % (ref 0–8)
ERYTHROCYTE [DISTWIDTH] IN BLOOD BY AUTOMATED COUNT: 12.5 % (ref 11.5–14.5)
EST. GFR  (AFRICAN AMERICAN): >60 ML/MIN/1.73 M^2
EST. GFR  (NON AFRICAN AMERICAN): >60 ML/MIN/1.73 M^2
GLUCOSE SERPL-MCNC: 95 MG/DL (ref 70–110)
HCT VFR BLD AUTO: 40.2 % (ref 37–48.5)
HGB BLD-MCNC: 12.9 G/DL (ref 12–16)
IMM GRANULOCYTES # BLD AUTO: 0.01 K/UL (ref 0–0.04)
LYMPHOCYTES # BLD AUTO: 3.1 K/UL (ref 1–4.8)
LYMPHOCYTES NFR BLD: 43.5 % (ref 18–48)
MCH RBC QN AUTO: 30 PG (ref 27–31)
MCHC RBC AUTO-ENTMCNC: 32.1 G/DL (ref 32–36)
MCV RBC AUTO: 94 FL (ref 82–98)
MONOCYTES # BLD AUTO: 0.6 K/UL (ref 0.3–1)
MONOCYTES NFR BLD: 8 % (ref 4–15)
NEUTROPHILS # BLD AUTO: 3.1 K/UL (ref 1.8–7.7)
NEUTROPHILS NFR BLD: 43.8 % (ref 38–73)
NRBC BLD-RTO: 0 /100 WBC
PLATELET # BLD AUTO: 309 K/UL (ref 150–350)
PMV BLD AUTO: 9.3 FL (ref 9.2–12.9)
POTASSIUM SERPL-SCNC: 4 MMOL/L (ref 3.5–5.1)
RBC # BLD AUTO: 4.3 M/UL (ref 4–5.4)
SODIUM SERPL-SCNC: 139 MMOL/L (ref 136–145)
WBC # BLD AUTO: 7.04 K/UL (ref 3.9–12.7)

## 2020-01-03 PROCEDURE — 93010 ELECTROCARDIOGRAM REPORT: CPT | Mod: ,,, | Performed by: INTERNAL MEDICINE

## 2020-01-03 PROCEDURE — 36415 COLL VENOUS BLD VENIPUNCTURE: CPT

## 2020-01-03 PROCEDURE — 85025 COMPLETE CBC W/AUTO DIFF WBC: CPT

## 2020-01-03 PROCEDURE — 93010 EKG 12-LEAD: ICD-10-PCS | Mod: ,,, | Performed by: INTERNAL MEDICINE

## 2020-01-03 PROCEDURE — 80048 BASIC METABOLIC PNL TOTAL CA: CPT

## 2020-01-03 PROCEDURE — 99900104 DSU ONLY-NO CHARGE-EA ADD'L HR (STAT)

## 2020-01-03 PROCEDURE — 99900103 DSU ONLY-NO CHARGE-INITIAL HR (STAT)

## 2020-01-03 PROCEDURE — 93005 ELECTROCARDIOGRAM TRACING: CPT

## 2020-01-03 NOTE — DISCHARGE INSTRUCTIONS
To confirm, Your doctor has instructed you that surgery is scheduled for:     Please report to [facility], Registration the morning of surgery. You must check-in and receive a wristband before going to your procedure.    Pre-Op will call the afternoon prior to surgery between 1:00 and 6:00 PM with the final arrival time.  Phone number: [phone]    PLEASE NOTE:  The surgery schedule has many variables which may affect the time of your surgery case.  Family members should be available if your surgery time changes.  Plan to be here the day of your procedure between 4-6 hours.    MEDICATIONS:  TAKE ONLY THESE MEDICATIONS WITH A SMALL SIP OF WATER THE MORNING OF YOUR PROCEDURE: NONE    DO NOT TAKE THESE MEDICATIONS 5-7 DAYS PRIOR to your procedure or per your surgeon's request: ASPIRIN, ALEVE, ADVIL, IBUPROFEN, FISH OIL VITAMIN E, HERBALS  (May take Tylenol)  HOLD ELIQUIS PER DOCTOR INSTRUCTIONS    ONLY if you are prescribed any types of blood thinners such as:  Aspirin, Coumadin, Plavix, Pradaxa, Xarelto, Aggrenox, Effient, Eliquis, Savasya, Brilinta, or any other, ask your surgeon whether you should stop taking them and how long before surgery you should stop.  You may also need to verify with the prescribing physician if it is ok to stop your medication.      INSTRUCTIONS IMPORTANT!!  · Do not eat or drink anything between midnight and the time of your procedure- this includes gum, mints, and candy.  · Do not smoke or drink alcoholic beverages 24 hours prior to your procedure.  · Shower the night before AND the morning of your procedure with a Chlorhexidine wash such as Hibiclens or Dial antibacterial soap from the neck down.  Do not get it on your face or in your eyes.  You may use your own shampoo and face wash. This helps your skin to be as bacteria free as possible.    · If you wear contact lenses, dentures, hearing aids or glasses, bring a container to put them in during surgery and  give to a family member for safe keeping.  Please leave all jewelry, piercing's and valuables at home.   · DO NOT remove hair from the surgery site.  Do not shave the incision site unless you are given specific instructions to do so.    · ONLY if you have been diagnosed with sleep apnea please bring your C-PAP machine.  · ONLY if you wear home oxygen please bring your portable oxygen tank the day of your procedure.  · ONLY if you have a history of OPEN HEART SURGERY you will need a clearance from your Cardiologist per Anesthesia.      · ONLY for patients requiring bowel prep, written instructions will be given by your doctor's office.  · ONLY if you have a neuro stimulator, please bring the controller with you the morning of surgery  · ONLY if a type and screen test is needed before surgery, please return:  · If your doctor has scheduled you for an overnight stay, bring a small overnight bag with any personal items you need.  · Make arrangements in advance for transportation home by a responsible adult.  It is not safe to drive a vehicle during the 24 hours after anesthesia.      · Visiting hours are 10:00AM to 8:30PM.  For the safety of all patients, visitors under the age of 12 are not allowed above the first floor of the hospital.    · All [facility] facilities and properties are tobacco free.  Smoking is NOT allowed.       If you have any questions about these instructions, call Pre-Op Admit  Nursing at [phone] or the Pre-Op Day Surgery Unit at [phone].

## 2020-01-03 NOTE — TELEPHONE ENCOUNTER
----- Message from Luzma Daniels LPN sent at 1/3/2020 10:41 AM CST -----  Regarding: preoperative clearance  Good morning! This patient is having a left knee arthroscopy with partial medial meniscectomy, scheduled for 01/10/2020. We have received Cardiology clearance from Dr. Frank, however, he stated clearance regarding bridging her eliquis with lovenox post operatively would need to be addressed by Dr. Sprague. Are you able to provide a recommendation regarding this medication transition?     Please let us know how to proceed.     Best regards,   Luzma Daniels LPN ROT on behalf of SARTHAK GalavizII MD PÉREZA

## 2020-01-03 NOTE — TELEPHONE ENCOUNTER
Advised Ms. Rausch to hold Eliquis two days prior to procedure and can restart the day after the procedure per Dr. Sprague.

## 2020-01-09 ENCOUNTER — ANESTHESIA EVENT (OUTPATIENT)
Dept: SURGERY | Facility: HOSPITAL | Age: 40
End: 2020-01-09
Payer: COMMERCIAL

## 2020-01-09 RX ORDER — SODIUM CHLORIDE, SODIUM LACTATE, POTASSIUM CHLORIDE, CALCIUM CHLORIDE 600; 310; 30; 20 MG/100ML; MG/100ML; MG/100ML; MG/100ML
INJECTION, SOLUTION INTRAVENOUS CONTINUOUS
Status: CANCELLED | OUTPATIENT
Start: 2020-01-09

## 2020-01-10 ENCOUNTER — ANESTHESIA (OUTPATIENT)
Dept: SURGERY | Facility: HOSPITAL | Age: 40
End: 2020-01-10
Payer: COMMERCIAL

## 2020-01-10 ENCOUNTER — HOSPITAL ENCOUNTER (OUTPATIENT)
Facility: HOSPITAL | Age: 40
Discharge: HOME | End: 2020-01-10
Attending: ORTHOPAEDIC SURGERY | Admitting: ORTHOPAEDIC SURGERY
Payer: COMMERCIAL

## 2020-01-10 DIAGNOSIS — Z01.818 PREOP TESTING: ICD-10-CM

## 2020-01-10 DIAGNOSIS — S83.242A TEAR OF MEDIAL MENISCUS OF LEFT KNEE, CURRENT, INITIAL ENCOUNTER: Primary | ICD-10-CM

## 2020-01-10 DIAGNOSIS — S83.242A TEAR OF MEDIAL MENISCUS OF LEFT KNEE, CURRENT, INITIAL ENCOUNTER: ICD-10-CM

## 2020-01-10 LAB
B-HCG UR QL: NEGATIVE
CTP QC/QA: YES

## 2020-01-10 PROCEDURE — 63600175 PHARM REV CODE 636 W HCPCS: Performed by: ORTHOPAEDIC SURGERY

## 2020-01-10 PROCEDURE — D9220A PRA ANESTHESIA: ICD-10-PCS | Mod: CRNA,,, | Performed by: NURSE ANESTHETIST, CERTIFIED REGISTERED

## 2020-01-10 PROCEDURE — D9220A PRA ANESTHESIA: Mod: CRNA,,, | Performed by: NURSE ANESTHETIST, CERTIFIED REGISTERED

## 2020-01-10 PROCEDURE — 63600175 PHARM REV CODE 636 W HCPCS: Performed by: NURSE ANESTHETIST, CERTIFIED REGISTERED

## 2020-01-10 PROCEDURE — 99900104 DSU ONLY-NO CHARGE-EA ADD'L HR (STAT): Performed by: ORTHOPAEDIC SURGERY

## 2020-01-10 PROCEDURE — 27200651 HC AIRWAY, LMA: Performed by: NURSE ANESTHETIST, CERTIFIED REGISTERED

## 2020-01-10 PROCEDURE — 71000015 HC POSTOP RECOV 1ST HR: Performed by: ORTHOPAEDIC SURGERY

## 2020-01-10 PROCEDURE — 99900103 DSU ONLY-NO CHARGE-INITIAL HR (STAT): Performed by: ORTHOPAEDIC SURGERY

## 2020-01-10 PROCEDURE — 27201423 OPTIME MED/SURG SUP & DEVICES STERILE SUPPLY: Performed by: ORTHOPAEDIC SURGERY

## 2020-01-10 PROCEDURE — 29880 PR KNEE SCOPE MED/LAT MENISCECTOMY: ICD-10-PCS | Mod: LT,,, | Performed by: ORTHOPAEDIC SURGERY

## 2020-01-10 PROCEDURE — 63600175 PHARM REV CODE 636 W HCPCS: Performed by: ANESTHESIOLOGY

## 2020-01-10 PROCEDURE — 01400 ANES OPN/ARTHRS KNEE JT NOS: CPT | Performed by: ORTHOPAEDIC SURGERY

## 2020-01-10 PROCEDURE — 36000710: Performed by: ORTHOPAEDIC SURGERY

## 2020-01-10 PROCEDURE — D9220A PRA ANESTHESIA: Mod: ANES,,, | Performed by: ANESTHESIOLOGY

## 2020-01-10 PROCEDURE — 25000003 PHARM REV CODE 250: Performed by: ANESTHESIOLOGY

## 2020-01-10 PROCEDURE — 29880 ARTHRS KNE SRG MNISECTMY M&L: CPT | Mod: LT,,, | Performed by: ORTHOPAEDIC SURGERY

## 2020-01-10 PROCEDURE — 71000033 HC RECOVERY, INTIAL HOUR: Performed by: ORTHOPAEDIC SURGERY

## 2020-01-10 PROCEDURE — 81025 URINE PREGNANCY TEST: CPT | Performed by: ORTHOPAEDIC SURGERY

## 2020-01-10 PROCEDURE — 25000003 PHARM REV CODE 250: Performed by: ORTHOPAEDIC SURGERY

## 2020-01-10 PROCEDURE — 37000008 HC ANESTHESIA 1ST 15 MINUTES: Performed by: ORTHOPAEDIC SURGERY

## 2020-01-10 PROCEDURE — 36000711: Performed by: ORTHOPAEDIC SURGERY

## 2020-01-10 PROCEDURE — D9220A PRA ANESTHESIA: ICD-10-PCS | Mod: ANES,,, | Performed by: ANESTHESIOLOGY

## 2020-01-10 PROCEDURE — 37000009 HC ANESTHESIA EA ADD 15 MINS: Performed by: ORTHOPAEDIC SURGERY

## 2020-01-10 RX ORDER — ROPIVACAINE HYDROCHLORIDE 5 MG/ML
INJECTION, SOLUTION EPIDURAL; INFILTRATION; PERINEURAL
Status: DISCONTINUED | OUTPATIENT
Start: 2020-01-10 | End: 2020-01-10 | Stop reason: HOSPADM

## 2020-01-10 RX ORDER — OXYCODONE HYDROCHLORIDE 5 MG/1
5 TABLET ORAL
Status: DISCONTINUED | OUTPATIENT
Start: 2020-01-10 | End: 2020-01-10 | Stop reason: HOSPADM

## 2020-01-10 RX ORDER — LIDOCAINE HYDROCHLORIDE 10 MG/ML
1 INJECTION, SOLUTION EPIDURAL; INFILTRATION; INTRACAUDAL; PERINEURAL ONCE
Status: DISPENSED | OUTPATIENT
Start: 2020-01-10

## 2020-01-10 RX ORDER — METOCLOPRAMIDE HYDROCHLORIDE 5 MG/ML
10 INJECTION INTRAMUSCULAR; INTRAVENOUS EVERY 10 MIN PRN
Status: DISCONTINUED | OUTPATIENT
Start: 2020-01-10 | End: 2020-01-10 | Stop reason: HOSPADM

## 2020-01-10 RX ORDER — ONDANSETRON HYDROCHLORIDE 2 MG/ML
INJECTION, SOLUTION INTRAMUSCULAR; INTRAVENOUS
Status: DISCONTINUED | OUTPATIENT
Start: 2020-01-10 | End: 2020-01-10

## 2020-01-10 RX ORDER — MUPIROCIN 20 MG/G
OINTMENT TOPICAL
Status: DISCONTINUED | OUTPATIENT
Start: 2020-01-10 | End: 2020-01-10 | Stop reason: HOSPADM

## 2020-01-10 RX ORDER — LIDOCAINE HCL/PF 100 MG/5ML
SYRINGE (ML) INTRAVENOUS
Status: DISCONTINUED | OUTPATIENT
Start: 2020-01-10 | End: 2020-01-10

## 2020-01-10 RX ORDER — KETOROLAC TROMETHAMINE 30 MG/ML
INJECTION, SOLUTION INTRAMUSCULAR; INTRAVENOUS
Status: DISCONTINUED | OUTPATIENT
Start: 2020-01-10 | End: 2020-01-10

## 2020-01-10 RX ORDER — PHENYLEPHRINE HYDROCHLORIDE 10 MG/ML
INJECTION INTRAVENOUS
Status: DISCONTINUED | OUTPATIENT
Start: 2020-01-10 | End: 2020-01-10

## 2020-01-10 RX ORDER — EPINEPHRINE 1 MG/ML
INJECTION, SOLUTION INTRACARDIAC; INTRAMUSCULAR; INTRAVENOUS; SUBCUTANEOUS
Status: DISCONTINUED | OUTPATIENT
Start: 2020-01-10 | End: 2020-01-10 | Stop reason: HOSPADM

## 2020-01-10 RX ORDER — PROPOFOL 10 MG/ML
VIAL (ML) INTRAVENOUS
Status: DISCONTINUED | OUTPATIENT
Start: 2020-01-10 | End: 2020-01-10

## 2020-01-10 RX ORDER — MIDAZOLAM HYDROCHLORIDE 1 MG/ML
INJECTION, SOLUTION INTRAMUSCULAR; INTRAVENOUS
Status: DISCONTINUED | OUTPATIENT
Start: 2020-01-10 | End: 2020-01-10

## 2020-01-10 RX ORDER — DEXAMETHASONE SODIUM PHOSPHATE 4 MG/ML
INJECTION, SOLUTION INTRA-ARTICULAR; INTRALESIONAL; INTRAMUSCULAR; INTRAVENOUS; SOFT TISSUE
Status: DISCONTINUED | OUTPATIENT
Start: 2020-01-10 | End: 2020-01-10

## 2020-01-10 RX ORDER — HYDROCODONE BITARTRATE AND ACETAMINOPHEN 7.5; 325 MG/1; MG/1
1 TABLET ORAL EVERY 6 HOURS PRN
Qty: 28 TABLET | Refills: 0 | Status: SHIPPED | OUTPATIENT
Start: 2020-01-10 | End: 2020-01-17

## 2020-01-10 RX ORDER — CEFAZOLIN SODIUM 2 G/50ML
2 SOLUTION INTRAVENOUS
Status: COMPLETED | OUTPATIENT
Start: 2020-01-10 | End: 2020-01-10

## 2020-01-10 RX ORDER — FENTANYL CITRATE 50 UG/ML
INJECTION, SOLUTION INTRAMUSCULAR; INTRAVENOUS
Status: DISCONTINUED | OUTPATIENT
Start: 2020-01-10 | End: 2020-01-10

## 2020-01-10 RX ORDER — FENTANYL CITRATE 50 UG/ML
25 INJECTION, SOLUTION INTRAMUSCULAR; INTRAVENOUS EVERY 5 MIN PRN
Status: DISCONTINUED | OUTPATIENT
Start: 2020-01-10 | End: 2020-01-10 | Stop reason: HOSPADM

## 2020-01-10 RX ADMIN — MUPIROCIN: 20 OINTMENT TOPICAL at 06:01

## 2020-01-10 RX ADMIN — OXYCODONE HYDROCHLORIDE 5 MG: 5 TABLET ORAL at 08:01

## 2020-01-10 RX ADMIN — PHENYLEPHRINE HYDROCHLORIDE 100 MCG: 10 INJECTION INTRAVENOUS at 07:01

## 2020-01-10 RX ADMIN — SODIUM CHLORIDE, SODIUM GLUCONATE, SODIUM ACETATE, POTASSIUM CHLORIDE, MAGNESIUM CHLORIDE, SODIUM PHOSPHATE, DIBASIC, AND POTASSIUM PHOSPHATE: .53; .5; .37; .037; .03; .012; .00082 INJECTION, SOLUTION INTRAVENOUS at 06:01

## 2020-01-10 RX ADMIN — FENTANYL CITRATE 50 MCG: 50 INJECTION, SOLUTION INTRAMUSCULAR; INTRAVENOUS at 07:01

## 2020-01-10 RX ADMIN — CEFAZOLIN SODIUM 2 G: 2 SOLUTION INTRAVENOUS at 07:01

## 2020-01-10 RX ADMIN — MIDAZOLAM 2 MG: 1 INJECTION INTRAMUSCULAR; INTRAVENOUS at 06:01

## 2020-01-10 RX ADMIN — DEXAMETHASONE SODIUM PHOSPHATE 4 MG: 4 INJECTION, SOLUTION INTRAMUSCULAR; INTRAVENOUS at 07:01

## 2020-01-10 RX ADMIN — FENTANYL CITRATE 25 MCG: 0.05 INJECTION, SOLUTION INTRAMUSCULAR; INTRAVENOUS at 08:01

## 2020-01-10 RX ADMIN — PROPOFOL 200 MG: 10 INJECTION, EMULSION INTRAVENOUS at 07:01

## 2020-01-10 RX ADMIN — ONDANSETRON 4 MG: 2 INJECTION, SOLUTION INTRAMUSCULAR; INTRAVENOUS at 07:01

## 2020-01-10 RX ADMIN — SODIUM CHLORIDE, SODIUM GLUCONATE, SODIUM ACETATE, POTASSIUM CHLORIDE, MAGNESIUM CHLORIDE, SODIUM PHOSPHATE, DIBASIC, AND POTASSIUM PHOSPHATE: .53; .5; .37; .037; .03; .012; .00082 INJECTION, SOLUTION INTRAVENOUS at 07:01

## 2020-01-10 RX ADMIN — LIDOCAINE HYDROCHLORIDE 100 MG: 20 INJECTION, SOLUTION INTRAVENOUS at 07:01

## 2020-01-10 RX ADMIN — KETOROLAC TROMETHAMINE 30 MG: 30 INJECTION, SOLUTION INTRAMUSCULAR; INTRAVENOUS at 07:01

## 2020-01-10 NOTE — ANESTHESIA PROCEDURE NOTES
Intubation  Performed by: Tulio Fall CRNA  Authorized by: Devan Brooks MD     Intubation:     Induction:  Intravenous    Mask Ventilation:  Easy mask    Attempts:  1    Attempted By:  Student    Difficult Airway Encountered?: No      Complications:  None    Airway Device:  Supraglottic airway/LMA    Airway Device Size:  3.0    Secured at:  The lips    Placement Verified By:  Capnometry    Complicating Factors:  None

## 2020-01-10 NOTE — TRANSFER OF CARE
"Anesthesia Transfer of Care Note    Patient: Jaclyn Rausch    Procedure(s) Performed: Procedure(s) (LRB):  ARTHROSCOPY, KNEE, WITH MENISCECTOMY (Left)  REPAIR, MENISCUS, KNEE (Left)  CHONDROPLASTY, KNEE (Left)    Patient location: PACU    Transport from OR: Transported from OR on 2-3 L/min O2 by NC with adequate spontaneous ventilation    Post pain: adequate analgesia    Post assessment: no apparent anesthetic complications    Post vital signs: stable    Level of consciousness: awake    Nausea/Vomiting: no nausea/vomiting    Complications: none    Transfer of care protocol was followed      Last vitals:   Visit Vitals  /77 (BP Location: Left arm, Patient Position: Lying)   Pulse 82   Temp 36.6 °C (97.9 °F) (Skin)   Resp 14   Ht 5' 5" (1.651 m)   Wt 81.6 kg (180 lb)   LMP 12/31/2019   SpO2 99%   Breastfeeding? No   BMI 29.95 kg/m²     "

## 2020-01-10 NOTE — DISCHARGE INSTRUCTIONS
General Information:    1.  Do not drink alcoholic beverages including beer for 24 hours or as long as you are on pain medication..  2.  Do not drive a motor vehicle, operate machinery or power tools, or signs legal papers for 24 hours or as long as you are on pain medication.   3.  You may experience light-headedness, dizziness, and sleepiness following surgery. Please do not stay alone. A responsible adult should be with you for this 24 hour period.  4.  Go home and rest.    5. Progress slowly to a normal diet unless instructed.  Otherwise, begin with liquids such as soft drinks, then soup and crackers working up to solid foods. Drink plenty of nonalcoholic fluids.  6.  Certain anesthetics and pain medications produce nausea and vomiting in certain       individuals. If nausea becomes a problem at home, call you doctor.    7. A nurse will be calling you sometime after surgery. Do not be alarmed. This is our way of finding out how you are doing.    8. Several times every hour while you are awake, take 2-3 deep breaths and cough. If you had stomach surgery hold a pillow or rolled towel firmly against your stomach before you cough. This will help with any pain the cough might cause.  9. Several times every hour while you are awake, pump and flex your feet 5-6 times and do foot circles. This will help prevent blood clots.    10.Call your doctor for severe pain, bleeding, fever, or signs or symptoms of infection (pain, swelling, redness, foul odor, drainage).    Post op instructions for prevention of DVT  What is deep vein thrombosis?  Deep vein thrombosis (DVT) is the medical term for blood clots in the deep veins of the leg.  These blood clots can be dangerous.  A DVT can block a blood vessel and keep blood from getting where it needs to go.  Another problem is that the clot can travel to other parts of the body such as the lungs.  A clot that travels to the lungs is called a pulmonary embolus (PE) and can cause  serious problems with breathing which can lead to death.  Am I at risk for DVT/PE?  If you are not very active, you are at risk of DVT.  Anyone confined to bed, sitting for long periods of time, recovering from surgery, etc. increases the risk of DVT.  Other risk factors are cancer diagnosis, certain medications, estrogen replacement in any form,older age, obesity, pregnancy, smoking, history of clotting disorders, and dehydration.  How will I know if I have a DVT?   Swelling in the lower leg   Pain   Warmth, redness, hardness or bulging of the vein  If you have any of these symptoms, call your doctors office right away.  Some people will not have any symptoms until the clot moves to the lungs.  What are the symptoms of a PE?   Panting, shortness of breath, or trouble breathing   Sharp, knife-like chest pain when you breathe   Coughing or coughing up blood   Rapid heartbeat  If you have any of these symptoms or get worse quickly, call 911 for emergency treatment.  How can I prevent a DVT?   Avoid long periods of inactivity and dont cross your legs--get up and walk around every hour or so.   Stay active--walking after surgery is highly encouraged.  This means you should get out of the house and walk in the neighborhood.  Going up and down stairs will not impair healing (unless advised against such activity by your doctor).     Drink plenty of noncaffeinated, nonalcoholic fluids each day to prevent dehydration.   Wear special support stockings, if they have been advised by your doctor.   If you travel, stop at least once an hour and walk around.   Avoid smoking (assistance with stopping is available through your healthcare provider)  Always notify your doctor if you are not able to follow the post operative instructions that are given to you at the time of discharge.  It may be necessary to prescribe one of the medications available to prevent DVT.    Discharge Instructions: After Your  "Surgery/Procedure  Youve just had surgery. During surgery you were given medicine called anesthesia to keep you relaxed and free of pain. After surgery you may have some pain or nausea. This is common. Here are some tips for feeling better and getting well after surgery.     Stay on schedule with your medication.   Going home  Your doctor or nurse will show you how to take care of yourself when you go home. He or she will also answer your questions. Have an adult family member or friend drive you home.      For your safety we recommend these precaution for the first 24 hours after your procedure:  · Do not drive or use heavy equipment.  · Do not make important decisions or sign legal papers.  · Do not drink alcohol.  · Have someone stay with you, if needed. He or she can watch for problems and help keep you safe.  · Your concentration, balance, coordination, and judgement may be impaired for many hours after anesthesia.  Use caution when ambulating or standing up.     · You may feel weak and "washed out" after anesthesia and surgery.      Subtle residual effects of general anesthesia or sedation with regional / local anesthesia can last more than 24 hours.  Rest for the remainder of the day or longer if your Doctor/Surgeon has advised you to do so.  Although you may feel normal within the first 24 hours, your reflexes and mental ability may be impaired without you realizing it.  You may feel dizzy, lightheaded or sleepy for 24 hours or longer.      Be sure to go to all follow-up visits with your doctor. And rest after your surgery for as long as your doctor tells you to.  Coping with pain  If you have pain after surgery, pain medicine will help you feel better. Take it as told, before pain becomes severe. Also, ask your doctor or pharmacist about other ways to control pain. This might be with heat, ice, or relaxation. And follow any other instructions your surgeon or nurse gives you.  Tips for taking pain " medicine  To get the best relief possible, remember these points:  · Pain medicines can upset your stomach. Taking them with a little food may help.  · Most pain relievers taken by mouth need at least 20 to 30 minutes to start to work.  · Taking medicine on a schedule can help you remember to take it. Try to time your medicine so that you can take it before starting an activity. This might be before you get dressed, go for a walk, or sit down for dinner.  · Constipation is a common side effect of pain medicines. Call your doctor before taking any medicines such as laxatives or stool softeners to help ease constipation. Also ask if you should skip any foods. Drinking lots of fluids and eating foods such as fruits and vegetables that are high in fiber can also help. Remember, do not take laxatives unless your surgeon has prescribed them.  · Drinking alcohol and taking pain medicine can cause dizziness and slow your breathing. It can even be deadly. Do not drink alcohol while taking pain medicine.  · Pain medicine can make you react more slowly to things. Do not drive or run machinery while taking pain medicine.  Your health care provider may tell you to take acetaminophen to help ease your pain. Ask him or her how much you are supposed to take each day. Acetaminophen or other pain relievers may interact with your prescription medicines or other over-the-counter (OTC) drugs. Some prescription medicines have acetaminophen and other ingredients. Using both prescription and OTC acetaminophen for pain can cause you to overdose. Read the labels on your OTC medicines with care. This will help you to clearly know the list of ingredients, how much to take, and any warnings. It may also help you not take too much acetaminophen. If you have questions or do not understand the information, ask your pharmacist or health care provider to explain it to you before you take the OTC medicine.  Managing nausea  Some people have an upset  stomach after surgery. This is often because of anesthesia, pain, or pain medicine, or the stress of surgery. These tips will help you handle nausea and eat healthy foods as you get better. If you were on a special food plan before surgery, ask your doctor if you should follow it while you get better. These tips may help:  · Do not push yourself to eat. Your body will tell you when to eat and how much.  · Start off with clear liquids and soup. They are easier to digest.  · Next try semi-solid foods, such as mashed potatoes, applesauce, and gelatin, as you feel ready.  · Slowly move to solid foods. Dont eat fatty, rich, or spicy foods at first.  · Do not force yourself to have 3 large meals a day. Instead eat smaller amounts more often.  · Take pain medicines with a small amount of solid food, such as crackers or toast, to avoid nausea.     Call your surgeon if  · You still have pain an hour after taking medicine. The medicine may not be strong enough.  · You feel too sleepy, dizzy, or groggy. The medicine may be too strong.  · You have side effects like nausea, vomiting, or skin changes, such as rash, itching, or hives.       If you have obstructive sleep apnea  You were given anesthesia medicine during surgery to keep you comfortable and free of pain. After surgery, you may have more apnea spells because of this medicine and other medicines you were given. The spells may last longer than usual.   At home:  · Keep using the continuous positive airway pressure (CPAP) device when you sleep. Unless your health care provider tells you not to, use it when you sleep, day or night. CPAP is a common device used to treat obstructive sleep apnea.  · Talk with your provider before taking any pain medicine, muscle relaxants, or sedatives. Your provider will tell you about the possible dangers of taking these medicines.  © 0644-5175 [employer]. [address] [place] [address], [place] [address]. All rights reserved. This  information is not intended as a substitute for professional medical care. Always follow your healthcare professional's instructions.

## 2020-01-10 NOTE — ANESTHESIA PREPROCEDURE EVALUATION
01/10/2020  Jaclyn Rausch is a 39 y.o., female.    Anesthesia Evaluation    I have reviewed the Patient Summary Reports.    I have reviewed the Nursing Notes.   I have reviewed the Medications.     Review of Systems  Anesthesia Hx:  Denies Family Hx of Anesthesia complications.   Denies Personal Hx of Anesthesia complications.   Hematology/Oncology:        Hematology Comments: Hypercoagulation on Eliquis   Neurological:   CVA, no residual symptoms Headaches   Peripheral Neuropathy        Physical Exam  General:  Well nourished    Airway/Jaw/Neck:  Airway Findings: Mouth Opening: Normal Tongue: Normal  General Airway Assessment: Adult  Mallampati: II  TM Distance: Normal, at least 6 cm        Eyes/Ears/Nose:  EYES/EARS/NOSE FINDINGS: Normal   Dental:  DENTAL FINDINGS: Normal   Chest/Lungs:  Chest/Lungs Clear    Heart/Vascular:  Heart Findings: Normal       Mental Status:  Mental Status Findings: Normal        Anesthesia Plan  Type of Anesthesia, risks & benefits discussed:  Anesthesia Type:  general  Patient's Preference:   Intra-op Monitoring Plan: standard ASA monitors  Intra-op Monitoring Plan Comments:   Post Op Pain Control Plan:   Post Op Pain Control Plan Comments:   Induction:   IV  Beta Blocker:  Patient is not currently on a Beta-Blocker (No further documentation required).       Informed Consent: Patient understands risks and agrees with Anesthesia plan.  Questions answered. Anesthesia consent signed with patient.  ASA Score: 2     Day of Surgery Review of History & Physical:    H&P update referred to the surgeon.         Ready For Surgery From Anesthesia Perspective.

## 2020-01-10 NOTE — PLAN OF CARE
Pt transferred to phase II. VSS, pain tolerable/improved after pain meds given, dressing to L knee cdi, cryo in place to L knee, bruise to L thigh and picture taken by OR nurse and uploaded into chart, informed pt of bruise and she stated that she bruises easily from the blood thinner that she takes, tolerated clear liquids without N/V. Report given to ALLIE Lion.

## 2020-01-10 NOTE — INTERVAL H&P NOTE
The patient has been examined and the H&P has been reviewed:    I concur with the findings and no changes have occurred since H&P was written.    Anesthesia/Surgery risks, benefits and alternative options discussed and understood by patient/family.          Active Hospital Problems    Diagnosis  POA    Tear of medial meniscus of left knee, current, initial encounter [S84.735A]  Yes      Resolved Hospital Problems   No resolved problems to display.

## 2020-01-10 NOTE — DISCHARGE SUMMARY
[facility]  Department of Orthopedic Surgery  Discharge Note  Short Stay    Admit Date: 1/10/2020    Discharge Date and Time: No discharge date for patient encounter.     Attending Physician: Michael Galaviz II, MD     Discharge Provider: Michael Galaviz II    Diagnoses:  Active Hospital Problems    Diagnosis  POA    Tear of medial meniscus of left knee, current, initial encounter [S83.242A]  Yes      Resolved Hospital Problems   No resolved problems to display.       Discharged Condition: good    Hospital Course: Patient was admitted for an outpatient procedure and tolerated the procedure well with no complications.    Final Diagnoses: Same as principal problem.    Disposition: Home or Self Care    Follow up/Patient Instructions:    Medications:  Reconciled Home Medications:      Medication List      CHANGE how you take these medications    iron-vitamin C 100-250 mg (ICAR-C) 100-250 mg Tab  Commonly known as:  Icar-C  Take 1 tablet by mouth once daily.  What changed:    · when to take this  · reasons to take this        CONTINUE taking these medications    atorvastatin 40 MG tablet  Commonly known as:  LIPITOR  Take 1 tablet (40 mg total) by mouth once daily.     Eliquis 5 mg Tab  Generic drug:  apixaban  TAKE 1 TABLET(5 MG) BY MOUTH TWICE DAILY        ASK your doctor about these medications    HYDROcodone-acetaminophen 7.5-325 mg per tablet  Commonly known as:  NORCO  Take 1 tablet by mouth every 6 (six) hours as needed.          Discharge Procedure Orders   Diet Adult Regular     Ice to affected area   Order Comments: Place polar Care on left knee     Notify your health care provider if you experience any of the following:  temperature >100.4     Notify your health care provider if you experience any of the following:  persistent nausea and vomiting or diarrhea     Notify your health care provider if you experience any of the following:  severe uncontrolled pain     Notify your health care provider  if you experience any of the following:  redness, tenderness, or signs of infection (pain, swelling, redness, odor or green/yellow discharge around incision site)     Notify your health care provider if you experience any of the following:  difficulty breathing or increased cough     Notify your health care provider if you experience any of the following:  severe persistent headache     Notify your health care provider if you experience any of the following:  worsening rash     Notify your health care provider if you experience any of the following:  persistent dizziness, light-headedness, or visual disturbances     Notify your health care provider if you experience any of the following:  increased confusion or weakness     Notify your health care provider if you experience any of the following:     Change dressing (specify)   Order Comments: Dressing change: One time per day beginning 72 hours post op.     Follow-up Information     Michael LEISA Galaviz II, MD In 1 week.    Specialty:  Orthopedic Surgery  Contact information:  [address] [place] [address] DR Marc SWEET [address]  [phone]                   Discharge Procedure Orders (must include Diet, Follow-up, Activity):   Discharge Procedure Orders (must include Diet, Follow-up, Activity)   Diet Adult Regular     Ice to affected area   Order Comments: Place polar Care on left knee     Notify your health care provider if you experience any of the following:  temperature >100.4     Notify your health care provider if you experience any of the following:  persistent nausea and vomiting or diarrhea     Notify your health care provider if you experience any of the following:  severe uncontrolled pain     Notify your health care provider if you experience any of the following:  redness, tenderness, or signs of infection (pain, swelling, redness, odor or green/yellow discharge around incision site)     Notify your health care provider if you experience any of the following:   difficulty breathing or increased cough     Notify your health care provider if you experience any of the following:  severe persistent headache     Notify your health care provider if you experience any of the following:  worsening rash     Notify your health care provider if you experience any of the following:  persistent dizziness, light-headedness, or visual disturbances     Notify your health care provider if you experience any of the following:  increased confusion or weakness     Notify your health care provider if you experience any of the following:     Change dressing (specify)   Order Comments: Dressing change: One time per day beginning 72 hours post op.

## 2020-01-10 NOTE — OP NOTE
[facility]  Orthopedic Surgery Department  Operative Note    SUMMARY     Date of Procedure: 1/10/2020     Procedure: Procedure(s) (LRB):  ARTHROSCOPY, KNEE, WITH MENISCECTOMY (Left)  REPAIR, MENISCUS, KNEE (Left)  CHONDROPLASTY, KNEE (Left)     Surgeon(s) and Role:     * Michael Galaviz II, MD - Primary    Assisting Surgeon: None    First Assist:  JOHNNA Mcrae    Pre-Operative Diagnosis: Preop testing [Z01.818]  Tear of medial meniscus of left knee, current, initial encounter [S83.242A]    Post-Operative Diagnosis: Post-Op Diagnosis Codes:     * Preop testing [Z01.818]     * Tear of medial meniscus of left knee, current, initial encounter [S83.242A]    Anesthesia: General    Technical Procedures Used:  Left knee arthroscopy with partial medial meniscectomy and chondroplasty of the medial femoral condyle    Description of the Findings of the Procedure:  Dictated    Significant Surgical Tasks Conducted by the Assistant(s), if Applicable:  Manipulation of the knee during arthroscopy, portal site closure and bandage application    Complications: No    Estimated Blood Loss (EBL): * No values recorded between 1/10/2020  7:16 AM and 1/10/2020  7:36 AM *           Implants: * No implants in log *    Specimens:   Specimen (12h ago, onward)    None                  Condition: Good    Disposition: PACU - hemodynamically stable.    Attestation: I was present for the entire procedure.    Procedure In Detail:  The patient was brought to the operating room and placed on the table in the supine position.  The patient underwent general endotracheal intubation without complication. A tourniquet was placed on the left lower extremity and was placed in an arthroscopic leg engel and prepped and draped in the normal sterile fashion. An Esmarch was used to exsanguinate the limb and the tourniquet was taken up to 250 mm of mercury.    A standard lateral parapatellar arthroscopy portal was created in the scope  was introduced into the knee.  The suprapatellar pouch was inspected and noted to be free of loose bodies and disease.  The medial gutter was inspected and noted to be free of loose bodies.  The medial compartment was entered and under direct arthroscopic visualization the medial portal was created.  There was noted to be a tear in the anterior horn of the medial meniscus that was debrided back to stable rim with a shaver.  The remainder of the meniscus was identified and probed and noted be free of tearing.      The intercondylar notch was inspected.  The ACL and PCL identified and probed and noted to be free of tears.  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  There was also a spot of chondral damage on the medial aspect of the trochlear groove measuring about 4 mm.  It had a loose fragment and that was debrided to stable rim by performing a chondroplasty.  The knee was flexed and extended and the patella was noted to track normally.    The lateral compartment was then inspected.  The meniscus was identified and probed.  In the posterior horn of lateral meniscus there was noted to be some fraying that was gently debrided.  The remainder of the meniscus was normal.  Cartilage in lateral compartment was normal. The lateral gutter was then inspected and noted be free of loose bodies and disease.    Scope was then removed from the knee.  Water used to insufflate the knee was ladder drain out.  The portal sites were closed with 3 O nylon and injected with ropivacaine.  A sterile intraoperative dressing was placed along with a polar Care device for postoperative pain control.  The patient was then awakened from anesthesia and taken recovery where she was noted to be stable postoperatively.  Needle and lap counts were correct at the end of the case.

## 2020-01-10 NOTE — ANESTHESIA POSTPROCEDURE EVALUATION
Anesthesia Post Evaluation    Patient: Jaclyn Rausch    Procedure(s) Performed: Procedure(s) (LRB):  ARTHROSCOPY, KNEE, WITH MENISCECTOMY (Left)  REPAIR, MENISCUS, KNEE (Left)  CHONDROPLASTY, KNEE (Left)    Final Anesthesia Type: general    Patient location during evaluation: PACU  Patient participation: Yes- Able to Participate  Level of consciousness: awake and alert  Post-procedure vital signs: reviewed and stable  Pain management: adequate  Airway patency: patent    PONV status at discharge: No PONV  Anesthetic complications: no      Cardiovascular status: blood pressure returned to baseline  Respiratory status: unassisted  Hydration status: euvolemic  Follow-up not needed.          Vitals Value Taken Time   /71 1/10/2020  9:00 AM   Temp 36.7 °C (98 °F) 1/10/2020  8:32 AM   Pulse 68 1/10/2020  9:00 AM   Resp 18 1/10/2020  9:00 AM   SpO2 97 % 1/10/2020  9:00 AM         Event Time     Out of Recovery 08:28:00          Pain/Malissa Score: Pain Rating Prior to Med Admin: 3 (1/10/2020  8:22 AM)  Pain Rating Post Med Admin: 1 (1/10/2020  8:25 AM)  Malissa Score: 10 (1/10/2020  9:00 AM)

## 2020-01-13 VITALS
HEIGHT: 65 IN | WEIGHT: 180 LBS | OXYGEN SATURATION: 97 % | BODY MASS INDEX: 29.99 KG/M2 | HEART RATE: 68 BPM | DIASTOLIC BLOOD PRESSURE: 71 MMHG | RESPIRATION RATE: 18 BRPM | TEMPERATURE: 98 F | SYSTOLIC BLOOD PRESSURE: 117 MMHG

## 2020-01-16 ENCOUNTER — OFFICE VISIT (OUTPATIENT)
Dept: ORTHOPEDICS | Facility: CLINIC | Age: 40
End: 2020-01-16
Payer: COMMERCIAL

## 2020-01-16 VITALS
SYSTOLIC BLOOD PRESSURE: 123 MMHG | HEIGHT: 65 IN | BODY MASS INDEX: 29.99 KG/M2 | HEART RATE: 76 BPM | WEIGHT: 180 LBS | DIASTOLIC BLOOD PRESSURE: 79 MMHG

## 2020-01-16 DIAGNOSIS — S83.241A TEAR OF MEDIAL MENISCUS OF RIGHT KNEE, CURRENT, INITIAL ENCOUNTER: ICD-10-CM

## 2020-01-16 DIAGNOSIS — S83.242A TEAR OF MEDIAL MENISCUS OF LEFT KNEE, CURRENT, INITIAL ENCOUNTER: Primary | ICD-10-CM

## 2020-01-16 PROCEDURE — 99999 PR PBB SHADOW E&M-EST. PATIENT-LVL III: CPT | Mod: PBBFAC,,, | Performed by: ORTHOPAEDIC SURGERY

## 2020-01-16 PROCEDURE — 99999 PR PBB SHADOW E&M-EST. PATIENT-LVL III: ICD-10-PCS | Mod: PBBFAC,,, | Performed by: ORTHOPAEDIC SURGERY

## 2020-01-16 PROCEDURE — 99024 PR POST-OP FOLLOW-UP VISIT: ICD-10-PCS | Mod: S$GLB,,, | Performed by: ORTHOPAEDIC SURGERY

## 2020-01-16 PROCEDURE — 99024 POSTOP FOLLOW-UP VISIT: CPT | Mod: S$GLB,,, | Performed by: ORTHOPAEDIC SURGERY

## 2020-01-16 NOTE — PROGRESS NOTES
CC:  39-year-old female follows up status post left knee arthroscopy.  She had a partial meniscectomy and chondroplasty on the medial side.  Overall she is doing well.  She reports the pain as a 2/10.    Examination of the Left Lower Extremity:     Portal sites are all clean, dry, intact with no sign of infection.  Motor function is intact distally EHL/FHL/TA/kristian   +2 dorsalis pedis and posterior tibial pulses   Sensation to light touch intact distally dorsal, plantar, and first web space   Range of motion is 0-100.    Diagnosis status post left knee arthroscopy with partial medial meniscectomy and chondroplasty, stable    Plan:  Sutures removed and Steri-Strips applied. I went over intraoperative photographs showed the area of chondral damage in the patellofemoral articulation.  Also showed her the meniscal tear that we debrided.  Sutures removed and Steri-Strips applied.  She can start physical therapy.  Follow-up in 4 weeks.

## 2020-01-20 ENCOUNTER — PATIENT MESSAGE (OUTPATIENT)
Dept: ORTHOPEDICS | Facility: CLINIC | Age: 40
End: 2020-01-20

## 2020-02-03 ENCOUNTER — OFFICE VISIT (OUTPATIENT)
Dept: FAMILY MEDICINE | Facility: CLINIC | Age: 40
End: 2020-02-03
Payer: COMMERCIAL

## 2020-02-03 ENCOUNTER — OFFICE VISIT (OUTPATIENT)
Dept: HEMATOLOGY/ONCOLOGY | Facility: CLINIC | Age: 40
End: 2020-02-03
Payer: COMMERCIAL

## 2020-02-03 VITALS
OXYGEN SATURATION: 98 % | HEART RATE: 80 BPM | DIASTOLIC BLOOD PRESSURE: 68 MMHG | HEIGHT: 65 IN | TEMPERATURE: 98 F | SYSTOLIC BLOOD PRESSURE: 122 MMHG | RESPIRATION RATE: 18 BRPM | BODY MASS INDEX: 31.74 KG/M2 | WEIGHT: 190.5 LBS

## 2020-02-03 VITALS
BODY MASS INDEX: 31.02 KG/M2 | TEMPERATURE: 98 F | DIASTOLIC BLOOD PRESSURE: 81 MMHG | SYSTOLIC BLOOD PRESSURE: 125 MMHG | RESPIRATION RATE: 18 BRPM | WEIGHT: 186.38 LBS | HEART RATE: 80 BPM

## 2020-02-03 DIAGNOSIS — D68.9 CLOTTING DISORDER: Primary | ICD-10-CM

## 2020-02-03 DIAGNOSIS — R05.8 DRY COUGH: ICD-10-CM

## 2020-02-03 DIAGNOSIS — E61.1 IRON DEFICIENCY: ICD-10-CM

## 2020-02-03 DIAGNOSIS — I63.81 LEFT SIDED LACUNAR INFARCTION: ICD-10-CM

## 2020-02-03 DIAGNOSIS — Z79.01 ANTICOAGULANT LONG-TERM USE: ICD-10-CM

## 2020-02-03 DIAGNOSIS — Z87.59 HISTORY OF MISCARRIAGE: ICD-10-CM

## 2020-02-03 DIAGNOSIS — K21.9 GASTROESOPHAGEAL REFLUX DISEASE, ESOPHAGITIS PRESENCE NOT SPECIFIED: Primary | ICD-10-CM

## 2020-02-03 DIAGNOSIS — R76.0 ANTICARDIOLIPIN ANTIBODY POSITIVE: ICD-10-CM

## 2020-02-03 PROCEDURE — 3008F BODY MASS INDEX DOCD: CPT | Mod: S$GLB,,, | Performed by: INTERNAL MEDICINE

## 2020-02-03 PROCEDURE — 3008F BODY MASS INDEX DOCD: CPT | Mod: CPTII,S$GLB,, | Performed by: PHYSICIAN ASSISTANT

## 2020-02-03 PROCEDURE — 99999 PR PBB SHADOW E&M-EST. PATIENT-LVL IV: CPT | Mod: PBBFAC,,, | Performed by: PHYSICIAN ASSISTANT

## 2020-02-03 PROCEDURE — 3008F PR BODY MASS INDEX (BMI) DOCUMENTED: ICD-10-PCS | Mod: CPTII,S$GLB,, | Performed by: PHYSICIAN ASSISTANT

## 2020-02-03 PROCEDURE — 99213 PR OFFICE/OUTPT VISIT, EST, LEVL III, 20-29 MIN: ICD-10-PCS | Mod: S$GLB,,, | Performed by: INTERNAL MEDICINE

## 2020-02-03 PROCEDURE — 99213 PR OFFICE/OUTPT VISIT, EST, LEVL III, 20-29 MIN: ICD-10-PCS | Mod: S$GLB,,, | Performed by: PHYSICIAN ASSISTANT

## 2020-02-03 PROCEDURE — 3008F PR BODY MASS INDEX (BMI) DOCUMENTED: ICD-10-PCS | Mod: S$GLB,,, | Performed by: INTERNAL MEDICINE

## 2020-02-03 PROCEDURE — 99999 PR PBB SHADOW E&M-EST. PATIENT-LVL IV: ICD-10-PCS | Mod: PBBFAC,,, | Performed by: PHYSICIAN ASSISTANT

## 2020-02-03 PROCEDURE — 99213 OFFICE O/P EST LOW 20 MIN: CPT | Mod: S$GLB,,, | Performed by: INTERNAL MEDICINE

## 2020-02-03 PROCEDURE — 99213 OFFICE O/P EST LOW 20 MIN: CPT | Mod: S$GLB,,, | Performed by: PHYSICIAN ASSISTANT

## 2020-02-03 RX ORDER — PANTOPRAZOLE SODIUM 20 MG/1
20 TABLET, DELAYED RELEASE ORAL DAILY
Qty: 30 TABLET | Refills: 1 | Status: SHIPPED | OUTPATIENT
Start: 2020-02-03 | End: 2020-03-27

## 2020-02-03 NOTE — PROGRESS NOTES
Subjective:       Patient ID: Jaclyn Rausch is a 39 y.o. female.    Chief Complaint: Cough and Gastroesophageal Reflux    Cough   This is a recurrent problem. The current episode started more than 1 month ago. The problem has been unchanged. The problem occurs constantly. The cough is non-productive. Associated symptoms include heartburn. Pertinent negatives include no chest pain, chills, ear congestion, ear pain, fever, headaches, hemoptysis, myalgias, nasal congestion, postnasal drip, rash, rhinorrhea, sore throat, shortness of breath, sweats, weight loss or wheezing. The symptoms are aggravated by other. She has tried OTC cough suppressant for the symptoms. The treatment provided no relief. Her past medical history is significant for bronchitis. There is no history of asthma, bronchiectasis, COPD, emphysema, environmental allergies or pneumonia.   Gastroesophageal Reflux   She complains of coughing and heartburn. She reports no chest pain, no sore throat or no wheezing. This is a recurrent problem. The current episode started more than 1 year ago. The problem occurs frequently. The problem has been gradually worsening. The symptoms are aggravated by certain foods. Pertinent negatives include no weight loss. Risk factors include caffeine use and obesity. She has tried nothing for the symptoms.     Review of patient's allergies indicates:  No Known Allergies      Current Outpatient Medications:     atorvastatin (LIPITOR) 40 MG tablet, Take 1 tablet (40 mg total) by mouth once daily., Disp: 30 tablet, Rfl: 11    ELIQUIS 5 mg Tab, TAKE 1 TABLET(5 MG) BY MOUTH TWICE DAILY, Disp: 60 tablet, Rfl: 11    iron-vitamin C 100-250 mg, ICAR-C, (ICAR-C) 100-250 mg Tab, Take 1 tablet by mouth once daily. (Patient taking differently: Take 1 tablet by mouth as needed. ), Disp: 30 each, Rfl: 6    pantoprazole (PROTONIX) 20 MG tablet, Take 1 tablet (20 mg total) by mouth once daily., Disp: 30 tablet, Rfl: 1  No  current facility-administered medications for this visit.     Facility-Administered Medications Ordered in Other Visits:     electrolyte-S (ISOLYTE), , Intravenous, Continuous, Héctor Trujillo MD, Stopped at 01/10/20 0828    lidocaine (PF) 10 mg/ml (1%) injection 10 mg, 1 mL, Intradermal, Once, Héctor Trujillo MD    Lab Results   Component Value Date    WBC 7.04 01/03/2020    HGB 12.9 01/03/2020    HCT 40.2 01/03/2020     01/03/2020    CHOL 155 11/04/2019    TRIG 96 11/04/2019    HDL 62 11/04/2019    ALT 15 12/09/2019    AST 19 12/09/2019     01/03/2020    K 4.0 01/03/2020     01/03/2020    CREATININE 0.8 01/03/2020    BUN 10 01/03/2020    CO2 26 01/03/2020    TSH 1.217 11/04/2019    INR 1.0 04/24/2018       Review of Systems   Constitutional: Negative for chills, fever and weight loss.   HENT: Negative for ear pain, postnasal drip, rhinorrhea and sore throat.    Respiratory: Positive for cough. Negative for hemoptysis, shortness of breath and wheezing.    Cardiovascular: Negative for chest pain.   Gastrointestinal: Positive for heartburn.        GERD   Musculoskeletal: Negative for myalgias.   Skin: Negative for rash.   Allergic/Immunologic: Negative for environmental allergies.   Neurological: Negative for headaches.       Objective:      Physical Exam   Constitutional: She is oriented to person, place, and time.   HENT:   Mouth/Throat: Oropharynx is clear and moist. No oropharyngeal exudate.   Eyes: Pupils are equal, round, and reactive to light. Conjunctivae are normal.   Cardiovascular: Normal rate and regular rhythm.   Pulmonary/Chest: Effort normal and breath sounds normal. She has no wheezes.   Abdominal: Soft. Bowel sounds are normal. She exhibits no distension. There is no tenderness.   Musculoskeletal: She exhibits no edema.   Lymphadenopathy:     She has no cervical adenopathy.   Neurological: She is alert and oriented to person, place, and time.   Skin: No erythema.   Psychiatric:  Her behavior is normal.       Assessment:       1. Gastroesophageal reflux disease, esophagitis presence not specified    2. Dry cough        Plan:   Jaclyn was seen today for cough and gastroesophageal reflux.    Diagnoses and all orders for this visit:    Gastroesophageal reflux disease, esophagitis presence not specified  -     pantoprazole (PROTONIX) 20 MG tablet; Take 1 tablet (20 mg total) by mouth once daily.    Dry cough  -     pantoprazole (PROTONIX) 20 MG tablet; Take 1 tablet (20 mg total) by mouth once daily.    Avoid trigger foods  Stop eating 2-3 hours before bed  Follow up in 2 weeks.   If no improvement , we will refer to GI.

## 2020-02-03 NOTE — PROGRESS NOTES
[facility]  PROGRESS NOTE      Subjective:       Patient ID:   NAME: Jaclyn Rausch : 1980     39 y.o. female    Referring Doc: Khalida Gibson (new PCP)  Other Physicians: Pearl Frank/Gertrudis Ariza (neuro); Federico Guzmán (GYN)    Chief Complaint:  Clot disorder f/u    History of Present Illness:     Patient returns today for a regularly scheduled follow-up visit.  The patient is here by herself. She is doing ok with no new issues. She is on eliquis. She denies any CP, SOB, HA's or N/V.  She denies any excessive bruising or bleeding. She is seeing her primary for acid reflux today. She previously had left knee surgery and did well. Occasional migraines.               ROS:   GEN: normal without any fever, night sweats or weight loss  HEENT: normal with no HA's, sore throat, stiff neck, changes in vision  CV: normal with no CP, SOB, PND, BAILEY or orthopnea  PULM: normal with no SOB, cough, hemoptysis, sputum or pleuritic pain  GI: normal with no abdominal pain, nausea, vomiting, constipation, diarrhea, melanotic stools, BRBPR, or hematemesis  : normal with no hematuria, dysuria  BREAST: normal with no mass, discharge, pain  SKIN: normal with no rash, erythema, bruising, or swelling    Allergies:  Review of patient's allergies indicates:  No Known Allergies    Medications:    Current Outpatient Medications:     atorvastatin (LIPITOR) 40 MG tablet, Take 1 tablet (40 mg total) by mouth once daily., Disp: 30 tablet, Rfl: 11    ELIQUIS 5 mg Tab, TAKE 1 TABLET(5 MG) BY MOUTH TWICE DAILY, Disp: 60 tablet, Rfl: 11    iron-vitamin C 100-250 mg, ICAR-C, (ICAR-C) 100-250 mg Tab, Take 1 tablet by mouth once daily. (Patient taking differently: Take 1 tablet by mouth as needed. ), Disp: 30 each, Rfl: 6  No current facility-administered medications for this visit.     Facility-Administered Medications Ordered in Other Visits:     electrolyte-S (ISOLYTE), , Intravenous, Continuous, Héctor  FLASH Trujillo MD, Stopped at 01/10/20 0828    lidocaine (PF) 10 mg/ml (1%) injection 10 mg, 1 mL, Intradermal, Once, Héctor Trujillo MD    PMHx/PSHx Updates:  See patient's last visit with me on 8/5/2019.  See H&P on 5/4/2018        Pathology:  Cancer Staging  No matching staging information was found for the patient.          Objective:     Vitals:  Blood pressure 125/81, pulse 80, temperature 98.1 °F (36.7 °C), temperature source Oral, resp. rate 18, weight 84.6 kg (186 lb 6.4 oz).    Physical Examination:   GEN: no apparent distress, comfortable; AAOx3  HEAD: atraumatic and normocephalic  EYES: no pallor, no icterus, PERRLA  ENT: OMM, no pharyngeal erythema, external ears WNL; no nasal discharge; no thrush  NECK: no masses, thyroid normal, trachea midline, no LAD/LN's, supple  CV: RRR with no murmur; normal pulse; normal S1 and S2; no pedal edema  CHEST: Normal respiratory effort; CTAB; normal breath sounds; no wheeze or crackles  ABDOM: nontender and nondistended; soft; normal bowel sounds; no rebound/guarding  MUSC/Skeletal: ROM normal; no crepitus; joints normal; no deformities or arthropathy  EXTREM: no clubbing, cyanosis, inflammation or swelling  SKIN: no rashes, lesions, ulcers, petechiae or subcutaneous nodules  : no gonzalez  NEURO: grossly intact; motor/sensory WNL; AAOx3; no tremors  PSYCH: normal mood, affect and behavior  LYMPH: normal cervical, supraclavicular, axillary and groin LN's            Labs:     1/3/2020  Lab Results   Component Value Date    WBC 7.04 01/03/2020    HGB 12.9 01/03/2020    HCT 40.2 01/03/2020    MCV 94 01/03/2020     01/03/2020     BMP  Lab Results   Component Value Date     01/03/2020    K 4.0 01/03/2020     01/03/2020    CO2 26 01/03/2020    BUN 10 01/03/2020    CREATININE 0.8 01/03/2020    CALCIUM 8.9 01/03/2020    ANIONGAP 7 (L) 01/03/2020    ESTGFRAFRICA >60 01/03/2020    EGFRNONAA >60 01/03/2020     Lab Results   Component Value Date    ALT 15 12/09/2019     AST 19 12/09/2019    ALKPHOS 85 12/09/2019    BILITOT 0.7 12/09/2019     Lab Results   Component Value Date    IRON 163 (H) 12/09/2019    TIBC 444 12/09/2019    FERRITIN 17 (L) 12/09/2019           Radiology/Diagnostic Studies:    No results found.    I have reviewed all available lab results and radiology reports.    Assessment/Plan:   (1) 38 yo female with prior acute onset aphasia and left facial droop with MRI showing lateral left frontal lobe acute nonhemorrhagic infarction. She has been seen by Dr Frank with cardiology and Dr Pearl Roca with neurology   - hematology was previously consulted for evaluation for hypercoag workup which I ordered while she was inpatient  - she is currently on Eliquis oral anticoagulation; neurology discontinued the aspirin  - she saw for f/u with Dr Pearl Roca with neurology as outpatient in Dec 2018  - she saw Evette NP in Oct 2018  - factor XII was elevated at 144 and  factor IX was elevated at 147, but mildly and may be reactive in nature only  - Lipoprotein-A was elevated at 103; APA IgM was elevated at 17.99  - homocysteine was WNL; ATIII was normal  - prothrombin II gene was normal, LA was negative; factor V leiden was normal  - protein C and S was adeqaute     (2) Chronic borderline anemia in past - current hgb is within normal limits  - prior ferritin levels were low  - she has some stomach issue with OTC iron  - check up to date iron panel  - on ICAR-C but sporadically     (3) Hx/of migraine headaches     (4) Atrial septal aneurysm - followed by Dr Frank with cardiology as outpatient  - she had bubble study and JUAN while in hospital  - there is no opening or hole per patient  - she sees Dr Frank again this Wednesday     (5) Recent loss of pregnancy in Jan 2018 in first trimester     (6) Hypercholesterolemia - now on lipitor    (7) GYN following - Dr Federico Polanco with [facility] - heavier menstrual cycles        Clotting disorder    Anticoagulant long-term  use    Anticardiolipin antibody positive    Iron deficiency    History of miscarriage    Left sided lacunar infarction, chronic, identified 4/24/18          PLAN:  1. Continue eliquis (possibly life-long), and anti-hypercholesterol meds  2. She may need her  activities restricted because of the risk of bleeding  3. F/u with PCP, Neuro and cardiology  4.  RTC in  6 months with labs every 3 months  5. continue ICAR-C at least 3x/week (encouraged compliance)  Fax note to Paige Frank Amy Jones, [place]    Discussion:     I have explained all of the above in detail and the patient understands all of the current recommendation(s). I have answered all of their questions to the best of my ability and to their complete satisfaction.   The patient is to continue with the current management plan.            Electronically signed by Jesus Sprague MD

## 2020-02-17 ENCOUNTER — OFFICE VISIT (OUTPATIENT)
Dept: ORTHOPEDICS | Facility: CLINIC | Age: 40
End: 2020-02-17
Payer: COMMERCIAL

## 2020-02-17 VITALS
DIASTOLIC BLOOD PRESSURE: 82 MMHG | HEART RATE: 71 BPM | SYSTOLIC BLOOD PRESSURE: 130 MMHG | WEIGHT: 190.5 LBS | BODY MASS INDEX: 31.74 KG/M2 | HEIGHT: 65 IN

## 2020-02-17 DIAGNOSIS — M94.261 CHONDROMALACIA OF KNEE, RIGHT: Primary | ICD-10-CM

## 2020-02-17 PROCEDURE — 99999 PR PBB SHADOW E&M-EST. PATIENT-LVL III: ICD-10-PCS | Mod: PBBFAC,,, | Performed by: ORTHOPAEDIC SURGERY

## 2020-02-17 PROCEDURE — 99999 PR PBB SHADOW E&M-EST. PATIENT-LVL III: CPT | Mod: PBBFAC,,, | Performed by: ORTHOPAEDIC SURGERY

## 2020-02-17 PROCEDURE — 99024 POSTOP FOLLOW-UP VISIT: CPT | Mod: S$GLB,,, | Performed by: ORTHOPAEDIC SURGERY

## 2020-02-17 PROCEDURE — 99024 PR POST-OP FOLLOW-UP VISIT: ICD-10-PCS | Mod: S$GLB,,, | Performed by: ORTHOPAEDIC SURGERY

## 2020-02-17 NOTE — PROGRESS NOTES
CC:  39-year-old female follows up status post left knee arthroscopy with partial medial meniscectomy and chondroplasty of the left knee. Date of surgery was 01/10/2020.  Overall she is doing well. She states her pain is currently 0/10.  Physical therapy is going well.    Examination of the Left Lower Extremity:     Motor function is intact distally EHL/FHL/TA/kristian   +2 dorsalis pedis and posterior tibial pulses   Sensation to light touch intact distally dorsal, plantar, and first web space     Examination of the Left knee:    ROM 0 - 150   Effusion negative  Tenderness to palpation at the joint line negative  Pain during range of motion negative  Crepitation during range of motion negative     negative increased pain noted with flexion past 90   negative antalgic gait noted   negative Lachman's Test   negative Anterior Drawer Test   negative Posterior Drawer Test   negative McMurrays Test   negative Disco Test   negative Varus/Valgus instability    Dx:  Status post left knee arthroscopy with partial medial meniscectomy and chondroplasty, stable and doing well    Plan:  Advanced activity as tolerated.  Follow-up p.r.n..

## 2020-03-17 ENCOUNTER — PATIENT MESSAGE (OUTPATIENT)
Dept: HEMATOLOGY/ONCOLOGY | Facility: CLINIC | Age: 40
End: 2020-03-17

## 2020-03-17 ENCOUNTER — PATIENT MESSAGE (OUTPATIENT)
Dept: NEUROLOGY | Facility: CLINIC | Age: 40
End: 2020-03-17

## 2020-03-18 DIAGNOSIS — J32.9 SINUSITIS, UNSPECIFIED CHRONICITY, UNSPECIFIED LOCATION: Primary | ICD-10-CM

## 2020-03-18 RX ORDER — AMOXICILLIN AND CLAVULANATE POTASSIUM 875; 125 MG/1; MG/1
1 TABLET, FILM COATED ORAL 2 TIMES DAILY
Qty: 20 TABLET | Refills: 0 | Status: SHIPPED | OUTPATIENT
Start: 2020-03-18 | End: 2020-03-28

## 2020-03-26 DIAGNOSIS — K21.9 GASTROESOPHAGEAL REFLUX DISEASE, ESOPHAGITIS PRESENCE NOT SPECIFIED: ICD-10-CM

## 2020-03-26 DIAGNOSIS — R05.8 DRY COUGH: ICD-10-CM

## 2020-03-27 RX ORDER — PANTOPRAZOLE SODIUM 20 MG/1
TABLET, DELAYED RELEASE ORAL
Qty: 30 TABLET | Refills: 1 | Status: SHIPPED | OUTPATIENT
Start: 2020-03-27 | End: 2020-05-26

## 2020-04-12 DIAGNOSIS — E78.01 FAMILIAL HYPERCHOLESTEROLEMIA: ICD-10-CM

## 2020-04-14 RX ORDER — ATORVASTATIN CALCIUM 40 MG/1
TABLET, FILM COATED ORAL
Qty: 30 TABLET | Refills: 11 | OUTPATIENT
Start: 2020-04-14

## 2020-05-05 ENCOUNTER — PATIENT MESSAGE (OUTPATIENT)
Dept: HEMATOLOGY/ONCOLOGY | Facility: CLINIC | Age: 40
End: 2020-05-05

## 2020-05-05 ENCOUNTER — TELEPHONE (OUTPATIENT)
Dept: HEMATOLOGY/ONCOLOGY | Facility: CLINIC | Age: 40
End: 2020-05-05

## 2020-05-05 RX ORDER — APIXABAN 5 MG/1
TABLET, FILM COATED ORAL
Qty: 60 TABLET | Refills: 11 | OUTPATIENT
Start: 2020-05-05

## 2020-05-05 NOTE — TELEPHONE ENCOUNTER
Called into the patient's pharmacy [facility] Eliquis 5 mg 1 po bid #60 with 11 refills.

## 2020-05-16 ENCOUNTER — HOSPITAL ENCOUNTER (EMERGENCY)
Facility: HOSPITAL | Age: 40
Discharge: HOME | End: 2020-05-16
Attending: EMERGENCY MEDICINE
Payer: COMMERCIAL

## 2020-05-16 ENCOUNTER — PATIENT MESSAGE (OUTPATIENT)
Dept: NEUROLOGY | Facility: CLINIC | Age: 40
End: 2020-05-16

## 2020-05-16 VITALS
WEIGHT: 185 LBS | DIASTOLIC BLOOD PRESSURE: 93 MMHG | RESPIRATION RATE: 16 BRPM | HEIGHT: 65 IN | OXYGEN SATURATION: 99 % | TEMPERATURE: 99 F | SYSTOLIC BLOOD PRESSURE: 172 MMHG | BODY MASS INDEX: 30.82 KG/M2 | HEART RATE: 89 BPM

## 2020-05-16 DIAGNOSIS — M79.603 ARM PAIN: ICD-10-CM

## 2020-05-16 DIAGNOSIS — G56.01 CARPAL TUNNEL SYNDROME OF RIGHT WRIST: Primary | ICD-10-CM

## 2020-05-16 LAB
ALBUMIN SERPL BCP-MCNC: 4 G/DL (ref 3.5–5.2)
ALP SERPL-CCNC: 96 U/L (ref 55–135)
ALT SERPL W/O P-5'-P-CCNC: 22 U/L (ref 10–44)
ANION GAP SERPL CALC-SCNC: 7 MMOL/L (ref 8–16)
AST SERPL-CCNC: 24 U/L (ref 10–40)
BASOPHILS # BLD AUTO: 0.05 K/UL (ref 0–0.2)
BASOPHILS NFR BLD: 0.5 % (ref 0–1.9)
BILIRUB SERPL-MCNC: 0.3 MG/DL (ref 0.1–1)
BUN SERPL-MCNC: 13 MG/DL (ref 6–20)
CALCIUM SERPL-MCNC: 9.3 MG/DL (ref 8.7–10.5)
CHLORIDE SERPL-SCNC: 107 MMOL/L (ref 95–110)
CO2 SERPL-SCNC: 23 MMOL/L (ref 23–29)
CREAT SERPL-MCNC: 0.9 MG/DL (ref 0.5–1.4)
D DIMER PPP IA.FEU-MCNC: <0.19 MG/L FEU
DIFFERENTIAL METHOD: ABNORMAL
EOSINOPHIL # BLD AUTO: 0.3 K/UL (ref 0–0.5)
EOSINOPHIL NFR BLD: 2.9 % (ref 0–8)
ERYTHROCYTE [DISTWIDTH] IN BLOOD BY AUTOMATED COUNT: 12.6 % (ref 11.5–14.5)
EST. GFR  (AFRICAN AMERICAN): >60 ML/MIN/1.73 M^2
EST. GFR  (NON AFRICAN AMERICAN): >60 ML/MIN/1.73 M^2
GLUCOSE SERPL-MCNC: 102 MG/DL (ref 70–110)
HCT VFR BLD AUTO: 39.3 % (ref 37–48.5)
HGB BLD-MCNC: 12.9 G/DL (ref 12–16)
IMM GRANULOCYTES # BLD AUTO: 0.02 K/UL (ref 0–0.04)
IMM GRANULOCYTES NFR BLD AUTO: 0.2 % (ref 0–0.5)
LYMPHOCYTES # BLD AUTO: 3.1 K/UL (ref 1–4.8)
LYMPHOCYTES NFR BLD: 29.5 % (ref 18–48)
MCH RBC QN AUTO: 30.1 PG (ref 27–31)
MCHC RBC AUTO-ENTMCNC: 32.8 G/DL (ref 32–36)
MCV RBC AUTO: 92 FL (ref 82–98)
MONOCYTES # BLD AUTO: 0.8 K/UL (ref 0.3–1)
MONOCYTES NFR BLD: 8 % (ref 4–15)
NEUTROPHILS # BLD AUTO: 6.2 K/UL (ref 1.8–7.7)
NEUTROPHILS NFR BLD: 58.9 % (ref 38–73)
NRBC BLD-RTO: 0 /100 WBC
PLATELET # BLD AUTO: 314 K/UL (ref 150–350)
PMV BLD AUTO: 8.9 FL (ref 9.2–12.9)
POTASSIUM SERPL-SCNC: 3.7 MMOL/L (ref 3.5–5.1)
PROT SERPL-MCNC: 6.9 G/DL (ref 6–8.4)
RBC # BLD AUTO: 4.29 M/UL (ref 4–5.4)
SODIUM SERPL-SCNC: 137 MMOL/L (ref 136–145)
WBC # BLD AUTO: 10.48 K/UL (ref 3.9–12.7)

## 2020-05-16 PROCEDURE — 93005 ELECTROCARDIOGRAM TRACING: CPT

## 2020-05-16 PROCEDURE — 99284 EMERGENCY DEPT VISIT MOD MDM: CPT | Mod: 25

## 2020-05-16 PROCEDURE — 85025 COMPLETE CBC W/AUTO DIFF WBC: CPT

## 2020-05-16 PROCEDURE — 80053 COMPREHEN METABOLIC PANEL: CPT

## 2020-05-16 PROCEDURE — 85379 FIBRIN DEGRADATION QUANT: CPT

## 2020-05-16 PROCEDURE — 36415 COLL VENOUS BLD VENIPUNCTURE: CPT

## 2020-05-16 NOTE — ED PROVIDER NOTES
Encounter Date: 5/16/2020       History     Chief Complaint   Patient presents with    arm pain     right arm from wrist radiating to shoulder; denies recent injury; pt has ulnar neuropathy in both arms     HPI   Patient is a 39-year-old woman with a history nonischemic CVA on Eliquis, carpal tunnel syndrome and neuropathy who presents emergency department complaining of atraumatic right arm pain that radiates down the inside of her right arm, to her upper arm and shoulder.  This is slightly different than the normal pain she experiences from her carpal tunnel.  She denies any chest pain or shortness of breath.  Denies any vision changes, difficulty speaking or any new neuro deficits.  Review of patient's allergies indicates:  No Known Allergies  Past Medical History:   Diagnosis Date    Anemia     slightly    Anticardiolipin antibody positive 5/4/2018    Anticoagulant long-term use     Atrial septal aneurysm     Dr. Frank; last visit 1/2018    Clotting disorder 2/3/2019    Elevated lipoprotein(a) 5/4/2018    Stroke 04/24/2018     Past Surgical History:   Procedure Laterality Date    BUNIONECTOMY Left 2001    CHONDROPLASTY OF KNEE Left 1/10/2020    Procedure: CHONDROPLASTY, KNEE;  Surgeon: Michael Galaviz II, MD;  Location: [facility] OR;  Service: Orthopedics;  Laterality: Left;    KNEE ARTHROSCOPY W/ MENISCECTOMY Left 1/10/2020    Procedure: ARTHROSCOPY, KNEE, WITH MENISCECTOMY;  Surgeon: Michael Galaviz II, MD;  Location: [facility] OR;  Service: Orthopedics;  Laterality: Left;    REPAIR OF MENISCUS OF KNEE Left 1/10/2020    Procedure: REPAIR, MENISCUS, KNEE;  Surgeon: Michael Galaviz II, MD;  Location: [facility] OR;  Service: Orthopedics;  Laterality: Left;  PARTIAL MEDIAL     Family History   Problem Relation Age of Onset    Diabetes Maternal Grandmother     Lung cancer Maternal Grandfather     Colon cancer Maternal Grandfather     Hypertension Mother     Hearing loss Father     Hypertension Brother      Eczema Son     Breast cancer Neg Hx     Ovarian cancer Neg Hx     Melanoma Neg Hx     Psoriasis Neg Hx     Lupus Neg Hx      Social History     Tobacco Use    Smoking status: Former Smoker     Packs/day: 0.25     Years: 2.00     Pack years: 0.50     Last attempt to quit: 2003     Years since quittin.3    Smokeless tobacco: Never Used   Substance Use Topics    Alcohol use: No     Frequency: 2-4 times a month     Drinks per session: 1 or 2     Binge frequency: Never     Comment: Socially    Drug use: No     Review of Systems   Constitutional: Negative for fever.   HENT: Negative for sore throat.    Respiratory: Negative for shortness of breath.    Cardiovascular: Negative for chest pain.   Gastrointestinal: Negative for nausea.   Genitourinary: Negative for dysuria.   Musculoskeletal: Negative for back pain.        Positive for right arm pain     Skin: Negative for rash.   Neurological: Positive for weakness (right hand(chronic)).   Hematological: Negative for adenopathy.       Physical Exam     Initial Vitals [20 0114]   BP Pulse Resp Temp SpO2   (!) 172/93 89 16 98.9 °F (37.2 °C) 99 %      MAP       --         Physical Exam    Constitutional: She appears well-developed and well-nourished.  Non-toxic appearance. No distress.   HENT:   Head: Normocephalic and atraumatic.   Eyes: EOM are normal. Pupils are equal, round, and reactive to light.   Neck: Normal range of motion. Neck supple. No neck rigidity. No JVD present.   Cardiovascular: Normal rate, regular rhythm, normal heart sounds and intact distal pulses. Exam reveals no gallop and no friction rub.    No murmur heard.  Pulmonary/Chest: Breath sounds normal. She has no wheezes. She has no rhonchi. She has no rales.   Abdominal: Soft. Bowel sounds are normal. She exhibits no distension. There is no tenderness. There is no rebound and no guarding.   Musculoskeletal: Normal range of motion.   Neurological: She is alert and oriented to person,  place, and time. She has normal strength and normal reflexes. No cranial nerve deficit or sensory deficit. She exhibits normal muscle tone. Coordination normal. GCS eye subscore is 4. GCS verbal subscore is 5. GCS motor subscore is 6.   Skin: Skin is warm and dry.   Psychiatric: She has a normal mood and affect. Her speech is normal and behavior is normal. She is not actively hallucinating.         ED Course   Procedures  Labs Reviewed   CBC W/ AUTO DIFFERENTIAL - Abnormal; Notable for the following components:       Result Value    MPV 8.9 (*)     All other components within normal limits   COMPREHENSIVE METABOLIC PANEL - Abnormal; Notable for the following components:    Anion Gap 7 (*)     All other components within normal limits   D DIMER, QUANTITATIVE     EKG Readings: (Independently Interpreted)   Initial Reading: No STEMI.   Normal sinus rhythm with sinus arrhythmia, 72 beats per minute, right axis deviation, normal T-waves and ST segments.       Imaging Results    None          Medical Decision Making:   History:   Old Medical Records: I decided to obtain old medical records.  Initial Assessment:   Patient is a 39-year-old woman who presents emergency department for evaluation right arm pain.  Patient has a history of carpal tunnel syndrome which she states has always been worse in her right hand which is the hand she is dominant in. This pain seems to radiate further upper arm than her normal pain and she became concerned that it may be a blood clot.  She denies any shortness of breath or chest pain.  She has no swelling or tenderness along the deep venous system of the right arm.  Her  strength is symmetrical.  She endorses occasional numbness in her right hand, worsening left hand but is not feeling any at this time.  I have low suspicion that her pain is from an acute CVA.  EKG was also performed showed no acute ischemic changes.  I have low suspicion this is atypical ACS symptoms.  She has normal  pulses and capillary refill.  I doubt this is a vascular issue.  There does not appear to be any infectious etiology at play.  Most likely this is due to her carpal tunnel syndrome.  She will be referred back to Physical Medicine Rehabilitation for re-evaluation.  Return precautions discussed for new symptoms or worsening symptoms.  She is discharged improved in no acute distress.                                 Clinical Impression:       ICD-10-CM ICD-9-CM   1. Carpal tunnel syndrome of right wrist G56.01 354.0   2. Arm pain M79.603 729.5             ED Disposition Condition    Discharge Stable        ED Prescriptions     None        Follow-up Information     Follow up With Specialties Details Why Contact Info    Larry Caban MD Physical Medicine and Rehabilitation Schedule an appointment as soon as possible for a visit   [address] [place] [address] [address]  [address]  [facility] [address]  [phone]      [facility] Emergency Medicine  As needed, If symptoms worsen [address]  [facility] [id number] [phone]                                     Abraham Albert MD  05/16/20 0336       Abraham Albert MD  05/16/20 0354

## 2020-05-20 ENCOUNTER — OFFICE VISIT (OUTPATIENT)
Dept: FAMILY MEDICINE | Facility: CLINIC | Age: 40
End: 2020-05-20
Payer: COMMERCIAL

## 2020-05-20 VITALS
SYSTOLIC BLOOD PRESSURE: 130 MMHG | BODY MASS INDEX: 31.63 KG/M2 | RESPIRATION RATE: 18 BRPM | DIASTOLIC BLOOD PRESSURE: 80 MMHG | OXYGEN SATURATION: 97 % | WEIGHT: 189.81 LBS | HEART RATE: 85 BPM | HEIGHT: 65 IN | TEMPERATURE: 98 F

## 2020-05-20 DIAGNOSIS — F41.9 ANXIETY: ICD-10-CM

## 2020-05-20 DIAGNOSIS — E78.01 FAMILIAL HYPERCHOLESTEROLEMIA: ICD-10-CM

## 2020-05-20 DIAGNOSIS — R94.31 ABNORMAL EKG: Primary | ICD-10-CM

## 2020-05-20 PROCEDURE — 99999 PR PBB SHADOW E&M-EST. PATIENT-LVL IV: ICD-10-PCS | Mod: PBBFAC,,, | Performed by: PHYSICIAN ASSISTANT

## 2020-05-20 PROCEDURE — 99999 PR PBB SHADOW E&M-EST. PATIENT-LVL IV: CPT | Mod: PBBFAC,,, | Performed by: PHYSICIAN ASSISTANT

## 2020-05-20 PROCEDURE — 3008F PR BODY MASS INDEX (BMI) DOCUMENTED: ICD-10-PCS | Mod: CPTII,S$GLB,, | Performed by: PHYSICIAN ASSISTANT

## 2020-05-20 PROCEDURE — 99214 OFFICE O/P EST MOD 30 MIN: CPT | Mod: S$GLB,,, | Performed by: PHYSICIAN ASSISTANT

## 2020-05-20 PROCEDURE — 99214 PR OFFICE/OUTPT VISIT, EST, LEVL IV, 30-39 MIN: ICD-10-PCS | Mod: S$GLB,,, | Performed by: PHYSICIAN ASSISTANT

## 2020-05-20 PROCEDURE — 3008F BODY MASS INDEX DOCD: CPT | Mod: CPTII,S$GLB,, | Performed by: PHYSICIAN ASSISTANT

## 2020-05-20 RX ORDER — ATORVASTATIN CALCIUM 40 MG/1
40 TABLET, FILM COATED ORAL DAILY
Qty: 90 TABLET | Refills: 3 | Status: SHIPPED | OUTPATIENT
Start: 2020-05-20 | End: 2021-05-03

## 2020-05-20 NOTE — PROGRESS NOTES
Subjective:       Patient ID: Jaclyn Rausch is a 39 y.o. female.    Chief Complaint: Establish Care    Ms. Rausch comes to clinic today for follow up from ED visit. The patient was seen from arm pain that was found to be a result of carpal tunnel. The patient reviewed her EKG from the ED and is concerned about the findings. The patient does have a significant history for CVA. She takes eliquis daily. The patient is followed by a cardiologist; she sees Dr. Frank annually. The patient denies chest pain, shortness of breath, fatigue. She does admit to increased anxiety. She was previously prescribed lexapro. This interacted with her eliquis causing her to have heavy menstrual cycles. She also reports that this medication call her to grind her teeth. She would like to consider starting a different agent.     Review of patient's allergies indicates:  No Known Allergies      Current Outpatient Medications:     ELIQUIS 5 mg Tab, TAKE 1 TABLET(5 MG) BY MOUTH TWICE DAILY, Disp: 60 tablet, Rfl: 11    iron-vitamin C 100-250 mg, ICAR-C, (ICAR-C) 100-250 mg Tab, Take 1 tablet by mouth once daily. (Patient taking differently: Take 1 tablet by mouth as needed. ), Disp: 30 each, Rfl: 6    pantoprazole (PROTONIX) 20 MG tablet, TAKE 1 TABLET(20 MG) BY MOUTH EVERY DAY, Disp: 30 tablet, Rfl: 1    atorvastatin (LIPITOR) 40 MG tablet, Take 1 tablet (40 mg total) by mouth once daily., Disp: 90 tablet, Rfl: 3  No current facility-administered medications for this visit.     Facility-Administered Medications Ordered in Other Visits:     electrolyte-S (ISOLYTE), , Intravenous, Continuous, Héctor Trujillo MD, Stopped at 01/10/20 0828    lidocaine (PF) 10 mg/ml (1%) injection 10 mg, 1 mL, Intradermal, Once, Héctor Trujillo MD    Lab Results   Component Value Date    WBC 10.48 05/16/2020    HGB 12.9 05/16/2020    HCT 39.3 05/16/2020     05/16/2020    CHOL 155 11/04/2019    TRIG 96 11/04/2019    HDL 62 11/04/2019     ALT 22 05/16/2020    AST 24 05/16/2020     05/16/2020    K 3.7 05/16/2020     05/16/2020    CREATININE 0.9 05/16/2020    BUN 13 05/16/2020    CO2 23 05/16/2020    TSH 1.217 11/04/2019    INR 1.0 04/24/2018       Review of Systems   Constitutional: Negative for activity change and unexpected weight change.   HENT: Negative for hearing loss, rhinorrhea and trouble swallowing.    Eyes: Negative for discharge and visual disturbance.   Respiratory: Negative for chest tightness and wheezing.    Cardiovascular: Positive for palpitations. Negative for chest pain.   Gastrointestinal: Negative for blood in stool, constipation, diarrhea and vomiting.   Endocrine: Negative for polydipsia and polyuria.   Genitourinary: Negative for difficulty urinating, dysuria, hematuria and menstrual problem.   Musculoskeletal: Positive for arthralgias. Negative for joint swelling and neck pain.   Neurological: Positive for weakness and headaches.   Psychiatric/Behavioral: Negative for confusion and dysphoric mood.       Objective:      Physical Exam   Constitutional: She is oriented to person, place, and time.   HENT:   Mouth/Throat: Oropharynx is clear and moist. No oropharyngeal exudate.   Eyes: Pupils are equal, round, and reactive to light. Conjunctivae are normal.   Cardiovascular: Normal rate and regular rhythm.   Pulmonary/Chest: Effort normal and breath sounds normal. She has no wheezes.   Abdominal: Soft. Bowel sounds are normal. She exhibits no distension. There is no tenderness.   Musculoskeletal: She exhibits no edema.   Lymphadenopathy:     She has no cervical adenopathy.   Neurological: She is alert and oriented to person, place, and time.   Skin: No erythema.   Psychiatric: Her behavior is normal.       Assessment:       1. Abnormal EKG    2. Familial hypercholesterolemia    3. Anxiety        Plan:       Jaclyn was seen today for establish care.    Diagnoses and all orders for this visit:    Abnormal EKG  -      Stress Echo Which stress agent will be used? Treadmill Exercise; Color Flow Doppler? Yes; Future  Reviewed EKG changes with patient  Follow up with cardiology, Dr. Frank  Familial hypercholesterolemia  -     atorvastatin (LIPITOR) 40 MG tablet; Take 1 tablet (40 mg total) by mouth once daily.    Anxiety  -     busPIRone (BUSPAR) 5 MG Tab; Take 1 tablet (5 mg total) by mouth 2 (two) times daily.  Follow up in 6 weeks or sooner.

## 2020-05-21 ENCOUNTER — TELEPHONE (OUTPATIENT)
Dept: FAMILY MEDICINE | Facility: CLINIC | Age: 40
End: 2020-05-21

## 2020-05-21 RX ORDER — BUSPIRONE HYDROCHLORIDE 5 MG/1
5 TABLET ORAL 2 TIMES DAILY
Qty: 60 TABLET | Refills: 2 | Status: SHIPPED | OUTPATIENT
Start: 2020-05-21 | End: 2020-08-20

## 2020-05-21 NOTE — TELEPHONE ENCOUNTER
Pt notified. Pt 6 week [facility] appointment scheduled Monday, 07/06/2020 at 1300 with ROD Gibson PA-C for anxiety medication f/u. Pt verbalized understanding.

## 2020-05-21 NOTE — TELEPHONE ENCOUNTER
Please advise Ms. Rausch, I have been looking into the appropriate medication for her to treat her anxiety. All of the medications in the same family of lexapro are going to have similar side effects. I am going to send in a medication called buspar as it does not interact with her eliquis. I would like to see the patient back in 6 weeks to see how she is doing with the medication. She can be scheduled for a virtual visit. Thank you!

## 2020-05-25 DIAGNOSIS — R05.8 DRY COUGH: ICD-10-CM

## 2020-05-25 DIAGNOSIS — K21.9 GASTROESOPHAGEAL REFLUX DISEASE, ESOPHAGITIS PRESENCE NOT SPECIFIED: ICD-10-CM

## 2020-05-26 RX ORDER — PANTOPRAZOLE SODIUM 20 MG/1
TABLET, DELAYED RELEASE ORAL
Qty: 30 TABLET | Refills: 1 | Status: SHIPPED | OUTPATIENT
Start: 2020-05-26 | End: 2020-07-24

## 2020-05-27 ENCOUNTER — CLINICAL SUPPORT (OUTPATIENT)
Dept: CARDIOLOGY | Facility: HOSPITAL | Age: 40
End: 2020-05-27
Attending: PHYSICIAN ASSISTANT
Payer: COMMERCIAL

## 2020-05-27 DIAGNOSIS — R94.31 ABNORMAL EKG: ICD-10-CM

## 2020-05-27 LAB
CV STRESS BASE HR: 69 BPM
DIASTOLIC BLOOD PRESSURE: 83 MMHG
OHS CV CPX 85 PERCENT MAX PREDICTED HEART RATE MALE: 147
OHS CV CPX ESTIMATED METS: 10
OHS CV CPX MAX PREDICTED HEART RATE: 173
OHS CV CPX PATIENT IS FEMALE: 1
OHS CV CPX PATIENT IS MALE: 0
OHS CV CPX PEAK DIASTOLIC BLOOD PRESSURE: 98 MMHG
OHS CV CPX PEAK HEAR RATE: 175 BPM
OHS CV CPX PEAK RATE PRESSURE PRODUCT: NORMAL
OHS CV CPX PEAK SYSTOLIC BLOOD PRESSURE: 195 MMHG
OHS CV CPX PERCENT MAX PREDICTED HEART RATE ACHIEVED: 101
OHS CV CPX RATE PRESSURE PRODUCT PRESENTING: 9453
STRESS ANGINA INDEX: 0
STRESS ECHO POST EXERCISE DUR MIN: 8 MINUTES
STRESS ECHO POST EXERCISE DUR SEC: 0 SECONDS
SYSTOLIC BLOOD PRESSURE: 137 MMHG

## 2020-05-27 PROCEDURE — 93351 STRESS TTE COMPLETE: CPT

## 2020-07-06 ENCOUNTER — TELEPHONE (OUTPATIENT)
Dept: FAMILY MEDICINE | Facility: CLINIC | Age: 40
End: 2020-07-06

## 2020-07-06 ENCOUNTER — OFFICE VISIT (OUTPATIENT)
Dept: FAMILY MEDICINE | Facility: CLINIC | Age: 40
End: 2020-07-06
Payer: COMMERCIAL

## 2020-07-06 DIAGNOSIS — Z86.19 HISTORY OF VIRAL ILLNESS: ICD-10-CM

## 2020-07-06 DIAGNOSIS — K21.9 GASTROESOPHAGEAL REFLUX DISEASE, ESOPHAGITIS PRESENCE NOT SPECIFIED: ICD-10-CM

## 2020-07-06 DIAGNOSIS — F41.9 ANXIETY: Primary | ICD-10-CM

## 2020-07-06 PROCEDURE — 99212 PR OFFICE/OUTPT VISIT, EST, LEVL II, 10-19 MIN: ICD-10-PCS | Mod: 95,,, | Performed by: PHYSICIAN ASSISTANT

## 2020-07-06 PROCEDURE — 99212 OFFICE O/P EST SF 10 MIN: CPT | Mod: 95,,, | Performed by: PHYSICIAN ASSISTANT

## 2020-07-06 NOTE — PROGRESS NOTES
Subjective:       Patient ID: Jaclyn Rausch is a 39 y.o. female.    Chief Complaint: No chief complaint on file.    The patient location is: [place]/ Home  The chief complaint leading to consultation is: follow up anxiety medication    Visit type: audiovisual    Face to Face time with patient: 5 minutes  Video connection was disrupted due to poor connection. We completed visit over the phone.   15 minutes of total time spent on the encounter, which includes face to face time and non-face to face time preparing to see the patient (eg, review of tests), Obtaining and/or reviewing separately obtained history, Documenting clinical information in the electronic or other health record, Independently interpreting results (not separately reported) and communicating results to the patient/family/caregiver, or Care coordination (not separately reported).         Each patient to whom he or she provides medical services by telemedicine is:  (1) informed of the relationship between the physician and patient and the respective role of any other health care provider with respect to management of the patient; and (2) notified that he or she may decline to receive medical services by telemedicine and may withdraw from such care at any time.    Ms. Rausch presents via telemedicine for follow up of anxiety. Patient has been taking buspar with positive results. The patient reports she has not had as much panic or overwhelming thoughts since starting the medication. The patient feels the current dose is working well for her. The patient also reports the pantoprazole is helping her GERD. The patient requests the COVID antibody test at this time. She had a respiratory illness in March. The patient would like to have this at the time she is having her labs for hematology.       Review of patient's allergies indicates:  No Known Allergies      Current Outpatient Medications:     atorvastatin (LIPITOR) 40 MG tablet, Take 1  tablet (40 mg total) by mouth once daily., Disp: 90 tablet, Rfl: 3    busPIRone (BUSPAR) 5 MG Tab, Take 1 tablet (5 mg total) by mouth 2 (two) times daily., Disp: 60 tablet, Rfl: 2    ELIQUIS 5 mg Tab, TAKE 1 TABLET(5 MG) BY MOUTH TWICE DAILY, Disp: 60 tablet, Rfl: 11    iron-vitamin C 100-250 mg, ICAR-C, (ICAR-C) 100-250 mg Tab, Take 1 tablet by mouth once daily. (Patient taking differently: Take 1 tablet by mouth as needed. ), Disp: 30 each, Rfl: 6    pantoprazole (PROTONIX) 20 MG tablet, TAKE 1 TABLET(20 MG) BY MOUTH EVERY DAY, Disp: 30 tablet, Rfl: 1  No current facility-administered medications for this visit.     Facility-Administered Medications Ordered in Other Visits:     electrolyte-S (ISOLYTE), , Intravenous, Continuous, Héctor Trujillo MD, Stopped at 01/10/20 0828    lidocaine (PF) 10 mg/ml (1%) injection 10 mg, 1 mL, Intradermal, Once, Héctor Trujillo MD    Lab Results   Component Value Date    WBC 10.48 05/16/2020    HGB 12.9 05/16/2020    HCT 39.3 05/16/2020     05/16/2020    CHOL 155 11/04/2019    TRIG 96 11/04/2019    HDL 62 11/04/2019    ALT 22 05/16/2020    AST 24 05/16/2020     05/16/2020    K 3.7 05/16/2020     05/16/2020    CREATININE 0.9 05/16/2020    BUN 13 05/16/2020    CO2 23 05/16/2020    TSH 1.217 11/04/2019    INR 1.0 04/24/2018       Review of Systems   Constitutional: Negative for activity change, appetite change and fever.   HENT: Negative for postnasal drip, rhinorrhea and sinus pressure.    Eyes: Negative for visual disturbance.   Respiratory: Negative for cough and shortness of breath.    Cardiovascular: Negative for chest pain.   Gastrointestinal: Negative for abdominal distention and abdominal pain.   Genitourinary: Negative for difficulty urinating and dysuria.   Musculoskeletal: Negative for arthralgias and myalgias.   Neurological: Negative for headaches.   Hematological: Negative for adenopathy.   Psychiatric/Behavioral: The patient is not  nervous/anxious.         Anxiety controlled       Objective:      Physical Exam  Constitutional:       General: She is not in acute distress.     Appearance: She is well-developed. She is not diaphoretic.   HENT:      Head: Normocephalic and atraumatic.   Pulmonary:      Effort: Pulmonary effort is normal. No respiratory distress.   Skin:     Findings: No rash.   Neurological:      Mental Status: She is alert and oriented to person, place, and time.      Cranial Nerves: No cranial nerve deficit.   Psychiatric:         Behavior: Behavior normal.         Assessment:       1. Anxiety    2. History of viral illness    3. Gastroesophageal reflux disease, esophagitis presence not specified        Plan:     Diagnoses and all orders for this visit:    Anxiety  Stable on current medication  History of viral illness  -     COVID-19 (SARS CoV-2) IgG Antibody; Future    Gastroesophageal reflux disease, esophagitis presence not specified  Stable on current medication  Avoid trigger foods  Stop eating 2-3 hours before bed

## 2020-07-06 NOTE — TELEPHONE ENCOUNTER
Please assist patient in scheduling labs this week. Covid antibody test and labs ordered from Dr. Sprague

## 2020-07-10 ENCOUNTER — LAB VISIT (OUTPATIENT)
Dept: LAB | Facility: HOSPITAL | Age: 40
End: 2020-07-10
Attending: INTERNAL MEDICINE
Payer: COMMERCIAL

## 2020-07-10 DIAGNOSIS — Z87.59 HISTORY OF MISCARRIAGE: ICD-10-CM

## 2020-07-10 DIAGNOSIS — Z79.01 ANTICOAGULANT LONG-TERM USE: ICD-10-CM

## 2020-07-10 DIAGNOSIS — D68.9 CLOTTING DISORDER: ICD-10-CM

## 2020-07-10 DIAGNOSIS — R76.0 ANTICARDIOLIPIN ANTIBODY POSITIVE: ICD-10-CM

## 2020-07-10 DIAGNOSIS — I63.81 LEFT SIDED LACUNAR INFARCTION: ICD-10-CM

## 2020-07-10 DIAGNOSIS — E61.1 IRON DEFICIENCY: ICD-10-CM

## 2020-07-10 DIAGNOSIS — Z86.19 HISTORY OF VIRAL ILLNESS: ICD-10-CM

## 2020-07-10 LAB
BASOPHILS # BLD AUTO: 0.04 K/UL (ref 0–0.2)
BASOPHILS NFR BLD: 0.6 % (ref 0–1.9)
DIFFERENTIAL METHOD: ABNORMAL
EOSINOPHIL # BLD AUTO: 0.3 K/UL (ref 0–0.5)
EOSINOPHIL NFR BLD: 4 % (ref 0–8)
ERYTHROCYTE [DISTWIDTH] IN BLOOD BY AUTOMATED COUNT: 13.1 % (ref 11.5–14.5)
HCT VFR BLD AUTO: 45 % (ref 37–48.5)
HGB BLD-MCNC: 13.8 G/DL (ref 12–16)
IMM GRANULOCYTES # BLD AUTO: 0.02 K/UL (ref 0–0.04)
IMM GRANULOCYTES NFR BLD AUTO: 0.3 % (ref 0–0.5)
LYMPHOCYTES # BLD AUTO: 2.7 K/UL (ref 1–4.8)
LYMPHOCYTES NFR BLD: 40.2 % (ref 18–48)
MCH RBC QN AUTO: 29.6 PG (ref 27–31)
MCHC RBC AUTO-ENTMCNC: 30.7 G/DL (ref 32–36)
MCV RBC AUTO: 97 FL (ref 82–98)
MONOCYTES # BLD AUTO: 0.4 K/UL (ref 0.3–1)
MONOCYTES NFR BLD: 6.5 % (ref 4–15)
NEUTROPHILS # BLD AUTO: 3.3 K/UL (ref 1.8–7.7)
NEUTROPHILS NFR BLD: 48.4 % (ref 38–73)
NRBC BLD-RTO: 0 /100 WBC
PLATELET # BLD AUTO: 368 K/UL (ref 150–350)
PMV BLD AUTO: 9.9 FL (ref 9.2–12.9)
RBC # BLD AUTO: 4.66 M/UL (ref 4–5.4)
WBC # BLD AUTO: 6.77 K/UL (ref 3.9–12.7)

## 2020-07-10 PROCEDURE — 86769 SARS-COV-2 COVID-19 ANTIBODY: CPT

## 2020-07-10 PROCEDURE — 85025 COMPLETE CBC W/AUTO DIFF WBC: CPT

## 2020-07-10 PROCEDURE — 82728 ASSAY OF FERRITIN: CPT

## 2020-07-10 PROCEDURE — 83540 ASSAY OF IRON: CPT

## 2020-07-10 PROCEDURE — 80053 COMPREHEN METABOLIC PANEL: CPT

## 2020-07-10 PROCEDURE — 36415 COLL VENOUS BLD VENIPUNCTURE: CPT | Mod: PO

## 2020-07-11 LAB
ALBUMIN SERPL BCP-MCNC: 4 G/DL (ref 3.5–5.2)
ALP SERPL-CCNC: 89 U/L (ref 55–135)
ALT SERPL W/O P-5'-P-CCNC: 20 U/L (ref 10–44)
ANION GAP SERPL CALC-SCNC: 8 MMOL/L (ref 8–16)
AST SERPL-CCNC: 19 U/L (ref 10–40)
BILIRUB SERPL-MCNC: 0.6 MG/DL (ref 0.1–1)
BUN SERPL-MCNC: 11 MG/DL (ref 6–20)
CALCIUM SERPL-MCNC: 9.4 MG/DL (ref 8.7–10.5)
CHLORIDE SERPL-SCNC: 102 MMOL/L (ref 95–110)
CO2 SERPL-SCNC: 27 MMOL/L (ref 23–29)
CREAT SERPL-MCNC: 1 MG/DL (ref 0.5–1.4)
EST. GFR  (AFRICAN AMERICAN): >60 ML/MIN/1.73 M^2
EST. GFR  (NON AFRICAN AMERICAN): >60 ML/MIN/1.73 M^2
FERRITIN SERPL-MCNC: 11 NG/ML (ref 20–300)
GLUCOSE SERPL-MCNC: 133 MG/DL (ref 70–110)
IRON SERPL-MCNC: 130 UG/DL (ref 30–160)
POTASSIUM SERPL-SCNC: 4 MMOL/L (ref 3.5–5.1)
PROT SERPL-MCNC: 7.3 G/DL (ref 6–8.4)
SARS-COV-2 IGG SERPLBLD QL IA.RAPID: NEGATIVE
SATURATED IRON: 25 % (ref 20–50)
SODIUM SERPL-SCNC: 137 MMOL/L (ref 136–145)
TOTAL IRON BINDING CAPACITY: 517 UG/DL (ref 250–450)
TRANSFERRIN SERPL-MCNC: 349 MG/DL (ref 200–375)

## 2020-07-13 ENCOUNTER — TELEPHONE (OUTPATIENT)
Dept: FAMILY MEDICINE | Facility: CLINIC | Age: 40
End: 2020-07-13

## 2020-07-20 ENCOUNTER — TELEPHONE (OUTPATIENT)
Dept: FAMILY MEDICINE | Facility: CLINIC | Age: 40
End: 2020-07-20

## 2020-07-23 DIAGNOSIS — R05.8 DRY COUGH: ICD-10-CM

## 2020-07-23 DIAGNOSIS — K21.9 GASTROESOPHAGEAL REFLUX DISEASE, ESOPHAGITIS PRESENCE NOT SPECIFIED: ICD-10-CM

## 2020-07-24 RX ORDER — PANTOPRAZOLE SODIUM 20 MG/1
TABLET, DELAYED RELEASE ORAL
Qty: 30 TABLET | Refills: 0 | Status: SHIPPED | OUTPATIENT
Start: 2020-07-24 | End: 2022-07-13

## 2020-08-02 NOTE — PROGRESS NOTES
[facility] Hematology/Oncology  PROGRESS NOTE      Subjective:       Patient ID:   NAME: Jaclyn Rausch : 1980     39 y.o. female    Referring Doc: Kahlida Gibson (new PCP)  Other Physicians: Pearl Frank/Gertrudis Ariza (neuro); Federico Guzmán (GYN)    Chief Complaint:  Clot disorder f/u    History of Present Illness:     Patient returns today for a regularly scheduled follow-up visit.  The patient is here by herself. She is doing ok with no new issues. She is on eliquis. She denies any CP, SOB, HA's or N/V.  She denies any excessive bruising or bleeding. GERD has been stable. Occasional migraines but also stable.     S/p recent sinus infection last March which resolved with z-pack.    Discussed covid19 precautions              ROS:   GEN: normal without any fever, night sweats or weight loss  HEENT: normal with no HA's, sore throat, stiff neck, changes in vision  CV: normal with no CP, SOB, PND, BAILEY or orthopnea  PULM: normal with no SOB, cough, hemoptysis, sputum or pleuritic pain  GI: normal with no abdominal pain, nausea, vomiting, constipation, diarrhea, melanotic stools, BRBPR, or hematemesis  : normal with no hematuria, dysuria  BREAST: normal with no mass, discharge, pain  SKIN: normal with no rash, erythema, bruising, or swelling    Allergies:  Review of patient's allergies indicates:  No Known Allergies    Medications:    Current Outpatient Medications:     atorvastatin (LIPITOR) 40 MG tablet, Take 1 tablet (40 mg total) by mouth once daily., Disp: 90 tablet, Rfl: 3    busPIRone (BUSPAR) 5 MG Tab, Take 1 tablet (5 mg total) by mouth 2 (two) times daily., Disp: 60 tablet, Rfl: 2    ELIQUIS 5 mg Tab, TAKE 1 TABLET(5 MG) BY MOUTH TWICE DAILY, Disp: 60 tablet, Rfl: 11    iron-vitamin C 100-250 mg, ICAR-C, (ICAR-C) 100-250 mg Tab, Take 1 tablet by mouth once daily. (Patient taking differently: Take 1 tablet by mouth as needed. ), Disp: 30 each, Rfl: 6    pantoprazole (PROTONIX) 20 MG  "tablet, TAKE 1 TABLET(20 MG) BY MOUTH EVERY DAY, Disp: 30 tablet, Rfl: 0  No current facility-administered medications for this visit.     Facility-Administered Medications Ordered in Other Visits:     electrolyte-S (ISOLYTE), , Intravenous, Continuous, Héctor Trujillo MD, Stopped at 01/10/20 0828    lidocaine (PF) 10 mg/ml (1%) injection 10 mg, 1 mL, Intradermal, Once, Héctor Trujillo MD    PMHx/PSHx Updates:  See patient's last visit with me on 2/3/2020.  See H&P on 5/4/2018        Pathology:  Cancer Staging  No matching staging information was found for the patient.          Objective:     Vitals:  Blood pressure (!) 135/91, pulse 67, temperature 97 °F (36.1 °C), resp. rate 18, height 5' 5" (1.651 m), weight 83.2 kg (183 lb 6.4 oz).    Physical Examination:   GEN: no apparent distress, comfortable; AAOx3  HEAD: atraumatic and normocephalic  EYES: no pallor, no icterus, PERRLA  ENT: OMM, no pharyngeal erythema, external ears WNL; no nasal discharge; no thrush  NECK: no masses, thyroid normal, trachea midline, no LAD/LN's, supple  CV: RRR with no murmur; normal pulse; normal S1 and S2; no pedal edema  CHEST: Normal respiratory effort; CTAB; normal breath sounds; no wheeze or crackles  ABDOM: nontender and nondistended; soft; normal bowel sounds; no rebound/guarding  MUSC/Skeletal: ROM normal; no crepitus; joints normal; no deformities or arthropathy  EXTREM: no clubbing, cyanosis, inflammation or swelling  SKIN: no rashes, lesions, ulcers, petechiae or subcutaneous nodules  : no gonzalez  NEURO: grossly intact; motor/sensory WNL; AAOx3; no tremors  PSYCH: normal mood, affect and behavior  LYMPH: normal cervical, supraclavicular, axillary and groin LN's            Labs:      Lab Results   Component Value Date    WBC 6.77 07/10/2020    HGB 13.8 07/10/2020    HCT 45.0 07/10/2020    MCV 97 07/10/2020     (H) 07/10/2020     BMP  Lab Results   Component Value Date     07/10/2020    K 4.0 07/10/2020    CL " 102 07/10/2020    CO2 27 07/10/2020    BUN 11 07/10/2020    CREATININE 1.0 07/10/2020    CALCIUM 9.4 07/10/2020    ANIONGAP 8 07/10/2020    ESTGFRAFRICA >60.0 07/10/2020    EGFRNONAA >60.0 07/10/2020     Lab Results   Component Value Date    ALT 20 07/10/2020    AST 19 07/10/2020    ALKPHOS 89 07/10/2020    BILITOT 0.6 07/10/2020     Lab Results   Component Value Date    IRON 130 07/10/2020    TIBC 517 (H) 07/10/2020    FERRITIN 11 (L) 07/10/2020           Radiology/Diagnostic Studies:    No results found.    I have reviewed all available lab results and radiology reports.    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 She has been seen by Dr Frank with cardiology and Dr Pearl Roca with neurology   - hematology was previously consulted for evaluation for hypercoag workup which I ordered while she was inpatient  - she is currently on Eliquis oral anticoagulation; neurology discontinued the aspirin  - she saw for f/u with Dr Pearl Roca with neurology as outpatient in Dec 2018  - she saw Evette NP in Oct 2018  - factor XII was elevated at 144 and  factor IX was elevated at 147, but mildly and may be reactive in nature only  - Lipoprotein-A was elevated at 103; APA IgM was elevated at 17.99  - homocysteine was WNL; ATIII was normal  - prothrombin II gene was normal, LA was negative; factor V leiden was normal  - protein C and S was adeqaute     (2) Chronic borderline anemia in past - current hgb is within normal limits  - prior ferritin levels were low  - she has some stomach issue with OTC iron  - check up to date iron panel every 3 months  - on ICAR-C but still sporadically     (3) Hx/of migraine headaches     (4) Atrial septal aneurysm - followed by Dr Frank with cardiology as outpatient  - she had bubble study and JUAN while in hospital  - there is no opening or hole per patient  - she sees   Zac again this Wednesday     (5) Recent loss of pregnancy in Jan 2018 in first trimester     (6) Hypercholesterolemia - now on lipitor    (7) GYN following - Dr Federico Polanco with [facility] - heavier menstrual cycles        Anticardiolipin antibody positive    Anticoagulant long-term use    Clotting disorder    Menorrhagia with regular cycle    Acute CVA (cerebrovascular accident) -  lateral left frontal lobe, non-hemorrhagic    History of miscarriage    Iron deficiency          PLAN:  1. Continue eliquis (possibly life-long), and anti-hypercholesterol meds  2. She may need her  activities restricted because of the risk of bleeding  3. F/u with PCP, Neuro and cardiology  4.  RTC in  6 months with labs every 6 months  5. continue ICAR-C at least 3x/week (encouraged compliance)  Fax note to Paige Frank Amy Jones, [place]    Discussion:       COVID-19 Discussion:    I had long discussion with patient and any applicable family about the COVID-19 coronavirus epidemic and the recommended precautions with regard to cancer and/or hematology patients. I have re-iterated the CDC recommendations for adequate hand washing, use of hand -like products, and coughing into elbow, etc. In addition, especially for our patients who are on chemotherapy and/or our otherwise immunocompromised patients, I have recommended avoidance of crowds, including movie theaters, restaurants, churches, etc. I have recommended avoidance of any sick or symptomatic family members and/or friends. I have also recommended avoidance of any raw and unwashed food products, and general avoidance of food items that have not been prepared by themselves. The patient has been asked to call us immediately with any symptom developments, issues, questions or other general concerns.       Anticoagulation Discussion:    Discussed with patient and any applicable family members about the benefit and/or need for anticoagulation. I communicated about  the risks of bleeding while on any anticoagulation, which could be serious and/or life-threatening, and which can occur at any time, regardless of degree of the level of anticoagulation. I expressed the need for compliance with any anticoagulation regimen and that failure to do so could potential lead to excessive bleeding, and risk to health and/or life. In particular, with patients on coumadin therapy, compliance with requested blood work is absolutely essential, as coumadin levels can vary from time to time, and failure to do so could potentially place the patient at risk for bleeding and/or clotting events which could be fatal. Patients on coumadin are encouraged to call the day after they have their levels drawn, as to obtain the appropriate instructions from my staff. Patients are aware that self-regulating or self-dosing of their medications is strictly prohibited.     I have explained all of the above in detail and the patient understands all of the current recommendation(s). I have answered all of their questions to the best of my ability and to their complete satisfaction.   The patient is to continue with the current management plan.            Electronically signed by Jesus Sprague MD        Answers for HPI/ROS submitted by the patient on 8/1/2020   appetite change : No  unexpected weight change: No  visual disturbance: No  cough: No  shortness of breath: No  chest pain: No  abdominal pain: No  diarrhea: No  frequency: No  back pain: No  rash: No  headaches: No  adenopathy: No  nervous/ anxious: No

## 2020-08-03 ENCOUNTER — OFFICE VISIT (OUTPATIENT)
Dept: HEMATOLOGY/ONCOLOGY | Facility: CLINIC | Age: 40
End: 2020-08-03
Payer: COMMERCIAL

## 2020-08-03 VITALS
RESPIRATION RATE: 18 BRPM | WEIGHT: 183.38 LBS | HEIGHT: 65 IN | TEMPERATURE: 97 F | HEART RATE: 67 BPM | DIASTOLIC BLOOD PRESSURE: 91 MMHG | BODY MASS INDEX: 30.55 KG/M2 | SYSTOLIC BLOOD PRESSURE: 135 MMHG

## 2020-08-03 DIAGNOSIS — N92.0 MENORRHAGIA WITH REGULAR CYCLE: ICD-10-CM

## 2020-08-03 DIAGNOSIS — Z79.01 ANTICOAGULANT LONG-TERM USE: ICD-10-CM

## 2020-08-03 DIAGNOSIS — R76.0 ANTICARDIOLIPIN ANTIBODY POSITIVE: Primary | ICD-10-CM

## 2020-08-03 DIAGNOSIS — I63.9 ACUTE CVA (CEREBROVASCULAR ACCIDENT): ICD-10-CM

## 2020-08-03 DIAGNOSIS — D68.9 CLOTTING DISORDER: ICD-10-CM

## 2020-08-03 DIAGNOSIS — E61.1 IRON DEFICIENCY: ICD-10-CM

## 2020-08-03 DIAGNOSIS — Z87.59 HISTORY OF MISCARRIAGE: ICD-10-CM

## 2020-08-03 PROCEDURE — 99213 PR OFFICE/OUTPT VISIT, EST, LEVL III, 20-29 MIN: ICD-10-PCS | Mod: S$GLB,,, | Performed by: INTERNAL MEDICINE

## 2020-08-03 PROCEDURE — 3008F PR BODY MASS INDEX (BMI) DOCUMENTED: ICD-10-PCS | Mod: S$GLB,,, | Performed by: INTERNAL MEDICINE

## 2020-08-03 PROCEDURE — 3008F BODY MASS INDEX DOCD: CPT | Mod: S$GLB,,, | Performed by: INTERNAL MEDICINE

## 2020-08-03 PROCEDURE — 99213 OFFICE O/P EST LOW 20 MIN: CPT | Mod: S$GLB,,, | Performed by: INTERNAL MEDICINE

## 2020-09-09 ENCOUNTER — OFFICE VISIT (OUTPATIENT)
Dept: OBSTETRICS AND GYNECOLOGY | Facility: CLINIC | Age: 40
End: 2020-09-09
Attending: OBSTETRICS & GYNECOLOGY
Payer: COMMERCIAL

## 2020-09-09 ENCOUNTER — HOSPITAL ENCOUNTER (OUTPATIENT)
Dept: RADIOLOGY | Facility: HOSPITAL | Age: 40
Discharge: HOME | End: 2020-09-09
Attending: OBSTETRICS & GYNECOLOGY
Payer: COMMERCIAL

## 2020-09-09 VITALS
BODY MASS INDEX: 31.59 KG/M2 | WEIGHT: 189.63 LBS | SYSTOLIC BLOOD PRESSURE: 122 MMHG | HEIGHT: 65 IN | DIASTOLIC BLOOD PRESSURE: 70 MMHG

## 2020-09-09 DIAGNOSIS — Z12.4 PAP SMEAR FOR CERVICAL CANCER SCREENING: ICD-10-CM

## 2020-09-09 DIAGNOSIS — Z01.419 WELL WOMAN EXAM WITH ROUTINE GYNECOLOGICAL EXAM: Primary | ICD-10-CM

## 2020-09-09 DIAGNOSIS — N93.9 ABNORMAL UTERINE BLEEDING (AUB): ICD-10-CM

## 2020-09-09 PROCEDURE — 76830 TRANSVAGINAL US NON-OB: CPT | Mod: 26,,, | Performed by: RADIOLOGY

## 2020-09-09 PROCEDURE — 76856 US EXAM PELVIC COMPLETE: CPT | Mod: 26,,, | Performed by: RADIOLOGY

## 2020-09-09 PROCEDURE — 76830 US PELVIS COMP WITH TRANSVAG NON-OB (XPD): ICD-10-PCS | Mod: 26,,, | Performed by: RADIOLOGY

## 2020-09-09 PROCEDURE — 99999 PR PBB SHADOW E&M-EST. PATIENT-LVL III: ICD-10-PCS | Mod: PBBFAC,,, | Performed by: OBSTETRICS & GYNECOLOGY

## 2020-09-09 PROCEDURE — 87624 HPV HI-RISK TYP POOLED RSLT: CPT

## 2020-09-09 PROCEDURE — 99395 PREV VISIT EST AGE 18-39: CPT | Mod: S$GLB,,, | Performed by: OBSTETRICS & GYNECOLOGY

## 2020-09-09 PROCEDURE — 99999 PR PBB SHADOW E&M-EST. PATIENT-LVL III: CPT | Mod: PBBFAC,,, | Performed by: OBSTETRICS & GYNECOLOGY

## 2020-09-09 PROCEDURE — 76856 US PELVIS COMP WITH TRANSVAG NON-OB (XPD): ICD-10-PCS | Mod: 26,,, | Performed by: RADIOLOGY

## 2020-09-09 PROCEDURE — 99395 PR PREVENTIVE VISIT,EST,18-39: ICD-10-PCS | Mod: S$GLB,,, | Performed by: OBSTETRICS & GYNECOLOGY

## 2020-09-09 PROCEDURE — 76830 TRANSVAGINAL US NON-OB: CPT | Mod: TC

## 2020-09-09 PROCEDURE — 88175 CYTOPATH C/V AUTO FLUID REDO: CPT

## 2020-09-09 NOTE — PROGRESS NOTES
Jaclyn Rausch is a 39 y.o. female  who presents as a new patient for an annual exam.  Menses occur monthly, lasting 5-6 days in duration.  She has 3 days of very heavy bleeding at which time she passes clots and has cramps.  At times, she is emptying her cup every 1-2 hours.  She feels that this bleeding is excessive for several days each month and request treatment.  She has a history a TIA as well as positive anticardiolipin antibody for which she is now on long-term Eliquis.  Her  is status post vasectomy.  Denies prior history of abnormal paps. (Last seen by me )  Patient's last menstrual period was 2020.    Past Medical History:   Diagnosis Date    Anemia     slightly    Anticardiolipin antibody positive 2018    Anticoagulant long-term use     Atrial septal aneurysm     Dr. Frank; last visit 2018    Clotting disorder 2/3/2019    Elevated lipoprotein(a) 2018    Stroke 2018       Past Surgical History:   Procedure Laterality Date    BUNIONECTOMY Left     CHONDROPLASTY OF KNEE Left 1/10/2020    Procedure: CHONDROPLASTY, KNEE;  Surgeon: Michael Galaviz II, MD;  Location: [facility] OR;  Service: Orthopedics;  Laterality: Left;    KNEE ARTHROSCOPY W/ MENISCECTOMY Left 1/10/2020    Procedure: ARTHROSCOPY, KNEE, WITH MENISCECTOMY;  Surgeon: Michael Galaviz II, MD;  Location: [facility] OR;  Service: Orthopedics;  Laterality: Left;    REPAIR OF MENISCUS OF KNEE Left 1/10/2020    Procedure: REPAIR, MENISCUS, KNEE;  Surgeon: Michael Galaviz II, MD;  Location: [facility] OR;  Service: Orthopedics;  Laterality: Left;  PARTIAL MEDIAL       OB History        5    Para   3    Term   3       0    AB   1    Living   3       SAB   1    TAB   0    Ectopic   0    Multiple   0    Live Births   3                 ROS:  GENERAL: Reports fatigue during her period.   SKIN: Denies rash or lesions.   HEAD: Denies head injury or headache.   NODES: Denies enlarged lymph  nodes.   CHEST: Denies chest pain or shortness of breath.   CARDIOVASCULAR: Denies palpitations or left sided chest pain.   ABDOMEN: No abdominal pain, nausea, vomiting or rectal bleeding.   URINARY: No dysuria or hematuria.  REPRODUCTIVE: See HPI.   BREASTS: Denies pain, lumps, or nipple discharge.   HEMATOLOGIC: Reports excessive menstrual bleeding.   MUSCULOSKELETAL: Denies joint pain or swelling.   NEUROLOGIC: Denies syncope or weakness.   PSYCHIATRIC: Denies depression.    PE:   (chaperone present during entire exam)  APPEARANCE: Well nourished, well developed, in no acute distress.  BREASTS: Symmetrical, no skin changes or visible lesions. No palpable masses, nipple discharge or adenopathy bilaterally.  ABDOMEN: Soft. No tenderness or masses. No hernias. No CVA tenderness.  VULVA: No lesions. Normal female genitalia.  URETHRAL MEATUS: Normal size and location, no lesions, no prolapse.  URETHRA: No masses, tenderness, prolapse or scarring.  VAGINA: Moist and well rugated, no abnormal discharge, no significant cystocele or rectocele.  CERVIX: No lesions and discharge. PAP done.  UTERUS: Normal size, retroflexed, non-tender, bladder base nontender.  ADNEXA: No masses, tenderness or CDS nodularity.  ANUS PERINEUM: Normal.      Diagnosis:  1. Well woman exam with routine gynecological exam    2. Pap smear for cervical cancer screening    3. Abnormal uterine bleeding (AUB)          PLAN:    Orders Placed This Encounter    HPV High Risk Genotypes, PCR    US Pelvis Comp with Transvag NON-OB (xpd    Liquid-Based Pap Smear, Screening       Patient was counseled today on the need for annual gyn exams.  We discussed her abnormal bleeding and the various etiologies.  We reviewed the need for evaluation with pelvic ultrasound.  We also discussed the various treatment options: 1) No treatment,  2) Medical treatment  3)  Surgical tretament.  With her history of a prior stroke and anticardiolipin antibody, her medical  options are limited.  She is leaning towards an endometrial ablation.  We also reviewed potential usage of the Mirena IUD.  Prior to ablation, she will need EMBX.  We will discuss her treatment plan in more detail after the ultrasound.      Follow-up in 1 year.

## 2020-09-10 ENCOUNTER — TELEPHONE (OUTPATIENT)
Dept: OBSTETRICS AND GYNECOLOGY | Facility: CLINIC | Age: 40
End: 2020-09-10

## 2020-09-10 NOTE — TELEPHONE ENCOUNTER
Called patient:    Discussed results of pelvic ultrasound:    FINDINGS:  Uterus:  Size: 8.2 x 4.3 x 5.3 cm  Masses: None  Endometrium: Central endometrial stripe closed and measures 7 mm  A small nabothian cyst is noted  Right ovary:  Size: 3.4 x 2.2 x 2.7 cm  Appearance: 1.8 x 1.4 cm cyst with small internal cyst or septation and other smaller subcentimeter cysts.  Vascular flow: Normal.  Left ovary:  Size: 3.0 x 2.2 x 2.3 cm  Appearance: Small subcentimeter cysts  Vascular Flow: Normal.  Free Fluid:  None.  Impression:  1.8 cm right ovarian cyst along with smaller subcentimeter ovarian cysts.  Central endometrial stripe measured at 7 mm    She has considered her options and wants to proceed with an endometrial ablation.    We reviewed the need for EMBX prior to this procedure.

## 2020-09-15 LAB
HPV HR 12 DNA SPEC QL NAA+PROBE: NEGATIVE
HPV16 AG SPEC QL: NEGATIVE
HPV18 DNA SPEC QL NAA+PROBE: NEGATIVE

## 2020-09-23 ENCOUNTER — TELEPHONE (OUTPATIENT)
Dept: OBSTETRICS AND GYNECOLOGY | Facility: CLINIC | Age: 40
End: 2020-09-23

## 2020-09-23 NOTE — TELEPHONE ENCOUNTER
----- Message from Tami Nicholson MA sent at 9/10/2020  5:03 PM CDT -----  Entered in remind me      Documentation     MD Alf Nance Staff 5 hours ago (11:36 AM)    Please schedule patient for EMBX.   She is aware.   Thanks.   Routing comment

## 2020-09-24 ENCOUNTER — PATIENT MESSAGE (OUTPATIENT)
Dept: OBSTETRICS AND GYNECOLOGY | Facility: CLINIC | Age: 40
End: 2020-09-24

## 2020-09-24 LAB
FINAL PATHOLOGIC DIAGNOSIS: NORMAL
Lab: NORMAL

## 2020-10-05 ENCOUNTER — OFFICE VISIT (OUTPATIENT)
Dept: OBSTETRICS AND GYNECOLOGY | Facility: CLINIC | Age: 40
End: 2020-10-05
Attending: OBSTETRICS & GYNECOLOGY
Payer: COMMERCIAL

## 2020-10-05 VITALS
WEIGHT: 185.63 LBS | SYSTOLIC BLOOD PRESSURE: 142 MMHG | HEIGHT: 65 IN | BODY MASS INDEX: 30.93 KG/M2 | DIASTOLIC BLOOD PRESSURE: 92 MMHG

## 2020-10-05 DIAGNOSIS — Z86.73 HISTORY OF TRANSIENT ISCHEMIC ATTACK (TIA): ICD-10-CM

## 2020-10-05 DIAGNOSIS — R76.0 ANTICARDIOLIPIN ANTIBODY POSITIVE: ICD-10-CM

## 2020-10-05 DIAGNOSIS — N93.9 ABNORMAL UTERINE BLEEDING (AUB): Primary | ICD-10-CM

## 2020-10-05 PROCEDURE — 58100 BIOPSY OF UTERUS LINING: CPT | Mod: S$GLB,,, | Performed by: OBSTETRICS & GYNECOLOGY

## 2020-10-05 PROCEDURE — 99999 PR PBB SHADOW E&M-EST. PATIENT-LVL III: CPT | Mod: PBBFAC,,, | Performed by: OBSTETRICS & GYNECOLOGY

## 2020-10-05 PROCEDURE — 99999 PR PBB SHADOW E&M-EST. PATIENT-LVL III: ICD-10-PCS | Mod: PBBFAC,,, | Performed by: OBSTETRICS & GYNECOLOGY

## 2020-10-05 PROCEDURE — 99499 NO LOS: ICD-10-PCS | Mod: S$GLB,,, | Performed by: OBSTETRICS & GYNECOLOGY

## 2020-10-05 PROCEDURE — 88305 TISSUE EXAM BY PATHOLOGIST: CPT | Performed by: PATHOLOGY

## 2020-10-05 PROCEDURE — 81025 URINE PREGNANCY TEST: CPT | Mod: S$GLB,,, | Performed by: OBSTETRICS & GYNECOLOGY

## 2020-10-05 PROCEDURE — 99499 UNLISTED E&M SERVICE: CPT | Mod: S$GLB,,, | Performed by: OBSTETRICS & GYNECOLOGY

## 2020-10-05 PROCEDURE — 88305 TISSUE EXAM BY PATHOLOGIST: ICD-10-PCS | Mod: 26,,, | Performed by: PATHOLOGY

## 2020-10-05 PROCEDURE — 58100 PR BIOPSY OF UTERUS LINING: ICD-10-PCS | Mod: S$GLB,,, | Performed by: OBSTETRICS & GYNECOLOGY

## 2020-10-05 PROCEDURE — 81025 PR  URINE PREGNANCY TEST: ICD-10-PCS | Mod: S$GLB,,, | Performed by: OBSTETRICS & GYNECOLOGY

## 2020-10-05 PROCEDURE — 88305 TISSUE EXAM BY PATHOLOGIST: CPT | Mod: 26,,, | Performed by: PATHOLOGY

## 2020-10-05 NOTE — PROGRESS NOTES
CC: ENDOMETRIAL BIOPSPY    Jaclyn Rausch is a 40 y.o. female  presents for an endometrial biopsy secondary to abnormal bleeding.  Menses occur monthly, lasting 5-6 days in duration.  She has 3 days of very heavy bleeding at which time she passes clots and has cramps.  At times, she is emptying her cup every 1-2 hours.  She feels that this bleeding is excessive for several days each month and request treatment.  She has a history a TIA as well as positive anticardiolipin antibody for which she is now on long-term Eliquis.  Her  is status post vasectomy.  She wants to proceed with an endometrial ablation.  We discussed the need for EMBX to rule out endometrial pathology.    20 Pelvic sono:  FINDINGS:  Uterus:  Size: 8.2 x 4.3 x 5.3 cm  Masses: None  Endometrium: Central endometrial stripe closed and measures 7 mm  A small nabothian cyst is noted  Right ovary:  Size: 3.4 x 2.2 x 2.7 cm  Appearance: 1.8 x 1.4 cm cyst with small internal cyst or septation and other smaller subcentimeter cysts.  Vascular flow: Normal.  Left ovary:  Size: 3.0 x 2.2 x 2.3 cm  Appearance: Small subcentimeter cysts  Vascular Flow: Normal.  Free Fluid:  None.  Impression:  1.8 cm right ovarian cyst along with smaller subcentimeter ovarian cysts.  Central endometrial stripe measured at 7 mm    20 Pap: Negative, HPV: Negative    UPT is negative    PRE ENDOMETRIAL BIOPSY COUNSELING:  The patient was informed of the risk of bleeding, infection, uterine perforation and pain and that the test will rule-out endometrial cancer with accuracy greater than 95%. She was counseled on the alternatives to endometrial biopsy and agrees to proceed.    TIME OUT PERFORMED.  The cervix was visualized with a speculum and prepped with betadine  A sterile endometrial pipelle was passed without difficulty to a depth of 7.5 cm.  Endometrial tissue was obtained.  The specimen was placed in formalyn and sent to Pathology for histology  evaluation.   The patient tolerated the procedure well.      ASSESSMENT and PLAN      1. Abnormal uterine bleeding (AUB)    2. History of transient ischemic attack (TIA)    3. Anticardiolipin antibody positive          POST ENDOMETRIAL BIOPSY COUNSELING:  Manage post biopsy cramping with NSAIDs or Tylenol.  Expect spotting or light bleeding for a few days.  Report bleeding heavier than a period, fever > 101.0 F, worsening pain or a foul smelling vaginal discharge.    We again discussed her abnormal bleeding and treatment options.  With her history of TIA and MIRTA, she is not a good candidate for hormonal regulation of her periods.  We reviewed surgical options: ablation vs hysterectomy.  She wants to proceed with an endometrial ablation.  We discussed expected post-ablation bleeding patterns and amenorrhea rates.    FOLLOW-UP: Pending biopsy results.  We will contact her in several days to discuss biopsy results and treatment plan.

## 2020-10-08 LAB
FINAL PATHOLOGIC DIAGNOSIS: NORMAL
GROSS: NORMAL

## 2020-10-09 ENCOUNTER — TELEPHONE (OUTPATIENT)
Dept: OBSTETRICS AND GYNECOLOGY | Facility: CLINIC | Age: 40
End: 2020-10-09

## 2020-10-09 NOTE — TELEPHONE ENCOUNTER
Call patient:    Discuss results of EMBX    FRAGMENTS OF PROLIFERATIVE ENDOMETRIUM AND BENIGN ENDOCERVICAL TISSUE    She has considered her options for abnormal bleeding and wants to proceed with endometrial ablation.    She will check her calendar and call us for scheduling.

## 2020-10-15 ENCOUNTER — TELEPHONE (OUTPATIENT)
Dept: OBSTETRICS AND GYNECOLOGY | Facility: CLINIC | Age: 40
End: 2020-10-15

## 2020-10-15 NOTE — TELEPHONE ENCOUNTER
----- Message from Crow Green, Patient Care Assistant sent at 10/15/2020 12:15 PM CDT -----  Name of Who is Calling: SABRINA FIGUEREDO [[id number]]    What is the request in detail: Requesting to schedule ablation. Please contact to further discuss and advise      Can the clinic reply by [facility]: No    What Number to Call Back if not in [facility]:   [phone]

## 2020-10-16 ENCOUNTER — TELEPHONE (OUTPATIENT)
Dept: OBSTETRICS AND GYNECOLOGY | Facility: CLINIC | Age: 40
End: 2020-10-16

## 2020-10-16 DIAGNOSIS — N93.9 ABNORMAL UTERINE BLEEDING (AUB): Primary | ICD-10-CM

## 2020-10-16 NOTE — TELEPHONE ENCOUNTER
----- Message from Tami Nicholson MA sent at 10/15/2020  5:26 PM CDT -----  Crow Green, Patient Care Assistant  [facility] [employer] Staff  Caller: Unspecified (Today, 12:15 PM)         Name of Who is Calling: SABRINA FIGUEREDO [[id number]]     What is the request in detail: Requesting to schedule ablation. Please contact to further discuss and advise       Can the clinic reply by [facility]: No     [facility]:   [phone]

## 2020-10-22 ENCOUNTER — OFFICE VISIT (OUTPATIENT)
Dept: FAMILY MEDICINE | Facility: CLINIC | Age: 40
End: 2020-10-22
Payer: COMMERCIAL

## 2020-10-22 VITALS
SYSTOLIC BLOOD PRESSURE: 112 MMHG | TEMPERATURE: 98 F | DIASTOLIC BLOOD PRESSURE: 76 MMHG | HEIGHT: 65 IN | WEIGHT: 183.44 LBS | HEART RATE: 78 BPM | OXYGEN SATURATION: 97 % | BODY MASS INDEX: 30.56 KG/M2 | RESPIRATION RATE: 16 BRPM

## 2020-10-22 DIAGNOSIS — R23.8 PAPULE OF SKIN: Primary | ICD-10-CM

## 2020-10-22 DIAGNOSIS — E66.9 OBESITY, CLASS I, BMI 30.0-34.9 (SEE ACTUAL BMI): ICD-10-CM

## 2020-10-22 PROCEDURE — 99999 PR PBB SHADOW E&M-EST. PATIENT-LVL IV: ICD-10-PCS | Mod: PBBFAC,,, | Performed by: FAMILY MEDICINE

## 2020-10-22 PROCEDURE — 99999 PR PBB SHADOW E&M-EST. PATIENT-LVL IV: CPT | Mod: PBBFAC,,, | Performed by: FAMILY MEDICINE

## 2020-10-22 PROCEDURE — 99214 OFFICE O/P EST MOD 30 MIN: CPT | Mod: S$GLB,,, | Performed by: FAMILY MEDICINE

## 2020-10-22 PROCEDURE — 99214 PR OFFICE/OUTPT VISIT, EST, LEVL IV, 30-39 MIN: ICD-10-PCS | Mod: S$GLB,,, | Performed by: FAMILY MEDICINE

## 2020-10-22 PROCEDURE — 3008F PR BODY MASS INDEX (BMI) DOCUMENTED: ICD-10-PCS | Mod: CPTII,S$GLB,, | Performed by: FAMILY MEDICINE

## 2020-10-22 PROCEDURE — 3008F BODY MASS INDEX DOCD: CPT | Mod: CPTII,S$GLB,, | Performed by: FAMILY MEDICINE

## 2020-10-22 RX ORDER — SULFAMETHOXAZOLE AND TRIMETHOPRIM 800; 160 MG/1; MG/1
1 TABLET ORAL 2 TIMES DAILY
Qty: 14 TABLET | Refills: 0 | Status: SHIPPED | OUTPATIENT
Start: 2020-10-22 | End: 2020-10-29

## 2020-10-22 NOTE — PROGRESS NOTES
Subjective:       Patient ID: Jaclyn Rausch is a 40 y.o. female.    Chief Complaint: Mass    This patient is new to me.  Mrs Anaya presents to the clinic today with complaints of a bump on her butt that has been there for a month or so. Patient reports she felt the bump but recently caused pain in the last week. Patient states she had mupirocin leftover from one of her children so she put that on it and it started to drain. Patient states it feels better but she wanted to get it looked at.   Patient educated on plan of care, verbalized understanding.    Review of Systems   Constitutional: Negative for activity change, appetite change, chills, diaphoresis and fever.   HENT: Negative for congestion, ear pain, postnasal drip, sinus pressure, sneezing and sore throat.    Eyes: Negative for pain, discharge, redness and itching.   Respiratory: Negative for apnea, cough, chest tightness, shortness of breath and wheezing.    Cardiovascular: Negative for chest pain and leg swelling.   Gastrointestinal: Negative for abdominal distention, abdominal pain, constipation, diarrhea, nausea and vomiting.   Genitourinary: Negative for difficulty urinating, dysuria, flank pain and frequency.   Skin: Positive for wound. Negative for color change and rash.   Neurological: Negative for dizziness.       Patient Active Problem List   Diagnosis    History of miscarriage    Encounter for initial prescription of contraceptive pills    Acute CVA (cerebrovascular accident) -  lateral left frontal lobe, non-hemorrhagic    Atrial septal aneurysm    Insomnia    Anticoagulant long-term use    Left sided lacunar infarction, chronic, identified 4/24/18    Familial hypercholesterolemia    BMI 29.0-29.9,adult    Migraine with aura and without status migrainosus, not intractable    Anticardiolipin antibody positive    Elevated lipoprotein(a)    Menorrhagia with regular cycle    Bilateral carpal tunnel syndrome     Anxiety    Clotting disorder    Paraosmia    Iron deficiency    Tear of medial meniscus of left knee, current, initial encounter       Objective:      Physical Exam  Vitals signs reviewed.   Constitutional:       General: She is not in acute distress.     Appearance: Normal appearance. She is well-developed.   HENT:      Head: Normocephalic.      Nose: Nose normal.   Eyes:      Conjunctiva/sclera: Conjunctivae normal.      Pupils: Pupils are equal, round, and reactive to light.   Neck:      Musculoskeletal: Normal range of motion and neck supple.   Cardiovascular:      Rate and Rhythm: Normal rate.   Pulmonary:      Effort: Pulmonary effort is normal. No respiratory distress.   Abdominal:      Palpations: Abdomen is soft.   Skin:     General: Skin is warm and dry.      Findings: Rash present. Rash is papular.          Neurological:      Mental Status: She is alert and oriented to person, place, and time.   Psychiatric:         Behavior: Behavior normal.         Lab Results   Component Value Date    WBC 6.77 07/10/2020    HGB 13.8 07/10/2020    HCT 45.0 07/10/2020     (H) 07/10/2020    CHOL 155 11/04/2019    TRIG 96 11/04/2019    HDL 62 11/04/2019    ALT 20 07/10/2020    AST 19 07/10/2020     07/10/2020    K 4.0 07/10/2020     07/10/2020    CREATININE 1.0 07/10/2020    BUN 11 07/10/2020    CO2 27 07/10/2020    TSH 1.217 11/04/2019    INR 1.0 04/24/2018     The ASCVD Risk score (Pamglenn LEAL Jr., et al., 2013) failed to calculate for the following reasons:    The 2013 ASCVD risk score is only valid for ages 40 to 79    The patient has a prior MI or stroke diagnosis    Assessment:       1. Papule of skin    2. Obesity, Class I, BMI 30.0-34.9 (see actual BMI)        Plan:       Jaclyn was seen today for [facility].    Diagnoses and all orders for this visit:    Papule of skin  -     sulfamethoxazole-trimethoprim 800-160mg (BACTRIM DS) 800-160 mg Tab; Take 1 tablet by mouth 2 (two) times daily. for 7  days  Continue mupirocin ointment as needed    Obesity, Class I, BMI 30.0-34.9 (see actual BMI)  Comments:  today 30.52  Continue healthy diet and regular exercise as tolerated.     Follow up if symptoms worsen or fail to improve.

## 2020-10-22 NOTE — PATIENT INSTRUCTIONS
Folliculitis  Folliculitis is an inflammation of a hair follicle. A hair follicle is the little pocket where a hair grows out of the skin. Bacteria normally live on the skin. But sometimes bacteria can get trapped in a follicle and cause infection. This causes a bumpy rash. The area over the follicles is red and raised. It may itch or be painful. The bumps may have fluid (pus) inside. The pus may leak and then form crusts. Sores can spread to other areas of the body. Once it goes away, folliculitis can come back at any time. Severe cases may cause permanent hair loss and scarring.  Folliculitis can happen anywhere on the body where hair grows. It can be caused by rubbing from tight clothing. Ingrown hairs can cause it. Soaking in a hot tub or swimming pool that has bacteria in the water can cause it. It may also occur if a hair follicle is blocked by a bandage.  Sores often go away in a few days with no treatment. In some cases, medicine may be given. A small piece of skin or pus may be taken to find the type of bacteria causing the infection.  Home care  The healthcare provider may prescribe an antibiotic cream or ointment.  Oral antibiotics may also be prescribed. Or you may be told to use an over-the-counter antibiotic cream. Follow all instructions when using any of these medicines.  General care:  · Apply warm, moist compresses to the sores for 20 minutes up to 3 times a day. You can make a compress by soaking a cloth in warm water. Squeeze out excess water.  · Dont cut, poke, or squeeze the sores. This can be painful and spread infection.  · Dont scratch the affected area. Scratching can delay healing.  · Dont shave the areas affected by folliculitis.  · If the sores leak fluid, cover the area with a nonstick gauze bandage. Use as little tape as possible. Carefully discard all soiled bandages.  · Dress in loose cotton clothing.  · Change sheets and blankets if they are soiled by pus. Wash all clothes,  towels, sheets, and cloth diapers in soap and hot water. Do not share clothes, towels, or sheets with other family members.  · Do not soak the sores in bath water. This can spread infection. Instead, keep the area clean by gently washing sores with soap and warm water.  · Wash your hands or use antibacterial gels often to prevent spreading the bacteria.  Follow-up care  Follow up with your healthcare provider, or as advised.  When to seek medical advice  Call your healthcare provider right away if any of these occur:  · Fever of 100.4°F (38°C) or higher  · Spreading of the rash  · Rash does not get better with treatment  · Redness or swelling that gets worse  · Rash becomes more painful  · Foul-smelling fluid leaking from the skin  · Rash improves, but then comes back   Date Last Reviewed: 11/1/2016  © 4454-9293 [employer]. [address] [place] [address], [place] [address]. All rights reserved. This information is not intended as a substitute for professional medical care. Always follow your healthcare professional's instructions.
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
## 2020-10-26 ENCOUNTER — OFFICE VISIT (OUTPATIENT)
Dept: PSYCHIATRY | Facility: CLINIC | Age: 40
End: 2020-10-26
Payer: COMMERCIAL

## 2020-10-26 DIAGNOSIS — F41.1 GAD (GENERALIZED ANXIETY DISORDER): Primary | ICD-10-CM

## 2020-10-26 PROCEDURE — 90791 PR PSYCHIATRIC DIAGNOSTIC EVALUATION: ICD-10-PCS | Mod: S$GLB,,, | Performed by: SOCIAL WORKER

## 2020-10-26 PROCEDURE — 99999 PR PBB SHADOW E&M-EST. PATIENT-LVL III: ICD-10-PCS | Mod: PBBFAC,,, | Performed by: SOCIAL WORKER

## 2020-10-26 PROCEDURE — 90791 PSYCH DIAGNOSTIC EVALUATION: CPT | Mod: S$GLB,,, | Performed by: SOCIAL WORKER

## 2020-10-26 PROCEDURE — 99999 PR PBB SHADOW E&M-EST. PATIENT-LVL III: CPT | Mod: PBBFAC,,, | Performed by: SOCIAL WORKER

## 2020-10-27 NOTE — PROGRESS NOTES
"Individual Psychotherapy (PhD/LCSW)    11/9/2020    Site:  [facility]         Therapeutic Intervention: Met with patient.  Outpatient - Insight oriented psychotherapy 45 min - CPT code 84322, Outpatient - Behavior modifying psychotherapy 45 min - CPT code 83762 and Outpatient - Supportive psychotherapy 45 min - CPT Code 55355    Chief complaint/reason for encounter: anxiety     Interval history and content of current session: Jaclyn shared that she recently had surgery on her uterus so that she no longer has a period in order to improve her health. She explained that over the weekend she experienced a panic attack while at her son's football game, and she was able to eventually distract herself by talking on the phone to her sister. She explained that afterwards, she was anxious all day about whether or not it was a panic attack or if she was experiencing a stroke. We discussed her following the medical advice of her doctors and the overlapping symptoms. Therapist led Jaclyn through the Calming Breath exercise to aid in relaxation, and she said that she felt more relaxed. Therapist recommended doing the exercise at least once per day. Jaclyn and therapist also discussed the Bull's Eye and Problems and Values worksheets, and Jaclyn said that she feels like she needs to "do things for me" and that she does not have enough time in the day. Jaclyn was provided with the Dissecting the Problem and Join the Dots worksheets.     Treatment plan:  · Target symptoms: anxiety   · Why chosen therapy is appropriate versus another modality: relevant to diagnosis, evidence based practice  · Outcome monitoring methods: self-report  · Therapeutic intervention type: insight oriented psychotherapy, behavior modifying psychotherapy, supportive psychotherapy    Risk parameters:  Patient reports no suicidal ideation  Patient reports no homicidal ideation  Patient reports no self-injurious behavior  Patient reports no violent " behavior    Verbal deficits: None    Patient's response to intervention:  The patient's response to intervention is accepting.    Progress toward goals and other mental status changes:  The patient's progress toward goals is fair .    Diagnosis:     ICD-10-CM ICD-9-CM   1. AGGIE (generalized anxiety disorder)  F41.1 300.02       Plan:  individual psychotherapy and medication management by physician Pt to go to ED or call 911 if symptoms worsen or if she has thoughts of harming self and/or others. Pt verbalized understanding.    Return to clinic: 2 weeks    Length of Service (minutes): 45      Each patient to whom he or she provides medical services by telemedicine is: (1) informed of the relationship between the physician and patient and the respective role of any other health care provider with respect to management of the patient; and (2) notified that he or she may decline to receive medical services by telemedicine and may withdraw from such care at any time.

## 2020-10-30 ENCOUNTER — TELEPHONE (OUTPATIENT)
Dept: OBSTETRICS AND GYNECOLOGY | Facility: CLINIC | Age: 40
End: 2020-10-30

## 2020-10-30 ENCOUNTER — PATIENT MESSAGE (OUTPATIENT)
Dept: SURGERY | Facility: OTHER | Age: 40
End: 2020-10-30

## 2020-10-30 ENCOUNTER — PATIENT MESSAGE (OUTPATIENT)
Dept: HEMATOLOGY/ONCOLOGY | Facility: CLINIC | Age: 40
End: 2020-10-30

## 2020-10-30 DIAGNOSIS — Z03.818 ENCOUNTER FOR OBSERVATION FOR SUSPECTED EXPOSURE TO OTHER BIOLOGICAL AGENTS RULED OUT: ICD-10-CM

## 2020-10-30 DIAGNOSIS — Z01.818 PREOP TESTING: Primary | ICD-10-CM

## 2020-10-30 NOTE — TELEPHONE ENCOUNTER
----- Message from Tami Nicholson MA sent at 10/19/2020  5:20 PM CDT -----  Pre-op appointments needed

## 2020-11-02 ENCOUNTER — LAB VISIT (OUTPATIENT)
Dept: PRIMARY CARE CLINIC | Facility: CLINIC | Age: 40
End: 2020-11-02
Payer: COMMERCIAL

## 2020-11-02 DIAGNOSIS — Z01.818 PREOP TESTING: ICD-10-CM

## 2020-11-02 PROCEDURE — U0003 INFECTIOUS AGENT DETECTION BY NUCLEIC ACID (DNA OR RNA); SEVERE ACUTE RESPIRATORY SYNDROME CORONAVIRUS 2 (SARS-COV-2) (CORONAVIRUS DISEASE [COVID-19]), AMPLIFIED PROBE TECHNIQUE, MAKING USE OF HIGH THROUGHPUT TECHNOLOGIES AS DESCRIBED BY CMS-2020-01-R: HCPCS

## 2020-11-03 LAB — SARS-COV-2 RNA RESP QL NAA+PROBE: NOT DETECTED

## 2020-11-04 ENCOUNTER — ANESTHESIA EVENT (OUTPATIENT)
Dept: SURGERY | Facility: OTHER | Age: 40
End: 2020-11-04
Payer: COMMERCIAL

## 2020-11-04 ENCOUNTER — OFFICE VISIT (OUTPATIENT)
Dept: OBSTETRICS AND GYNECOLOGY | Facility: CLINIC | Age: 40
End: 2020-11-04
Attending: OBSTETRICS & GYNECOLOGY
Payer: COMMERCIAL

## 2020-11-04 ENCOUNTER — HOSPITAL ENCOUNTER (OUTPATIENT)
Dept: PREADMISSION TESTING | Facility: OTHER | Age: 40
Discharge: HOME | End: 2020-11-04
Attending: OBSTETRICS & GYNECOLOGY
Payer: OTHER GOVERNMENT

## 2020-11-04 VITALS
HEIGHT: 65 IN | WEIGHT: 183 LBS | SYSTOLIC BLOOD PRESSURE: 129 MMHG | DIASTOLIC BLOOD PRESSURE: 83 MMHG | TEMPERATURE: 98 F | BODY MASS INDEX: 30.49 KG/M2 | OXYGEN SATURATION: 99 % | HEART RATE: 70 BPM

## 2020-11-04 VITALS
DIASTOLIC BLOOD PRESSURE: 70 MMHG | BODY MASS INDEX: 30.85 KG/M2 | SYSTOLIC BLOOD PRESSURE: 120 MMHG | HEIGHT: 65 IN | WEIGHT: 185.19 LBS

## 2020-11-04 DIAGNOSIS — N93.9 ABNORMAL UTERINE BLEEDING (AUB): Primary | ICD-10-CM

## 2020-11-04 DIAGNOSIS — Z01.818 PREOP TESTING: Primary | ICD-10-CM

## 2020-11-04 LAB
ANION GAP SERPL CALC-SCNC: 4 MMOL/L (ref 8–16)
BASOPHILS # BLD AUTO: 0.05 K/UL (ref 0–0.2)
BASOPHILS NFR BLD: 0.7 % (ref 0–1.9)
BUN SERPL-MCNC: 10 MG/DL (ref 6–20)
CALCIUM SERPL-MCNC: 8.7 MG/DL (ref 8.7–10.5)
CHLORIDE SERPL-SCNC: 105 MMOL/L (ref 95–110)
CO2 SERPL-SCNC: 28 MMOL/L (ref 23–29)
CREAT SERPL-MCNC: 0.8 MG/DL (ref 0.5–1.4)
DIFFERENTIAL METHOD: NORMAL
EOSINOPHIL # BLD AUTO: 0.2 K/UL (ref 0–0.5)
EOSINOPHIL NFR BLD: 3 % (ref 0–8)
ERYTHROCYTE [DISTWIDTH] IN BLOOD BY AUTOMATED COUNT: 12.5 % (ref 11.5–14.5)
EST. GFR  (AFRICAN AMERICAN): >60 ML/MIN/1.73 M^2
EST. GFR  (NON AFRICAN AMERICAN): >60 ML/MIN/1.73 M^2
GLUCOSE SERPL-MCNC: 96 MG/DL (ref 70–110)
HCT VFR BLD AUTO: 42.2 % (ref 37–48.5)
HGB BLD-MCNC: 13.9 G/DL (ref 12–16)
IMM GRANULOCYTES # BLD AUTO: 0.02 K/UL (ref 0–0.04)
IMM GRANULOCYTES NFR BLD AUTO: 0.3 % (ref 0–0.5)
LYMPHOCYTES # BLD AUTO: 2.8 K/UL (ref 1–4.8)
LYMPHOCYTES NFR BLD: 37.6 % (ref 18–48)
MCH RBC QN AUTO: 30.7 PG (ref 27–31)
MCHC RBC AUTO-ENTMCNC: 32.9 G/DL (ref 32–36)
MCV RBC AUTO: 93 FL (ref 82–98)
MONOCYTES # BLD AUTO: 0.5 K/UL (ref 0.3–1)
MONOCYTES NFR BLD: 7.3 % (ref 4–15)
NEUTROPHILS # BLD AUTO: 3.8 K/UL (ref 1.8–7.7)
NEUTROPHILS NFR BLD: 51.1 % (ref 38–73)
NRBC BLD-RTO: 0 /100 WBC
PLATELET # BLD AUTO: 333 K/UL (ref 150–350)
PMV BLD AUTO: 9.3 FL (ref 9.2–12.9)
POTASSIUM SERPL-SCNC: 4.1 MMOL/L (ref 3.5–5.1)
RBC # BLD AUTO: 4.53 M/UL (ref 4–5.4)
SODIUM SERPL-SCNC: 137 MMOL/L (ref 136–145)
WBC # BLD AUTO: 7.35 K/UL (ref 3.9–12.7)

## 2020-11-04 PROCEDURE — 36415 COLL VENOUS BLD VENIPUNCTURE: CPT

## 2020-11-04 PROCEDURE — 99499 UNLISTED E&M SERVICE: CPT | Mod: S$GLB,,, | Performed by: OBSTETRICS & GYNECOLOGY

## 2020-11-04 PROCEDURE — 80048 BASIC METABOLIC PNL TOTAL CA: CPT

## 2020-11-04 PROCEDURE — 99499 NO LOS: ICD-10-PCS | Mod: S$GLB,,, | Performed by: OBSTETRICS & GYNECOLOGY

## 2020-11-04 PROCEDURE — 99999 PR PBB SHADOW E&M-EST. PATIENT-LVL III: CPT | Mod: PBBFAC,,, | Performed by: OBSTETRICS & GYNECOLOGY

## 2020-11-04 PROCEDURE — 99999 PR PBB SHADOW E&M-EST. PATIENT-LVL III: ICD-10-PCS | Mod: PBBFAC,,, | Performed by: OBSTETRICS & GYNECOLOGY

## 2020-11-04 PROCEDURE — 85025 COMPLETE CBC W/AUTO DIFF WBC: CPT

## 2020-11-04 RX ORDER — DEXTROSE, SODIUM CHLORIDE, SODIUM LACTATE, POTASSIUM CHLORIDE, AND CALCIUM CHLORIDE 5; .6; .31; .03; .02 G/100ML; G/100ML; G/100ML; G/100ML; G/100ML
INJECTION, SOLUTION INTRAVENOUS CONTINUOUS
Status: CANCELLED | OUTPATIENT
Start: 2020-11-04

## 2020-11-04 RX ORDER — ACETAMINOPHEN 500 MG
1000 TABLET ORAL
Status: CANCELLED | OUTPATIENT
Start: 2020-11-04 | End: 2020-11-04

## 2020-11-04 RX ORDER — LIDOCAINE HYDROCHLORIDE 10 MG/ML
0.5 INJECTION, SOLUTION EPIDURAL; INFILTRATION; INTRACAUDAL; PERINEURAL ONCE
Status: CANCELLED | OUTPATIENT
Start: 2020-11-04 | End: 2020-11-04

## 2020-11-04 RX ORDER — SODIUM CHLORIDE, SODIUM LACTATE, POTASSIUM CHLORIDE, CALCIUM CHLORIDE 600; 310; 30; 20 MG/100ML; MG/100ML; MG/100ML; MG/100ML
INJECTION, SOLUTION INTRAVENOUS CONTINUOUS
Status: CANCELLED | OUTPATIENT
Start: 2020-11-04

## 2020-11-04 RX ORDER — MIDAZOLAM HYDROCHLORIDE 1 MG/ML
2 INJECTION INTRAMUSCULAR; INTRAVENOUS
Status: CANCELLED | OUTPATIENT
Start: 2020-11-04 | End: 2020-11-04

## 2020-11-04 NOTE — ANESTHESIA PREPROCEDURE EVALUATION
11/04/2020  Jaclyn Rausch is a 40 y.o., female.    Anesthesia Evaluation    I have reviewed the Patient Summary Reports.    I have reviewed the Nursing Notes. I have reviewed the NPO Status.   I have reviewed the Medications.     Review of Systems  Anesthesia Hx:  No problems with previous Anesthesia  History of prior surgery of interest to airway management or planning: Denies Family Hx of Anesthesia complications.   Denies Personal Hx of Anesthesia complications.   Social:  Non-Smoker    Hematology/Oncology:     Oncology Normal   Hematology Comments: Was on Eliquis for anti cardiolipin antibody clotting disorder,2 prev mini strokes    EENT/Dental:EENT/Dental Normal   Cardiovascular:   Exercise tolerance: good ECG has been reviewed. NSR w SA,normal stress echo May,has atrial septal aneursym   Pulmonary:  Pulmonary Normal    Renal/:  Renal/ Normal     Hepatic/GI:  Hepatic/GI Normal    Musculoskeletal:  Musculoskeletal Normal    Neurological:   CVA, no residual symptoms Headaches 2018 Small strokes due to clotting disorder no sequalae   Endocrine:  Endocrine Normal    Psych:   Psychiatric History          Physical Exam  General:  Well nourished    Airway/Jaw/Neck:  Airway Findings: Mouth Opening: Normal Tongue: Normal  General Airway Assessment: Adult  Mallampati: II      Dental:  Dental Findings: In tact        Mental Status:  Mental Status Findings:  Cooperative, Alert and Oriented         Anesthesia Plan  Type of Anesthesia, risks & benefits discussed:  Anesthesia Type:  general  Patient's Preference:   Intra-op Monitoring Plan: standard ASA monitors  Intra-op Monitoring Plan Comments:   Post Op Pain Control Plan: per primary service following discharge from PACU and multimodal analgesia  Post Op Pain Control Plan Comments:   Induction:   IV  Beta Blocker:         Informed Consent: Patient  understands risks and agrees with Anesthesia plan.  Questions answered. Anesthesia consent signed with patient.  ASA Score: 3     Day of Surgery Review of History & Physical:    H&P update referred to the surgeon.     Anesthesia Plan Notes: Labs today.off Eliquis now per hematologist orders        Ready For Surgery From Anesthesia Perspective.

## 2020-11-04 NOTE — PROGRESS NOTES
[facility]A Pre-op Exam      HPI : Jaclyn Rausch is a 40 y.o. female  for evaluation and discussion of treatment options for abnormal uterine bleeding.   Menses occur monthly, lasting 5-6 days in duration.  She has 3 days of very heavy bleeding at which time she passes clots and has cramps.  At times, she is emptying her cup every 1-2 hours.  She feels that this bleeding is excessive for several days each month and request treatment.  She has a history a TIA as well as positive anticardiolipin antibody for which she is now on long-term Eliquis (off since 21).  Her  is S/P vasectomy.  She has had 3 prior vaginal deliveries.  Work-up has included a pelvic ultrasound with normal uterus and negative EMBX.    10/5/20 EMBX:  FRAGMENTS OF PROLIFERATIVE ENDOMETRIUM AND BENIGN ENDOCERVICAL TISSUE     20 Pelvic sono:  FINDINGS:  Uterus:  Size: 8.2 x 4.3 x 5.3 cm  Masses: None  Endometrium: Central endometrial stripe closed and measures 7 mm  A small nabothian cyst is noted  Right ovary:  Size: 3.4 x 2.2 x 2.7 cm  Appearance: 1.8 x 1.4 cm cyst with small internal cyst or septation and other smaller subcentimeter cysts.  Vascular flow: Normal.  Left ovary:  Size: 3.0 x 2.2 x 2.3 cm  Appearance: Small subcentimeter cysts  Vascular Flow: Normal.  Free Fluid:  None.  Impression:  1.8 cm right ovarian cyst along with smaller subcentimeter ovarian cysts.  Central endometrial stripe measured at 7 mm     20 Pap: Negative, HPV: Negative    2019: TSH 1.217    Past Medical History:   Diagnosis Date    Anemia     slightly    Anticardiolipin antibody positive 2018    Anticoagulant long-term use     Atrial septal aneurysm     Dr. Frank; last visit 2018    Clotting disorder 2/3/2019    Elevated lipoprotein(a) 2018    Stroke 2018     Past Surgical History:   Procedure Laterality Date    BUNIONECTOMY Left     CHONDROPLASTY OF KNEE Left 1/10/2020    Procedure: CHONDROPLASTY,  "KNEE;  Surgeon: Michael Galaviz II, MD;  Location: [facility] OR;  Service: Orthopedics;  Laterality: Left;    KNEE ARTHROSCOPY W/ MENISCECTOMY Left 1/10/2020    Procedure: ARTHROSCOPY, KNEE, WITH MENISCECTOMY;  Surgeon: Michael Galavzi II, MD;  Location: [facility] OR;  Service: Orthopedics;  Laterality: Left;    REPAIR OF MENISCUS OF KNEE Left 1/10/2020    Procedure: REPAIR, MENISCUS, KNEE;  Surgeon: Michael Galaviz II, MD;  Location: [facility] OR;  Service: Orthopedics;  Laterality: Left;  PARTIAL MEDIAL     Family History   Problem Relation Age of Onset    Diabetes Maternal Grandmother     Lung cancer Maternal Grandfather     Colon cancer Maternal Grandfather     Hypertension Mother     Hearing loss Father     Hypertension Brother     Eczema Son     Breast cancer Neg Hx     Ovarian cancer Neg Hx     Melanoma Neg Hx     Psoriasis Neg Hx     Lupus Neg Hx      Social History     Tobacco Use    Smoking status: Former Smoker     Packs/day: 0.25     Years: 2.00     Pack years: 0.50     Quit date: 2003     Years since quittin.8    Smokeless tobacco: Never Used   Substance Use Topics    Alcohol use: No     Frequency: 2-4 times a month     Drinks per session: 1 or 2     Binge frequency: Never     Comment: Socially    Drug use: No     OB History    Para Term  AB Living   5 3 3 0 1 3   SAB TAB Ectopic Multiple Live Births   1 0 0 0 3      # Outcome Date GA Lbr Alexis/2nd Weight Sex Delivery Anes PTL Lv   5             4 Term / 39w0d  2.948 kg (6 lb 8 oz) F INDUCTION EPI N FRANCINE   3 Term /08 39w0d  2.722 kg (6 lb) M Vag-Spont EPI N FRANCINE   2 Term     M Vag-Spont   FRANCINE      Complications: Placenta Previa   1 SAB  5w0d       DEC       /70   Ht 5' 5" (1.651 m)   Wt 84 kg (185 lb 3 oz)   LMP 10/24/2020   BMI 30.82 kg/m²       ASSESSMENT:  1. Abnormal uterine bleeding (AUB)    We again discussed her abnormal bleeding and treatment options.  With her history of TIA and MIRTA, she is " not a good candidate for hormonal regulation of her periods.  We reviewed surgical options: ablation vs hysterectomy.  She wants to proceed with an endometrial ablation.  We discussed expected post-ablation bleeding patterns and amenorrhea rates. She understands that ablation is not a method of contraception.  We have discussed the risks, benefits, indications, and alternatives of the procedure in detail.  The patient verbalizes her understanding.  All questions answered.  Consents signed.  The patient agrees to proceed to proceed as planned.    PLAN:  D&C, hysteroscopy, endometrial ablation 11/5/20

## 2020-11-04 NOTE — H&P
C.C Abnormal uterine bleeding      HPI : Jaclyn Rausch is a 40 y.o. female  for D&C, hysteroscopy, endometrial ablation for the treatment of abnormal uterine bleeding.   Menses occur monthly, lasting 5-6 days in duration.  She has 3 days of very heavy bleeding at which time she passes clots and has cramps.  At times, she is emptying her cup every 1-2 hours.  She feels that this bleeding is excessive for several days each month and request treatment.  She has a history a TIA as well as positive anticardiolipin antibody for which she is now on long-term Eliquis (off since 21).  Her  is S/P vasectomy.  She has had 3 prior vaginal deliveries.  Work-up has included a pelvic ultrasound with normal uterus and negative EMBX.    10/5/20 EMBX:  FRAGMENTS OF PROLIFERATIVE ENDOMETRIUM AND BENIGN ENDOCERVICAL TISSUE     20 Pelvic sono:  FINDINGS:  Uterus:  Size: 8.2 x 4.3 x 5.3 cm  Masses: None  Endometrium: Central endometrial stripe closed and measures 7 mm  A small nabothian cyst is noted  Right ovary:  Size: 3.4 x 2.2 x 2.7 cm  Appearance: 1.8 x 1.4 cm cyst with small internal cyst or septation and other smaller subcentimeter cysts.  Vascular flow: Normal.  Left ovary:  Size: 3.0 x 2.2 x 2.3 cm  Appearance: Small subcentimeter cysts  Vascular Flow: Normal.  Free Fluid:  None.  Impression:  1.8 cm right ovarian cyst along with smaller subcentimeter ovarian cysts.  Central endometrial stripe measured at 7 mm     20 Pap: Negative, HPV: Negative    2019: TSH 1.217    Past Medical History:   Diagnosis Date    Anemia     slightly    Anticardiolipin antibody positive 2018    Anticoagulant long-term use     Atrial septal aneurysm     Dr. Frank; last visit 2018    Clotting disorder 2/3/2019    Elevated lipoprotein(a) 2018    Stroke 2018     Past Surgical History:   Procedure Laterality Date    BUNIONECTOMY Left     CHONDROPLASTY OF KNEE Left 1/10/2020     "Procedure: CHONDROPLASTY, KNEE;  Surgeon: Michael Galaviz II, MD;  Location: [facility] OR;  Service: Orthopedics;  Laterality: Left;    KNEE ARTHROSCOPY W/ MENISCECTOMY Left 1/10/2020    Procedure: ARTHROSCOPY, KNEE, WITH MENISCECTOMY;  Surgeon: Michael Galaviz II, MD;  Location: [facility] OR;  Service: Orthopedics;  Laterality: Left;    REPAIR OF MENISCUS OF KNEE Left 1/10/2020    Procedure: REPAIR, MENISCUS, KNEE;  Surgeon: Michael Galaviz II, MD;  Location: [facility] OR;  Service: Orthopedics;  Laterality: Left;  PARTIAL MEDIAL     Family History   Problem Relation Age of Onset    Diabetes Maternal Grandmother     Lung cancer Maternal Grandfather     Colon cancer Maternal Grandfather     Hypertension Mother     Hearing loss Father     Hypertension Brother     Eczema Son     Breast cancer Neg Hx     Ovarian cancer Neg Hx     Melanoma Neg Hx     Psoriasis Neg Hx     Lupus Neg Hx      Social History     Tobacco Use    Smoking status: Former Smoker     Packs/day: 0.25     Years: 2.00     Pack years: 0.50     Quit date: 2003     Years since quittin.8    Smokeless tobacco: Never Used   Substance Use Topics    Alcohol use: No     Frequency: 2-4 times a month     Drinks per session: 1 or 2     Binge frequency: Never     Comment: Socially    Drug use: No     OB History    Para Term  AB Living   5 3 3 0 1 3   SAB TAB Ectopic Multiple Live Births   1 0 0 0 3      # Outcome Date GA Lbr Alexis/2nd Weight Sex Delivery Anes PTL Lv   5             4 Term 09 39w0d  2.948 kg (6 lb 8 oz) F INDUCTION EPI N FRANCINE   3 Term /08 39w0d  2.722 kg (6 lb) M Vag-Spont EPI N FRANCINE   2 Term     M Vag-Spont   FRANCINE      Complications: Placenta Previa   1 SAB  5w0d       DEC       /70   Ht 5' 5" (1.651 m)   Wt 84 kg (185 lb 3 oz)   LMP 10/24/2020   BMI 30.82 kg/m²     ROS:  GENERAL: Feeling well overall.   SKIN: Denies rash or lesions.   HEAD: Denies head injury or headache.   NODES: Denies enlarged " lymph nodes.   CHEST: Denies chest pain or shortness of breath.   CARDIOVASCULAR: Denies palpitations or left sided chest pain.   ABDOMEN: No abdominal pain, nausea, vomiting or rectal bleeding.   URINARY: No frequency, dysuria, hematuria, or burning on urination.  REPRODUCTIVE: See HPI.   BREASTS: Denies pain, lumps, or nipple discharge.   HEMATOLOGIC: No easy bruisability.  MUSCULOSKELETAL: Denies joint pain or swelling.   NEUROLOGIC: Denies syncope or weakness.   PSYCHIATRIC: Denies depression.      PHYSICAL EXAM: (10/5/20)  APPEARANCE: Well nourished, well developed, in no acute distress.  AFFECT: WNL, alert and oriented x 3  SKIN: No acne or hirsutism  NECK: Neck symmetric without masses or thyromegaly  NODES: No inguinal, cervical, axillary, or femoral lymph node enlargement  CHEST: Good respiratory effect  ABDOMEN: Soft.  No tenderness or masses.   PELVIC: Normal external genitalia without lesions.  Normal hair distribution.  Adequate perineal body, normal urethral meatus.  Vagina moist and well rugated without lesions or discharge.  Cervix pink, without lesions, discharge or tenderness.  No significant cystocele or rectocele.  Bimanual exam shows uterus to be normal size, regular, mobile and nontender.  Adnexa without masses or tenderness.    EXTREMITIES: No edema.    ASSESSMENT:  1. Abnormal uterine bleeding (AUB)    We again discussed her abnormal bleeding and treatment options.  With her history of TIA and MIRTA, she is not a good candidate for hormonal regulation of her periods.  We reviewed surgical options: ablation vs hysterectomy.  She wants to proceed with an endometrial ablation.  We discussed expected post-ablation bleeding patterns and amenorrhea rates. She understands that ablation is not a method of contraception.  We have discussed the risks, benefits, indications, and alternatives of the procedure in detail.  The patient verbalizes her understanding.  All questions answered.  Consents signed.   The patient agrees to proceed to proceed as planned.    PLAN:  D&C, hysteroscopy, endometrial ablation 11/5/20

## 2020-11-04 NOTE — H&P (VIEW-ONLY)
C.C Abnormal uterine bleeding      HPI : Jaclyn Rausch is a 40 y.o. female  for D&C, hysteroscopy, endometrial ablation for the treatment of abnormal uterine bleeding.   Menses occur monthly, lasting 5-6 days in duration.  She has 3 days of very heavy bleeding at which time she passes clots and has cramps.  At times, she is emptying her cup every 1-2 hours.  She feels that this bleeding is excessive for several days each month and request treatment.  She has a history a TIA as well as positive anticardiolipin antibody for which she is now on long-term Eliquis (off since 21).  Her  is S/P vasectomy.  She has had 3 prior vaginal deliveries.  Work-up has included a pelvic ultrasound with normal uterus and negative EMBX.    10/5/20 EMBX:  FRAGMENTS OF PROLIFERATIVE ENDOMETRIUM AND BENIGN ENDOCERVICAL TISSUE     20 Pelvic sono:  FINDINGS:  Uterus:  Size: 8.2 x 4.3 x 5.3 cm  Masses: None  Endometrium: Central endometrial stripe closed and measures 7 mm  A small nabothian cyst is noted  Right ovary:  Size: 3.4 x 2.2 x 2.7 cm  Appearance: 1.8 x 1.4 cm cyst with small internal cyst or septation and other smaller subcentimeter cysts.  Vascular flow: Normal.  Left ovary:  Size: 3.0 x 2.2 x 2.3 cm  Appearance: Small subcentimeter cysts  Vascular Flow: Normal.  Free Fluid:  None.  Impression:  1.8 cm right ovarian cyst along with smaller subcentimeter ovarian cysts.  Central endometrial stripe measured at 7 mm     20 Pap: Negative, HPV: Negative    2019: TSH 1.217    Past Medical History:   Diagnosis Date    Anemia     slightly    Anticardiolipin antibody positive 2018    Anticoagulant long-term use     Atrial septal aneurysm     Dr. Frank; last visit 2018    Clotting disorder 2/3/2019    Elevated lipoprotein(a) 2018    Stroke 2018     Past Surgical History:   Procedure Laterality Date    BUNIONECTOMY Left     CHONDROPLASTY OF KNEE Left 1/10/2020     "Procedure: CHONDROPLASTY, KNEE;  Surgeon: Michael Galaviz II, MD;  Location: [facility] OR;  Service: Orthopedics;  Laterality: Left;    KNEE ARTHROSCOPY W/ MENISCECTOMY Left 1/10/2020    Procedure: ARTHROSCOPY, KNEE, WITH MENISCECTOMY;  Surgeon: Michael Galaviz II, MD;  Location: [facility] OR;  Service: Orthopedics;  Laterality: Left;    REPAIR OF MENISCUS OF KNEE Left 1/10/2020    Procedure: REPAIR, MENISCUS, KNEE;  Surgeon: Michael Galaviz II, MD;  Location: [facility] OR;  Service: Orthopedics;  Laterality: Left;  PARTIAL MEDIAL     Family History   Problem Relation Age of Onset    Diabetes Maternal Grandmother     Lung cancer Maternal Grandfather     Colon cancer Maternal Grandfather     Hypertension Mother     Hearing loss Father     Hypertension Brother     Eczema Son     Breast cancer Neg Hx     Ovarian cancer Neg Hx     Melanoma Neg Hx     Psoriasis Neg Hx     Lupus Neg Hx      Social History     Tobacco Use    Smoking status: Former Smoker     Packs/day: 0.25     Years: 2.00     Pack years: 0.50     Quit date: 2003     Years since quittin.8    Smokeless tobacco: Never Used   Substance Use Topics    Alcohol use: No     Frequency: 2-4 times a month     Drinks per session: 1 or 2     Binge frequency: Never     Comment: Socially    Drug use: No     OB History    Para Term  AB Living   5 3 3 0 1 3   SAB TAB Ectopic Multiple Live Births   1 0 0 0 3      # Outcome Date GA Lbr Alexis/2nd Weight Sex Delivery Anes PTL Lv   5             4 Term 09 39w0d  2.948 kg (6 lb 8 oz) F INDUCTION EPI N FRANCINE   3 Term /08 39w0d  2.722 kg (6 lb) M Vag-Spont EPI N FRANCINE   2 Term     M Vag-Spont   FRANCINE      Complications: Placenta Previa   1 SAB  5w0d       DEC       /70   Ht 5' 5" (1.651 m)   Wt 84 kg (185 lb 3 oz)   LMP 10/24/2020   BMI 30.82 kg/m²     ROS:  GENERAL: Feeling well overall.   SKIN: Denies rash or lesions.   HEAD: Denies head injury or headache.   NODES: Denies enlarged " lymph nodes.   CHEST: Denies chest pain or shortness of breath.   CARDIOVASCULAR: Denies palpitations or left sided chest pain.   ABDOMEN: No abdominal pain, nausea, vomiting or rectal bleeding.   URINARY: No frequency, dysuria, hematuria, or burning on urination.  REPRODUCTIVE: See HPI.   BREASTS: Denies pain, lumps, or nipple discharge.   HEMATOLOGIC: No easy bruisability.  MUSCULOSKELETAL: Denies joint pain or swelling.   NEUROLOGIC: Denies syncope or weakness.   PSYCHIATRIC: Denies depression.      PHYSICAL EXAM: (10/5/20)  APPEARANCE: Well nourished, well developed, in no acute distress.  AFFECT: WNL, alert and oriented x 3  SKIN: No acne or hirsutism  NECK: Neck symmetric without masses or thyromegaly  NODES: No inguinal, cervical, axillary, or femoral lymph node enlargement  CHEST: Good respiratory effect  ABDOMEN: Soft.  No tenderness or masses.   PELVIC: Normal external genitalia without lesions.  Normal hair distribution.  Adequate perineal body, normal urethral meatus.  Vagina moist and well rugated without lesions or discharge.  Cervix pink, without lesions, discharge or tenderness.  No significant cystocele or rectocele.  Bimanual exam shows uterus to be normal size, regular, mobile and nontender.  Adnexa without masses or tenderness.    EXTREMITIES: No edema.    ASSESSMENT:  1. Abnormal uterine bleeding (AUB)    We again discussed her abnormal bleeding and treatment options.  With her history of TIA and MIRTA, she is not a good candidate for hormonal regulation of her periods.  We reviewed surgical options: ablation vs hysterectomy.  She wants to proceed with an endometrial ablation.  We discussed expected post-ablation bleeding patterns and amenorrhea rates. She understands that ablation is not a method of contraception.  We have discussed the risks, benefits, indications, and alternatives of the procedure in detail.  The patient verbalizes her understanding.  All questions answered.  Consents signed.   The patient agrees to proceed to proceed as planned.    PLAN:  D&C, hysteroscopy, endometrial ablation 11/5/20

## 2020-11-04 NOTE — DISCHARGE INSTRUCTIONS
Information to Prepare you for your Surgery    PRE-ADMIT TESTING -  [phone]    [address]  [place] [facility]          Your surgery has been scheduled at [facility]. We are pleased to have the opportunity to serve you. For Further Information please call [phone].    On the day of surgery please report to the Information Desk on the 1st floor.    · CONTACT YOUR PHYSICIAN'S OFFICE THE DAY PRIOR TO YOUR SURGERY TO OBTAIN YOUR ARRIVAL TIME.     · The evening before surgery do not eat anything after 9 p.m. ( this includes hard candy, chewing gum and mints).  You may only have GATORADE, POWERADE AND WATER  from 9 p.m. until you leave your home.   DO NOT DRINK ANY LIQUIDS ON THE WAY TO THE HOSPITAL.      SPECIAL MEDICATION INSTRUCTIONS: TAKE medications checked off by the Anesthesiologist on your Medication List.    Angiogram Patients: Take medications as instructed by your physician, including aspirin.     Surgery Patients:    If you take ASPIRIN - Your PHYSICIAN/SURGEON will need to inform you IF/OR when you need to stop taking aspirin prior to your surgery.     Do Not take any medications containing IBUPROFEN.  Do Not Wear any make-up or dark nail polish   (especially eye make-up) to surgery. If you come to surgery with makeup on you will be required to remove the makeup or nail polish.    Do not shave your surgical area at least 5 days prior to your surgery. The surgical prep will be performed at the hospital according to Infection Control regulations.    Leave all valuables at home.   Do Not wear any jewelry or watches, including any metal in body piercings. Jewelry must be removed prior to coming to the hospital.  There is a possibility that rings that are unable to be removed may be cut off if they are on the surgical extremity.    Contact Lens must be removed before surgery. Either do not wear the contact lens or bring a case and solution for  storage.  Please bring a container for eyeglasses or dentures as required.  Bring any paperwork your physician has provided, such as consent forms,  history and physicals, doctor's orders, etc.   Bring comfortable clothes that are loose fitting to wear upon discharge. Take into consideration the type of surgery being performed.  Maintain your diet as advised per your physician the day prior to surgery.      Adequate rest the night before surgery is advised.   Park in the Parking lot behind the hospital or in the [facility] across the street from the parking lot. Parking is complimentary.  If you will be discharged the same day as your procedure, please arrange for a responsible adult to drive you home or to accompany you if traveling by taxi.   YOU WILL NOT BE PERMITTED TO DRIVE OR TO LEAVE THE HOSPITAL ALONE AFTER SURGERY.   If you are being discharged the same day, it is strongly recommended that you arrange for someone to remain with you for the first 24 hrs following your surgery.    The Surgeon will speak to your family/visitor after your surgery regarding the outcome of your surgery and post op care.  The Surgeon may speak to you after your surgery, but there is a possibility you may not remember the details.  Please check with your family members regarding the conversation with the Surgeon.    We strongly recommend whoever is bringing you home be present for discharge instructions.  This will ensure a thorough understanding for your post op home care.    ALL CHILDREN MUST ALWAYS BE ACCOMPANIED BY AN ADULT.    Visitors-Refer to current Visitor policy handouts.    Thank you for your cooperation.  The Staff of [facility].                Bathing Instructions with Hibiclens     Shower the evening before and morning of your procedure with Hibiclens:   Wash your face with water and your regular face wash/soap   Apply Hibiclens directly on your skin or on a wet washcloth and wash  gently. When showering: Move away from the shower stream when applying Hibiclens to avoid rinsing off too soon.   Rinse thoroughly with warm water   Do not dilute Hibiclens         Dry off as usual, do not use any deodorant, powder, body lotions, perfume, after shave or cologne.

## 2020-11-05 ENCOUNTER — ANESTHESIA (OUTPATIENT)
Dept: SURGERY | Facility: OTHER | Age: 40
End: 2020-11-05
Payer: COMMERCIAL

## 2020-11-05 ENCOUNTER — HOSPITAL ENCOUNTER (OUTPATIENT)
Facility: OTHER | Age: 40
Discharge: HOME | End: 2020-11-05
Attending: OBSTETRICS & GYNECOLOGY | Admitting: OBSTETRICS & GYNECOLOGY
Payer: COMMERCIAL

## 2020-11-05 VITALS
SYSTOLIC BLOOD PRESSURE: 140 MMHG | OXYGEN SATURATION: 100 % | DIASTOLIC BLOOD PRESSURE: 89 MMHG | TEMPERATURE: 98 F | HEIGHT: 65 IN | WEIGHT: 183 LBS | RESPIRATION RATE: 16 BRPM | HEART RATE: 80 BPM | BODY MASS INDEX: 30.49 KG/M2

## 2020-11-05 DIAGNOSIS — Z98.890 STATUS POST D&C: Primary | ICD-10-CM

## 2020-11-05 DIAGNOSIS — N93.9 ABNORMAL UTERINE BLEEDING (AUB): ICD-10-CM

## 2020-11-05 LAB
B-HCG UR QL: NEGATIVE
B-HCG UR QL: NEGATIVE
CTP QC/QA: YES
CTP QC/QA: YES

## 2020-11-05 PROCEDURE — 63600175 PHARM REV CODE 636 W HCPCS: Performed by: ANESTHESIOLOGY

## 2020-11-05 PROCEDURE — 88305 TISSUE EXAM BY PATHOLOGIST: CPT | Mod: 26,,, | Performed by: PATHOLOGY

## 2020-11-05 PROCEDURE — 37000008 HC ANESTHESIA 1ST 15 MINUTES: Performed by: OBSTETRICS & GYNECOLOGY

## 2020-11-05 PROCEDURE — 27201423 OPTIME MED/SURG SUP & DEVICES STERILE SUPPLY: Performed by: OBSTETRICS & GYNECOLOGY

## 2020-11-05 PROCEDURE — 81025 URINE PREGNANCY TEST: CPT | Performed by: ANESTHESIOLOGY

## 2020-11-05 PROCEDURE — 58563 PR HYSTEROSCOPY,W/ENDOMETRIAL ABLATION: ICD-10-PCS | Mod: ,,, | Performed by: OBSTETRICS & GYNECOLOGY

## 2020-11-05 PROCEDURE — 71000015 HC POSTOP RECOV 1ST HR: Performed by: OBSTETRICS & GYNECOLOGY

## 2020-11-05 PROCEDURE — 90471 IMMUNIZATION ADMIN: CPT | Performed by: OBSTETRICS & GYNECOLOGY

## 2020-11-05 PROCEDURE — 36000706: Performed by: OBSTETRICS & GYNECOLOGY

## 2020-11-05 PROCEDURE — 25000003 PHARM REV CODE 250: Performed by: ANESTHESIOLOGY

## 2020-11-05 PROCEDURE — 63600175 PHARM REV CODE 636 W HCPCS: Performed by: OBSTETRICS & GYNECOLOGY

## 2020-11-05 PROCEDURE — 63600175 PHARM REV CODE 636 W HCPCS: Performed by: NURSE ANESTHETIST, CERTIFIED REGISTERED

## 2020-11-05 PROCEDURE — 71000016 HC POSTOP RECOV ADDL HR: Performed by: OBSTETRICS & GYNECOLOGY

## 2020-11-05 PROCEDURE — 25000003 PHARM REV CODE 250: Performed by: NURSE ANESTHETIST, CERTIFIED REGISTERED

## 2020-11-05 PROCEDURE — 90686 IIV4 VACC NO PRSV 0.5 ML IM: CPT | Performed by: OBSTETRICS & GYNECOLOGY

## 2020-11-05 PROCEDURE — 25000003 PHARM REV CODE 250: Performed by: OBSTETRICS & GYNECOLOGY

## 2020-11-05 PROCEDURE — 88305 TISSUE EXAM BY PATHOLOGIST: CPT | Performed by: PATHOLOGY

## 2020-11-05 PROCEDURE — 37000009 HC ANESTHESIA EA ADD 15 MINS: Performed by: OBSTETRICS & GYNECOLOGY

## 2020-11-05 PROCEDURE — 36000707: Performed by: OBSTETRICS & GYNECOLOGY

## 2020-11-05 PROCEDURE — 58563 HYSTEROSCOPY ABLATION: CPT | Mod: ,,, | Performed by: OBSTETRICS & GYNECOLOGY

## 2020-11-05 PROCEDURE — 71000033 HC RECOVERY, INTIAL HOUR: Performed by: OBSTETRICS & GYNECOLOGY

## 2020-11-05 PROCEDURE — 88305 TISSUE EXAM BY PATHOLOGIST: ICD-10-PCS | Mod: 26,,, | Performed by: PATHOLOGY

## 2020-11-05 RX ORDER — FENTANYL CITRATE 50 UG/ML
INJECTION, SOLUTION INTRAMUSCULAR; INTRAVENOUS
Status: DISCONTINUED | OUTPATIENT
Start: 2020-11-05 | End: 2020-11-05

## 2020-11-05 RX ORDER — DIPHENHYDRAMINE HYDROCHLORIDE 50 MG/ML
25 INJECTION INTRAMUSCULAR; INTRAVENOUS EVERY 4 HOURS PRN
Status: DISCONTINUED | OUTPATIENT
Start: 2020-11-05 | End: 2020-11-06 | Stop reason: HOSPADM

## 2020-11-05 RX ORDER — SODIUM CHLORIDE, SODIUM LACTATE, POTASSIUM CHLORIDE, CALCIUM CHLORIDE 600; 310; 30; 20 MG/100ML; MG/100ML; MG/100ML; MG/100ML
INJECTION, SOLUTION INTRAVENOUS CONTINUOUS
Status: DISCONTINUED | OUTPATIENT
Start: 2020-11-05 | End: 2020-11-06 | Stop reason: HOSPADM

## 2020-11-05 RX ORDER — LIDOCAINE HYDROCHLORIDE 20 MG/ML
INJECTION INTRAVENOUS
Status: DISCONTINUED | OUTPATIENT
Start: 2020-11-05 | End: 2020-11-05

## 2020-11-05 RX ORDER — ACETAMINOPHEN 500 MG
1000 TABLET ORAL
Status: COMPLETED | OUTPATIENT
Start: 2020-11-05 | End: 2020-11-05

## 2020-11-05 RX ORDER — LIDOCAINE HYDROCHLORIDE 10 MG/ML
0.5 INJECTION, SOLUTION EPIDURAL; INFILTRATION; INTRACAUDAL; PERINEURAL ONCE
Status: DISCONTINUED | OUTPATIENT
Start: 2020-11-05 | End: 2020-11-06 | Stop reason: HOSPADM

## 2020-11-05 RX ORDER — DEXAMETHASONE SODIUM PHOSPHATE 4 MG/ML
INJECTION, SOLUTION INTRA-ARTICULAR; INTRALESIONAL; INTRAMUSCULAR; INTRAVENOUS; SOFT TISSUE
Status: DISCONTINUED | OUTPATIENT
Start: 2020-11-05 | End: 2020-11-05

## 2020-11-05 RX ORDER — ONDANSETRON 2 MG/ML
4 INJECTION INTRAMUSCULAR; INTRAVENOUS DAILY PRN
Status: DISCONTINUED | OUTPATIENT
Start: 2020-11-05 | End: 2020-11-06 | Stop reason: HOSPADM

## 2020-11-05 RX ORDER — HYDROCODONE BITARTRATE AND ACETAMINOPHEN 5; 325 MG/1; MG/1
1 TABLET ORAL EVERY 4 HOURS PRN
Status: CANCELLED | OUTPATIENT
Start: 2020-11-05

## 2020-11-05 RX ORDER — MIDAZOLAM HYDROCHLORIDE 1 MG/ML
2 INJECTION INTRAMUSCULAR; INTRAVENOUS
Status: COMPLETED | OUTPATIENT
Start: 2020-11-05 | End: 2020-11-05

## 2020-11-05 RX ORDER — DIPHENHYDRAMINE HCL 25 MG
25 CAPSULE ORAL EVERY 4 HOURS PRN
Status: DISCONTINUED | OUTPATIENT
Start: 2020-11-05 | End: 2020-11-06 | Stop reason: HOSPADM

## 2020-11-05 RX ORDER — PROPOFOL 10 MG/ML
VIAL (ML) INTRAVENOUS
Status: DISCONTINUED | OUTPATIENT
Start: 2020-11-05 | End: 2020-11-05

## 2020-11-05 RX ORDER — PHENYLEPHRINE HYDROCHLORIDE 10 MG/ML
INJECTION INTRAVENOUS
Status: DISCONTINUED | OUTPATIENT
Start: 2020-11-05 | End: 2020-11-05

## 2020-11-05 RX ORDER — DEXTROSE, SODIUM CHLORIDE, SODIUM LACTATE, POTASSIUM CHLORIDE, AND CALCIUM CHLORIDE 5; .6; .31; .03; .02 G/100ML; G/100ML; G/100ML; G/100ML; G/100ML
INJECTION, SOLUTION INTRAVENOUS CONTINUOUS
Status: DISCONTINUED | OUTPATIENT
Start: 2020-11-05 | End: 2020-11-06 | Stop reason: HOSPADM

## 2020-11-05 RX ORDER — MEPERIDINE HYDROCHLORIDE 25 MG/ML
12.5 INJECTION INTRAMUSCULAR; INTRAVENOUS; SUBCUTANEOUS ONCE AS NEEDED
Status: DISCONTINUED | OUTPATIENT
Start: 2020-11-05 | End: 2020-11-06 | Stop reason: HOSPADM

## 2020-11-05 RX ORDER — SODIUM CHLORIDE 0.9 % (FLUSH) 0.9 %
3 SYRINGE (ML) INJECTION
Status: DISCONTINUED | OUTPATIENT
Start: 2020-11-05 | End: 2020-11-06 | Stop reason: HOSPADM

## 2020-11-05 RX ORDER — HYDROCODONE BITARTRATE AND ACETAMINOPHEN 5; 325 MG/1; MG/1
1 TABLET ORAL EVERY 4 HOURS PRN
Qty: 18 TABLET | Refills: 0 | Status: SHIPPED | OUTPATIENT
Start: 2020-11-05 | End: 2020-11-16 | Stop reason: ALTCHOICE

## 2020-11-05 RX ORDER — OXYCODONE HYDROCHLORIDE 5 MG/1
5 TABLET ORAL
Status: DISCONTINUED | OUTPATIENT
Start: 2020-11-05 | End: 2020-11-06 | Stop reason: HOSPADM

## 2020-11-05 RX ORDER — ONDANSETRON 8 MG/1
8 TABLET, ORALLY DISINTEGRATING ORAL EVERY 8 HOURS PRN
Status: DISCONTINUED | OUTPATIENT
Start: 2020-11-05 | End: 2020-11-06 | Stop reason: HOSPADM

## 2020-11-05 RX ORDER — HYDROMORPHONE HYDROCHLORIDE 2 MG/ML
0.4 INJECTION, SOLUTION INTRAMUSCULAR; INTRAVENOUS; SUBCUTANEOUS EVERY 5 MIN PRN
Status: DISCONTINUED | OUTPATIENT
Start: 2020-11-05 | End: 2020-11-06 | Stop reason: HOSPADM

## 2020-11-05 RX ORDER — MIDAZOLAM HYDROCHLORIDE 1 MG/ML
INJECTION INTRAMUSCULAR; INTRAVENOUS
Status: DISCONTINUED | OUTPATIENT
Start: 2020-11-05 | End: 2020-11-05

## 2020-11-05 RX ORDER — ONDANSETRON 2 MG/ML
INJECTION INTRAMUSCULAR; INTRAVENOUS
Status: DISCONTINUED | OUTPATIENT
Start: 2020-11-05 | End: 2020-11-05

## 2020-11-05 RX ORDER — IBUPROFEN 600 MG/1
600 TABLET ORAL EVERY 6 HOURS PRN
Status: DISCONTINUED | OUTPATIENT
Start: 2020-11-05 | End: 2020-11-06 | Stop reason: HOSPADM

## 2020-11-05 RX ADMIN — INFLUENZA VIRUS VACCINE 0.5 ML: 15; 15; 15; 15 SUSPENSION INTRAMUSCULAR at 01:11

## 2020-11-05 RX ADMIN — OXYCODONE 5 MG: 5 TABLET ORAL at 12:11

## 2020-11-05 RX ADMIN — ONDANSETRON HYDROCHLORIDE 4 MG: 2 INJECTION INTRAMUSCULAR; INTRAVENOUS at 11:11

## 2020-11-05 RX ADMIN — FENTANYL CITRATE 100 MCG: 50 INJECTION, SOLUTION INTRAMUSCULAR; INTRAVENOUS at 11:11

## 2020-11-05 RX ADMIN — ACETAMINOPHEN 1000 MG: 500 TABLET, FILM COATED ORAL at 09:11

## 2020-11-05 RX ADMIN — HYDROMORPHONE HYDROCHLORIDE 0.4 MG: 2 INJECTION, SOLUTION INTRAMUSCULAR; INTRAVENOUS; SUBCUTANEOUS at 12:11

## 2020-11-05 RX ADMIN — DOXYCYCLINE 100 MG: 100 INJECTION, POWDER, LYOPHILIZED, FOR SOLUTION INTRAVENOUS at 10:11

## 2020-11-05 RX ADMIN — PHENYLEPHRINE HYDROCHLORIDE 200 MCG: 10 INJECTION INTRAVENOUS at 11:11

## 2020-11-05 RX ADMIN — DEXAMETHASONE SODIUM PHOSPHATE 8 MG: 4 INJECTION, SOLUTION INTRAMUSCULAR; INTRAVENOUS at 11:11

## 2020-11-05 RX ADMIN — GLYCOPYRROLATE 0.4 MG: 0.2 INJECTION, SOLUTION INTRAMUSCULAR; INTRAVITREAL at 11:11

## 2020-11-05 RX ADMIN — LIDOCAINE HYDROCHLORIDE 100 MG: 20 INJECTION, SOLUTION INTRAVENOUS at 11:11

## 2020-11-05 RX ADMIN — SODIUM CHLORIDE, SODIUM LACTATE, POTASSIUM CHLORIDE, AND CALCIUM CHLORIDE: 600; 310; 30; 20 INJECTION, SOLUTION INTRAVENOUS at 11:11

## 2020-11-05 RX ADMIN — MIDAZOLAM HYDROCHLORIDE 2 MG: 1 INJECTION, SOLUTION INTRAMUSCULAR; INTRAVENOUS at 10:11

## 2020-11-05 RX ADMIN — PROPOFOL 200 MG: 10 INJECTION, EMULSION INTRAVENOUS at 11:11

## 2020-11-05 RX ADMIN — MIDAZOLAM HYDROCHLORIDE 2 MG: 1 INJECTION, SOLUTION INTRAMUSCULAR; INTRAVENOUS at 09:11

## 2020-11-05 RX ADMIN — SODIUM CHLORIDE, SODIUM LACTATE, POTASSIUM CHLORIDE, AND CALCIUM CHLORIDE: 600; 310; 30; 20 INJECTION, SOLUTION INTRAVENOUS at 10:11

## 2020-11-05 NOTE — ANESTHESIA PROCEDURE NOTES
Intubation  Performed by: Sebastian Bob CRNA  Authorized by: Frederic Arreaga MD     Intubation:     Induction:  Intravenous    Intubated:  Postinduction    Mask Ventilation:  Easy mask    Attempts:  1    Attempted By:  CRNA    Difficult Airway Encountered?: No      Airway Device:  Supraglottic airway/LMA    Airway Device Size:  3.5    Style/Cuff Inflation:  Cuffed    Inflation Amount (mL):  20    Secured at:  The lips    Placement Verified By:  Capnometry    Complicating Factors:  None    Findings Post-Intubation:  BS equal bilateral

## 2020-11-05 NOTE — DISCHARGE INSTRUCTIONS
Endometrial Ablation  Endometrial ablation is an outpatient surgery that can reduce or stop heavy menstrual bleeding. Ablation destroys the lining of the uterus. This surgery is for women who do not want to have any more children and who have not yet entered menopause. It should not be used by women with endometrial hyperplasia or cancer of the uterus.  Treatment takes less than an hour, and you can go home later that day.  Preparing for surgery  · You may be given medicine by mouth or injection for a few weeks or months before your ablation. This thins the lining of the uterus and reduces bleeding.  · Your health care provider may recommend other procedures to check the inside of your uterus before the ablation is done.   · The day before surgery, you may be given medicine or a special substance (laminaria) may be put into your cervix (the opening to the uterus). This widens the opening.  · To help prevent problems with anesthesia, do not eat or drink anything 10 hours before surgery.  Your surgery     Destroying the lining with heat, freezing, or electric current prevents the lining from growing back.      · Youll be given anesthesia so you stay comfortable and relaxed and feel no pain during surgery.  · Then, your uterus may be filled with fluid. This puts pressure on the lining to help reduce bleeding. It also allows your health care provider to see inside your uterus.  · Next your health care provider puts a small telescope-like instrument through the cervix. This scope may be connected to a video monitor. This helps your health care provider see and control the ablation process. At the end of the scope, a device using heat, freezing, or electric current destroys the uterine lining. Instead of the scope, your health care provider may use a device that both expands and destroys the uterine lining. After being inserted into your uterus, it also uses heat or other energy to destroy the lining. Your health care  provider will choose the device thats best for you.  Your recovery  · You may have cramping or aching in your abdomen after surgery. Your health care provider can give you pain medicine.  · You may also have a bloody or watery discharge or bleeding for days or weeks. Use sanitary pads, not tampons.  · Dont have sexual intercourse or play active sports for 2 weeks after surgery.  · You can likely return to work in 2 days.  · Ask your health care provider about using contraception after an ablation.  · Your health care provider will see you in about 6 weeks to be sure youre healing well.  Call your health care provider if you have any of the following after surgery:  · Persistent or increased abdominal pain  · Shortness of breath  · Heavy vaginal bleeding  · Fever over 100.4°F (38°C) or chills  · Nausea  · Frequent urination for 24 hours   Date Last Reviewed: 5/10/2015  © 2346-6320 [employer]. [address] [place] [address], [place] [address]. All rights reserved. This information is not intended as a substitute for professional medical care. Always follow your healthcare professional's instructions.

## 2020-11-05 NOTE — TRANSFER OF CARE
"Anesthesia Transfer of Care Note    Patient: Jaclyn Rausch    Procedure(s) Performed: Procedure(s) (LRB):  ABLATION, ENDOMETRIUM, THERMAL (N/A)  HYSTEROSCOPY, WITH DILATION AND CURETTAGE OF UTERUS (N/A)    Patient location: PACU    Anesthesia Type: general    Transport from OR: Transported from OR on 2-3 L/min O2 by NC with adequate spontaneous ventilation    Post pain: adequate analgesia    Post assessment: no apparent anesthetic complications    Post vital signs: stable    Level of consciousness: awake    Nausea/Vomiting: no nausea/vomiting    Complications: none    Transfer of care protocol was followed      Last vitals:   Visit Vitals  /84 (BP Location: Left arm, Patient Position: Lying)   Pulse 86   Temp 36.7 °C (98.1 °F) (Oral)   Resp 16   Ht 5' 5" (1.651 m)   Wt 83 kg (183 lb)   LMP 10/24/2020   SpO2 100%   Breastfeeding No   BMI 30.45 kg/m²     "

## 2020-11-05 NOTE — PLAN OF CARE
Jaclyn Rausch has met all discharge criteria from Phase II. Vital Signs are stable, ambulating  without difficulty. Discharge instructions given, patient verbalized understanding. Pt dressed and awaiting meds from pharmacy

## 2020-11-05 NOTE — INTERVAL H&P NOTE
The patient has been examined and the H&P has been reviewed:    I concur with the findings and no changes have occurred since H&P was written.    Surgery risks, benefits and alternative options discussed and understood by patient/family.    Katherine Boecking MD   Ob/Gyn PGY 1        Active Hospital Problems    Diagnosis  POA    Abnormal uterine bleeding (AUB) [N93.9]  Yes      Resolved Hospital Problems   No resolved problems to display.

## 2020-11-05 NOTE — ANESTHESIA POSTPROCEDURE EVALUATION
Anesthesia Post Evaluation    Patient: Jaclyn Rausch    Procedure(s) Performed: Procedure(s) (LRB):  ABLATION, ENDOMETRIUM, THERMAL (N/A)  HYSTEROSCOPY, WITH DILATION AND CURETTAGE OF UTERUS (N/A)    Final Anesthesia Type: general    Patient location during evaluation: PACU  Patient participation: Yes- Able to Participate  Level of consciousness: awake and alert and oriented  Post-procedure vital signs: reviewed and stable  Pain management: adequate  Airway patency: patent    PONV status at discharge: No PONV  Anesthetic complications: no      Cardiovascular status: blood pressure returned to baseline and hemodynamically stable  Respiratory status: unassisted, spontaneous ventilation and room air  Hydration status: euvolemic  Follow-up not needed.          Vitals Value Taken Time   /81 11/05/20 1243   Temp 36.7 °C (98 °F) 11/05/20 1243   Pulse 71 11/05/20 1243   Resp 16 11/05/20 1243   SpO2 96 % 11/05/20 1243         Event Time   Out of Recovery 12:44:13         Pain/Malissa Score: Pain Rating Prior to Med Admin: 5 (11/5/2020 12:32 PM)  Malissa Score: 10 (11/5/2020 12:50 PM)

## 2020-11-05 NOTE — DISCHARGE SUMMARY
[facility]  Brief Op Note/Discharge Note  Short Stay    Admit Date: 11/5/2020    Discharge Date: 11/05/2020    Attending Physician: Ap Milner MD     Surgery Date: 11/5/2020     Surgeon(s) and Role:     * Ap Milner MD - Primary    Assisting Surgeon: Katie Boecking MD PGY-1    Pre-op Diagnosis:  Abnormal uterine bleeding (AUB) [N93.9]    Post-op Diagnosis:  Post-Op Diagnosis Codes:     * Abnormal uterine bleeding (AUB) [N93.9]    Procedure(s) (LRB):  ABLATION, ENDOMETRIUM, THERMAL (N/A)  HYSTEROSCOPY, WITH DILATION AND CURETTAGE OF UTERUS (N/A)    Anesthesia: General    Findings/Key Components: Normal appearing external genitalia.Normal appearing vaginal and cervical mucosa, free of lesions. Single-toothed tenaculum applied to the anterior lip of the cervix. Uterus sounded to 9 cm, Uterine cavity length 5 cm, Uterine width 4.5 cm. Endometrial curettage with #1 curette; scrapings sent to pathology. Hysteroscopically, normal endometrium. NovaSure inserted, seal tested, electricity applied at power 124 W for 1:49 seconds. NovaSure removed. Hysteroscopically, no damage to the endocervical canal, uniform ablation to the uterine cavity; no evidence of perforation. Tenaculum removed, tenaculum puncture sites with mild bleeding well-controlled after applying pressure with the ringed forceps.    Estimated Blood Loss: < 15 cc         Specimens:   Endometrial Scrapings    Discharge Provider: Katherine C Boecking    Diagnoses:  Active Hospital Problems    Diagnosis  POA    Status post Hysteroscopy/D&C/Ablation [Z98.890]  Not Applicable    Abnormal uterine bleeding (AUB) [N93.9]  Yes      Resolved Hospital Problems   No resolved problems to display.       Discharged Condition: good    Hospital Course:   Patient was admitted for outpatient procedure as above, and tolerated the procedure well with no complications. Please see operative report for further details. Following the procedure, the patient  was awakened from anesthesia and transferred to the recovery area in stable condition. She was discharged to home once ambulating, voiding, tolerating PO intake, and pain was well-controlled. Patient was given routine post-op instructions and prescriptions for pain medication to take as needed. Patient instructed to follow up with Dr. Milner in 2 weeks.    Final Diagnoses: Same as principal problem.    Disposition: Home or Self Care    Follow up/Patient Instructions:    Medications:  Reconciled Home Medications:      Medication List      START taking these medications    HYDROcodone-acetaminophen 5-325 mg per tablet  Commonly known as: NORCO  Take 1 tablet by mouth every 4 (four) hours as needed for Pain.        CHANGE how you take these medications    ELIQUIS 5 mg Tab  Generic drug: apixaban  TAKE 1 TABLET(5 MG) BY MOUTH TWICE DAILY  What changed:   · how much to take  · how to take this  · when to take this  · additional instructions        CONTINUE taking these medications    atorvastatin 40 MG tablet  Commonly known as: LIPITOR  Take 1 tablet (40 mg total) by mouth once daily.     iron-vitamin C 100-250 mg (ICAR-C) 100-250 mg Tab  TAKE 1 TABLET BY MOUTH EVERY DAY     pantoprazole 20 MG tablet  Commonly known as: PROTONIX  TAKE 1 TABLET(20 MG) BY MOUTH EVERY DAY          Discharge Procedure Orders   Diet Adult Regular     No driving until:   Order Comments: Until not taking narcotics     Notify your health care provider if you experience any of the following:  temperature >100.4     Notify your health care provider if you experience any of the following:  persistent nausea and vomiting or diarrhea     Notify your health care provider if you experience any of the following:  severe uncontrolled pain     Notify your health care provider if you experience any of the following:  redness, tenderness, or signs of infection (pain, swelling, redness, odor or green/yellow discharge around incision site)     Notify your  health care provider if you experience any of the following:  difficulty breathing or increased cough     Notify your health care provider if you experience any of the following:  severe persistent headache     Notify your health care provider if you experience any of the following:  persistent dizziness, light-headedness, or visual disturbances     Notify your health care provider if you experience any of the following:  increased confusion or weakness     Notify your health care provider if you experience any of the following:   Order Comments: Heavy vaginal bleeding     Pelvic Rest   Order Comments: Nothing in the vagina for 1-2 weeks     Activity as tolerated     Follow-up Information     Ap Milner MD In 2 weeks.    Specialties: Obstetrics, Obstetrics and Gynecology  Why: Post operative follow up  Contact information:  [address] [id number] [phone]                   Katherine Boecking M.D.  [facility] PGY-1

## 2020-11-05 NOTE — OP NOTE
Operative Report     Date of Surgery:  11/05/2020     Surgeon: Ap Milner MD     Assistant: Katherine C Boecking MD PGY-1     Pre-Op Diagnosis:   1. AUB    Post-op Diagnosis:  same    Procedure:  1. Dilation and Curettage  2. Endometrial ablation     Findings:   FINDINGS:   Normal appearing external genitalia.Normal appearing vaginal and cervical mucosa, free of lesions. Single-toothed tenaculum applied to the anterior lip of the cervix. Uterus sounded to 9 cm, Uterine cavity length 5 cm, Uterine width 4.5 cm. Endometrial curettage with #1 curette; scrapings sent to pathology. Hysteroscopically, normal endometrium. NovaSure inserted, seal tested, electricity applied at power 124 W for 1:49 seconds. NovaSure removed. Hysteroscopically, no damage to the endocervical canal, uniform ablation to the uterine cavity; no evidence of perforation. Tenaculum removed, tenaculum puncture sites with mild bleeding well-controlled after applying pressure with the ringed forceps.    EBL: <15  cc      IVF: see anesthesia report     Urine Output: 100 cc    NOVASURE FLUID IN: 523 cc  NOVASURE FLUID DEFICIT: 185 cc   NOVASURE CAVITY MEASUREMENTS: 5 cm (L) x 4.5 cm (W)  NOVASURE POWER: 124 W  NOVASURE TIME: 1:49 seconds      Specimen: Endometrial scrapings     Procedure in Detail:  The patient was taken to the Operating Room where general anesthesia was obtained, the patient was placed in the lithotomy position, with her legs in the Yellow fin stirrups. The patient was then prepped and draped in the normal sterile fashion and her bladder was drained using in-and-out catheterization. The weighted speculum was placed in the posterior aspect of the vagina and the right angle retractor was placed in the anterior aspect of the vagina.  The cervix was visualized and a single-tooth tenaculum was placed on the anterior aspect of the cervix. The uterus sounded to 9 cm. The cervix was dilated 6 mm to allow introduction of the 6 mm  hysteroscope. The hysteroscope was then white balanced and the line cleared of air. The hysteroscope was then inserted into the cervical os and the uterine cavity was directly visualized, as well as both ostia.  Normal uterine findings.     Curettage was then performed to remove endometrial tissue for sampling.  The Novasure system was placed into the endometrial cavity and the array deployed which was then properly seated into the cavity.  The cavity width was measured.  The with and length were then applied to the Novasure system.   The cavity assessment was performed and was passed. The ablation was then conducted. Following removal of the Novasure device, the cavity was again inspected with a thorough burn noted. The cervix was not burned.  There was no evidence of perforation.  The single tooth tenaculum was then removed and the cervix was noted to be hemostatic after applying pressure with ringed forceps.     Sponge, lap,and instrument counts were correct x 2.  The patient tolerated the procedure well and was taken to recovery in stable condition.      Katherine Boecking, MD  OB/GYN PGY-1    I was present, scrubbed, and participated in the entire procedure.  Ap Milner MD

## 2020-11-09 ENCOUNTER — OFFICE VISIT (OUTPATIENT)
Dept: ORTHOPEDICS | Facility: CLINIC | Age: 40
End: 2020-11-09
Payer: COMMERCIAL

## 2020-11-09 ENCOUNTER — HOSPITAL ENCOUNTER (OUTPATIENT)
Dept: RADIOLOGY | Facility: HOSPITAL | Age: 40
Discharge: HOME | End: 2020-11-09
Attending: ORTHOPAEDIC SURGERY
Payer: COMMERCIAL

## 2020-11-09 ENCOUNTER — OFFICE VISIT (OUTPATIENT)
Dept: PSYCHIATRY | Facility: CLINIC | Age: 40
End: 2020-11-09
Payer: COMMERCIAL

## 2020-11-09 VITALS — HEIGHT: 65 IN | RESPIRATION RATE: 16 BRPM | WEIGHT: 183 LBS | BODY MASS INDEX: 30.49 KG/M2

## 2020-11-09 DIAGNOSIS — M75.81 ROTATOR CUFF TENDINITIS, RIGHT: Primary | ICD-10-CM

## 2020-11-09 DIAGNOSIS — M25.511 RIGHT SHOULDER PAIN, UNSPECIFIED CHRONICITY: Primary | ICD-10-CM

## 2020-11-09 DIAGNOSIS — M25.511 RIGHT SHOULDER PAIN, UNSPECIFIED CHRONICITY: ICD-10-CM

## 2020-11-09 DIAGNOSIS — F41.1 GAD (GENERALIZED ANXIETY DISORDER): Primary | ICD-10-CM

## 2020-11-09 LAB
FINAL PATHOLOGIC DIAGNOSIS: NORMAL
GROSS: NORMAL
Lab: NORMAL

## 2020-11-09 PROCEDURE — 99214 OFFICE O/P EST MOD 30 MIN: CPT | Mod: S$GLB,,, | Performed by: ORTHOPAEDIC SURGERY

## 2020-11-09 PROCEDURE — 90834 PSYTX W PT 45 MINUTES: CPT | Mod: S$GLB,,, | Performed by: SOCIAL WORKER

## 2020-11-09 PROCEDURE — 73030 X-RAY EXAM OF SHOULDER: CPT | Mod: TC,PN,RT

## 2020-11-09 PROCEDURE — 3008F PR BODY MASS INDEX (BMI) DOCUMENTED: ICD-10-PCS | Mod: CPTII,S$GLB,, | Performed by: ORTHOPAEDIC SURGERY

## 2020-11-09 PROCEDURE — 99999 PR PBB SHADOW E&M-EST. PATIENT-LVL II: ICD-10-PCS | Mod: PBBFAC,,, | Performed by: SOCIAL WORKER

## 2020-11-09 PROCEDURE — 99214 PR OFFICE/OUTPT VISIT, EST, LEVL IV, 30-39 MIN: ICD-10-PCS | Mod: S$GLB,,, | Performed by: ORTHOPAEDIC SURGERY

## 2020-11-09 PROCEDURE — 99999 PR PBB SHADOW E&M-EST. PATIENT-LVL III: CPT | Mod: PBBFAC,,, | Performed by: ORTHOPAEDIC SURGERY

## 2020-11-09 PROCEDURE — 99999 PR PBB SHADOW E&M-EST. PATIENT-LVL III: ICD-10-PCS | Mod: PBBFAC,,, | Performed by: ORTHOPAEDIC SURGERY

## 2020-11-09 PROCEDURE — 73030 XR SHOULDER TRAUMA 3 VIEW RIGHT: ICD-10-PCS | Mod: 26,RT,, | Performed by: RADIOLOGY

## 2020-11-09 PROCEDURE — 73030 X-RAY EXAM OF SHOULDER: CPT | Mod: 26,RT,, | Performed by: RADIOLOGY

## 2020-11-09 PROCEDURE — 90834 PR PSYCHOTHERAPY W/PATIENT, 45 MIN: ICD-10-PCS | Mod: S$GLB,,, | Performed by: SOCIAL WORKER

## 2020-11-09 PROCEDURE — 3008F BODY MASS INDEX DOCD: CPT | Mod: CPTII,S$GLB,, | Performed by: ORTHOPAEDIC SURGERY

## 2020-11-09 PROCEDURE — 99999 PR PBB SHADOW E&M-EST. PATIENT-LVL II: CPT | Mod: PBBFAC,,, | Performed by: SOCIAL WORKER

## 2020-11-09 NOTE — PROGRESS NOTES
"Individual Psychotherapy (PhD/LCSW)    11/23/2020    Site:  [facility]         Therapeutic Intervention: Met with patient.  Outpatient - Insight oriented psychotherapy 45 min - CPT code 19273, Outpatient - Behavior modifying psychotherapy 45 min - CPT code 25136 and Outpatient - Supportive psychotherapy 45 min - CPT Code 36830    Chief complaint/reason for encounter: anxiety     Interval history and content of current session: Jaclyn reported that her anxiety is somewhat improved, noting that she is not sure if it is because she just started Zoloft, there are less stressors currently (children no longer in football practice, she is off of work this week), or because she has started to write things and tell herself "Okay, this is done, nothing to do to change it." We discussed her growing up and suppressing her emotions and how this might have been reinforced by being in the  environment. She discussed that she has learned that suppressing her emotions is no longer working for her due to having emotional build up, and she shared about her 's family and having to set boundaries with them. Therapist provided psycho-education on anxiety and how it functions, and we discussed struggling vs. experiencing emotions. We also discussed being able to challenge negative "what if" thoughts and writing down thoughts. Jaclyn said that she forgot to fill out the Dissecting the Problem and Join the Dots worksheets, and she will bring them in next session.     Treatment plan:  · Target symptoms: anxiety   · Why chosen therapy is appropriate versus another modality: relevant to diagnosis, evidence based practice  · Outcome monitoring methods: self-report  · Therapeutic intervention type: insight oriented psychotherapy, behavior modifying psychotherapy, supportive psychotherapy    Risk parameters:  Patient reports no suicidal ideation  Patient reports no homicidal ideation  Patient reports no self-injurious " behavior  Patient reports no violent behavior    Verbal deficits: None    Patient's response to intervention:  The patient's response to intervention is accepting.    Progress toward goals and other mental status changes:  The patient's progress toward goals is good.    Diagnosis:     ICD-10-CM ICD-9-CM   1. AGGIE (generalized anxiety disorder)  F41.1 300.02       Plan:  individual psychotherapy and medication management by physician Pt to go to ED or call 911 if symptoms worsen or if she has thoughts of harming self and/or others. Pt verbalized understanding.    Return to clinic: 2 weeks    Length of Service (minutes): 45      Each patient to whom he or she provides medical services by telemedicine is: (1) informed of the relationship between the physician and patient and the respective role of any other health care provider with respect to management of the patient; and (2) notified that he or she may decline to receive medical services by telemedicine and may withdraw from such care at any time.

## 2020-11-09 NOTE — PROGRESS NOTES
CC:   40-year-old female presents for evaluation of right shoulder pain.  The patient has intermittent pain in the right shoulder that is activity related.  Doing things like pushups or pull-ups aggravates the right shoulder.  Otherwise she does not have pain in the shoulder unless she does those specific activities.  When she has the pain she rates as a 4/10.  Today she is not having any pain in the shoulder.    ROS:    Constitution: Denies chills, fever, and sweats.  HENT: Denies headaches or blurry vision.  Cardiovascular: Denies chest pain or irregular heart beat.  Respiratory: Denies cough or shortness of breath.  Gastrointestinal: Denies abdominal pain, nausea, or vomiting.  Genitourinary:  Denies urinary incontinence, bladder and kidney issues  Musculoskeletal:  Denies muscle cramps.  Positive for intermittent right shoulder pain  Neurological: Denies dizziness or focal weakness.  Psychiatric/Behavioral: Normal mental status.  Hematologic/Lymphatic: Denies bleeding problem or easy bruising/bleeding.  Skin: Denies rash or suspicious lesions.    Physical examination     Gen - No acute distress, well nourished, well groomed   Eyes - Extraoccular motions intact, pupils equally round and reactive to light and accommodation   ENT - normocephalic, atruamtic, oropharynx clear   Neck - Supple, no abnormal masses   Cardiovascular - regular rate and rhythm   Pulmonary - clear to auscultation bilaterally, no wheezes, ronchi, or rales   Abdomen - soft, non-tender, non-distended, positive bowel sounds   Psych - The patient is alert and oriented x3 with normal mood and affect    Right Upper Extremity Examination     Skin is intact throughout   Motor is intact distally radial, median, ulnar, AIN, PIN   +2 radial and ulnar pulses   Sensation to light touch is intact distally radial, median, and ulnar     Examination of the Right shoulder:   ROM:   For - 150   Abd - 150   Ext - 50   Int - T12     Tenderness to palpation:  Mild  tender to palpation over the supraspinatus muscle belly  Subacromial space - negative  Biceps Tendon - negative  Anterior Glenohumeral Joint - negative  AC joint - negative  Glenohumeral instability - negative  Empty Can test - negative  Speeds test - negative  Francis/Neers sign - negative  Cross-arm adduction test - negative    X-ray images were examined and personally interpreted by me.  Three views the right shoulder dated 11/09/2020 show the humeral head well reduced in the glenoid.  There are no acute fractures.  Normal bony anatomy.    Dx:  Rotator cuff tendinitis of the right shoulder    Plan:  We discussed an exercise program and activity modification.  The patient verbalized understanding and agreement.  Unfortunately, she cannot take NSAIDs because she is on a blood thinner.  If it flares up to the point that it is really bothering her we could consider an injection but she is not quite ready for that at this point.

## 2020-11-10 ENCOUNTER — TELEPHONE (OUTPATIENT)
Dept: NEUROLOGY | Facility: CLINIC | Age: 40
End: 2020-11-10

## 2020-11-10 ENCOUNTER — PATIENT MESSAGE (OUTPATIENT)
Dept: NEUROLOGY | Facility: CLINIC | Age: 40
End: 2020-11-10

## 2020-11-10 NOTE — TELEPHONE ENCOUNTER
----- Message from Марина Mathews sent at 11/10/2020 11:19 AM CST -----  Name Of Caller:  Jaclyn     Provider Name: CHIQUITA NAVARRETE    Does patient feel the need to be seen today? No     Relationship to the Pt?:  Patient     Contact Preference?: [phone]    What is the nature of the call?: Patient called to schedule an appointment for a check up and no appointment came up would like to speak to your office to schedule one     Patient said she sent a appointment request on my chart yesterday and still have not heard back

## 2020-11-16 ENCOUNTER — OFFICE VISIT (OUTPATIENT)
Dept: PSYCHIATRY | Facility: CLINIC | Age: 40
End: 2020-11-16
Payer: COMMERCIAL

## 2020-11-16 VITALS
HEART RATE: 71 BPM | TEMPERATURE: 97 F | WEIGHT: 184 LBS | SYSTOLIC BLOOD PRESSURE: 141 MMHG | HEIGHT: 65 IN | DIASTOLIC BLOOD PRESSURE: 94 MMHG | BODY MASS INDEX: 30.66 KG/M2

## 2020-11-16 DIAGNOSIS — F41.1 GAD (GENERALIZED ANXIETY DISORDER): Primary | ICD-10-CM

## 2020-11-16 DIAGNOSIS — F32.A DEPRESSIVE DISORDER: ICD-10-CM

## 2020-11-16 PROCEDURE — 90792 PSYCH DIAG EVAL W/MED SRVCS: CPT | Mod: S$GLB,,, | Performed by: PHYSICIAN ASSISTANT

## 2020-11-16 PROCEDURE — 3008F PR BODY MASS INDEX (BMI) DOCUMENTED: ICD-10-PCS | Mod: CPTII,S$GLB,, | Performed by: PHYSICIAN ASSISTANT

## 2020-11-16 PROCEDURE — 1126F PR PAIN SEVERITY QUANTIFIED, NO PAIN PRESENT: ICD-10-PCS | Mod: S$GLB,,, | Performed by: PHYSICIAN ASSISTANT

## 2020-11-16 PROCEDURE — 1126F AMNT PAIN NOTED NONE PRSNT: CPT | Mod: S$GLB,,, | Performed by: PHYSICIAN ASSISTANT

## 2020-11-16 PROCEDURE — 3008F BODY MASS INDEX DOCD: CPT | Mod: CPTII,S$GLB,, | Performed by: PHYSICIAN ASSISTANT

## 2020-11-16 PROCEDURE — 99999 PR PBB SHADOW E&M-EST. PATIENT-LVL III: ICD-10-PCS | Mod: PBBFAC,,, | Performed by: PHYSICIAN ASSISTANT

## 2020-11-16 PROCEDURE — 99999 PR PBB SHADOW E&M-EST. PATIENT-LVL III: CPT | Mod: PBBFAC,,, | Performed by: PHYSICIAN ASSISTANT

## 2020-11-16 PROCEDURE — 90792 PR PSYCHIATRIC DIAGNOSTIC EVALUATION W/MEDICAL SERVICES: ICD-10-PCS | Mod: S$GLB,,, | Performed by: PHYSICIAN ASSISTANT
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
## 2020-11-17 ENCOUNTER — OFFICE VISIT (OUTPATIENT)
Dept: DERMATOLOGY | Facility: CLINIC | Age: 40
End: 2020-11-17
Payer: COMMERCIAL

## 2020-11-17 DIAGNOSIS — R68.89 UNWANTED HAIR: ICD-10-CM

## 2020-11-17 DIAGNOSIS — L73.8 PITYROSPORUM FOLLICULITIS: Primary | ICD-10-CM

## 2020-11-17 DIAGNOSIS — L75.0 BROMHIDROSIS: ICD-10-CM

## 2020-11-17 PROCEDURE — 99213 OFFICE O/P EST LOW 20 MIN: CPT | Mod: S$GLB,,, | Performed by: DERMATOLOGY

## 2020-11-17 PROCEDURE — 99999 PR PBB SHADOW E&M-EST. PATIENT-LVL III: CPT | Mod: PBBFAC,,, | Performed by: DERMATOLOGY

## 2020-11-17 PROCEDURE — 99213 PR OFFICE/OUTPT VISIT, EST, LEVL III, 20-29 MIN: ICD-10-PCS | Mod: S$GLB,,, | Performed by: DERMATOLOGY

## 2020-11-17 PROCEDURE — 99999 PR PBB SHADOW E&M-EST. PATIENT-LVL III: ICD-10-PCS | Mod: PBBFAC,,, | Performed by: DERMATOLOGY

## 2020-11-17 PROCEDURE — 1126F AMNT PAIN NOTED NONE PRSNT: CPT | Mod: S$GLB,,, | Performed by: DERMATOLOGY

## 2020-11-17 PROCEDURE — 1126F PR PAIN SEVERITY QUANTIFIED, NO PAIN PRESENT: ICD-10-PCS | Mod: S$GLB,,, | Performed by: DERMATOLOGY

## 2020-11-17 RX ORDER — CLINDAMYCIN PHOSPHATE 10 UG/ML
LOTION TOPICAL
Qty: 60 ML | Refills: 1 | Status: SHIPPED | OUTPATIENT
Start: 2020-11-17 | End: 2022-01-14

## 2020-11-17 RX ORDER — SERTRALINE HYDROCHLORIDE 25 MG/1
25 TABLET, FILM COATED ORAL DAILY
Qty: 30 TABLET | Refills: 1 | Status: SHIPPED | OUTPATIENT
Start: 2020-11-17 | End: 2020-12-14 | Stop reason: SDUPTHER

## 2020-11-17 RX ORDER — KETOCONAZOLE 20 MG/ML
SHAMPOO, SUSPENSION TOPICAL
Qty: 120 ML | Refills: 5 | Status: SHIPPED | OUTPATIENT
Start: 2020-11-17 | End: 2021-03-05 | Stop reason: SDUPTHER

## 2020-11-17 RX ORDER — KETOCONAZOLE 20 MG/G
CREAM TOPICAL
Qty: 60 G | Refills: 3 | Status: SHIPPED | OUTPATIENT
Start: 2020-11-17 | End: 2021-03-05 | Stop reason: ALTCHOICE

## 2020-11-17 NOTE — PROGRESS NOTES
Subjective:       Patient ID:  Jaclyn Rausch is a 40 y.o. female who presents for   Chief Complaint   Patient presents with    Mass     back     Last OV 11/12/18  TBSE    Here today with c/o unwanted hair growth on neck/chin   Bumps on back, sometimes itchy, never comes to a head, been happening for years, never been treated. Moisturizes body at least once daily with Jergens Ultra, has tried Amlactin  Also c/o foul smell from L underarm only. Intermittent, happens whether pt is freshly showered or not. Wears Native deodorant daily, has used for a long time. Has not talked with OB/GYN about issue.   States she has extreme PMS, denies other hormonal dysfunction  H/o uterine ablation    Derm hx  Denies fhx nsmc/mm  Denies phx nmsc/mm  Spends average amount of time outdoors, wears  uniform most of the day. Wears moisturizer with SPF.   Denies tanning bed usage, h/o bad sunburns   ++Pt on eliquis, hypercoagulable state      Current Outpatient Medications:     atorvastatin (LIPITOR) 40 MG tablet, Take 1 tablet (40 mg total) by mouth once daily., Disp: 90 tablet, Rfl: 3    ELIQUIS 5 mg Tab, TAKE 1 TABLET(5 MG) BY MOUTH TWICE DAILY, Disp: 60 tablet, Rfl: 11    iron-vitamin C 100-250 mg, ICAR-C, 100-250 mg Tab, TAKE 1 TABLET BY MOUTH EVERY DAY, Disp: 30 tablet, Rfl: 6    pantoprazole (PROTONIX) 20 MG tablet, TAKE 1 TABLET(20 MG) BY MOUTH EVERY DAY, Disp: 30 tablet, Rfl: 0  No current facility-administered medications for this visit.     Facility-Administered Medications Ordered in Other Visits:     electrolyte-S (ISOLYTE), , Intravenous, Continuous, Héctor Trujillo MD, Stopped at 01/10/20 0828    lidocaine (PF) 10 mg/ml (1%) injection 10 mg, 1 mL, Intradermal, Once, Héctor Trujillo MD            Review of Systems   Constitutional: Negative for fever and chills.   Respiratory: Negative for cough and shortness of breath.    Skin: Positive for itching, dry skin, daily sunscreen use (moisturizer)  and activity-related sunscreen use. Negative for rash.   Hematologic/Lymphatic: Bruises/bleeds easily (eliquis ).        Objective:    Physical Exam   Constitutional: She appears well-developed and well-nourished. No distress.   Neurological: She is alert and oriented to person, place, and time. She is not disoriented.   Psychiatric: She has a normal mood and affect.   Skin:   Areas Examined (abnormalities noted in diagram):   Scalp / Hair Palpated and Inspected  Head / Face Inspection Performed  Neck Inspection Performed  Chest / Axilla Inspection Performed  Abdomen Inspection Performed  Back Inspection Performed  RUE Inspected  LUE Inspection Performed  Nails and Digits Inspection Performed                       Diagram Legend     Erythematous scaling macule/papule c/w actinic keratosis       Vascular papule c/w angioma      Pigmented verrucoid papule/plaque c/w seborrheic keratosis      Yellow umbilicated papule c/w sebaceous hyperplasia      Irregularly shaped tan macule c/w lentigo     1-2 mm smooth white papules consistent with Milia      Movable subcutaneous cyst with punctum c/w epidermal inclusion cyst      Subcutaneous movable cyst c/w pilar cyst      Firm pink to brown papule c/w dermatofibroma      Pedunculated fleshy papule(s) c/w skin tag(s)      Evenly pigmented macule c/w junctional nevus     Mildly variegated pigmented, slightly irregular-bordered macule c/w mildly atypical nevus      Flesh colored to evenly pigmented papule c/w intradermal nevus       Pink pearly papule/plaque c/w basal cell carcinoma      Erythematous hyperkeratotic cursted plaque c/w SCC      Surgical scar with no sign of skin cancer recurrence      Open and closed comedones      Inflammatory papules and pustules      Verrucoid papule consistent consistent with wart     Erythematous eczematous patches and plaques     Dystrophic onycholytic nail with subungual debris c/w onychomycosis     Umbilicated papule    Erythematous-base  heme-crusted tan verrucoid plaque consistent with inflamed seborrheic keratosis     Erythematous Silvery Scaling Plaque c/w Psoriasis     See annotation      Assessment / Plan:        Pityrosporum folliculitis  -     ketoconazole (NIZORAL) 2 % shampoo; Wash trunk with medicated shampoo 1 to 2x/week - let sit on skin at least 5 minutes prior to rinsing  Dispense: 120 mL; Refill: 5  -     ketoconazole (NIZORAL) 2 % cream; AAA back once daily after shower  Dispense: 60 g; Refill: 3  BP wash in shower  Denies strenuous exercise with tight clothing    Unwanted hair  Terminal hair, chin only   Past laser treatment, years ago, recommend repeat  Will message Dr Brunner for ok to use spironolactone in setting of hypercoagulable state (anti androgen should be ok)      Bromhidrosis  -     clindamycin (CLEOCIN T) 1 % lotion; aaa axillae once tow twice daily  Dispense: 60 mL; Refill: 1    Patient instructed in importance in daily sun protection of at least spf 30. Mineral sunscreen ingredients preferred (Zinc +/- Titanium).   Recommend Elta MD for daily use on face and neck.  Patient encouraged to wear hat for all outdoor exposure.   Also discussed sun avoidance and use of protective clothing.             Follow up in about 3 months (around 2/17/2021), or if symptoms worsen or fail to improve.

## 2020-11-17 NOTE — Clinical Note
Barry Sprague,  I saw Jaclyn today with c/o unwanted hair (chin, submental). Discussed spironolactone, wanted your input as to whether you consider okay to use in setting of her hypercoagulable state.   Thank you!  Frida Keller

## 2020-11-18 ENCOUNTER — PATIENT MESSAGE (OUTPATIENT)
Dept: SURGERY | Facility: OTHER | Age: 40
End: 2020-11-18

## 2020-11-23 ENCOUNTER — OFFICE VISIT (OUTPATIENT)
Dept: PSYCHIATRY | Facility: CLINIC | Age: 40
End: 2020-11-23
Payer: COMMERCIAL

## 2020-11-23 DIAGNOSIS — F41.1 GAD (GENERALIZED ANXIETY DISORDER): Primary | ICD-10-CM

## 2020-11-23 PROCEDURE — 99999 PR PBB SHADOW E&M-EST. PATIENT-LVL II: ICD-10-PCS | Mod: PBBFAC,,, | Performed by: SOCIAL WORKER

## 2020-11-23 PROCEDURE — 90834 PSYTX W PT 45 MINUTES: CPT | Mod: S$GLB,,, | Performed by: SOCIAL WORKER

## 2020-11-23 PROCEDURE — 90834 PR PSYCHOTHERAPY W/PATIENT, 45 MIN: ICD-10-PCS | Mod: S$GLB,,, | Performed by: SOCIAL WORKER

## 2020-11-23 PROCEDURE — 99999 PR PBB SHADOW E&M-EST. PATIENT-LVL II: CPT | Mod: PBBFAC,,, | Performed by: SOCIAL WORKER

## 2020-12-02 ENCOUNTER — TELEPHONE (OUTPATIENT)
Dept: NEUROLOGY | Facility: CLINIC | Age: 40
End: 2020-12-02

## 2020-12-02 ENCOUNTER — OFFICE VISIT (OUTPATIENT)
Dept: NEUROLOGY | Facility: CLINIC | Age: 40
End: 2020-12-02
Payer: COMMERCIAL

## 2020-12-02 VITALS
RESPIRATION RATE: 17 BRPM | HEART RATE: 77 BPM | HEIGHT: 65 IN | TEMPERATURE: 98 F | SYSTOLIC BLOOD PRESSURE: 130 MMHG | WEIGHT: 184.44 LBS | BODY MASS INDEX: 30.73 KG/M2 | DIASTOLIC BLOOD PRESSURE: 89 MMHG

## 2020-12-02 DIAGNOSIS — Z86.73 HISTORY OF STROKE: Primary | ICD-10-CM

## 2020-12-02 DIAGNOSIS — G43.109 MIGRAINE WITH AURA AND WITHOUT STATUS MIGRAINOSUS, NOT INTRACTABLE: ICD-10-CM

## 2020-12-02 DIAGNOSIS — Z79.01 ANTICOAGULANT LONG-TERM USE: ICD-10-CM

## 2020-12-02 DIAGNOSIS — E78.01 FAMILIAL HYPERCHOLESTEROLEMIA: ICD-10-CM

## 2020-12-02 PROCEDURE — 1125F AMNT PAIN NOTED PAIN PRSNT: CPT | Mod: S$GLB,,, | Performed by: PSYCHIATRY & NEUROLOGY

## 2020-12-02 PROCEDURE — 99214 OFFICE O/P EST MOD 30 MIN: CPT | Mod: S$GLB,,, | Performed by: PSYCHIATRY & NEUROLOGY

## 2020-12-02 PROCEDURE — 3008F BODY MASS INDEX DOCD: CPT | Mod: CPTII,S$GLB,, | Performed by: PSYCHIATRY & NEUROLOGY

## 2020-12-02 PROCEDURE — 99999 PR PBB SHADOW E&M-EST. PATIENT-LVL IV: CPT | Mod: PBBFAC,,, | Performed by: PSYCHIATRY & NEUROLOGY

## 2020-12-02 PROCEDURE — 3008F PR BODY MASS INDEX (BMI) DOCUMENTED: ICD-10-PCS | Mod: CPTII,S$GLB,, | Performed by: PSYCHIATRY & NEUROLOGY

## 2020-12-02 PROCEDURE — 1125F PR PAIN SEVERITY QUANTIFIED, PAIN PRESENT: ICD-10-PCS | Mod: S$GLB,,, | Performed by: PSYCHIATRY & NEUROLOGY

## 2020-12-02 PROCEDURE — 99999 PR PBB SHADOW E&M-EST. PATIENT-LVL IV: ICD-10-PCS | Mod: PBBFAC,,, | Performed by: PSYCHIATRY & NEUROLOGY

## 2020-12-02 PROCEDURE — 99214 PR OFFICE/OUTPT VISIT, EST, LEVL IV, 30-39 MIN: ICD-10-PCS | Mod: S$GLB,,, | Performed by: PSYCHIATRY & NEUROLOGY

## 2020-12-02 NOTE — TELEPHONE ENCOUNTER
----- Message from Pearl Roca MD sent at 12/2/2020 12:33 PM CST -----  Will you send a message to the patient and let her know that I heard from the migraine doctor that blue light filtering glasses can be very helpful in people. She can check with an optical shop to get some made and that might help prevent her migraines as well.

## 2020-12-02 NOTE — PROGRESS NOTES
Date: 12/2/2020    Patient ID: Jaclyn Rausch is a 40 y.o. female.    Chief Complaint: Follow-up      History of Present Illness:  Ms. Rausch is a 40 y.o. female who presents for followup of history of stroke.      She was off her Eliquis for an endometrial ablation. The first day she restarted it, she had an episode. She felt a weird cold feeling in her left arm and her face. She felt lightheaded and felt like she might pass out but didn't. She sat down. This lasted for a few minutes and went away. This has not happened again.     She has migraines often. The headache doesn't bother her more. She has vision disturbances. She has peripheral white fuzziness with them. Driving in the bright lights will trigger them. The vision changes will last 1-2 hours and then followed by a headache. She has frontal headaches. The pain is typically a 6/10 or some 8-9/10. She has had some that last over a day but mostly they last a couple of hours. She doesn't take anything for it. Caffeine helps and tylenol will help some. She gets nauseated as well.     Years ago, she tried imitrex but it made her neck stiff. She notes depression and urinary incontinence.     Stroke occurred in April 2018. This manifested as aphasia and she was found to have a 4.5 mm restricted diffusion in the left frontal lobe as well as a chronic left cerebellar stroke. Echo initially showed concern for atrial septal aneurysm but subsequent echos did not. She also had separate episode later of left arm numbness that lasted an hour concerning for TIA. Her CTA head and neck and EEG were normal. We switched from xarelto to Eliquis in June 2018. She has not had any more episodes since that time. 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 She is on Eliquis for anticoagulation/stroke prevention. She is on atorvastatin 40 mg daily.      We have felt she has CTS in the past and has had  improvement with wrist splints in the past.      She is on zoloft for anxiety now.     Review of Systems:   All other systems were reviewed and negative except as mentioned in the HPI    Allergies:  Review of patient's allergies indicates:  No Known Allergies    Current Medications:  Current Outpatient Medications   Medication Sig Dispense Refill    atorvastatin (LIPITOR) 40 MG tablet Take 1 tablet (40 mg total) by mouth once daily. 90 tablet 3    clindamycin (CLEOCIN T) 1 % lotion aaa axillae once tow twice daily 60 mL 1    ELIQUIS 5 mg Tab TAKE 1 TABLET(5 MG) BY MOUTH TWICE DAILY 60 tablet 11    iron-vitamin C 100-250 mg, ICAR-C, 100-250 mg Tab TAKE 1 TABLET BY MOUTH EVERY DAY 30 tablet 6    ketoconazole (NIZORAL) 2 % cream AAA back once daily after shower 60 g 3    ketoconazole (NIZORAL) 2 % shampoo Wash trunk with medicated shampoo 1 to 2x/week - let sit on skin at least 5 minutes prior to rinsing 120 mL 5    pantoprazole (PROTONIX) 20 MG tablet TAKE 1 TABLET(20 MG) BY MOUTH EVERY DAY 30 tablet 0    sertraline (ZOLOFT) 25 MG tablet Take 1 tablet (25 mg total) by mouth once daily. 30 tablet 1     No current facility-administered medications for this visit.      Facility-Administered Medications Ordered in Other Visits   Medication Dose Route Frequency Provider Last Rate Last Dose    electrolyte-S (ISOLYTE)   Intravenous Continuous Héctor Trujillo MD   Stopped at 01/10/20 0828    lidocaine (PF) 10 mg/ml (1%) injection 10 mg  1 mL Intradermal Once Héctor Trujillo MD           Past Medical History:  Past Medical History:   Diagnosis Date    Anemia     slightly    Anticardiolipin antibody positive 5/4/2018    Anticoagulant long-term use     Atrial septal aneurysm     Dr. Frank; last visit 1/2018    Clotting disorder 2/3/2019    Elevated lipoprotein(a) 5/4/2018    Stroke 04/24/2018       Past Surgical History:  Past Surgical History:   Procedure Laterality Date    BUNIONECTOMY Left 2001     "CHONDROPLASTY OF KNEE Left 1/10/2020    Procedure: CHONDROPLASTY, KNEE;  Surgeon: Michael Galaviz II, MD;  Location: [facility] OR;  Service: Orthopedics;  Laterality: Left;    HYSTEROSCOPY WITH DILATION AND CURETTAGE OF UTERUS N/A 11/5/2020    Procedure: HYSTEROSCOPY, WITH DILATION AND CURETTAGE OF UTERUS;  Surgeon: Ap Milner MD;  Location: [facility] [place] OR;  Service: OB/GYN;  Laterality: N/A;    KNEE ARTHROSCOPY W/ MENISCECTOMY Left 1/10/2020    Procedure: ARTHROSCOPY, KNEE, WITH MENISCECTOMY;  Surgeon: Michael Galaviz II, MD;  Location: [facility] OR;  Service: Orthopedics;  Laterality: Left;    REPAIR OF MENISCUS OF KNEE Left 1/10/2020    Procedure: REPAIR, MENISCUS, KNEE;  Surgeon: Michael Galaviz II, MD;  Location: [facility] OR;  Service: Orthopedics;  Laterality: Left;  PARTIAL MEDIAL    THERMAL ABLATION OF ENDOMETRIUM N/A 11/5/2020    Procedure: ABLATION, ENDOMETRIUM, THERMAL;  Surgeon: Ap Milner MD;  Location: [facility] [place] OR;  Service: OB/GYN;  Laterality: N/A;       Family History:  family history includes Colon cancer in her maternal grandfather; Diabetes in her maternal grandmother; Eczema in her son; Hearing loss in her father; Hypertension in her brother and mother; Lung cancer in her maternal grandfather.    Social History:   reports that she quit smoking about 17 years ago. She has a 0.50 pack-year smoking history. She has never used smokeless tobacco. She reports current alcohol use. She reports that she does not use drugs.    Physical Exam:  Vitals:    12/02/20 1126   BP: 130/89   Pulse: 77   Resp: 17   Temp: 98.2 °F (36.8 °C)   Weight: 83.7 kg (184 lb 6.6 oz)   Height: 5' 5" (1.651 m)   PainSc:   1   PainLoc: Foot     Body mass index is 30.69 kg/m².  General: Well developed, well nourished.  No acute distress.  Musculoskeletal: No obvious joint deformities, moves all extremities well.  Peripheral vascular: No edema noted    Neurological Exam:  General: well-developed, well-nourished, no distress  Mental " status: Awake and alert  Speech language: No dysarthria or aphasia on conversation  Cranial nerves: Face symmetric  Motor: 5/5 strength throughout bilateral UE and LE  Reflexes: 2+ bilateral biceps and knees  Coordination: No ataxia. No tremor.   Gait: normal tandem walking        Data:  I have personally reviewed other provider's notes, labs, & imaging made available to me today.       Labs:  CBC:   Lab Results   Component Value Date    WBC 7.35 11/04/2020    HGB 13.9 11/04/2020    HCT 42.2 11/04/2020     11/04/2020    MCV 93 11/04/2020    RDW 12.5 11/04/2020     BMP:   Lab Results   Component Value Date     11/04/2020    K 4.1 11/04/2020     11/04/2020    CO2 28 11/04/2020    BUN 10 11/04/2020    CREATININE 0.8 11/04/2020    GLU 96 11/04/2020    CALCIUM 8.7 11/04/2020    MG 2.2 11/04/2019     LFTS;   Lab Results   Component Value Date    PROT 7.3 07/10/2020    ALBUMIN 4.0 07/10/2020    BILITOT 0.6 07/10/2020    AST 19 07/10/2020    ALKPHOS 89 07/10/2020    ALT 20 07/10/2020     COAGS:   Lab Results   Component Value Date    INR 1.0 04/24/2018     FLP:   Lab Results   Component Value Date    CHOL 155 11/04/2019    HDL 62 11/04/2019    LDLCALC 73.8 11/04/2019    TRIG 96 11/04/2019    CHOLHDL 40.0 11/04/2019         Imaging:  I have personally reviewed the imaging, prior MRI showed small left frontal restricted diffusion    Assessment and Plan:  Ms. Rausch is a 40 y.o. female here for followup of h/o stroke and migraines. She should remain on Eliquis. I think this episode may have been a TIA potentially from holding Eliquis or from BP dropping (especially since it was accompanied by lightheadedness). Will continue anticoagulation and statin for LDL goal <70    For her h/o migraines, we will try magnesium and/or riboflavin as preventatives. If that doesn't work, could consider gabapentin but she would like to avoid prescription medications if possible. I also discussed the possibility of blue light  filtering glasses for her photophobia.       History of stroke    Migraine with aura and without status migrainosus, not intractable    Anticoagulant long-term use    Familial hypercholesterolemia

## 2020-12-03 ENCOUNTER — PATIENT MESSAGE (OUTPATIENT)
Dept: OBSTETRICS AND GYNECOLOGY | Facility: CLINIC | Age: 40
End: 2020-12-03

## 2020-12-07 NOTE — PROGRESS NOTES
"Individual Psychotherapy (PhD/LCSW)    12/21/2020    Site:  [facility]         Therapeutic Intervention: Met with patient.  Outpatient - Behavior modifying psychotherapy 45 min - CPT code 11025 and Outpatient - Supportive psychotherapy 45 min - CPT Code 53687    Chief complaint/reason for encounter: anxiety     Interval history and content of current session: Jaclyn shared that she has been feeling "better" since our last session approx 1 month ago. She said that she feels the medication she is currently taking is helping to "take the edge off" of her anxiety to a point where it's more manageable. She explained that though she has not experienced any panic attacks, she does still notice anxiety and is having difficulty pinpointing where it is coming from. She did say that she has had some nervousness in getting the children their Sherif presents and completing tasks, but she has been able to do both. Therapist provided empathic support as well as discussed with Jaclyn practicing acceptance of emotions as opposed to fighting with them or pushing them away. Therapist led Jaclyn through the Acceptance of Emotions mindfulness exercise, and she said that this helped to relax her. Therapist encouraged her to attempt to practice "making space for" and "allowing" anxiety when she notices that she is experiencing it.     Treatment plan:  · Target symptoms: anxiety   · Why chosen therapy is appropriate versus another modality: relevant to diagnosis, patient responds to this modality, evidence based practice  · Outcome monitoring methods: self-report  · Therapeutic intervention type: behavior modifying psychotherapy, supportive psychotherapy    Risk parameters:  Patient reports no suicidal ideation  Patient reports no homicidal ideation  Patient reports no self-injurious behavior  Patient reports no violent behavior    Verbal deficits: None    Patient's response to intervention:  The patient's response to intervention " is accepting.    Progress toward goals and other mental status changes:  The patient's progress toward goals is fair .    Diagnosis:     ICD-10-CM ICD-9-CM   1. AGGIE (generalized anxiety disorder)  F41.1 300.02       Plan:  individual psychotherapy and medication management by physician Pt to go to ED or call 911 if symptoms worsen or if she has thoughts of harming self and/or others. Pt verbalized understanding.    Return to clinic: 2 weeks    Length of Service (minutes): 45      Each patient to whom he or she provides medical services by telemedicine is: (1) informed of the relationship between the physician and patient and the respective role of any other health care provider with respect to management of the patient; and (2) notified that he or she may decline to receive medical services by telemedicine and may withdraw from such care at any time.

## 2020-12-14 ENCOUNTER — OFFICE VISIT (OUTPATIENT)
Dept: PSYCHIATRY | Facility: CLINIC | Age: 40
End: 2020-12-14
Payer: COMMERCIAL

## 2020-12-14 VITALS
BODY MASS INDEX: 31.59 KG/M2 | DIASTOLIC BLOOD PRESSURE: 87 MMHG | TEMPERATURE: 97 F | WEIGHT: 189.63 LBS | SYSTOLIC BLOOD PRESSURE: 135 MMHG | HEART RATE: 68 BPM | HEIGHT: 65 IN

## 2020-12-14 DIAGNOSIS — F41.1 GAD (GENERALIZED ANXIETY DISORDER): ICD-10-CM

## 2020-12-14 DIAGNOSIS — F32.A DEPRESSIVE DISORDER: Primary | ICD-10-CM

## 2020-12-14 PROCEDURE — 3008F PR BODY MASS INDEX (BMI) DOCUMENTED: ICD-10-PCS | Mod: CPTII,S$GLB,, | Performed by: PHYSICIAN ASSISTANT

## 2020-12-14 PROCEDURE — 99214 OFFICE O/P EST MOD 30 MIN: CPT | Mod: S$GLB,,, | Performed by: PHYSICIAN ASSISTANT

## 2020-12-14 PROCEDURE — 99999 PR PBB SHADOW E&M-EST. PATIENT-LVL IV: ICD-10-PCS | Mod: PBBFAC,,, | Performed by: PHYSICIAN ASSISTANT

## 2020-12-14 PROCEDURE — 99214 PR OFFICE/OUTPT VISIT, EST, LEVL IV, 30-39 MIN: ICD-10-PCS | Mod: S$GLB,,, | Performed by: PHYSICIAN ASSISTANT

## 2020-12-14 PROCEDURE — 1126F PR PAIN SEVERITY QUANTIFIED, NO PAIN PRESENT: ICD-10-PCS | Mod: S$GLB,,, | Performed by: PHYSICIAN ASSISTANT

## 2020-12-14 PROCEDURE — 1126F AMNT PAIN NOTED NONE PRSNT: CPT | Mod: S$GLB,,, | Performed by: PHYSICIAN ASSISTANT

## 2020-12-14 PROCEDURE — 99999 PR PBB SHADOW E&M-EST. PATIENT-LVL IV: CPT | Mod: PBBFAC,,, | Performed by: PHYSICIAN ASSISTANT

## 2020-12-14 PROCEDURE — 3008F BODY MASS INDEX DOCD: CPT | Mod: CPTII,S$GLB,, | Performed by: PHYSICIAN ASSISTANT

## 2020-12-14 RX ORDER — SERTRALINE HYDROCHLORIDE 25 MG/1
25 TABLET, FILM COATED ORAL DAILY
Qty: 30 TABLET | Refills: 1 | Status: SHIPPED | OUTPATIENT
Start: 2020-12-14 | End: 2021-02-03 | Stop reason: SDUPTHER

## 2020-12-14 NOTE — PATIENT INSTRUCTIONS
Please go to emergency department if feeling as though you are a harm to yourself or others or if you are in crisis.     Please call the clinic to report any worsening of symptoms or problems associated with medication.    Continue zoloft 25mg at this time for anxiety.

## 2020-12-14 NOTE — PROGRESS NOTES
"Outpatient Psychiatry Follow-Up Visit (MD/NP)    12/14/2020    Clinical Status of Patient:  Outpatient (Ambulatory)    Chief Complaint:  Jaclyn Rausch is a 40 y.o. female who presents today for follow-up of anxiety.  Met with patient.      Interval History and Content of Current Session:  Interim Events/Subjective Report/Content of Current Session:   Patient presents today for follow-up of anxiety and medication management.  She was prescribed low-dose sertraline 25 mg daily and reports that the medicine is working pretty well".  She reported significant clenching of her jaw with previously prescribed Lexapro.  Reports the jaw clenching is not severe.  She does have a mouth guard that was fit by her dentist and she plans to begin using that again in the future.  She does not notice significant other side effects from the medication.  She did have a recent endometrial ablation and has not had significant menstrual bleeding.  She is no longer having any ruminating anxiety thoughts.  She feels stable on 25 mg of Zoloft and does not need an increase today.  She reports she is anxious about prepping for [place] but is not that bad compared to how she was feeling before.  Feels that she is able to accomplish her tasks at work and has adequate concentration/focus.  She is going to continue with Catherine Washington LCSW for therapy sessions.  Has seen Neurology for concern of TIA and was recommended to continue with apixaban at that time.  Blood pressure is elevated again in the clinic but she reports this is always the case at appointments.  Blood pressures are within normal limits at home.  She has a blood pressure cuff at home.  She has been drinking alcohol occasionally but not a significant amount.  No nicotine products.  No other substances.  Drinking one cup of coffee per day.  Has had some weight gain but reports her appetite is actually less which she is happy about.  Has been intermittently taking is " zzzquil for sleep.  Will maybe try melatonin again in the future.  Adamantly denies thoughts of self-harm or harming others.    GAD7 12/14/2020 11/15/2020 10/25/2020   1. Feeling nervous, anxious, or on edge? 1 2 3   2. Not being able to stop or control worrying? 1 2 3   3. Worrying too much about different things? 1 2 3   4. Trouble relaxing? 1 1 2   5. Being so restless that it is hard to sit still? 2 1 1   6. Becoming easily annoyed or irritable? 1 1 1   7. Feeling afraid as if something awful might happen? 1 1 1   8. If you checked off any problems, how difficult have these problems made it for you to do your work, take care of things at home, or get along with other people? 1 2 2   AGGIE-7 Score 8 10 14     PHQ9 12/14/2020   Little interest or pleasure in doing things: Several days   Feeling down, depressed or hopeless: Several days   Trouble falling asleep, staying asleep, or sleeping too much: Several days   Feeling tired or having little energy: Several days   Poor appetite or overeating: Several days   Feeling bad about yourself- or that you are a failure or have let yourself or family down Several days   Trouble concentrating on things, such as reading the newspaper or watching television: Several days   Moving or speaking so slowly that other people could have noticed. Or the opposite- being so fidgety or restless that you have been moving around a lot more than usual: Not at all   Thoughts that you would be better off dead or hurting yourself in some way: Not at all   If you indicated you have experienced any of the aforementioned problems, how difficult have these problems made it for you to do your work, take care of things at home or get along with other people? Somewhat difficult   Total Score 7       Review of Systems   · PSYCHIATRIC: Pertinant items are noted in the narrative.  · RESPIRATORY: No shortness of breath.  · CARDIOVASCULAR: No tachycardia or chest pain.  · GASTROINTESTINAL: No nausea,  "vomiting, pain, constipation or diarrhea.    Past Medical, Family and Social History: The patient's past medical, family and social history have been reviewed and updated as appropriate within the electronic medical record - see encounter notes.    Compliance: yes    Side effects: jaw clenching but improved    Risk Parameters:  Patient reports no suicidal ideation  Patient reports no homicidal ideation  Patient reports no self-injurious behavior  Patient reports no violent behavior    Exam (detailed: at least 9 elements; comprehensive: all 15 elements)   Constitutional  Vitals:  Most recent vital signs, dated less than 90 days prior to this appointment, were reviewed.     Vitals:    12/14/20 1026   BP: 135/87   Pulse: 68   Temp:       General:  age appropriate, casually dressed     Musculoskeletal  Muscle Strength/Tone:  no spasicity, no rigidity   Gait & Station:  non-ataxic     Psychiatric  Speech:  no latency; no press   Mood & Affect:  "good mood"  congruent and appropriate   Thought Process:  normal and logical   Associations:  intact   Thought Content:  normal, no suicidality, no homicidality, delusions, or paranoia   Insight:  intact   Judgement: behavior is adequate to circumstances   Orientation:  grossly intact   Memory: intact for content of interview   Language: grossly intact   Attention Span & Concentration:  able to focus   Fund of Knowledge:  intact and appropriate to age and level of education     Assessment and Diagnosis   Status/Progress: Based on the examination today, the patient's problem(s) is/are improved.  New problems have not been presented today.   Co-morbidities, Diagnostic uncertainty and Lack of compliance are not complicating management of the primary condition.  There are no active rule-out diagnoses for this patient at this time.     General Impression:     Generalized anxiety disorder  Unspecified depressive disorder    Intervention/Counseling/Treatment Plan   · Medication " Management: Continue current medications. The risks and benefits of medication were discussed with the patient.  · Counseling provided with patient as follows: importance of compliance with chosen treatment options was emphasized, risks and benefits of treatment options, including medications, were discussed with the patient, risk factor reduction, prognosis    Patient presents today for follow-up of initiation of sertraline 25 mg daily.  She is tolerating this medication well.  Declines need for medication adjustments today.    Will refill sertraline 25 mg daily.  She is to continue with therapy sessions with Catherine Washington LCSW.    Please go to emergency department if feeling as though you are a harm to yourself or others or if you are in crisis.     Please call the clinic to report any worsening of symptoms or problems associated with medication.    She is in agreement with plan of care.    Return to Clinic: as scheduled

## 2020-12-16 ENCOUNTER — OFFICE VISIT (OUTPATIENT)
Dept: OBSTETRICS AND GYNECOLOGY | Facility: CLINIC | Age: 40
End: 2020-12-16
Attending: OBSTETRICS & GYNECOLOGY
Payer: COMMERCIAL

## 2020-12-16 VITALS
HEIGHT: 65 IN | WEIGHT: 187.5 LBS | BODY MASS INDEX: 31.24 KG/M2 | DIASTOLIC BLOOD PRESSURE: 70 MMHG | SYSTOLIC BLOOD PRESSURE: 120 MMHG

## 2020-12-16 DIAGNOSIS — Z98.890 POSTOPERATIVE STATE: Primary | ICD-10-CM

## 2020-12-16 PROCEDURE — 99999 PR PBB SHADOW E&M-EST. PATIENT-LVL III: CPT | Mod: PBBFAC,,, | Performed by: OBSTETRICS & GYNECOLOGY

## 2020-12-16 PROCEDURE — 1126F AMNT PAIN NOTED NONE PRSNT: CPT | Mod: S$GLB,,, | Performed by: OBSTETRICS & GYNECOLOGY

## 2020-12-16 PROCEDURE — 99999 PR PBB SHADOW E&M-EST. PATIENT-LVL III: ICD-10-PCS | Mod: PBBFAC,,, | Performed by: OBSTETRICS & GYNECOLOGY

## 2020-12-16 PROCEDURE — 3008F BODY MASS INDEX DOCD: CPT | Mod: CPTII,S$GLB,, | Performed by: OBSTETRICS & GYNECOLOGY

## 2020-12-16 PROCEDURE — 1126F PR PAIN SEVERITY QUANTIFIED, NO PAIN PRESENT: ICD-10-PCS | Mod: S$GLB,,, | Performed by: OBSTETRICS & GYNECOLOGY

## 2020-12-16 PROCEDURE — 3008F PR BODY MASS INDEX (BMI) DOCUMENTED: ICD-10-PCS | Mod: CPTII,S$GLB,, | Performed by: OBSTETRICS & GYNECOLOGY

## 2020-12-16 PROCEDURE — 99024 PR POST-OP FOLLOW-UP VISIT: ICD-10-PCS | Mod: S$GLB,,, | Performed by: OBSTETRICS & GYNECOLOGY

## 2020-12-16 PROCEDURE — 99024 POSTOP FOLLOW-UP VISIT: CPT | Mod: S$GLB,,, | Performed by: OBSTETRICS & GYNECOLOGY

## 2020-12-16 NOTE — PROGRESS NOTES
CC: Postop visit    Jaclyn Rausch is a 40 y.o. female  who is 6 weeks post-op from a D&C, hysteroscopy, endometrial ablation on 20.  Patient is doing well postoperatively.  No pain.  No bowel or bladder complaints.  No fever.  She describes only having very light bleeding at the time of her expected period.    The pathology revealed:  FRAGMENTS OF PROLIFERATIVE ENDOMETRIUM      Past Medical History:   Diagnosis Date    Anemia     slightly    Anticardiolipin antibody positive 2018    Anticoagulant long-term use     Atrial septal aneurysm     Dr. Frank; last visit 2018    Clotting disorder 2/3/2019    Elevated lipoprotein(a) 2018    Stroke 2018     Past Surgical History:   Procedure Laterality Date    BUNIONECTOMY Left     CHONDROPLASTY OF KNEE Left 1/10/2020    Procedure: CHONDROPLASTY, KNEE;  Surgeon: Michael Galaviz II, MD;  Location: [facility] OR;  Service: Orthopedics;  Laterality: Left;    HYSTEROSCOPY WITH DILATION AND CURETTAGE OF UTERUS N/A 2020    Procedure: HYSTEROSCOPY, WITH DILATION AND CURETTAGE OF UTERUS;  Surgeon: Ap Milner MD;  Location: [facility] OR;  Service: OB/GYN;  Laterality: N/A;    KNEE ARTHROSCOPY W/ MENISCECTOMY Left 1/10/2020    Procedure: ARTHROSCOPY, KNEE, WITH MENISCECTOMY;  Surgeon: Michael Galaviz II, MD;  Location: [facility] OR;  Service: Orthopedics;  Laterality: Left;    REPAIR OF MENISCUS OF KNEE Left 1/10/2020    Procedure: REPAIR, MENISCUS, KNEE;  Surgeon: Michael Galaviz II, MD;  Location: [facility] OR;  Service: Orthopedics;  Laterality: Left;  PARTIAL MEDIAL    THERMAL ABLATION OF ENDOMETRIUM N/A 2020    Procedure: ABLATION, ENDOMETRIUM, THERMAL;  Surgeon: Ap Milner MD;  Location: [facility] OR;  Service: OB/GYN;  Laterality: N/A;     Family History   Problem Relation Age of Onset    Diabetes Maternal Grandmother     Lung cancer Maternal Grandfather     Colon cancer Maternal Grandfather     Hypertension  "Mother     Hearing loss Father     Hypertension Brother     Eczema Son     Breast cancer Neg Hx     Ovarian cancer Neg Hx     Melanoma Neg Hx     Psoriasis Neg Hx     Lupus Neg Hx      Social History     Tobacco Use    Smoking status: Former Smoker     Packs/day: 0.25     Years: 2.00     Pack years: 0.50     Quit date: 2003     Years since quittin.9    Smokeless tobacco: Never Used   Substance Use Topics    Alcohol use: Yes     Frequency: 2-4 times a month     Drinks per session: 1 or 2     Binge frequency: Never     Comment: Socially    Drug use: No     OB History    Para Term  AB Living   5 3 3 0 1 3   SAB TAB Ectopic Multiple Live Births   1 0 0 0 3      # Outcome Date GA Lbr Alexis/2nd Weight Sex Delivery Anes PTL Lv   5             4 Term 09 39w0d  2.948 kg (6 lb 8 oz) F INDUCTION EPI N FRANCINE   3 Term 08 39w0d  2.722 kg (6 lb) M Vag-Spont EPI N FRANCINE   2 Term     M Vag-Spont   FRANCINE      Complications: Placenta Previa   1 SAB  5w0d       DEC       /70   Ht 5' 5" (1.651 m)   Wt 85.1 kg (187 lb 8 oz)   LMP 2020   BMI 31.20 kg/m²     ROS:  GENERAL: No fever, chills, fatigability or weight loss.  VULVAR: No pain, no lesions and no itching.  VAGINAL: No relaxation, no itching, no discharge, no abnormal bleeding and no lesions.  ABDOMEN: No abdominal pain. Denies nausea. Denies vomiting. No diarrhea. No constipation  BREAST: Denies pain. No lumps. No discharge.  URINARY: No incontinence, no nocturia, no frequency and no dysuria.  CARDIOVASCULAR: No chest pain. No shortness of breath. No leg cramps.  NEUROLOGICAL: No headaches. No vision changes.    PE:   ABDOMEN:  Soft, non-tender, non-distended.  PELVIC: Normal external genitalia without lesions.  Normal hair distribution.  Adequate perineal body, normal urethral meatus.  Vagina moist and well rugated without lesions or discharge.  Cervix pink, without lesions, discharge or tenderness.  No significant " cystocele or rectocele.  Bimanual exam shows uterus to be normal size, regular, mobile and nontender.  Adnexa without masses or tenderness.        ICD-10-CM ICD-9-CM    1. Postoperative state  Z98.890 V45.89        We reviewed the surgery and operative findings.  We also discussed expected post-ablation bleeding patterns.    Doing well 6 weeks S/P D&C, hysteroscopy, endometrial ablation  May resume normal activities

## 2020-12-21 ENCOUNTER — OFFICE VISIT (OUTPATIENT)
Dept: PSYCHIATRY | Facility: CLINIC | Age: 40
End: 2020-12-21
Payer: COMMERCIAL

## 2020-12-21 DIAGNOSIS — F41.1 GAD (GENERALIZED ANXIETY DISORDER): Primary | ICD-10-CM

## 2020-12-21 PROCEDURE — 99999 PR PBB SHADOW E&M-EST. PATIENT-LVL III: CPT | Mod: PBBFAC,,, | Performed by: SOCIAL WORKER

## 2020-12-21 PROCEDURE — 99999 PR PBB SHADOW E&M-EST. PATIENT-LVL III: ICD-10-PCS | Mod: PBBFAC,,, | Performed by: SOCIAL WORKER

## 2020-12-21 PROCEDURE — 90834 PR PSYCHOTHERAPY W/PATIENT, 45 MIN: ICD-10-PCS | Mod: S$GLB,,, | Performed by: SOCIAL WORKER

## 2020-12-21 PROCEDURE — 90834 PSYTX W PT 45 MINUTES: CPT | Mod: S$GLB,,, | Performed by: SOCIAL WORKER

## 2020-12-30 ENCOUNTER — OFFICE VISIT (OUTPATIENT)
Dept: URGENT CARE | Facility: CLINIC | Age: 40
End: 2020-12-30
Payer: COMMERCIAL

## 2020-12-30 ENCOUNTER — TELEPHONE (OUTPATIENT)
Dept: FAMILY MEDICINE | Facility: CLINIC | Age: 40
End: 2020-12-30

## 2020-12-30 ENCOUNTER — PATIENT MESSAGE (OUTPATIENT)
Dept: FAMILY MEDICINE | Facility: CLINIC | Age: 40
End: 2020-12-30

## 2020-12-30 VITALS
SYSTOLIC BLOOD PRESSURE: 126 MMHG | HEART RATE: 76 BPM | OXYGEN SATURATION: 98 % | TEMPERATURE: 98 F | DIASTOLIC BLOOD PRESSURE: 87 MMHG
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
## 2020-12-30 DIAGNOSIS — Z02.89 ENCOUNTER TO OBTAIN EXCUSE FROM SCHOOL: ICD-10-CM

## 2020-12-30 DIAGNOSIS — R09.89 RUNNY NOSE: Primary | ICD-10-CM

## 2020-12-30 LAB
CTP QC/QA: YES
SARS-COV-2 RDRP RESP QL NAA+PROBE: NEGATIVE

## 2020-12-30 PROCEDURE — 99203 OFFICE O/P NEW LOW 30 MIN: CPT | Mod: S$GLB,CS,, | Performed by: NURSE PRACTITIONER

## 2020-12-30 PROCEDURE — U0002: ICD-10-PCS | Mod: QW,S$GLB,, | Performed by: NURSE PRACTITIONER

## 2020-12-30 PROCEDURE — U0002 COVID-19 LAB TEST NON-CDC: HCPCS | Mod: QW,S$GLB,, | Performed by: NURSE PRACTITIONER

## 2020-12-30 PROCEDURE — 99203 PR OFFICE/OUTPT VISIT, NEW, LEVL III, 30-44 MIN: ICD-10-PCS | Mod: S$GLB,CS,, | Performed by: NURSE PRACTITIONER

## 2020-12-30 NOTE — PROGRESS NOTES
Subjective:       Patient ID: Jaclyn Rausch is a 40 y.o. female.    Vitals:  temperature is 98.2 °F (36.8 °C). Her blood pressure is 126/87 and her pulse is 76. Her oxygen saturation is 98%.     Chief Complaint: No chief complaint on file.    Presents for covid testing to return to work  Denies complaint      Constitution: Negative for chills and fever.   HENT: Negative for congestion and sore throat.    Neck: Negative for painful lymph nodes.   Respiratory: Negative for cough.    Gastrointestinal: Negative for vomiting and diarrhea.   Musculoskeletal: Negative for muscle ache.   Skin: Negative for rash.   Neurological: Negative for headaches and seizures.   Hematologic/Lymphatic: Negative for swollen lymph nodes.       Objective:      Physical Exam   Constitutional: She is oriented to person, place, and time. She appears well-developed.   HENT:   Head: Normocephalic and atraumatic.   Mouth/Throat: Oropharynx is clear and moist.   Eyes: Pupils are equal, round, and reactive to light. Conjunctivae and EOM are normal.   Neck: Normal range of motion. Neck supple.   Cardiovascular: Normal rate, regular rhythm and normal heart sounds.   Pulmonary/Chest: Effort normal and breath sounds normal.   Musculoskeletal: Normal range of motion.   Neurological: She is alert and oriented to person, place, and time.   Skin: Skin is warm and dry. Psychiatric: Her behavior is normal.   Nursing note and vitals reviewed.        Assessment:       1. Runny nose    2. School as place of occurrence of external cause    3. Encounter to obtain excuse from school        Plan:         Runny nose  -     POCT COVID-19 Rapid Screening    School as place of occurrence of external cause    Encounter to obtain excuse from school  -     POCT COVID-19 Rapid Screening

## 2020-12-30 NOTE — TELEPHONE ENCOUNTER
----- Message from Lesly Hampton sent at 12/30/2020  8:46 AM CST -----  Regarding: orders  Contact: [phone]  Type: Request Orders    Who called:pt   Request orders for :covid test  Please contact :[phone]

## 2021-01-11 ENCOUNTER — OFFICE VISIT (OUTPATIENT)
Dept: PSYCHIATRY | Facility: CLINIC | Age: 41
End: 2021-01-11
Payer: COMMERCIAL

## 2021-01-11 DIAGNOSIS — F32.A DEPRESSIVE DISORDER: ICD-10-CM

## 2021-01-11 DIAGNOSIS — F41.1 GAD (GENERALIZED ANXIETY DISORDER): ICD-10-CM

## 2021-01-11 PROCEDURE — 90834 PR PSYCHOTHERAPY W/PATIENT, 45 MIN: ICD-10-PCS | Mod: S$GLB,,, | Performed by: SOCIAL WORKER

## 2021-01-11 PROCEDURE — 99999 PR PBB SHADOW E&M-EST. PATIENT-LVL II: ICD-10-PCS | Mod: PBBFAC,,, | Performed by: SOCIAL WORKER

## 2021-01-11 PROCEDURE — 90834 PSYTX W PT 45 MINUTES: CPT | Mod: S$GLB,,, | Performed by: SOCIAL WORKER

## 2021-01-11 PROCEDURE — 99999 PR PBB SHADOW E&M-EST. PATIENT-LVL II: CPT | Mod: PBBFAC,,, | Performed by: SOCIAL WORKER

## 2021-01-28 ENCOUNTER — TELEPHONE (OUTPATIENT)
Dept: HEMATOLOGY/ONCOLOGY | Facility: CLINIC | Age: 41
End: 2021-01-28

## 2021-01-28 ENCOUNTER — LAB VISIT (OUTPATIENT)
Dept: LAB | Facility: HOSPITAL | Age: 41
End: 2021-01-28
Attending: INTERNAL MEDICINE
Payer: OTHER GOVERNMENT

## 2021-01-28 DIAGNOSIS — D68.9 CLOTTING DISORDER: ICD-10-CM

## 2021-01-28 DIAGNOSIS — R76.0 ANTICARDIOLIPIN ANTIBODY POSITIVE: ICD-10-CM

## 2021-01-28 DIAGNOSIS — N92.0 MENORRHAGIA WITH REGULAR CYCLE: ICD-10-CM

## 2021-01-28 DIAGNOSIS — E61.1 IRON DEFICIENCY: ICD-10-CM

## 2021-01-28 DIAGNOSIS — Z79.01 ANTICOAGULANT LONG-TERM USE: ICD-10-CM

## 2021-01-28 DIAGNOSIS — I63.9 ACUTE CVA (CEREBROVASCULAR ACCIDENT): ICD-10-CM

## 2021-01-28 LAB
BASOPHILS # BLD AUTO: 0.04 K/UL (ref 0–0.2)
BASOPHILS NFR BLD: 0.4 % (ref 0–1.9)
DIFFERENTIAL METHOD: ABNORMAL
EOSINOPHIL # BLD AUTO: 0.3 K/UL (ref 0–0.5)
EOSINOPHIL NFR BLD: 3.2 % (ref 0–8)
ERYTHROCYTE [DISTWIDTH] IN BLOOD BY AUTOMATED COUNT: 12.9 % (ref 11.5–14.5)
HCT VFR BLD AUTO: 43.7 % (ref 37–48.5)
HGB BLD-MCNC: 13.8 G/DL (ref 12–16)
IMM GRANULOCYTES # BLD AUTO: 0.03 K/UL (ref 0–0.04)
IMM GRANULOCYTES NFR BLD AUTO: 0.3 % (ref 0–0.5)
LYMPHOCYTES # BLD AUTO: 3.1 K/UL (ref 1–4.8)
LYMPHOCYTES NFR BLD: 34 % (ref 18–48)
MCH RBC QN AUTO: 30.1 PG (ref 27–31)
MCHC RBC AUTO-ENTMCNC: 31.6 G/DL (ref 32–36)
MCV RBC AUTO: 95 FL (ref 82–98)
MONOCYTES # BLD AUTO: 0.8 K/UL (ref 0.3–1)
MONOCYTES NFR BLD: 8.7 % (ref 4–15)
NEUTROPHILS # BLD AUTO: 4.8 K/UL (ref 1.8–7.7)
NEUTROPHILS NFR BLD: 53.4 % (ref 38–73)
NRBC BLD-RTO: 0 /100 WBC
PLATELET # BLD AUTO: 395 K/UL (ref 150–350)
PMV BLD AUTO: 9.8 FL (ref 9.2–12.9)
RBC # BLD AUTO: 4.59 M/UL (ref 4–5.4)
WBC # BLD AUTO: 9.03 K/UL (ref 3.9–12.7)

## 2021-01-28 PROCEDURE — 82728 ASSAY OF FERRITIN: CPT

## 2021-01-28 PROCEDURE — 80053 COMPREHEN METABOLIC PANEL: CPT

## 2021-01-28 PROCEDURE — 85025 COMPLETE CBC W/AUTO DIFF WBC: CPT

## 2021-01-28 PROCEDURE — 83540 ASSAY OF IRON: CPT

## 2021-01-28 PROCEDURE — 36415 COLL VENOUS BLD VENIPUNCTURE: CPT | Mod: PO

## 2021-01-29 LAB
ALBUMIN SERPL BCP-MCNC: 4.2 G/DL (ref 3.5–5.2)
ALP SERPL-CCNC: 86 U/L (ref 55–135)
ALT SERPL W/O P-5'-P-CCNC: 19 U/L (ref 10–44)
ANION GAP SERPL CALC-SCNC: 8 MMOL/L (ref 8–16)
AST SERPL-CCNC: 23 U/L (ref 10–40)
BILIRUB SERPL-MCNC: 0.5 MG/DL (ref 0.1–1)
BUN SERPL-MCNC: 17 MG/DL (ref 6–20)
CALCIUM SERPL-MCNC: 9.3 MG/DL (ref 8.7–10.5)
CHLORIDE SERPL-SCNC: 102 MMOL/L (ref 95–110)
CO2 SERPL-SCNC: 26 MMOL/L (ref 23–29)
CREAT SERPL-MCNC: 1 MG/DL (ref 0.5–1.4)
EST. GFR  (AFRICAN AMERICAN): >60 ML/MIN/1.73 M^2
EST. GFR  (NON AFRICAN AMERICAN): >60 ML/MIN/1.73 M^2
FERRITIN SERPL-MCNC: 20 NG/ML (ref 20–300)
GLUCOSE SERPL-MCNC: 90 MG/DL (ref 70–110)
IRON SERPL-MCNC: 134 UG/DL (ref 30–160)
POTASSIUM SERPL-SCNC: 4 MMOL/L (ref 3.5–5.1)
PROT SERPL-MCNC: 7.2 G/DL (ref 6–8.4)
SATURATED IRON: 30 % (ref 20–50)
SODIUM SERPL-SCNC: 136 MMOL/L (ref 136–145)
TOTAL IRON BINDING CAPACITY: 450 UG/DL (ref 250–450)
TRANSFERRIN SERPL-MCNC: 304 MG/DL (ref 200–375)

## 2021-02-03 ENCOUNTER — PATIENT MESSAGE (OUTPATIENT)
Dept: PSYCHIATRY | Facility: CLINIC | Age: 41
End: 2021-02-03

## 2021-02-03 DIAGNOSIS — F41.1 GAD (GENERALIZED ANXIETY DISORDER): ICD-10-CM

## 2021-02-03 RX ORDER — SERTRALINE HYDROCHLORIDE 25 MG/1
25 TABLET, FILM COATED ORAL DAILY
Qty: 30 TABLET | Refills: 1 | Status: SHIPPED | OUTPATIENT
Start: 2021-02-03 | End: 2021-03-15 | Stop reason: SDUPTHER

## 2021-02-17 ENCOUNTER — TELEPHONE (OUTPATIENT)
Dept: FAMILY MEDICINE | Facility: CLINIC | Age: 41
End: 2021-02-17

## 2021-02-17 DIAGNOSIS — D68.9 CLOTTING DISORDER: ICD-10-CM

## 2021-02-17 DIAGNOSIS — Z79.01 ANTICOAGULANT LONG-TERM USE: Primary | ICD-10-CM

## 2021-02-17 DIAGNOSIS — R76.0 ANTICARDIOLIPIN ANTIBODY POSITIVE: ICD-10-CM

## 2021-03-05 ENCOUNTER — OFFICE VISIT (OUTPATIENT)
Dept: FAMILY MEDICINE | Facility: CLINIC | Age: 41
End: 2021-03-05
Payer: OTHER GOVERNMENT

## 2021-03-05 ENCOUNTER — LAB VISIT (OUTPATIENT)
Dept: LAB | Facility: HOSPITAL | Age: 41
End: 2021-03-05
Attending: FAMILY MEDICINE
Payer: OTHER GOVERNMENT

## 2021-03-05 VITALS
DIASTOLIC BLOOD PRESSURE: 72 MMHG | HEIGHT: 65 IN | WEIGHT: 188.69 LBS | SYSTOLIC BLOOD PRESSURE: 120 MMHG | TEMPERATURE: 98 F | OXYGEN SATURATION: 97 % | HEART RATE: 77 BPM | BODY MASS INDEX: 31.44 KG/M2

## 2021-03-05 DIAGNOSIS — Z00.00 ENCOUNTER FOR ANNUAL PHYSICAL EXAM: Primary | ICD-10-CM

## 2021-03-05 DIAGNOSIS — Z13.220 ENCOUNTER FOR LIPID SCREENING FOR CARDIOVASCULAR DISEASE: ICD-10-CM

## 2021-03-05 DIAGNOSIS — D68.9 CLOTTING DISORDER: ICD-10-CM

## 2021-03-05 DIAGNOSIS — L73.8 PITYROSPORUM FOLLICULITIS: ICD-10-CM

## 2021-03-05 DIAGNOSIS — Z13.6 ENCOUNTER FOR LIPID SCREENING FOR CARDIOVASCULAR DISEASE: ICD-10-CM

## 2021-03-05 DIAGNOSIS — F41.9 ANXIETY: ICD-10-CM

## 2021-03-05 DIAGNOSIS — K21.9 GASTROESOPHAGEAL REFLUX DISEASE WITHOUT ESOPHAGITIS: ICD-10-CM

## 2021-03-05 DIAGNOSIS — Z11.59 ENCOUNTER FOR HEPATITIS C SCREENING TEST FOR LOW RISK PATIENT: ICD-10-CM

## 2021-03-05 PROCEDURE — 36415 COLL VENOUS BLD VENIPUNCTURE: CPT | Mod: PO | Performed by: FAMILY MEDICINE

## 2021-03-05 PROCEDURE — 99214 OFFICE O/P EST MOD 30 MIN: CPT | Mod: PBBFAC,PO | Performed by: FAMILY MEDICINE

## 2021-03-05 PROCEDURE — 99999 PR PBB SHADOW E&M-EST. PATIENT-LVL IV: ICD-10-PCS | Mod: PBBFAC,,, | Performed by: FAMILY MEDICINE

## 2021-03-05 PROCEDURE — 99999 PR PBB SHADOW E&M-EST. PATIENT-LVL IV: CPT | Mod: PBBFAC,,, | Performed by: FAMILY MEDICINE

## 2021-03-05 PROCEDURE — 86803 HEPATITIS C AB TEST: CPT | Performed by: FAMILY MEDICINE

## 2021-03-05 PROCEDURE — 99395 PREV VISIT EST AGE 18-39: CPT | Mod: S$PBB,,, | Performed by: FAMILY MEDICINE

## 2021-03-05 PROCEDURE — 80061 LIPID PANEL: CPT | Performed by: FAMILY MEDICINE

## 2021-03-05 PROCEDURE — 99395 PR PREVENTIVE VISIT,EST,18-39: ICD-10-PCS | Mod: S$PBB,,, | Performed by: FAMILY MEDICINE

## 2021-03-05 RX ORDER — KETOCONAZOLE 20 MG/ML
SHAMPOO, SUSPENSION TOPICAL
Qty: 120 ML | Refills: 5 | Status: SHIPPED | OUTPATIENT
Start: 2021-03-05 | End: 2021-11-22

## 2021-03-06 LAB
CHOLEST SERPL-MCNC: 170 MG/DL (ref 120–199)
CHOLEST/HDLC SERPL: 3.1 {RATIO} (ref 2–5)
HDLC SERPL-MCNC: 55 MG/DL (ref 40–75)
HDLC SERPL: 32.4 % (ref 20–50)
LDLC SERPL CALC-MCNC: 88.8 MG/DL (ref 63–159)
NONHDLC SERPL-MCNC: 115 MG/DL
TRIGL SERPL-MCNC: 131 MG/DL (ref 30–150)

## 2021-03-08 ENCOUNTER — OFFICE VISIT (OUTPATIENT)
Dept: PSYCHIATRY | Facility: CLINIC | Age: 41
End: 2021-03-08
Payer: COMMERCIAL

## 2021-03-08 DIAGNOSIS — F41.1 GAD (GENERALIZED ANXIETY DISORDER): Primary | ICD-10-CM

## 2021-03-08 DIAGNOSIS — F32.A DEPRESSIVE DISORDER: ICD-10-CM

## 2021-03-08 PROCEDURE — 99999 PR PBB SHADOW E&M-EST. PATIENT-LVL II: CPT | Mod: PBBFAC,,, | Performed by: SOCIAL WORKER

## 2021-03-08 PROCEDURE — 90834 PR PSYCHOTHERAPY W/PATIENT, 45 MIN: ICD-10-PCS | Mod: S$GLB,,, | Performed by: SOCIAL WORKER

## 2021-03-08 PROCEDURE — 90834 PSYTX W PT 45 MINUTES: CPT | Mod: S$GLB,,, | Performed by: SOCIAL WORKER

## 2021-03-08 PROCEDURE — 99999 PR PBB SHADOW E&M-EST. PATIENT-LVL II: ICD-10-PCS | Mod: PBBFAC,,, | Performed by: SOCIAL WORKER

## 2021-03-09 LAB — HCV AB SERPL QL IA: NEGATIVE

## 2021-03-10 ENCOUNTER — OFFICE VISIT (OUTPATIENT)
Dept: HEMATOLOGY/ONCOLOGY | Facility: CLINIC | Age: 41
End: 2021-03-10
Payer: OTHER GOVERNMENT

## 2021-03-10 VITALS
SYSTOLIC BLOOD PRESSURE: 136 MMHG | RESPIRATION RATE: 18 BRPM | BODY MASS INDEX: 31.49 KG/M2 | HEART RATE: 76 BPM | WEIGHT: 189 LBS | HEIGHT: 65 IN | DIASTOLIC BLOOD PRESSURE: 86 MMHG

## 2021-03-10 DIAGNOSIS — Z79.01 ANTICOAGULANT LONG-TERM USE: ICD-10-CM

## 2021-03-10 DIAGNOSIS — N93.9 ABNORMAL UTERINE BLEEDING (AUB): ICD-10-CM

## 2021-03-10 DIAGNOSIS — D68.9 CLOTTING DISORDER: ICD-10-CM

## 2021-03-10 DIAGNOSIS — E61.1 IRON DEFICIENCY: ICD-10-CM

## 2021-03-10 DIAGNOSIS — N92.0 MENORRHAGIA WITH REGULAR CYCLE: ICD-10-CM

## 2021-03-10 DIAGNOSIS — R76.0 ANTICARDIOLIPIN ANTIBODY POSITIVE: Primary | ICD-10-CM

## 2021-03-10 PROCEDURE — 99213 OFFICE O/P EST LOW 20 MIN: CPT | Mod: S$GLB,,, | Performed by: INTERNAL MEDICINE

## 2021-03-10 PROCEDURE — 99213 PR OFFICE/OUTPT VISIT, EST, LEVL III, 20-29 MIN: ICD-10-PCS | Mod: S$GLB,,, | Performed by: INTERNAL MEDICINE

## 2021-03-15 ENCOUNTER — OFFICE VISIT (OUTPATIENT)
Dept: PSYCHIATRY | Facility: CLINIC | Age: 41
End: 2021-03-15
Payer: COMMERCIAL

## 2021-03-15 VITALS
HEIGHT: 65 IN | DIASTOLIC BLOOD PRESSURE: 82 MMHG | SYSTOLIC BLOOD PRESSURE: 124 MMHG | WEIGHT: 187.25 LBS | BODY MASS INDEX: 31.2 KG/M2 | HEART RATE: 67 BPM

## 2021-03-15 DIAGNOSIS — F41.1 GAD (GENERALIZED ANXIETY DISORDER): Primary | ICD-10-CM

## 2021-03-15 DIAGNOSIS — F32.A DEPRESSIVE DISORDER: ICD-10-CM

## 2021-03-15 PROCEDURE — 99999 PR PBB SHADOW E&M-EST. PATIENT-LVL IV: CPT | Mod: PBBFAC,,, | Performed by: PHYSICIAN ASSISTANT

## 2021-03-15 PROCEDURE — 99999 PR PBB SHADOW E&M-EST. PATIENT-LVL IV: ICD-10-PCS | Mod: PBBFAC,,, | Performed by: PHYSICIAN ASSISTANT

## 2021-03-15 PROCEDURE — 3008F PR BODY MASS INDEX (BMI) DOCUMENTED: ICD-10-PCS | Mod: CPTII,S$GLB,, | Performed by: PHYSICIAN ASSISTANT

## 2021-03-15 PROCEDURE — 99214 OFFICE O/P EST MOD 30 MIN: CPT | Mod: S$GLB,,, | Performed by: PHYSICIAN ASSISTANT

## 2021-03-15 PROCEDURE — 3008F BODY MASS INDEX DOCD: CPT | Mod: CPTII,S$GLB,, | Performed by: PHYSICIAN ASSISTANT

## 2021-03-15 PROCEDURE — 99214 PR OFFICE/OUTPT VISIT, EST, LEVL IV, 30-39 MIN: ICD-10-PCS | Mod: S$GLB,,, | Performed by: PHYSICIAN ASSISTANT

## 2021-03-15 PROCEDURE — 1125F PR PAIN SEVERITY QUANTIFIED, PAIN PRESENT: ICD-10-PCS | Mod: S$GLB,,, | Performed by: PHYSICIAN ASSISTANT

## 2021-03-15 PROCEDURE — 1125F AMNT PAIN NOTED PAIN PRSNT: CPT | Mod: S$GLB,,, | Performed by: PHYSICIAN ASSISTANT

## 2021-03-15 RX ORDER — SERTRALINE HYDROCHLORIDE 50 MG/1
50 TABLET, FILM COATED ORAL DAILY
Qty: 30 TABLET | Refills: 2 | Status: SHIPPED | OUTPATIENT
Start: 2021-03-15 | End: 2021-05-10 | Stop reason: SDUPTHER

## 2021-03-22 ENCOUNTER — OFFICE VISIT (OUTPATIENT)
Dept: PSYCHIATRY | Facility: CLINIC | Age: 41
End: 2021-03-22
Payer: COMMERCIAL

## 2021-03-22 DIAGNOSIS — F41.1 GAD (GENERALIZED ANXIETY DISORDER): Primary | ICD-10-CM

## 2021-03-22 DIAGNOSIS — F32.A DEPRESSIVE DISORDER: ICD-10-CM

## 2021-03-22 PROCEDURE — 90834 PR PSYCHOTHERAPY W/PATIENT, 45 MIN: ICD-10-PCS | Mod: S$GLB,,, | Performed by: SOCIAL WORKER

## 2021-03-22 PROCEDURE — 99999 PR PBB SHADOW E&M-EST. PATIENT-LVL II: CPT | Mod: PBBFAC,,, | Performed by: SOCIAL WORKER

## 2021-03-22 PROCEDURE — 90834 PSYTX W PT 45 MINUTES: CPT | Mod: S$GLB,,, | Performed by: SOCIAL WORKER

## 2021-03-22 PROCEDURE — 99999 PR PBB SHADOW E&M-EST. PATIENT-LVL II: ICD-10-PCS | Mod: PBBFAC,,, | Performed by: SOCIAL WORKER

## 2021-03-23 ENCOUNTER — PATIENT MESSAGE (OUTPATIENT)
Dept: PSYCHIATRY | Facility: CLINIC | Age: 41
End: 2021-03-23

## 2021-03-23 ENCOUNTER — PATIENT MESSAGE (OUTPATIENT)
Dept: FAMILY MEDICINE | Facility: CLINIC | Age: 41
End: 2021-03-23

## 2021-03-24 ENCOUNTER — PATIENT MESSAGE (OUTPATIENT)
Dept: FAMILY MEDICINE | Facility: CLINIC | Age: 41
End: 2021-03-24

## 2021-04-07 ENCOUNTER — OFFICE VISIT (OUTPATIENT)
Dept: PSYCHIATRY | Facility: CLINIC | Age: 41
End: 2021-04-07
Payer: COMMERCIAL

## 2021-04-07 DIAGNOSIS — F32.A DEPRESSIVE DISORDER: ICD-10-CM

## 2021-04-07 DIAGNOSIS — F41.1 GAD (GENERALIZED ANXIETY DISORDER): Primary | ICD-10-CM

## 2021-04-07 PROCEDURE — 90834 PR PSYCHOTHERAPY W/PATIENT, 45 MIN: ICD-10-PCS | Mod: S$GLB,,, | Performed by: SOCIAL WORKER

## 2021-04-07 PROCEDURE — 99999 PR PBB SHADOW E&M-EST. PATIENT-LVL II: CPT | Mod: PBBFAC,,, | Performed by: SOCIAL WORKER

## 2021-04-07 PROCEDURE — 90834 PSYTX W PT 45 MINUTES: CPT | Mod: S$GLB,,, | Performed by: SOCIAL WORKER

## 2021-04-07 PROCEDURE — 99999 PR PBB SHADOW E&M-EST. PATIENT-LVL II: ICD-10-PCS | Mod: PBBFAC,,, | Performed by: SOCIAL WORKER

## 2021-04-12 ENCOUNTER — PATIENT MESSAGE (OUTPATIENT)
Dept: PSYCHIATRY | Facility: CLINIC | Age: 41
End: 2021-04-12

## 2021-04-26 ENCOUNTER — PATIENT MESSAGE (OUTPATIENT)
Dept: FAMILY MEDICINE | Facility: CLINIC | Age: 41
End: 2021-04-26

## 2021-04-26 ENCOUNTER — OFFICE VISIT (OUTPATIENT)
Dept: URGENT CARE | Facility: CLINIC | Age: 41
End: 2021-04-26
Payer: OTHER GOVERNMENT

## 2021-04-26 VITALS
HEIGHT: 66 IN | TEMPERATURE: 99 F | WEIGHT: 186 LBS | OXYGEN SATURATION: 100 % | HEART RATE: 66 BPM | BODY MASS INDEX: 29.89 KG/M2 | DIASTOLIC BLOOD PRESSURE: 85 MMHG | SYSTOLIC BLOOD PRESSURE: 143 MMHG

## 2021-04-26 DIAGNOSIS — J01.90 ACUTE SINUSITIS, RECURRENCE NOT SPECIFIED, UNSPECIFIED LOCATION: Primary | ICD-10-CM

## 2021-04-26 DIAGNOSIS — Z20.822 COVID-19 VIRUS NOT DETECTED: ICD-10-CM

## 2021-04-26 LAB
B-HCG UR QL: NEGATIVE
CTP QC/QA: YES
CTP QC/QA: YES
SARS-COV-2 RDRP RESP QL NAA+PROBE: NEGATIVE

## 2021-04-26 PROCEDURE — 96372 PR INJECTION,THERAP/PROPH/DIAG2ST, IM OR SUBCUT: ICD-10-PCS | Mod: S$GLB,,, | Performed by: NURSE PRACTITIONER

## 2021-04-26 PROCEDURE — 99214 OFFICE O/P EST MOD 30 MIN: CPT | Mod: 25,S$GLB,, | Performed by: NURSE PRACTITIONER

## 2021-04-26 PROCEDURE — U0002 COVID-19 LAB TEST NON-CDC: HCPCS | Mod: QW,S$GLB,, | Performed by: NURSE PRACTITIONER

## 2021-04-26 PROCEDURE — 81025 URINE PREGNANCY TEST: CPT | Mod: S$GLB,,, | Performed by: NURSE PRACTITIONER

## 2021-04-26 PROCEDURE — U0002: ICD-10-PCS | Mod: QW,S$GLB,, | Performed by: NURSE PRACTITIONER

## 2021-04-26 PROCEDURE — 99214 PR OFFICE/OUTPT VISIT, EST, LEVL IV, 30-39 MIN: ICD-10-PCS | Mod: 25,S$GLB,, | Performed by: NURSE PRACTITIONER

## 2021-04-26 PROCEDURE — 96372 THER/PROPH/DIAG INJ SC/IM: CPT | Mod: S$GLB,,, | Performed by: NURSE PRACTITIONER

## 2021-04-26 PROCEDURE — 81025 POCT URINE PREGNANCY: ICD-10-PCS | Mod: S$GLB,,, | Performed by: NURSE PRACTITIONER

## 2021-04-26 RX ORDER — AMOXICILLIN AND CLAVULANATE POTASSIUM 875; 125 MG/1; MG/1
1 TABLET, FILM COATED ORAL 2 TIMES DAILY
Qty: 14 TABLET | Refills: 0 | Status: SHIPPED | OUTPATIENT
Start: 2021-04-26 | End: 2021-05-03

## 2021-04-26 RX ORDER — DEXAMETHASONE SODIUM PHOSPHATE 4 MG/ML
8 INJECTION, SOLUTION INTRA-ARTICULAR; INTRALESIONAL; INTRAMUSCULAR; INTRAVENOUS; SOFT TISSUE
Status: COMPLETED | OUTPATIENT
Start: 2021-04-26 | End: 2021-04-26

## 2021-04-26 RX ADMIN — DEXAMETHASONE SODIUM PHOSPHATE 8 MG: 4 INJECTION, SOLUTION INTRA-ARTICULAR; INTRALESIONAL; INTRAMUSCULAR; INTRAVENOUS; SOFT TISSUE at 05:04

## 2021-05-07 ENCOUNTER — TELEPHONE (OUTPATIENT)
Dept: HEMATOLOGY/ONCOLOGY | Facility: CLINIC | Age: 41
End: 2021-05-07

## 2021-05-10 ENCOUNTER — OFFICE VISIT (OUTPATIENT)
Dept: PSYCHIATRY | Facility: CLINIC | Age: 41
End: 2021-05-10
Payer: COMMERCIAL

## 2021-05-10 VITALS
BODY MASS INDEX: 29.67 KG/M2 | SYSTOLIC BLOOD PRESSURE: 130 MMHG | DIASTOLIC BLOOD PRESSURE: 92 MMHG | HEART RATE: 64 BPM | WEIGHT: 184.63 LBS | HEIGHT: 66 IN

## 2021-05-10 DIAGNOSIS — F41.1 GAD (GENERALIZED ANXIETY DISORDER): ICD-10-CM

## 2021-05-10 DIAGNOSIS — F32.A DEPRESSIVE DISORDER: Primary | ICD-10-CM

## 2021-05-10 DIAGNOSIS — F32.A DEPRESSIVE DISORDER: ICD-10-CM

## 2021-05-10 PROCEDURE — 99214 PR OFFICE/OUTPT VISIT, EST, LEVL IV, 30-39 MIN: ICD-10-PCS | Mod: S$GLB,,, | Performed by: PHYSICIAN ASSISTANT

## 2021-05-10 PROCEDURE — 90834 PSYTX W PT 45 MINUTES: CPT | Mod: S$GLB,,, | Performed by: SOCIAL WORKER

## 2021-05-10 PROCEDURE — 1126F AMNT PAIN NOTED NONE PRSNT: CPT | Mod: S$GLB,,, | Performed by: PHYSICIAN ASSISTANT

## 2021-05-10 PROCEDURE — 99214 OFFICE O/P EST MOD 30 MIN: CPT | Mod: S$GLB,,, | Performed by: PHYSICIAN ASSISTANT

## 2021-05-10 PROCEDURE — 99999 PR PBB SHADOW E&M-EST. PATIENT-LVL II: ICD-10-PCS | Mod: PBBFAC,,, | Performed by: SOCIAL WORKER

## 2021-05-10 PROCEDURE — 1126F PR PAIN SEVERITY QUANTIFIED, NO PAIN PRESENT: ICD-10-PCS | Mod: S$GLB,,, | Performed by: PHYSICIAN ASSISTANT

## 2021-05-10 PROCEDURE — 90834 PR PSYCHOTHERAPY W/PATIENT, 45 MIN: ICD-10-PCS | Mod: S$GLB,,, | Performed by: SOCIAL WORKER

## 2021-05-10 PROCEDURE — 99999 PR PBB SHADOW E&M-EST. PATIENT-LVL III: CPT | Mod: PBBFAC,,, | Performed by: PHYSICIAN ASSISTANT

## 2021-05-10 PROCEDURE — 99999 PR PBB SHADOW E&M-EST. PATIENT-LVL II: CPT | Mod: PBBFAC,,, | Performed by: SOCIAL WORKER

## 2021-05-10 PROCEDURE — 3008F PR BODY MASS INDEX (BMI) DOCUMENTED: ICD-10-PCS | Mod: CPTII,S$GLB,, | Performed by: PHYSICIAN ASSISTANT

## 2021-05-10 PROCEDURE — 3008F BODY MASS INDEX DOCD: CPT | Mod: CPTII,S$GLB,, | Performed by: PHYSICIAN ASSISTANT

## 2021-05-10 PROCEDURE — 99999 PR PBB SHADOW E&M-EST. PATIENT-LVL III: ICD-10-PCS | Mod: PBBFAC,,, | Performed by: PHYSICIAN ASSISTANT

## 2021-05-10 RX ORDER — SERTRALINE HYDROCHLORIDE 50 MG/1
50 TABLET, FILM COATED ORAL DAILY
Qty: 90 TABLET | Refills: 1 | Status: SHIPPED | OUTPATIENT
Start: 2021-05-10 | End: 2021-08-09 | Stop reason: SDUPTHER

## 2021-05-24 ENCOUNTER — OFFICE VISIT (OUTPATIENT)
Dept: PSYCHIATRY | Facility: CLINIC | Age: 41
End: 2021-05-24
Payer: COMMERCIAL

## 2021-05-24 ENCOUNTER — PATIENT OUTREACH (OUTPATIENT)
Dept: ADMINISTRATIVE | Facility: OTHER | Age: 41
End: 2021-05-24

## 2021-05-24 DIAGNOSIS — F32.A DEPRESSIVE DISORDER: ICD-10-CM

## 2021-05-24 DIAGNOSIS — F41.1 GAD (GENERALIZED ANXIETY DISORDER): ICD-10-CM

## 2021-05-24 PROCEDURE — 90834 PR PSYCHOTHERAPY W/PATIENT, 45 MIN: ICD-10-PCS | Mod: S$GLB,,, | Performed by: SOCIAL WORKER

## 2021-05-24 PROCEDURE — 99999 PR PBB SHADOW E&M-EST. PATIENT-LVL II: CPT | Mod: PBBFAC,,, | Performed by: SOCIAL WORKER

## 2021-05-24 PROCEDURE — 90834 PSYTX W PT 45 MINUTES: CPT | Mod: S$GLB,,, | Performed by: SOCIAL WORKER

## 2021-05-24 PROCEDURE — 99999 PR PBB SHADOW E&M-EST. PATIENT-LVL II: ICD-10-PCS | Mod: PBBFAC,,, | Performed by: SOCIAL WORKER

## 2021-05-27 ENCOUNTER — TELEPHONE (OUTPATIENT)
Dept: SPINE | Facility: CLINIC | Age: 41
End: 2021-05-27

## 2021-05-27 ENCOUNTER — HOSPITAL ENCOUNTER (OUTPATIENT)
Dept: RADIOLOGY | Facility: HOSPITAL | Age: 41
Discharge: HOME | End: 2021-05-27
Attending: PHYSICAL MEDICINE & REHABILITATION
Payer: OTHER GOVERNMENT

## 2021-05-27 ENCOUNTER — OFFICE VISIT (OUTPATIENT)
Dept: SPINE | Facility: CLINIC | Age: 41
End: 2021-05-27
Payer: OTHER GOVERNMENT

## 2021-05-27 VITALS — BODY MASS INDEX: 30.66 KG/M2 | HEIGHT: 65 IN | WEIGHT: 184 LBS

## 2021-05-27 DIAGNOSIS — M54.9 DORSALGIA, UNSPECIFIED: ICD-10-CM

## 2021-05-27 DIAGNOSIS — M54.41 ACUTE RIGHT-SIDED LOW BACK PAIN WITH RIGHT-SIDED SCIATICA: Primary | ICD-10-CM

## 2021-05-27 PROCEDURE — 72110 XR LUMBAR SPINE 5 VIEW WITH FLEX AND EXT: ICD-10-PCS | Mod: 26,,, | Performed by: RADIOLOGY

## 2021-05-27 PROCEDURE — 99214 OFFICE O/P EST MOD 30 MIN: CPT | Mod: S$GLB,,, | Performed by: PHYSICAL MEDICINE & REHABILITATION

## 2021-05-27 PROCEDURE — 72110 X-RAY EXAM L-2 SPINE 4/>VWS: CPT | Mod: TC,FY

## 2021-05-27 PROCEDURE — 99214 PR OFFICE/OUTPT VISIT, EST, LEVL IV, 30-39 MIN: ICD-10-PCS | Mod: S$GLB,,, | Performed by: PHYSICAL MEDICINE & REHABILITATION

## 2021-05-27 PROCEDURE — 72110 X-RAY EXAM L-2 SPINE 4/>VWS: CPT | Mod: 26,,, | Performed by: RADIOLOGY

## 2021-05-27 RX ORDER — METHYLPREDNISOLONE 4 MG/1
TABLET ORAL
Qty: 1 PACKAGE | Refills: 0 | Status: SHIPPED | OUTPATIENT
Start: 2021-05-27 | End: 2021-06-17

## 2021-06-04 ENCOUNTER — PATIENT MESSAGE (OUTPATIENT)
Dept: SPINE | Facility: CLINIC | Age: 41
End: 2021-06-04

## 2021-06-07 ENCOUNTER — OFFICE VISIT (OUTPATIENT)
Dept: PSYCHIATRY | Facility: CLINIC | Age: 41
End: 2021-06-07
Payer: COMMERCIAL

## 2021-06-07 ENCOUNTER — CLINICAL SUPPORT (OUTPATIENT)
Dept: REHABILITATION | Facility: HOSPITAL | Age: 41
End: 2021-06-07
Attending: PHYSICAL MEDICINE & REHABILITATION
Payer: OTHER GOVERNMENT

## 2021-06-07 DIAGNOSIS — R29.898 IMPAIRED FLEXIBILITY OF LOWER EXTREMITY: ICD-10-CM

## 2021-06-07 DIAGNOSIS — F41.1 GAD (GENERALIZED ANXIETY DISORDER): Primary | ICD-10-CM

## 2021-06-07 DIAGNOSIS — M54.41 ACUTE RIGHT-SIDED LOW BACK PAIN WITH RIGHT-SIDED SCIATICA: ICD-10-CM

## 2021-06-07 DIAGNOSIS — F32.A DEPRESSIVE DISORDER: ICD-10-CM

## 2021-06-07 PROCEDURE — 99999 PR PBB SHADOW E&M-EST. PATIENT-LVL I: ICD-10-PCS | Mod: PBBFAC,,, | Performed by: SOCIAL WORKER

## 2021-06-07 PROCEDURE — 90834 PSYTX W PT 45 MINUTES: CPT | Mod: S$GLB,,, | Performed by: SOCIAL WORKER

## 2021-06-07 PROCEDURE — 97110 THERAPEUTIC EXERCISES: CPT | Mod: PN | Performed by: PHYSICAL THERAPIST

## 2021-06-07 PROCEDURE — 99999 PR PBB SHADOW E&M-EST. PATIENT-LVL I: CPT | Mod: PBBFAC,,, | Performed by: SOCIAL WORKER

## 2021-06-07 PROCEDURE — 97161 PT EVAL LOW COMPLEX 20 MIN: CPT | Mod: PN | Performed by: PHYSICAL THERAPIST

## 2021-06-07 PROCEDURE — 90834 PR PSYCHOTHERAPY W/PATIENT, 45 MIN: ICD-10-PCS | Mod: S$GLB,,, | Performed by: SOCIAL WORKER

## 2021-06-09 ENCOUNTER — CLINICAL SUPPORT (OUTPATIENT)
Dept: REHABILITATION | Facility: HOSPITAL | Age: 41
End: 2021-06-09
Attending: PHYSICAL MEDICINE & REHABILITATION
Payer: OTHER GOVERNMENT

## 2021-06-09 DIAGNOSIS — R29.898 IMPAIRED FLEXIBILITY OF LOWER EXTREMITY: ICD-10-CM

## 2021-06-09 DIAGNOSIS — M54.41 ACUTE RIGHT-SIDED LOW BACK PAIN WITH RIGHT-SIDED SCIATICA: ICD-10-CM

## 2021-06-09 PROCEDURE — 97110 THERAPEUTIC EXERCISES: CPT | Mod: PN | Performed by: PHYSICAL THERAPIST

## 2021-06-14 ENCOUNTER — CLINICAL SUPPORT (OUTPATIENT)
Dept: REHABILITATION | Facility: HOSPITAL | Age: 41
End: 2021-06-14
Attending: PHYSICAL MEDICINE & REHABILITATION
Payer: OTHER GOVERNMENT

## 2021-06-14 DIAGNOSIS — M54.41 ACUTE RIGHT-SIDED LOW BACK PAIN WITH RIGHT-SIDED SCIATICA: ICD-10-CM

## 2021-06-14 DIAGNOSIS — R29.898 IMPAIRED FLEXIBILITY OF LOWER EXTREMITY: ICD-10-CM

## 2021-06-14 PROCEDURE — 97110 THERAPEUTIC EXERCISES: CPT | Mod: PN | Performed by: PHYSICAL THERAPIST

## 2021-06-17 ENCOUNTER — CLINICAL SUPPORT (OUTPATIENT)
Dept: REHABILITATION | Facility: HOSPITAL | Age: 41
End: 2021-06-17
Attending: PHYSICAL MEDICINE & REHABILITATION
Payer: OTHER GOVERNMENT

## 2021-06-17 DIAGNOSIS — R29.898 IMPAIRED FLEXIBILITY OF LOWER EXTREMITY: ICD-10-CM

## 2021-06-17 DIAGNOSIS — M54.41 ACUTE RIGHT-SIDED LOW BACK PAIN WITH RIGHT-SIDED SCIATICA: ICD-10-CM

## 2021-06-17 PROCEDURE — 97110 THERAPEUTIC EXERCISES: CPT | Mod: PN,CQ

## 2021-06-21 ENCOUNTER — DOCUMENTATION ONLY (OUTPATIENT)
Dept: REHABILITATION | Facility: HOSPITAL | Age: 41
End: 2021-06-21

## 2021-06-24 ENCOUNTER — PATIENT MESSAGE (OUTPATIENT)
Dept: PSYCHIATRY | Facility: CLINIC | Age: 41
End: 2021-06-24

## 2021-06-29 ENCOUNTER — CLINICAL SUPPORT (OUTPATIENT)
Dept: REHABILITATION | Facility: HOSPITAL | Age: 41
End: 2021-06-29
Attending: PHYSICAL MEDICINE & REHABILITATION
Payer: OTHER GOVERNMENT

## 2021-06-29 DIAGNOSIS — R29.898 IMPAIRED FLEXIBILITY OF LOWER EXTREMITY: ICD-10-CM

## 2021-06-29 DIAGNOSIS — M54.41 ACUTE RIGHT-SIDED LOW BACK PAIN WITH RIGHT-SIDED SCIATICA: ICD-10-CM

## 2021-06-29 PROCEDURE — 97110 THERAPEUTIC EXERCISES: CPT | Mod: PN,CQ

## 2021-07-02 ENCOUNTER — CLINICAL SUPPORT (OUTPATIENT)
Dept: REHABILITATION | Facility: HOSPITAL | Age: 41
End: 2021-07-02
Attending: PHYSICAL MEDICINE & REHABILITATION
Payer: COMMERCIAL

## 2021-07-02 DIAGNOSIS — M54.41 ACUTE RIGHT-SIDED LOW BACK PAIN WITH RIGHT-SIDED SCIATICA: ICD-10-CM

## 2021-07-02 DIAGNOSIS — R29.898 IMPAIRED FLEXIBILITY OF LOWER EXTREMITY: ICD-10-CM

## 2021-07-02 PROCEDURE — 97110 THERAPEUTIC EXERCISES: CPT | Mod: PN | Performed by: PHYSICAL THERAPIST

## 2021-07-06 ENCOUNTER — CLINICAL SUPPORT (OUTPATIENT)
Dept: REHABILITATION | Facility: HOSPITAL | Age: 41
End: 2021-07-06
Attending: PHYSICAL MEDICINE & REHABILITATION
Payer: COMMERCIAL

## 2021-07-06 DIAGNOSIS — M54.41 ACUTE RIGHT-SIDED LOW BACK PAIN WITH RIGHT-SIDED SCIATICA: ICD-10-CM

## 2021-07-06 DIAGNOSIS — R29.898 IMPAIRED FLEXIBILITY OF LOWER EXTREMITY: ICD-10-CM

## 2021-07-06 PROCEDURE — 97110 THERAPEUTIC EXERCISES: CPT | Mod: PN,CQ

## 2021-07-08 ENCOUNTER — CLINICAL SUPPORT (OUTPATIENT)
Dept: REHABILITATION | Facility: HOSPITAL | Age: 41
End: 2021-07-08
Attending: PHYSICAL MEDICINE & REHABILITATION
Payer: COMMERCIAL

## 2021-07-08 DIAGNOSIS — M54.41 ACUTE RIGHT-SIDED LOW BACK PAIN WITH RIGHT-SIDED SCIATICA: ICD-10-CM

## 2021-07-08 DIAGNOSIS — R29.898 IMPAIRED FLEXIBILITY OF LOWER EXTREMITY: ICD-10-CM

## 2021-07-08 PROCEDURE — 97110 THERAPEUTIC EXERCISES: CPT | Mod: PN,CQ

## 2021-07-12 ENCOUNTER — OFFICE VISIT (OUTPATIENT)
Dept: PSYCHIATRY | Facility: CLINIC | Age: 41
End: 2021-07-12
Payer: OTHER GOVERNMENT

## 2021-07-12 DIAGNOSIS — F32.A DEPRESSIVE DISORDER: ICD-10-CM

## 2021-07-12 DIAGNOSIS — F41.1 GAD (GENERALIZED ANXIETY DISORDER): ICD-10-CM

## 2021-07-12 PROCEDURE — 99999 PR PBB SHADOW E&M-EST. PATIENT-LVL I: CPT | Mod: PBBFAC,,, | Performed by: SOCIAL WORKER

## 2021-07-12 PROCEDURE — 90834 PR PSYCHOTHERAPY W/PATIENT, 45 MIN: ICD-10-PCS | Mod: ,,, | Performed by: SOCIAL WORKER

## 2021-07-12 PROCEDURE — 99211 OFF/OP EST MAY X REQ PHY/QHP: CPT | Mod: PBBFAC,PN | Performed by: SOCIAL WORKER

## 2021-07-12 PROCEDURE — 90834 PSYTX W PT 45 MINUTES: CPT | Mod: ,,, | Performed by: SOCIAL WORKER

## 2021-07-12 PROCEDURE — 99999 PR PBB SHADOW E&M-EST. PATIENT-LVL I: ICD-10-PCS | Mod: PBBFAC,,, | Performed by: SOCIAL WORKER

## 2021-07-13 ENCOUNTER — CLINICAL SUPPORT (OUTPATIENT)
Dept: REHABILITATION | Facility: HOSPITAL | Age: 41
End: 2021-07-13
Attending: PHYSICAL MEDICINE & REHABILITATION
Payer: COMMERCIAL

## 2021-07-13 ENCOUNTER — DOCUMENTATION ONLY (OUTPATIENT)
Dept: REHABILITATION | Facility: HOSPITAL | Age: 41
End: 2021-07-13

## 2021-07-13 DIAGNOSIS — M54.41 ACUTE RIGHT-SIDED LOW BACK PAIN WITH RIGHT-SIDED SCIATICA: ICD-10-CM

## 2021-07-13 DIAGNOSIS — R29.898 IMPAIRED FLEXIBILITY OF LOWER EXTREMITY: ICD-10-CM

## 2021-07-13 PROCEDURE — 97110 THERAPEUTIC EXERCISES: CPT | Mod: PN,CQ

## 2021-07-15 ENCOUNTER — CLINICAL SUPPORT (OUTPATIENT)
Dept: REHABILITATION | Facility: HOSPITAL | Age: 41
End: 2021-07-15
Attending: PHYSICAL MEDICINE & REHABILITATION
Payer: COMMERCIAL

## 2021-07-15 DIAGNOSIS — M54.41 ACUTE RIGHT-SIDED LOW BACK PAIN WITH RIGHT-SIDED SCIATICA: ICD-10-CM

## 2021-07-15 DIAGNOSIS — R29.898 IMPAIRED FLEXIBILITY OF LOWER EXTREMITY: ICD-10-CM

## 2021-07-15 PROCEDURE — 97110 THERAPEUTIC EXERCISES: CPT | Mod: PN,CQ

## 2021-07-22 ENCOUNTER — CLINICAL SUPPORT (OUTPATIENT)
Dept: REHABILITATION | Facility: HOSPITAL | Age: 41
End: 2021-07-22
Attending: PHYSICAL MEDICINE & REHABILITATION
Payer: COMMERCIAL

## 2021-07-22 DIAGNOSIS — M54.41 ACUTE RIGHT-SIDED LOW BACK PAIN WITH RIGHT-SIDED SCIATICA: ICD-10-CM

## 2021-07-22 DIAGNOSIS — R29.898 IMPAIRED FLEXIBILITY OF LOWER EXTREMITY: ICD-10-CM

## 2021-07-22 PROCEDURE — 97110 THERAPEUTIC EXERCISES: CPT | Mod: PN,CQ

## 2021-07-26 ENCOUNTER — CLINICAL SUPPORT (OUTPATIENT)
Dept: REHABILITATION | Facility: HOSPITAL | Age: 41
End: 2021-07-26
Attending: PHYSICAL MEDICINE & REHABILITATION
Payer: COMMERCIAL

## 2021-07-26 ENCOUNTER — OFFICE VISIT (OUTPATIENT)
Dept: PSYCHIATRY | Facility: CLINIC | Age: 41
End: 2021-07-26
Payer: OTHER GOVERNMENT

## 2021-07-26 DIAGNOSIS — M54.41 ACUTE RIGHT-SIDED LOW BACK PAIN WITH RIGHT-SIDED SCIATICA: ICD-10-CM

## 2021-07-26 DIAGNOSIS — R29.898 IMPAIRED FLEXIBILITY OF LOWER EXTREMITY: ICD-10-CM

## 2021-07-26 DIAGNOSIS — F41.1 GAD (GENERALIZED ANXIETY DISORDER): Primary | ICD-10-CM

## 2021-07-26 PROCEDURE — 99999 PR PBB SHADOW E&M-EST. PATIENT-LVL I: ICD-10-PCS | Mod: PBBFAC,,, | Performed by: SOCIAL WORKER

## 2021-07-26 PROCEDURE — 97110 THERAPEUTIC EXERCISES: CPT | Mod: PN | Performed by: PHYSICAL THERAPIST

## 2021-07-26 PROCEDURE — 99999 PR PBB SHADOW E&M-EST. PATIENT-LVL I: CPT | Mod: PBBFAC,,, | Performed by: SOCIAL WORKER

## 2021-07-26 PROCEDURE — 99211 OFF/OP EST MAY X REQ PHY/QHP: CPT | Mod: PBBFAC,PN | Performed by: SOCIAL WORKER

## 2021-07-26 PROCEDURE — 90834 PSYTX W PT 45 MINUTES: CPT | Mod: ,,, | Performed by: SOCIAL WORKER

## 2021-07-26 PROCEDURE — 90834 PR PSYCHOTHERAPY W/PATIENT, 45 MIN: ICD-10-PCS | Mod: ,,, | Performed by: SOCIAL WORKER

## 2021-07-26 PROCEDURE — 97112 NEUROMUSCULAR REEDUCATION: CPT | Mod: PN | Performed by: PHYSICAL THERAPIST

## 2021-08-09 ENCOUNTER — OFFICE VISIT (OUTPATIENT)
Dept: PSYCHIATRY | Facility: CLINIC | Age: 41
End: 2021-08-09
Payer: COMMERCIAL

## 2021-08-09 VITALS
HEART RATE: 70 BPM | HEIGHT: 65 IN | WEIGHT: 187.81 LBS | DIASTOLIC BLOOD PRESSURE: 87 MMHG | BODY MASS INDEX: 31.29 KG/M2 | SYSTOLIC BLOOD PRESSURE: 128 MMHG

## 2021-08-09 DIAGNOSIS — F41.1 GAD (GENERALIZED ANXIETY DISORDER): ICD-10-CM

## 2021-08-09 DIAGNOSIS — F32.A DEPRESSIVE DISORDER: Primary | ICD-10-CM

## 2021-08-09 PROCEDURE — 3074F SYST BP LT 130 MM HG: CPT | Mod: CPTII,S$GLB,, | Performed by: PHYSICIAN ASSISTANT

## 2021-08-09 PROCEDURE — 1126F PR PAIN SEVERITY QUANTIFIED, NO PAIN PRESENT: ICD-10-PCS | Mod: CPTII,S$GLB,, | Performed by: PHYSICIAN ASSISTANT

## 2021-08-09 PROCEDURE — 1159F MED LIST DOCD IN RCRD: CPT | Mod: CPTII,S$GLB,, | Performed by: PHYSICIAN ASSISTANT

## 2021-08-09 PROCEDURE — 99999 PR PBB SHADOW E&M-EST. PATIENT-LVL III: CPT | Mod: PBBFAC,,, | Performed by: PHYSICIAN ASSISTANT

## 2021-08-09 PROCEDURE — 99999 PR PBB SHADOW E&M-EST. PATIENT-LVL III: ICD-10-PCS | Mod: PBBFAC,,, | Performed by: PHYSICIAN ASSISTANT

## 2021-08-09 PROCEDURE — 3008F PR BODY MASS INDEX (BMI) DOCUMENTED: ICD-10-PCS | Mod: CPTII,S$GLB,, | Performed by: PHYSICIAN ASSISTANT

## 2021-08-09 PROCEDURE — 1126F AMNT PAIN NOTED NONE PRSNT: CPT | Mod: CPTII,S$GLB,, | Performed by: PHYSICIAN ASSISTANT

## 2021-08-09 PROCEDURE — 99214 OFFICE O/P EST MOD 30 MIN: CPT | Mod: S$GLB,,, | Performed by: PHYSICIAN ASSISTANT

## 2021-08-09 PROCEDURE — 99214 PR OFFICE/OUTPT VISIT, EST, LEVL IV, 30-39 MIN: ICD-10-PCS | Mod: S$GLB,,, | Performed by: PHYSICIAN ASSISTANT

## 2021-08-09 PROCEDURE — 1159F PR MEDICATION LIST DOCUMENTED IN MEDICAL RECORD: ICD-10-PCS | Mod: CPTII,S$GLB,, | Performed by: PHYSICIAN ASSISTANT

## 2021-08-09 PROCEDURE — 3008F BODY MASS INDEX DOCD: CPT | Mod: CPTII,S$GLB,, | Performed by: PHYSICIAN ASSISTANT

## 2021-08-09 PROCEDURE — 3079F PR MOST RECENT DIASTOLIC BLOOD PRESSURE 80-89 MM HG: ICD-10-PCS | Mod: CPTII,S$GLB,, | Performed by: PHYSICIAN ASSISTANT

## 2021-08-09 PROCEDURE — 3074F PR MOST RECENT SYSTOLIC BLOOD PRESSURE < 130 MM HG: ICD-10-PCS | Mod: CPTII,S$GLB,, | Performed by: PHYSICIAN ASSISTANT

## 2021-08-09 PROCEDURE — 3079F DIAST BP 80-89 MM HG: CPT | Mod: CPTII,S$GLB,, | Performed by: PHYSICIAN ASSISTANT

## 2021-08-09 RX ORDER — SERTRALINE HYDROCHLORIDE 25 MG/1
25 TABLET, FILM COATED ORAL DAILY
Start: 2021-08-09 | End: 2021-10-04

## 2021-08-09 RX ORDER — BUPROPION HYDROCHLORIDE 100 MG/1
100 TABLET, EXTENDED RELEASE ORAL 2 TIMES DAILY
Qty: 60 TABLET | Refills: 1 | Status: SHIPPED | OUTPATIENT
Start: 2021-08-09 | End: 2021-10-04 | Stop reason: SDUPTHER

## 2021-08-23 ENCOUNTER — OFFICE VISIT (OUTPATIENT)
Dept: PSYCHIATRY | Facility: CLINIC | Age: 41
End: 2021-08-23
Payer: OTHER GOVERNMENT

## 2021-08-23 DIAGNOSIS — F41.1 GAD (GENERALIZED ANXIETY DISORDER): ICD-10-CM

## 2021-08-23 DIAGNOSIS — F32.A DEPRESSIVE DISORDER: ICD-10-CM

## 2021-08-23 PROCEDURE — 99999 PR PBB SHADOW E&M-EST. PATIENT-LVL I: ICD-10-PCS | Mod: PBBFAC,,, | Performed by: SOCIAL WORKER

## 2021-08-23 PROCEDURE — 99999 PR PBB SHADOW E&M-EST. PATIENT-LVL I: CPT | Mod: PBBFAC,,, | Performed by: SOCIAL WORKER

## 2021-08-23 PROCEDURE — 99211 OFF/OP EST MAY X REQ PHY/QHP: CPT | Mod: PBBFAC,PN | Performed by: SOCIAL WORKER

## 2021-08-23 PROCEDURE — 90834 PR PSYCHOTHERAPY W/PATIENT, 45 MIN: ICD-10-PCS | Mod: ,,, | Performed by: SOCIAL WORKER

## 2021-08-23 PROCEDURE — 90834 PSYTX W PT 45 MINUTES: CPT | Mod: ,,, | Performed by: SOCIAL WORKER

## 2021-09-07 ENCOUNTER — LAB VISIT (OUTPATIENT)
Dept: LAB | Facility: HOSPITAL | Age: 41
End: 2021-09-07
Attending: PHYSICIAN ASSISTANT
Payer: COMMERCIAL

## 2021-09-07 ENCOUNTER — OFFICE VISIT (OUTPATIENT)
Dept: FAMILY MEDICINE | Facility: CLINIC | Age: 41
End: 2021-09-07
Payer: COMMERCIAL

## 2021-09-07 VITALS
DIASTOLIC BLOOD PRESSURE: 78 MMHG | OXYGEN SATURATION: 98 % | TEMPERATURE: 99 F | BODY MASS INDEX: 30.59 KG/M2 | HEART RATE: 86 BPM | SYSTOLIC BLOOD PRESSURE: 122 MMHG | HEIGHT: 65 IN | WEIGHT: 183.63 LBS

## 2021-09-07 DIAGNOSIS — E78.5 HYPERLIPIDEMIA, UNSPECIFIED HYPERLIPIDEMIA TYPE: Primary | ICD-10-CM

## 2021-09-07 DIAGNOSIS — E61.1 IRON DEFICIENCY: ICD-10-CM

## 2021-09-07 DIAGNOSIS — L68.0 EXCESSIVE HAIR ON FEMALES: ICD-10-CM

## 2021-09-07 DIAGNOSIS — E78.5 HYPERLIPIDEMIA, UNSPECIFIED HYPERLIPIDEMIA TYPE: ICD-10-CM

## 2021-09-07 DIAGNOSIS — Z86.73 HISTORY OF CVA (CEREBROVASCULAR ACCIDENT): ICD-10-CM

## 2021-09-07 DIAGNOSIS — K21.9 GASTROESOPHAGEAL REFLUX DISEASE, UNSPECIFIED WHETHER ESOPHAGITIS PRESENT: ICD-10-CM

## 2021-09-07 LAB
ALBUMIN SERPL BCP-MCNC: 4.1 G/DL (ref 3.5–5.2)
ALP SERPL-CCNC: 82 U/L (ref 55–135)
ALT SERPL W/O P-5'-P-CCNC: 15 U/L (ref 10–44)
ANION GAP SERPL CALC-SCNC: 9 MMOL/L (ref 8–16)
AST SERPL-CCNC: 19 U/L (ref 10–40)
BASOPHILS # BLD AUTO: 0.05 K/UL (ref 0–0.2)
BASOPHILS NFR BLD: 0.7 % (ref 0–1.9)
BILIRUB SERPL-MCNC: 0.5 MG/DL (ref 0.1–1)
BUN SERPL-MCNC: 9 MG/DL (ref 6–20)
CALCIUM SERPL-MCNC: 9.2 MG/DL (ref 8.7–10.5)
CHLORIDE SERPL-SCNC: 102 MMOL/L (ref 95–110)
CO2 SERPL-SCNC: 23 MMOL/L (ref 23–29)
CREAT SERPL-MCNC: 1 MG/DL (ref 0.5–1.4)
DIFFERENTIAL METHOD: NORMAL
EOSINOPHIL # BLD AUTO: 0.3 K/UL (ref 0–0.5)
EOSINOPHIL NFR BLD: 3.5 % (ref 0–8)
ERYTHROCYTE [DISTWIDTH] IN BLOOD BY AUTOMATED COUNT: 12.4 % (ref 11.5–14.5)
EST. GFR  (AFRICAN AMERICAN): >60 ML/MIN/1.73 M^2
EST. GFR  (NON AFRICAN AMERICAN): >60 ML/MIN/1.73 M^2
FERRITIN SERPL-MCNC: 24 NG/ML (ref 20–300)
GLUCOSE SERPL-MCNC: 128 MG/DL (ref 70–110)
HCT VFR BLD AUTO: 45.2 % (ref 37–48.5)
HGB BLD-MCNC: 14.5 G/DL (ref 12–16)
IMM GRANULOCYTES # BLD AUTO: 0.02 K/UL (ref 0–0.04)
IMM GRANULOCYTES NFR BLD AUTO: 0.3 % (ref 0–0.5)
IRON SERPL-MCNC: 156 UG/DL (ref 30–160)
LYMPHOCYTES # BLD AUTO: 2.7 K/UL (ref 1–4.8)
LYMPHOCYTES NFR BLD: 36.3 % (ref 18–48)
MCH RBC QN AUTO: 30.8 PG (ref 27–31)
MCHC RBC AUTO-ENTMCNC: 32.1 G/DL (ref 32–36)
MCV RBC AUTO: 96 FL (ref 82–98)
MONOCYTES # BLD AUTO: 0.5 K/UL (ref 0.3–1)
MONOCYTES NFR BLD: 7 % (ref 4–15)
NEUTROPHILS # BLD AUTO: 3.9 K/UL (ref 1.8–7.7)
NEUTROPHILS NFR BLD: 52.2 % (ref 38–73)
NRBC BLD-RTO: 0 /100 WBC
PLATELET # BLD AUTO: 348 K/UL (ref 150–450)
PMV BLD AUTO: 9.8 FL (ref 9.2–12.9)
POTASSIUM SERPL-SCNC: 4.3 MMOL/L (ref 3.5–5.1)
PROT SERPL-MCNC: 7.1 G/DL (ref 6–8.4)
RBC # BLD AUTO: 4.71 M/UL (ref 4–5.4)
SATURATED IRON: 37 % (ref 20–50)
SODIUM SERPL-SCNC: 134 MMOL/L (ref 136–145)
T3 SERPL-MCNC: 93 NG/DL (ref 60–180)
T4 FREE SERPL-MCNC: 0.85 NG/DL (ref 0.71–1.51)
TESTOST SERPL-MCNC: 53 NG/DL (ref 5–73)
TOTAL IRON BINDING CAPACITY: 426 UG/DL (ref 250–450)
TRANSFERRIN SERPL-MCNC: 288 MG/DL (ref 200–375)
TSH SERPL DL<=0.005 MIU/L-ACNC: 1.35 UIU/ML (ref 0.4–4)
WBC # BLD AUTO: 7.44 K/UL (ref 3.9–12.7)

## 2021-09-07 PROCEDURE — 84439 ASSAY OF FREE THYROXINE: CPT | Performed by: PHYSICIAN ASSISTANT

## 2021-09-07 PROCEDURE — 99214 OFFICE O/P EST MOD 30 MIN: CPT | Mod: PBBFAC,PO | Performed by: PHYSICIAN ASSISTANT

## 2021-09-07 PROCEDURE — 99999 PR PBB SHADOW E&M-EST. PATIENT-LVL IV: ICD-10-PCS | Mod: PBBFAC,,, | Performed by: PHYSICIAN ASSISTANT

## 2021-09-07 PROCEDURE — 84403 ASSAY OF TOTAL TESTOSTERONE: CPT | Performed by: PHYSICIAN ASSISTANT

## 2021-09-07 PROCEDURE — 85025 COMPLETE CBC W/AUTO DIFF WBC: CPT | Performed by: PHYSICIAN ASSISTANT

## 2021-09-07 PROCEDURE — 84466 ASSAY OF TRANSFERRIN: CPT | Performed by: PHYSICIAN ASSISTANT

## 2021-09-07 PROCEDURE — 99214 PR OFFICE/OUTPT VISIT, EST, LEVL IV, 30-39 MIN: ICD-10-PCS | Mod: S$GLB,,, | Performed by: PHYSICIAN ASSISTANT

## 2021-09-07 PROCEDURE — 80053 COMPREHEN METABOLIC PANEL: CPT | Performed by: PHYSICIAN ASSISTANT

## 2021-09-07 PROCEDURE — 36415 COLL VENOUS BLD VENIPUNCTURE: CPT | Mod: PO | Performed by: PHYSICIAN ASSISTANT

## 2021-09-07 PROCEDURE — 86376 MICROSOMAL ANTIBODY EACH: CPT | Performed by: PHYSICIAN ASSISTANT

## 2021-09-07 PROCEDURE — 84443 ASSAY THYROID STIM HORMONE: CPT | Performed by: PHYSICIAN ASSISTANT

## 2021-09-07 PROCEDURE — 99999 PR PBB SHADOW E&M-EST. PATIENT-LVL IV: CPT | Mod: PBBFAC,,, | Performed by: PHYSICIAN ASSISTANT

## 2021-09-07 PROCEDURE — 99214 OFFICE O/P EST MOD 30 MIN: CPT | Mod: S$GLB,,, | Performed by: PHYSICIAN ASSISTANT

## 2021-09-07 PROCEDURE — 82728 ASSAY OF FERRITIN: CPT | Performed by: PHYSICIAN ASSISTANT

## 2021-09-07 PROCEDURE — 84480 ASSAY TRIIODOTHYRONINE (T3): CPT | Performed by: PHYSICIAN ASSISTANT

## 2021-09-08 ENCOUNTER — LAB VISIT (OUTPATIENT)
Dept: LAB | Facility: HOSPITAL | Age: 41
End: 2021-09-08
Attending: PHYSICIAN ASSISTANT
Payer: COMMERCIAL

## 2021-09-08 DIAGNOSIS — L68.0 EXCESSIVE HAIR ON FEMALES: Primary | ICD-10-CM

## 2021-09-08 DIAGNOSIS — L68.0 EXCESSIVE HAIR ON FEMALES: ICD-10-CM

## 2021-09-08 LAB — THYROPEROXIDASE IGG SERPL-ACNC: <6 IU/ML

## 2021-09-08 PROCEDURE — 36415 COLL VENOUS BLD VENIPUNCTURE: CPT | Mod: PO | Performed by: PHYSICIAN ASSISTANT

## 2021-09-08 PROCEDURE — 84702 CHORIONIC GONADOTROPIN TEST: CPT | Performed by: PHYSICIAN ASSISTANT

## 2021-09-08 PROCEDURE — 83498 ASY HYDROXYPROGESTERONE 17-D: CPT | Performed by: PHYSICIAN ASSISTANT

## 2021-09-08 PROCEDURE — 83001 ASSAY OF GONADOTROPIN (FSH): CPT | Performed by: PHYSICIAN ASSISTANT

## 2021-09-08 PROCEDURE — 84146 ASSAY OF PROLACTIN: CPT | Performed by: PHYSICIAN ASSISTANT

## 2021-09-09 LAB
FSH SERPL-ACNC: 3.21 MIU/ML
HCG INTACT+B SERPL-ACNC: <2.4 MIU/ML
PROLACTIN SERPL IA-MCNC: 18.4 NG/ML (ref 5.2–26.5)

## 2021-09-13 ENCOUNTER — OFFICE VISIT (OUTPATIENT)
Dept: PSYCHIATRY | Facility: CLINIC | Age: 41
End: 2021-09-13
Payer: COMMERCIAL

## 2021-09-13 ENCOUNTER — OFFICE VISIT (OUTPATIENT)
Dept: HEMATOLOGY/ONCOLOGY | Facility: CLINIC | Age: 41
End: 2021-09-13
Payer: OTHER GOVERNMENT

## 2021-09-13 ENCOUNTER — TELEPHONE (OUTPATIENT)
Dept: FAMILY MEDICINE | Facility: CLINIC | Age: 41
End: 2021-09-13

## 2021-09-13 VITALS
SYSTOLIC BLOOD PRESSURE: 137 MMHG | HEART RATE: 81 BPM | RESPIRATION RATE: 18 BRPM | WEIGHT: 185.69 LBS | DIASTOLIC BLOOD PRESSURE: 83 MMHG | BODY MASS INDEX: 30.9 KG/M2 | TEMPERATURE: 98 F

## 2021-09-13 DIAGNOSIS — Z79.01 ANTICOAGULANT LONG-TERM USE: ICD-10-CM

## 2021-09-13 DIAGNOSIS — F32.A DEPRESSIVE DISORDER: ICD-10-CM

## 2021-09-13 DIAGNOSIS — L68.0 EXCESSIVE HAIR ON FEMALES: Primary | ICD-10-CM

## 2021-09-13 DIAGNOSIS — D68.9 CLOTTING DISORDER: ICD-10-CM

## 2021-09-13 DIAGNOSIS — E61.1 IRON DEFICIENCY: ICD-10-CM

## 2021-09-13 DIAGNOSIS — R76.0 ANTICARDIOLIPIN ANTIBODY POSITIVE: Primary | ICD-10-CM

## 2021-09-13 DIAGNOSIS — F41.1 GAD (GENERALIZED ANXIETY DISORDER): ICD-10-CM

## 2021-09-13 LAB — 17OHP SERPL-MCNC: 172 NG/DL (ref 35–413)

## 2021-09-13 PROCEDURE — 99213 PR OFFICE/OUTPT VISIT, EST, LEVL III, 20-29 MIN: ICD-10-PCS | Mod: S$GLB,,, | Performed by: INTERNAL MEDICINE

## 2021-09-13 PROCEDURE — 99213 OFFICE O/P EST LOW 20 MIN: CPT | Mod: S$GLB,,, | Performed by: INTERNAL MEDICINE

## 2021-09-13 PROCEDURE — 1159F PR MEDICATION LIST DOCUMENTED IN MEDICAL RECORD: ICD-10-PCS | Mod: CPTII,95,, | Performed by: SOCIAL WORKER

## 2021-09-13 PROCEDURE — 1159F MED LIST DOCD IN RCRD: CPT | Mod: CPTII,95,, | Performed by: SOCIAL WORKER

## 2021-09-13 PROCEDURE — 90834 PSYTX W PT 45 MINUTES: CPT | Mod: 95,,, | Performed by: SOCIAL WORKER

## 2021-09-13 PROCEDURE — 90834 PR PSYCHOTHERAPY W/PATIENT, 45 MIN: ICD-10-PCS | Mod: 95,,, | Performed by: SOCIAL WORKER

## 2021-09-20 ENCOUNTER — OFFICE VISIT (OUTPATIENT)
Dept: PSYCHIATRY | Facility: CLINIC | Age: 41
End: 2021-09-20
Payer: COMMERCIAL

## 2021-09-20 DIAGNOSIS — F41.1 GAD (GENERALIZED ANXIETY DISORDER): ICD-10-CM

## 2021-09-20 DIAGNOSIS — F32.A DEPRESSIVE DISORDER: ICD-10-CM

## 2021-09-20 PROCEDURE — 90834 PR PSYCHOTHERAPY W/PATIENT, 45 MIN: ICD-10-PCS | Mod: 95,,, | Performed by: SOCIAL WORKER

## 2021-09-20 PROCEDURE — 1159F PR MEDICATION LIST DOCUMENTED IN MEDICAL RECORD: ICD-10-PCS | Mod: CPTII,95,, | Performed by: SOCIAL WORKER

## 2021-09-20 PROCEDURE — 1159F MED LIST DOCD IN RCRD: CPT | Mod: CPTII,95,, | Performed by: SOCIAL WORKER

## 2021-09-20 PROCEDURE — 90834 PSYTX W PT 45 MINUTES: CPT | Mod: 95,,, | Performed by: SOCIAL WORKER

## 2021-10-04 ENCOUNTER — OFFICE VISIT (OUTPATIENT)
Dept: PSYCHIATRY | Facility: CLINIC | Age: 41
End: 2021-10-04
Payer: COMMERCIAL

## 2021-10-04 VITALS
SYSTOLIC BLOOD PRESSURE: 130 MMHG | HEART RATE: 73 BPM | HEIGHT: 65 IN | DIASTOLIC BLOOD PRESSURE: 88 MMHG | WEIGHT: 183.63 LBS | BODY MASS INDEX: 30.59 KG/M2

## 2021-10-04 DIAGNOSIS — F32.A DEPRESSIVE DISORDER: ICD-10-CM

## 2021-10-04 DIAGNOSIS — F41.1 GAD (GENERALIZED ANXIETY DISORDER): ICD-10-CM

## 2021-10-04 DIAGNOSIS — F32.A DEPRESSIVE DISORDER: Primary | ICD-10-CM

## 2021-10-04 PROCEDURE — 99214 PR OFFICE/OUTPT VISIT, EST, LEVL IV, 30-39 MIN: ICD-10-PCS | Mod: S$GLB,,, | Performed by: PHYSICIAN ASSISTANT

## 2021-10-04 PROCEDURE — 3079F DIAST BP 80-89 MM HG: CPT | Mod: CPTII,S$GLB,, | Performed by: PHYSICIAN ASSISTANT

## 2021-10-04 PROCEDURE — 99214 OFFICE O/P EST MOD 30 MIN: CPT | Mod: S$GLB,,, | Performed by: PHYSICIAN ASSISTANT

## 2021-10-04 PROCEDURE — 3079F PR MOST RECENT DIASTOLIC BLOOD PRESSURE 80-89 MM HG: ICD-10-PCS | Mod: CPTII,S$GLB,, | Performed by: PHYSICIAN ASSISTANT

## 2021-10-04 PROCEDURE — 99999 PR PBB SHADOW E&M-EST. PATIENT-LVL II: CPT | Mod: PBBFAC,,, | Performed by: SOCIAL WORKER

## 2021-10-04 PROCEDURE — 90834 PR PSYCHOTHERAPY W/PATIENT, 45 MIN: ICD-10-PCS | Mod: S$GLB,,, | Performed by: SOCIAL WORKER

## 2021-10-04 PROCEDURE — 3075F PR MOST RECENT SYSTOLIC BLOOD PRESS GE 130-139MM HG: ICD-10-PCS | Mod: CPTII,S$GLB,, | Performed by: PHYSICIAN ASSISTANT

## 2021-10-04 PROCEDURE — 99999 PR PBB SHADOW E&M-EST. PATIENT-LVL II: ICD-10-PCS | Mod: PBBFAC,,, | Performed by: SOCIAL WORKER

## 2021-10-04 PROCEDURE — 1160F RVW MEDS BY RX/DR IN RCRD: CPT | Mod: CPTII,S$GLB,, | Performed by: PHYSICIAN ASSISTANT

## 2021-10-04 PROCEDURE — 1159F MED LIST DOCD IN RCRD: CPT | Mod: CPTII,S$GLB,, | Performed by: PHYSICIAN ASSISTANT

## 2021-10-04 PROCEDURE — 99999 PR PBB SHADOW E&M-EST. PATIENT-LVL IV: CPT | Mod: PBBFAC,,, | Performed by: PHYSICIAN ASSISTANT

## 2021-10-04 PROCEDURE — 3008F BODY MASS INDEX DOCD: CPT | Mod: CPTII,S$GLB,, | Performed by: PHYSICIAN ASSISTANT

## 2021-10-04 PROCEDURE — 1159F PR MEDICATION LIST DOCUMENTED IN MEDICAL RECORD: ICD-10-PCS | Mod: CPTII,S$GLB,, | Performed by: PHYSICIAN ASSISTANT

## 2021-10-04 PROCEDURE — 99999 PR PBB SHADOW E&M-EST. PATIENT-LVL IV: ICD-10-PCS | Mod: PBBFAC,,, | Performed by: PHYSICIAN ASSISTANT

## 2021-10-04 PROCEDURE — 90834 PSYTX W PT 45 MINUTES: CPT | Mod: S$GLB,,, | Performed by: SOCIAL WORKER

## 2021-10-04 PROCEDURE — 3075F SYST BP GE 130 - 139MM HG: CPT | Mod: CPTII,S$GLB,, | Performed by: PHYSICIAN ASSISTANT

## 2021-10-04 PROCEDURE — 1160F PR REVIEW ALL MEDS BY PRESCRIBER/CLIN PHARMACIST DOCUMENTED: ICD-10-PCS | Mod: CPTII,S$GLB,, | Performed by: PHYSICIAN ASSISTANT

## 2021-10-04 PROCEDURE — 3008F PR BODY MASS INDEX (BMI) DOCUMENTED: ICD-10-PCS | Mod: CPTII,S$GLB,, | Performed by: PHYSICIAN ASSISTANT

## 2021-10-04 RX ORDER — TRAZODONE HYDROCHLORIDE 50 MG/1
50 TABLET ORAL NIGHTLY
Qty: 30 TABLET | Refills: 1 | Status: SHIPPED | OUTPATIENT
Start: 2021-10-04 | End: 2021-12-06

## 2021-10-04 RX ORDER — BUPROPION HYDROCHLORIDE 100 MG/1
100 TABLET, EXTENDED RELEASE ORAL 2 TIMES DAILY
Qty: 60 TABLET | Refills: 1 | Status: SHIPPED | OUTPATIENT
Start: 2021-10-04 | End: 2021-12-06 | Stop reason: ALTCHOICE

## 2021-10-18 ENCOUNTER — OFFICE VISIT (OUTPATIENT)
Dept: PSYCHIATRY | Facility: CLINIC | Age: 41
End: 2021-10-18
Payer: COMMERCIAL

## 2021-10-18 DIAGNOSIS — F41.1 GAD (GENERALIZED ANXIETY DISORDER): ICD-10-CM

## 2021-10-18 DIAGNOSIS — F32.A DEPRESSIVE DISORDER: ICD-10-CM

## 2021-10-18 PROCEDURE — 1159F PR MEDICATION LIST DOCUMENTED IN MEDICAL RECORD: ICD-10-PCS | Mod: CPTII,S$GLB,, | Performed by: SOCIAL WORKER

## 2021-10-18 PROCEDURE — 1159F MED LIST DOCD IN RCRD: CPT | Mod: CPTII,S$GLB,, | Performed by: SOCIAL WORKER

## 2021-10-18 PROCEDURE — 99999 PR PBB SHADOW E&M-EST. PATIENT-LVL I: ICD-10-PCS | Mod: PBBFAC,,, | Performed by: SOCIAL WORKER

## 2021-10-18 PROCEDURE — 90834 PSYTX W PT 45 MINUTES: CPT | Mod: S$GLB,,, | Performed by: SOCIAL WORKER

## 2021-10-18 PROCEDURE — 90834 PR PSYCHOTHERAPY W/PATIENT, 45 MIN: ICD-10-PCS | Mod: S$GLB,,, | Performed by: SOCIAL WORKER

## 2021-10-18 PROCEDURE — 99999 PR PBB SHADOW E&M-EST. PATIENT-LVL I: CPT | Mod: PBBFAC,,, | Performed by: SOCIAL WORKER

## 2021-10-25 ENCOUNTER — OFFICE VISIT (OUTPATIENT)
Dept: PSYCHIATRY | Facility: CLINIC | Age: 41
End: 2021-10-25
Payer: COMMERCIAL

## 2021-10-25 DIAGNOSIS — F32.A DEPRESSIVE DISORDER: ICD-10-CM

## 2021-10-25 DIAGNOSIS — F41.1 GAD (GENERALIZED ANXIETY DISORDER): ICD-10-CM

## 2021-10-25 PROCEDURE — 99999 PR PBB SHADOW E&M-EST. PATIENT-LVL I: ICD-10-PCS | Mod: PBBFAC,,, | Performed by: SOCIAL WORKER

## 2021-10-25 PROCEDURE — 1159F MED LIST DOCD IN RCRD: CPT | Mod: CPTII,S$GLB,, | Performed by: SOCIAL WORKER

## 2021-10-25 PROCEDURE — 1159F PR MEDICATION LIST DOCUMENTED IN MEDICAL RECORD: ICD-10-PCS | Mod: CPTII,S$GLB,, | Performed by: SOCIAL WORKER

## 2021-10-25 PROCEDURE — 90834 PSYTX W PT 45 MINUTES: CPT | Mod: S$GLB,,, | Performed by: SOCIAL WORKER

## 2021-10-25 PROCEDURE — 99999 PR PBB SHADOW E&M-EST. PATIENT-LVL I: CPT | Mod: PBBFAC,,, | Performed by: SOCIAL WORKER

## 2021-10-25 PROCEDURE — 90834 PR PSYCHOTHERAPY W/PATIENT, 45 MIN: ICD-10-PCS | Mod: S$GLB,,, | Performed by: SOCIAL WORKER

## 2021-10-28 ENCOUNTER — PATIENT MESSAGE (OUTPATIENT)
Dept: FAMILY MEDICINE | Facility: CLINIC | Age: 41
End: 2021-10-28
Payer: COMMERCIAL

## 2021-10-28 ENCOUNTER — OFFICE VISIT (OUTPATIENT)
Dept: ENDOCRINOLOGY | Facility: CLINIC | Age: 41
End: 2021-10-28
Payer: COMMERCIAL

## 2021-10-28 VITALS
TEMPERATURE: 98 F | SYSTOLIC BLOOD PRESSURE: 122 MMHG | DIASTOLIC BLOOD PRESSURE: 72 MMHG | RESPIRATION RATE: 18 BRPM | HEIGHT: 65 IN | HEART RATE: 75 BPM | OXYGEN SATURATION: 97 % | BODY MASS INDEX: 30.64 KG/M2 | WEIGHT: 183.88 LBS

## 2021-10-28 DIAGNOSIS — R22.0 LIP SWELLING: ICD-10-CM

## 2021-10-28 DIAGNOSIS — R53.83 FATIGUE, UNSPECIFIED TYPE: Primary | ICD-10-CM

## 2021-10-28 DIAGNOSIS — R73.9 HYPERGLYCEMIA: ICD-10-CM

## 2021-10-28 DIAGNOSIS — Z12.31 ENCOUNTER FOR SCREENING MAMMOGRAM FOR MALIGNANT NEOPLASM OF BREAST: Primary | ICD-10-CM

## 2021-10-28 DIAGNOSIS — E66.9 CLASS 1 OBESITY WITH SERIOUS COMORBIDITY AND BODY MASS INDEX (BMI) OF 30.0 TO 30.9 IN ADULT, UNSPECIFIED OBESITY TYPE: ICD-10-CM

## 2021-10-28 PROBLEM — E66.811 CLASS 1 OBESITY WITH SERIOUS COMORBIDITY AND BODY MASS INDEX (BMI) OF 30.0 TO 30.9 IN ADULT: Status: ACTIVE | Noted: 2021-10-28

## 2021-10-28 PROCEDURE — 99999 PR PBB SHADOW E&M-EST. PATIENT-LVL IV: CPT | Mod: PBBFAC,,, | Performed by: INTERNAL MEDICINE

## 2021-10-28 PROCEDURE — 99999 PR PBB SHADOW E&M-EST. PATIENT-LVL IV: ICD-10-PCS | Mod: PBBFAC,,, | Performed by: INTERNAL MEDICINE

## 2021-10-28 PROCEDURE — 99204 PR OFFICE/OUTPT VISIT, NEW, LEVL IV, 45-59 MIN: ICD-10-PCS | Mod: S$GLB,,, | Performed by: INTERNAL MEDICINE

## 2021-10-28 PROCEDURE — 99204 OFFICE O/P NEW MOD 45 MIN: CPT | Mod: S$GLB,,, | Performed by: INTERNAL MEDICINE

## 2021-10-28 PROCEDURE — 99214 OFFICE O/P EST MOD 30 MIN: CPT | Mod: PBBFAC,PO | Performed by: INTERNAL MEDICINE

## 2021-10-30 ENCOUNTER — LAB VISIT (OUTPATIENT)
Dept: LAB | Facility: HOSPITAL | Age: 41
End: 2021-10-30
Attending: INTERNAL MEDICINE
Payer: COMMERCIAL

## 2021-10-30 DIAGNOSIS — R22.0 LIP SWELLING: ICD-10-CM

## 2021-10-30 DIAGNOSIS — R53.83 FATIGUE, UNSPECIFIED TYPE: ICD-10-CM

## 2021-10-30 DIAGNOSIS — R73.9 HYPERGLYCEMIA: ICD-10-CM

## 2021-10-30 LAB
ALBUMIN SERPL BCP-MCNC: 3.9 G/DL (ref 3.5–5.2)
ALP SERPL-CCNC: 83 U/L (ref 55–135)
ALT SERPL W/O P-5'-P-CCNC: 24 U/L (ref 10–44)
ANION GAP SERPL CALC-SCNC: 10 MMOL/L (ref 8–16)
AST SERPL-CCNC: 22 U/L (ref 10–40)
BASOPHILS # BLD AUTO: 0.05 K/UL (ref 0–0.2)
BASOPHILS NFR BLD: 0.6 % (ref 0–1.9)
BILIRUB SERPL-MCNC: 0.6 MG/DL (ref 0.1–1)
BUN SERPL-MCNC: 11 MG/DL (ref 6–20)
C4 SERPL-MCNC: 27 MG/DL (ref 11–44)
CALCIUM SERPL-MCNC: 9.4 MG/DL (ref 8.7–10.5)
CHLORIDE SERPL-SCNC: 104 MMOL/L (ref 95–110)
CO2 SERPL-SCNC: 25 MMOL/L (ref 23–29)
CORTIS SERPL-MCNC: 14.3 UG/DL (ref 4.3–22.4)
CREAT SERPL-MCNC: 0.8 MG/DL (ref 0.5–1.4)
CRP SERPL-MCNC: 0.6 MG/L (ref 0–8.2)
DHEA-S SERPL-MCNC: 345.7 UG/DL (ref 74.8–410.2)
DIFFERENTIAL METHOD: ABNORMAL
EOSINOPHIL # BLD AUTO: 0.5 K/UL (ref 0–0.5)
EOSINOPHIL NFR BLD: 5.5 % (ref 0–8)
ERYTHROCYTE [DISTWIDTH] IN BLOOD BY AUTOMATED COUNT: 12.6 % (ref 11.5–14.5)
EST. GFR  (AFRICAN AMERICAN): >60 ML/MIN/1.73 M^2
EST. GFR  (NON AFRICAN AMERICAN): >60 ML/MIN/1.73 M^2
ESTIMATED AVG GLUCOSE: 97 MG/DL (ref 68–131)
GLUCOSE SERPL-MCNC: 96 MG/DL (ref 70–110)
HBA1C MFR BLD: 5 % (ref 4–5.6)
HCT VFR BLD AUTO: 41 % (ref 37–48.5)
HGB BLD-MCNC: 13.5 G/DL (ref 12–16)
IMM GRANULOCYTES # BLD AUTO: 0.02 K/UL (ref 0–0.04)
IMM GRANULOCYTES NFR BLD AUTO: 0.2 % (ref 0–0.5)
LYMPHOCYTES # BLD AUTO: 2.5 K/UL (ref 1–4.8)
LYMPHOCYTES NFR BLD: 29.4 % (ref 18–48)
MCH RBC QN AUTO: 31.7 PG (ref 27–31)
MCHC RBC AUTO-ENTMCNC: 32.9 G/DL (ref 32–36)
MCV RBC AUTO: 96 FL (ref 82–98)
MONOCYTES # BLD AUTO: 0.7 K/UL (ref 0.3–1)
MONOCYTES NFR BLD: 7.9 % (ref 4–15)
NEUTROPHILS # BLD AUTO: 4.8 K/UL (ref 1.8–7.7)
NEUTROPHILS NFR BLD: 56.4 % (ref 38–73)
NRBC BLD-RTO: 0 /100 WBC
PLATELET # BLD AUTO: 335 K/UL (ref 150–450)
PMV BLD AUTO: 10 FL (ref 9.2–12.9)
POTASSIUM SERPL-SCNC: 4.2 MMOL/L (ref 3.5–5.1)
PROT SERPL-MCNC: 6.7 G/DL (ref 6–8.4)
RBC # BLD AUTO: 4.26 M/UL (ref 4–5.4)
SODIUM SERPL-SCNC: 139 MMOL/L (ref 136–145)
T4 FREE SERPL-MCNC: 0.97 NG/DL (ref 0.71–1.51)
TSH SERPL DL<=0.005 MIU/L-ACNC: 1.1 UIU/ML (ref 0.4–4)
VIT B12 SERPL-MCNC: 370 PG/ML (ref 210–950)
WBC # BLD AUTO: 8.57 K/UL (ref 3.9–12.7)

## 2021-10-30 PROCEDURE — 86140 C-REACTIVE PROTEIN: CPT | Performed by: INTERNAL MEDICINE

## 2021-10-30 PROCEDURE — 80053 COMPREHEN METABOLIC PANEL: CPT | Performed by: INTERNAL MEDICINE

## 2021-10-30 PROCEDURE — 82024 ASSAY OF ACTH: CPT | Performed by: INTERNAL MEDICINE

## 2021-10-30 PROCEDURE — 84443 ASSAY THYROID STIM HORMONE: CPT | Performed by: INTERNAL MEDICINE

## 2021-10-30 PROCEDURE — 83036 HEMOGLOBIN GLYCOSYLATED A1C: CPT | Performed by: INTERNAL MEDICINE

## 2021-10-30 PROCEDURE — 82607 VITAMIN B-12: CPT | Performed by: INTERNAL MEDICINE

## 2021-10-30 PROCEDURE — 84439 ASSAY OF FREE THYROXINE: CPT | Performed by: INTERNAL MEDICINE

## 2021-10-30 PROCEDURE — 36415 COLL VENOUS BLD VENIPUNCTURE: CPT | Mod: PO | Performed by: INTERNAL MEDICINE

## 2021-10-30 PROCEDURE — 86160 COMPLEMENT ANTIGEN: CPT | Performed by: INTERNAL MEDICINE

## 2021-10-30 PROCEDURE — 85025 COMPLETE CBC W/AUTO DIFF WBC: CPT | Performed by: INTERNAL MEDICINE

## 2021-10-30 PROCEDURE — 82627 DEHYDROEPIANDROSTERONE: CPT | Performed by: INTERNAL MEDICINE

## 2021-10-30 PROCEDURE — 82533 TOTAL CORTISOL: CPT | Performed by: INTERNAL MEDICINE

## 2021-11-01 ENCOUNTER — OFFICE VISIT (OUTPATIENT)
Dept: URGENT CARE | Facility: CLINIC | Age: 41
End: 2021-11-01
Payer: COMMERCIAL

## 2021-11-01 VITALS
RESPIRATION RATE: 14 BRPM | TEMPERATURE: 97 F | SYSTOLIC BLOOD PRESSURE: 124 MMHG | WEIGHT: 188 LBS | DIASTOLIC BLOOD PRESSURE: 84 MMHG | HEART RATE: 74 BPM | OXYGEN SATURATION: 98 % | BODY MASS INDEX: 31.32 KG/M2 | HEIGHT: 65 IN

## 2021-11-01 DIAGNOSIS — R05.9 COUGH: Primary | ICD-10-CM

## 2021-11-01 DIAGNOSIS — J40 BRONCHITIS: ICD-10-CM

## 2021-11-01 DIAGNOSIS — Z20.822 COVID-19 VIRUS NOT DETECTED: ICD-10-CM

## 2021-11-01 LAB
CTP QC/QA: YES
CTP QC/QA: YES
FLUAV AG NPH QL: NEGATIVE
FLUBV AG NPH QL: NEGATIVE
SARS-COV-2 RDRP RESP QL NAA+PROBE: NEGATIVE

## 2021-11-01 PROCEDURE — 87804 INFLUENZA ASSAY W/OPTIC: CPT | Mod: QW,,, | Performed by: NURSE PRACTITIONER

## 2021-11-01 PROCEDURE — U0002 COVID-19 LAB TEST NON-CDC: HCPCS | Mod: QW,S$GLB,, | Performed by: NURSE PRACTITIONER

## 2021-11-01 PROCEDURE — 99214 OFFICE O/P EST MOD 30 MIN: CPT | Mod: 25,S$GLB,CS, | Performed by: NURSE PRACTITIONER

## 2021-11-01 PROCEDURE — U0002: ICD-10-PCS | Mod: QW,S$GLB,, | Performed by: NURSE PRACTITIONER

## 2021-11-01 PROCEDURE — 99214 PR OFFICE/OUTPT VISIT, EST, LEVL IV, 30-39 MIN: ICD-10-PCS | Mod: 25,S$GLB,CS, | Performed by: NURSE PRACTITIONER

## 2021-11-01 PROCEDURE — 87804 POCT INFLUENZA A/B: ICD-10-PCS | Mod: QW,,, | Performed by: NURSE PRACTITIONER

## 2021-11-01 RX ORDER — FLUTICASONE PROPIONATE 50 MCG
1 SPRAY, SUSPENSION (ML) NASAL DAILY
Qty: 15.8 ML | Refills: 0 | Status: SHIPPED | OUTPATIENT
Start: 2021-11-01 | End: 2022-03-03

## 2021-11-01 RX ORDER — CETIRIZINE HYDROCHLORIDE 10 MG/1
10 TABLET ORAL DAILY
Qty: 30 TABLET | Refills: 0 | Status: SHIPPED | OUTPATIENT
Start: 2021-11-01 | End: 2022-01-14

## 2021-11-01 RX ORDER — DOXYCYCLINE 100 MG/1
100 CAPSULE ORAL 2 TIMES DAILY
Qty: 14 CAPSULE | Refills: 0 | Status: SHIPPED | OUTPATIENT
Start: 2021-11-01 | End: 2021-11-08

## 2021-11-01 RX ORDER — CODEINE PHOSPHATE AND GUAIFENESIN 10; 100 MG/5ML; MG/5ML
5 SOLUTION ORAL 3 TIMES DAILY PRN
Qty: 118 ML | Refills: 0 | Status: SHIPPED | OUTPATIENT
Start: 2021-11-01 | End: 2021-11-11

## 2021-11-01 RX ORDER — ALBUTEROL SULFATE 90 UG/1
2 AEROSOL, METERED RESPIRATORY (INHALATION) EVERY 6 HOURS PRN
Qty: 18 G | Refills: 0 | Status: SHIPPED | OUTPATIENT
Start: 2021-11-01 | End: 2022-01-14

## 2021-11-02 LAB — ACTH PLAS-MCNC: 31 PG/ML (ref 0–46)

## 2021-11-03 ENCOUNTER — PATIENT MESSAGE (OUTPATIENT)
Dept: ENDOCRINOLOGY | Facility: CLINIC | Age: 41
End: 2021-11-03
Payer: COMMERCIAL

## 2021-11-03 RX ORDER — TOPIRAMATE 50 MG/1
50 TABLET, FILM COATED ORAL 2 TIMES DAILY
Qty: 60 TABLET | Refills: 5 | Status: SHIPPED | OUTPATIENT
Start: 2021-11-03 | End: 2021-11-04

## 2021-11-09 ENCOUNTER — HOSPITAL ENCOUNTER (OUTPATIENT)
Dept: RADIOLOGY | Facility: CLINIC | Age: 41
Discharge: HOME | End: 2021-11-09
Attending: PHYSICIAN ASSISTANT
Payer: COMMERCIAL

## 2021-11-09 DIAGNOSIS — Z12.31 ENCOUNTER FOR SCREENING MAMMOGRAM FOR MALIGNANT NEOPLASM OF BREAST: ICD-10-CM

## 2021-11-09 PROCEDURE — 77063 BREAST TOMOSYNTHESIS BI: CPT | Mod: 26,,, | Performed by: RADIOLOGY

## 2021-11-09 PROCEDURE — 77067 SCR MAMMO BI INCL CAD: CPT | Mod: TC,PO

## 2021-11-09 PROCEDURE — 77063 MAMMO DIGITAL SCREENING BILAT WITH TOMO: ICD-10-PCS | Mod: 26,,, | Performed by: RADIOLOGY

## 2021-11-09 PROCEDURE — 77067 MAMMO DIGITAL SCREENING BILAT WITH TOMO: ICD-10-PCS | Mod: 26,,, | Performed by: RADIOLOGY

## 2021-11-09 PROCEDURE — 77067 SCR MAMMO BI INCL CAD: CPT | Mod: 26,,, | Performed by: RADIOLOGY

## 2021-11-15 ENCOUNTER — OFFICE VISIT (OUTPATIENT)
Dept: PSYCHIATRY | Facility: CLINIC | Age: 41
End: 2021-11-15
Payer: COMMERCIAL

## 2021-11-15 DIAGNOSIS — F41.1 GAD (GENERALIZED ANXIETY DISORDER): ICD-10-CM

## 2021-11-15 DIAGNOSIS — F32.A DEPRESSIVE DISORDER: ICD-10-CM

## 2021-11-15 PROCEDURE — 90834 PR PSYCHOTHERAPY W/PATIENT, 45 MIN: ICD-10-PCS | Mod: S$GLB,,, | Performed by: SOCIAL WORKER

## 2021-11-15 PROCEDURE — 3044F HG A1C LEVEL LT 7.0%: CPT | Mod: CPTII,S$GLB,, | Performed by: SOCIAL WORKER

## 2021-11-15 PROCEDURE — 90834 PSYTX W PT 45 MINUTES: CPT | Mod: S$GLB,,, | Performed by: SOCIAL WORKER

## 2021-11-15 PROCEDURE — 3044F PR MOST RECENT HEMOGLOBIN A1C LEVEL <7.0%: ICD-10-PCS | Mod: CPTII,S$GLB,, | Performed by: SOCIAL WORKER

## 2021-11-15 PROCEDURE — 1159F PR MEDICATION LIST DOCUMENTED IN MEDICAL RECORD: ICD-10-PCS | Mod: CPTII,S$GLB,, | Performed by: SOCIAL WORKER

## 2021-11-15 PROCEDURE — 99999 PR PBB SHADOW E&M-EST. PATIENT-LVL I: ICD-10-PCS | Mod: PBBFAC,,, | Performed by: SOCIAL WORKER

## 2021-11-15 PROCEDURE — 99999 PR PBB SHADOW E&M-EST. PATIENT-LVL I: CPT | Mod: PBBFAC,,, | Performed by: SOCIAL WORKER

## 2021-11-15 PROCEDURE — 1159F MED LIST DOCD IN RCRD: CPT | Mod: CPTII,S$GLB,, | Performed by: SOCIAL WORKER

## 2021-12-06 ENCOUNTER — OFFICE VISIT (OUTPATIENT)
Dept: PSYCHIATRY | Facility: CLINIC | Age: 41
End: 2021-12-06
Payer: COMMERCIAL

## 2021-12-06 VITALS
BODY MASS INDEX: 31.4 KG/M2 | DIASTOLIC BLOOD PRESSURE: 78 MMHG | HEIGHT: 65 IN | SYSTOLIC BLOOD PRESSURE: 121 MMHG | WEIGHT: 188.5 LBS | HEART RATE: 74 BPM

## 2021-12-06 DIAGNOSIS — F32.A DEPRESSIVE DISORDER: Primary | ICD-10-CM

## 2021-12-06 DIAGNOSIS — F41.1 GAD (GENERALIZED ANXIETY DISORDER): ICD-10-CM

## 2021-12-06 DIAGNOSIS — F41.1 GAD (GENERALIZED ANXIETY DISORDER): Primary | ICD-10-CM

## 2021-12-06 PROCEDURE — 90834 PR PSYCHOTHERAPY W/PATIENT, 45 MIN: ICD-10-PCS | Mod: S$GLB,,, | Performed by: SOCIAL WORKER

## 2021-12-06 PROCEDURE — 99999 PR PBB SHADOW E&M-EST. PATIENT-LVL I: ICD-10-PCS | Mod: PBBFAC,,, | Performed by: SOCIAL WORKER

## 2021-12-06 PROCEDURE — 99999 PR PBB SHADOW E&M-EST. PATIENT-LVL III: ICD-10-PCS | Mod: PBBFAC,,, | Performed by: PHYSICIAN ASSISTANT

## 2021-12-06 PROCEDURE — 99999 PR PBB SHADOW E&M-EST. PATIENT-LVL III: CPT | Mod: PBBFAC,,, | Performed by: PHYSICIAN ASSISTANT

## 2021-12-06 PROCEDURE — 90834 PSYTX W PT 45 MINUTES: CPT | Mod: S$GLB,,, | Performed by: SOCIAL WORKER

## 2021-12-06 PROCEDURE — 99999 PR PBB SHADOW E&M-EST. PATIENT-LVL I: CPT | Mod: PBBFAC,,, | Performed by: SOCIAL WORKER

## 2021-12-06 PROCEDURE — 99214 OFFICE O/P EST MOD 30 MIN: CPT | Mod: S$GLB,,, | Performed by: PHYSICIAN ASSISTANT

## 2021-12-06 PROCEDURE — 99214 PR OFFICE/OUTPT VISIT, EST, LEVL IV, 30-39 MIN: ICD-10-PCS | Mod: S$GLB,,, | Performed by: PHYSICIAN ASSISTANT

## 2022-01-14 ENCOUNTER — OFFICE VISIT (OUTPATIENT)
Dept: OBSTETRICS AND GYNECOLOGY | Facility: CLINIC | Age: 42
End: 2022-01-14
Payer: COMMERCIAL

## 2022-01-14 VITALS
HEIGHT: 65 IN | BODY MASS INDEX: 31.33 KG/M2 | WEIGHT: 188.06 LBS | DIASTOLIC BLOOD PRESSURE: 82 MMHG | SYSTOLIC BLOOD PRESSURE: 120 MMHG

## 2022-01-14 DIAGNOSIS — Z98.890 HISTORY OF ENDOMETRIAL ABLATION: ICD-10-CM

## 2022-01-14 DIAGNOSIS — R76.0 ANTICARDIOLIPIN ANTIBODY POSITIVE: ICD-10-CM

## 2022-01-14 DIAGNOSIS — N39.46 MIXED STRESS AND URGE URINARY INCONTINENCE: ICD-10-CM

## 2022-01-14 DIAGNOSIS — Z01.419 WOMEN'S ANNUAL ROUTINE GYNECOLOGICAL EXAMINATION: Primary | ICD-10-CM

## 2022-01-14 PROCEDURE — 1160F RVW MEDS BY RX/DR IN RCRD: CPT | Mod: CPTII,S$GLB,, | Performed by: OBSTETRICS & GYNECOLOGY

## 2022-01-14 PROCEDURE — 3074F PR MOST RECENT SYSTOLIC BLOOD PRESSURE < 130 MM HG: ICD-10-PCS | Mod: CPTII,S$GLB,, | Performed by: OBSTETRICS & GYNECOLOGY

## 2022-01-14 PROCEDURE — 3079F DIAST BP 80-89 MM HG: CPT | Mod: CPTII,S$GLB,, | Performed by: OBSTETRICS & GYNECOLOGY

## 2022-01-14 PROCEDURE — 3008F BODY MASS INDEX DOCD: CPT | Mod: CPTII,S$GLB,, | Performed by: OBSTETRICS & GYNECOLOGY

## 2022-01-14 PROCEDURE — 3074F SYST BP LT 130 MM HG: CPT | Mod: CPTII,S$GLB,, | Performed by: OBSTETRICS & GYNECOLOGY

## 2022-01-14 PROCEDURE — 99396 PREV VISIT EST AGE 40-64: CPT | Mod: S$GLB,,, | Performed by: OBSTETRICS & GYNECOLOGY

## 2022-01-14 PROCEDURE — 3079F PR MOST RECENT DIASTOLIC BLOOD PRESSURE 80-89 MM HG: ICD-10-PCS | Mod: CPTII,S$GLB,, | Performed by: OBSTETRICS & GYNECOLOGY

## 2022-01-14 PROCEDURE — 99999 PR PBB SHADOW E&M-EST. PATIENT-LVL III: ICD-10-PCS | Mod: PBBFAC,,, | Performed by: OBSTETRICS & GYNECOLOGY

## 2022-01-14 PROCEDURE — 99396 PR PREVENTIVE VISIT,EST,40-64: ICD-10-PCS | Mod: S$GLB,,, | Performed by: OBSTETRICS & GYNECOLOGY

## 2022-01-14 PROCEDURE — 1159F MED LIST DOCD IN RCRD: CPT | Mod: CPTII,S$GLB,, | Performed by: OBSTETRICS & GYNECOLOGY

## 2022-01-14 PROCEDURE — 1159F PR MEDICATION LIST DOCUMENTED IN MEDICAL RECORD: ICD-10-PCS | Mod: CPTII,S$GLB,, | Performed by: OBSTETRICS & GYNECOLOGY

## 2022-01-14 PROCEDURE — 1160F PR REVIEW ALL MEDS BY PRESCRIBER/CLIN PHARMACIST DOCUMENTED: ICD-10-PCS | Mod: CPTII,S$GLB,, | Performed by: OBSTETRICS & GYNECOLOGY

## 2022-01-14 PROCEDURE — 3008F PR BODY MASS INDEX (BMI) DOCUMENTED: ICD-10-PCS | Mod: CPTII,S$GLB,, | Performed by: OBSTETRICS & GYNECOLOGY

## 2022-01-14 PROCEDURE — 99999 PR PBB SHADOW E&M-EST. PATIENT-LVL III: CPT | Mod: PBBFAC,,, | Performed by: OBSTETRICS & GYNECOLOGY

## 2022-01-14 NOTE — PROGRESS NOTES
Jaclyn Rausch is a 41 y.o. female  who presents for annual exam.  She is S/P endometrial ablation 2020 and now has only occasional light bleeding.  No pelvic pain.  No fever.  Over the past year, she describes having increased urinary incontinence with both cough / sneeze as well as urgency symptoms.  She is currently doing Kegels.  She has a history of positive anticardiolipin antibody and is on long-term Eliquis.  Recent mammogram was negative.    Patient's last menstrual period was 2022.     2020 Pap: Negative, HPV: Negative    2021 Mammogram: Negative, TC: 10.84    Past Medical History:   Diagnosis Date    Anemia     slightly    Anticardiolipin antibody positive 2018    Anticoagulant long-term use     Atrial septal aneurysm     Dr. Frank; last visit 2018    Clotting disorder 2/3/2019    Elevated lipoprotein(a) 2018    Stroke 2018       Past Surgical History:   Procedure Laterality Date    BUNIONECTOMY Left     CHONDROPLASTY OF KNEE Left 1/10/2020    Procedure: CHONDROPLASTY, KNEE;  Surgeon: Michael Galaviz II, MD;  Location: [facility] OR;  Service: Orthopedics;  Laterality: Left;    HYSTEROSCOPY WITH DILATION AND CURETTAGE OF UTERUS N/A 2020    Procedure: HYSTEROSCOPY, WITH DILATION AND CURETTAGE OF UTERUS;  Surgeon: Ap Milner MD;  Location: [facility];  Service: OB/GYN;  Laterality: N/A;    KNEE ARTHROSCOPY W/ MENISCECTOMY Left 1/10/2020    Procedure: ARTHROSCOPY, KNEE, WITH MENISCECTOMY;  Surgeon: Michael Galaviz II, MD;  Location: [facility] OR;  Service: Orthopedics;  Laterality: Left;    REPAIR OF MENISCUS OF KNEE Left 1/10/2020    Procedure: REPAIR, MENISCUS, KNEE;  Surgeon: Michael Galaviz II, MD;  Location: [facility] OR;  Service: Orthopedics;  Laterality: Left;  PARTIAL MEDIAL    THERMAL ABLATION OF ENDOMETRIUM N/A 2020    Procedure: ABLATION, ENDOMETRIUM, THERMAL;  Surgeon: Ap Milner MD;  Location: [facility] OR;  Service:  OB/GYN;  Laterality: N/A;       OB History        5    Para   3    Term   3       0    AB   1    Living   3       SAB   1    IAB   0    Ectopic   0    Multiple   0    Live Births   3                 ROS:  GENERAL: Feeling well overall.   SKIN: Denies rash or lesions.   HEAD: Denies head injury or headache.   NODES: Denies enlarged lymph nodes.   CHEST: Denies chest pain or shortness of breath.   CARDIOVASCULAR: Denies palpitations or left sided chest pain.   ABDOMEN: No abdominal pain, nausea, vomiting or rectal bleeding.   URINARY: Reports increased incontinence.  REPRODUCTIVE: See HPI.   BREASTS: Denies pain, lumps, or nipple discharge.   HEMATOLOGIC: No easy bruisability or excessive bleeding.   MUSCULOSKELETAL: Denies joint pain or swelling.   NEUROLOGIC: Denies syncope or weakness.   PSYCHIATRIC: Denies depression.    PE:   (chaperone present during entire exam)  APPEARANCE: Well nourished, well developed, in no acute distress.  BREASTS: Symmetrical, no skin changes or visible lesions. No palpable masses, nipple discharge or adenopathy bilaterally.  ABDOMEN: Soft. No tenderness or masses. No CVA tenderness.  VULVA: No lesions. Normal female genitalia.  URETHRAL MEATUS: Normal size and location, no lesions, no prolapse.  URETHRA: No masses, tenderness, prolapse or scarring.  VAGINA: Moist and well rugated, no abnormal discharge, no significant cystocele or rectocele.  CERVIX: No lesions and discharge.   UTERUS: Normal size, regular shape, mobile, non-tender, bladder base nontender.  ADNEXA: No masses, tenderness or CDS nodularity.  ANUS PERINEUM: Normal.      Diagnosis:  1. Women's annual routine gynecological examination    2. History of endometrial ablation    3. Mixed stress and urge urinary incontinence    4. Anticardiolipin antibody positive          PLAN:    Orders Placed This Encounter    Ambulatory referral/consult to Urogynecology       Patient was counseled today on the need for annual  gyn exams.  She is doing well after endometrial ablation with only occasional light bleeding.  We discussed her increasing urinary incontinence.  She is now at the point that she desires evaluation and will be referred to Gyn-Urology.    Follow-up in 1 year.

## 2022-01-18 NOTE — PROGRESS NOTES
Individual Psychotherapy (PhD/LCSW)    1/24/2022    Site:  [facility]         Therapeutic Intervention: Met with patient.  Outpatient - Supportive psychotherapy 45 min - CPT Code 75926    Chief complaint/reason for encounter: depression and anxiety     Interval history and content of current session: Last visit with me: 12/06/21. Jaclyn shared that she has been doing really well since our last visit approx 1 1/2 months ago. She denies depression and feels that her anxiety has been less frequent and more manageable. She said that she has not experienced any anxiety related to medical issues. She is not currently taking any psychiatric medications and feels that she is doing well at this time. She did note a little stress around the holidays, but she says this was due to housing her sister and her brother in law for 1 1/2 weeks, which therapist normalized. We spent most of the session discussing Jaclyn's concerns regarding her 12 year old daughter who spends a lot of time in her room on electronics. Jaclyn said that she found a story that her daughter may have written about a girl who is thinking about harming herself, which concerns Jaclyn. Jaclyn said that her daughter has never expressed feeling depressed or expressed feeling like she wants to harm herself. Provided empathic support. We discussed Jaclyn and her  sitting down with her daughter and asking her if she is having any thoughts or feelings about harming herself, and we discussed that after the conversation, getting in touch with her daughter's school to speak with the mental health professional there and inquire about counseling services. Discussed getting her daughter the appropriate help if her daughter is having SI with a plan and intent.     Treatment plan:  · Target symptoms: depression, anxiety   · Why chosen therapy is appropriate versus another modality: relevant to diagnosis, patient responds to this modality  · Outcome  monitoring methods: self-report  · Therapeutic intervention type: insight oriented psychotherapy, behavior modifying psychotherapy, supportive psychotherapy    Risk parameters:  Patient reports no suicidal ideation  Patient reports no homicidal ideation  Patient reports no self-injurious behavior  Patient reports no violent behavior    Verbal deficits: None    Patient's response to intervention:  The patient's response to intervention is accepting.    Progress toward goals and other mental status changes:  The patient's progress toward goals is fair , good.    Diagnosis:     ICD-10-CM ICD-9-CM   1. AGGIE (generalized anxiety disorder)  F41.1 300.02       Plan:  individual psychotherapy and medication management by physician Pt to go to ED or call 911 if symptoms worsen or if she has thoughts of harming self and/or others. Pt verbalized understanding.    Return to clinic: as scheduled    Length of Service (minutes): 45      Each patient to whom he or she provides medical services by telemedicine is: (1) informed of the relationship between the physician and patient and the respective role of any other health care provider with respect to management of the patient; and (2) notified that he or she may decline to receive medical services by telemedicine and may withdraw from such care at any time.

## 2022-01-24 ENCOUNTER — OFFICE VISIT (OUTPATIENT)
Dept: PSYCHIATRY | Facility: CLINIC | Age: 42
End: 2022-01-24
Payer: COMMERCIAL

## 2022-01-24 DIAGNOSIS — F41.1 GAD (GENERALIZED ANXIETY DISORDER): Primary | ICD-10-CM

## 2022-01-24 PROCEDURE — 99999 PR PBB SHADOW E&M-EST. PATIENT-LVL I: ICD-10-PCS | Mod: PBBFAC,,, | Performed by: SOCIAL WORKER

## 2022-01-24 PROCEDURE — 1159F PR MEDICATION LIST DOCUMENTED IN MEDICAL RECORD: ICD-10-PCS | Mod: CPTII,S$GLB,, | Performed by: SOCIAL WORKER

## 2022-01-24 PROCEDURE — 99999 PR PBB SHADOW E&M-EST. PATIENT-LVL I: CPT | Mod: PBBFAC,,, | Performed by: SOCIAL WORKER

## 2022-01-24 PROCEDURE — 90834 PR PSYCHOTHERAPY W/PATIENT, 45 MIN: ICD-10-PCS | Mod: S$GLB,,, | Performed by: SOCIAL WORKER

## 2022-01-24 PROCEDURE — 90834 PSYTX W PT 45 MINUTES: CPT | Mod: S$GLB,,, | Performed by: SOCIAL WORKER

## 2022-01-24 PROCEDURE — 1159F MED LIST DOCD IN RCRD: CPT | Mod: CPTII,S$GLB,, | Performed by: SOCIAL WORKER

## 2022-01-31 ENCOUNTER — OFFICE VISIT (OUTPATIENT)
Dept: UROGYNECOLOGY | Facility: CLINIC | Age: 42
End: 2022-01-31
Attending: OBSTETRICS & GYNECOLOGY
Payer: COMMERCIAL

## 2022-01-31 VITALS
HEIGHT: 65 IN | WEIGHT: 189.63 LBS | BODY MASS INDEX: 31.59 KG/M2 | DIASTOLIC BLOOD PRESSURE: 84 MMHG | SYSTOLIC BLOOD PRESSURE: 121 MMHG

## 2022-01-31 DIAGNOSIS — N81.84 PELVIC MUSCLE WASTING: Primary | ICD-10-CM

## 2022-01-31 DIAGNOSIS — N39.46 MIXED STRESS AND URGE URINARY INCONTINENCE: ICD-10-CM

## 2022-01-31 PROCEDURE — 3079F PR MOST RECENT DIASTOLIC BLOOD PRESSURE 80-89 MM HG: ICD-10-PCS | Mod: CPTII,S$GLB,, | Performed by: OBSTETRICS & GYNECOLOGY

## 2022-01-31 PROCEDURE — 3074F PR MOST RECENT SYSTOLIC BLOOD PRESSURE < 130 MM HG: ICD-10-PCS | Mod: CPTII,S$GLB,, | Performed by: OBSTETRICS & GYNECOLOGY

## 2022-01-31 PROCEDURE — 99999 PR PBB SHADOW E&M-EST. PATIENT-LVL IV: ICD-10-PCS | Mod: PBBFAC,,, | Performed by: OBSTETRICS & GYNECOLOGY

## 2022-01-31 PROCEDURE — 99213 PR OFFICE/OUTPT VISIT, EST, LEVL III, 20-29 MIN: ICD-10-PCS | Mod: S$GLB,,, | Performed by: OBSTETRICS & GYNECOLOGY

## 2022-01-31 PROCEDURE — 3008F BODY MASS INDEX DOCD: CPT | Mod: CPTII,S$GLB,, | Performed by: OBSTETRICS & GYNECOLOGY

## 2022-01-31 PROCEDURE — 3008F PR BODY MASS INDEX (BMI) DOCUMENTED: ICD-10-PCS | Mod: CPTII,S$GLB,, | Performed by: OBSTETRICS & GYNECOLOGY

## 2022-01-31 PROCEDURE — 99213 OFFICE O/P EST LOW 20 MIN: CPT | Mod: S$GLB,,, | Performed by: OBSTETRICS & GYNECOLOGY

## 2022-01-31 PROCEDURE — 3074F SYST BP LT 130 MM HG: CPT | Mod: CPTII,S$GLB,, | Performed by: OBSTETRICS & GYNECOLOGY

## 2022-01-31 PROCEDURE — 99999 PR PBB SHADOW E&M-EST. PATIENT-LVL IV: CPT | Mod: PBBFAC,,, | Performed by: OBSTETRICS & GYNECOLOGY

## 2022-01-31 PROCEDURE — 3079F DIAST BP 80-89 MM HG: CPT | Mod: CPTII,S$GLB,, | Performed by: OBSTETRICS & GYNECOLOGY

## 2022-01-31 PROCEDURE — 1159F MED LIST DOCD IN RCRD: CPT | Mod: CPTII,S$GLB,, | Performed by: OBSTETRICS & GYNECOLOGY

## 2022-01-31 PROCEDURE — 1159F PR MEDICATION LIST DOCUMENTED IN MEDICAL RECORD: ICD-10-PCS | Mod: CPTII,S$GLB,, | Performed by: OBSTETRICS & GYNECOLOGY

## 2022-01-31 NOTE — PROGRESS NOTES
Subjective:      Patient ID: Jaclyn Rausch is a 41 y.o. female.    Chief Complaint:  Consult and Urinary Incontinence (JADA)      History of Present Illness  [age] multipara longstanding leakage of urine since 2st child leakage of urine with coughing sneezing but this is very minimal however the last several years there has been onset of significant urgency and frequency with concomitant leakage of urine patient is here really for this requesting assistance          Past Medical History:   Diagnosis Date    Anemia     slightly    Anticardiolipin antibody positive 5/4/2018    Anticoagulant long-term use     Atrial septal aneurysm     Dr. Frank; last visit 1/2018    Clotting disorder 2/3/2019    Elevated lipoprotein(a) 5/4/2018    Stroke 04/24/2018       Past Surgical History:   Procedure Laterality Date    BUNIONECTOMY Left 2001    CHONDROPLASTY OF KNEE Left 1/10/2020    Procedure: CHONDROPLASTY, KNEE;  Surgeon: Michael Galaviz II, MD;  Location: [facility] OR;  Service: Orthopedics;  Laterality: Left;    HYSTEROSCOPY WITH DILATION AND CURETTAGE OF UTERUS N/A 11/5/2020    Procedure: HYSTEROSCOPY, WITH DILATION AND CURETTAGE OF UTERUS;  Surgeon: Ap Milner MD;  Location: [facility] OR;  Service: OB/GYN;  Laterality: N/A;    KNEE ARTHROSCOPY W/ MENISCECTOMY Left 1/10/2020    Procedure: ARTHROSCOPY, KNEE, WITH MENISCECTOMY;  Surgeon: Michael Galaviz II, MD;  Location: [facility] OR;  Service: Orthopedics;  Laterality: Left;    REPAIR OF MENISCUS OF KNEE Left 1/10/2020    Procedure: REPAIR, MENISCUS, KNEE;  Surgeon: Michael Galaviz II, MD;  Location: [facility] OR;  Service: Orthopedics;  Laterality: Left;  PARTIAL MEDIAL    THERMAL ABLATION OF ENDOMETRIUM N/A 11/5/2020    Procedure: ABLATION, ENDOMETRIUM, THERMAL;  Surgeon: Ap Milner MD;  Location: [facility] OR;  Service: OB/GYN;  Laterality: N/A;       GYN & OB History  Patient's last menstrual period was 01/05/2022.   Date of Last Pap:  2020    OB History    Para Term  AB Living   5 3 3 0 1 3   SAB IAB Ectopic Multiple Live Births   1 0 0 0 3      # Outcome Date GA Lbr Alexis/2nd Weight Sex Delivery Anes PTL Lv   5 Term 09 39w0d  2.948 kg (6 lb 8 oz) F INDUCTION EPI N FRANCINE   4 Term 08 39w0d  2.722 kg (6 lb) M Vag-Spont EPI N FRANCINE   3             2 Term     M Vag-Spont   FRANCINE      Complications: Placenta Previa   1 SAB  5w0d       DEC       Health Maintenance       Date Due Completion Date    Influenza Vaccine (1) 2021    COVID-19 Vaccine (3 - Booster for Pfizer series) 2022    Mammogram 2022    High Dose Statin 2023    Cervical Cancer Screening 2023    TETANUS VACCINE 2025          Family History   Problem Relation Age of Onset    Diabetes Maternal Grandmother     Lung cancer Maternal Grandfather     Colon cancer Maternal Grandfather     Hypertension Mother     Aneurysm Mother         brain    Hearing loss Father     Hypertension Brother     Eczema Son     Breast cancer Neg Hx     Ovarian cancer Neg Hx     Melanoma Neg Hx     Psoriasis Neg Hx     Lupus Neg Hx        Social History     Socioeconomic History    Marital status:    Tobacco Use    Smoking status: Former Smoker     Packs/day: 0.25     Years: 2.00     Pack years: 0.50     Quit date: 2003     Years since quittin.0    Smokeless tobacco: Never Used   Substance and Sexual Activity    Alcohol use: Yes     Comment: Socially    Drug use: No    Sexual activity: Yes     Partners: Male     Birth control/protection: None     Comment:      Social Determinants of Health     Financial Resource Strain: Low Risk     Difficulty of Paying Living Expenses: Not very hard   Food Insecurity: No Food Insecurity    Worried About Running Out of Food in the Last Year: Never true    Ran Out of Food in the Last Year: Never true   Transportation  "Needs: No Transportation Needs    Lack of Transportation (Medical): No    Lack of Transportation (Non-Medical): No   Physical Activity: Sufficiently Active    Days of Exercise per Week: 5 days    Minutes of Exercise per Session: 30 min   Stress: Stress Concern Present    Feeling of Stress : To some extent   Social Connections: Unknown    Frequency of Communication with Friends and Family: More than three times a week    Frequency of Social Gatherings with Friends and Family: Twice a week    Active Member of Clubs or Organizations: Yes    Attends Club or Organization Meetings: Never    Marital Status:    Housing Stability: Low Risk     Unable to Pay for Housing in the Last Year: No    Number of Places Lived in the Last Year: 1    Unstable Housing in the Last Year: No       Review of Systems  Review of Systems  Leakage of urine     Objective:   /84   Ht 5' 5" (1.651 m)   Wt 86 kg (189 lb 9.6 oz)   LMP 01/05/2022   BMI 31.55 kg/m²     Physical Exam   Constitutional: She is oriented to person, place, and time. She appears well-developed and well-nourished.   HENT:   Head: Normocephalic and atraumatic.   Abdominal: Soft. Bowel sounds are normal.   Genitourinary: Pelvic exam was performed with patient supine. Rectum normal, vagina normal, uterus normal, cervix normal, skenes normal, bartholins normal and vaginal cuff normal. Right labia normal and left labia normal. Urethra exhibits no urethral caruncle, no urethral diverticulum, no urethral mass and no hypermobility. Kegel: 0/5    POP-Q  Aa: -3 Ba: -11 C: -9   GH: 2 PB: 2 TVL: 12   Ap: -3 Bp: -8 D: -10                     Musculoskeletal:      Cervical back: Normal range of motion and neck supple.   Neurological: She is alert and oriented to person, place, and time.   Psychiatric: She has a normal mood and affect. Her behavior is normal. Thought content normal.        Assessment:     1. Pelvic muscle wasting    2. Mixed stress and urge " urinary incontinence            Plan:     1. Pelvic muscle wasting    2. Mixed stress and urge urinary incontinence      Long discussion with the patient anatomy is completely normal    Strength is however 0/5.  Were going to move forward with pelvic floor physical therapy patient actually at this point we brought this up discussed the some type of machine that she used to assist her with this and machine itself set see specialist so the strength is indeed quite weak were going to have to move forward with this medication and further intervention will be utilized as needed patient noted understanding greatly appreciated approach  There are no Patient Instructions on file for this visit.

## 2022-02-01 ENCOUNTER — CLINICAL SUPPORT (OUTPATIENT)
Dept: REHABILITATION | Facility: HOSPITAL | Age: 42
End: 2022-02-01
Attending: OBSTETRICS & GYNECOLOGY
Payer: COMMERCIAL

## 2022-02-01 DIAGNOSIS — N81.89 PELVIC FLOOR WEAKNESS IN FEMALE: ICD-10-CM

## 2022-02-01 DIAGNOSIS — N39.46 MIXED STRESS AND URGE URINARY INCONTINENCE: ICD-10-CM

## 2022-02-01 DIAGNOSIS — N81.84 PELVIC MUSCLE WASTING: ICD-10-CM

## 2022-02-01 DIAGNOSIS — M62.89 PELVIC FLOOR TENSION: ICD-10-CM

## 2022-02-01 DIAGNOSIS — R27.8 OTHER LACK OF COORDINATION: ICD-10-CM

## 2022-02-01 PROCEDURE — 97112 NEUROMUSCULAR REEDUCATION: CPT | Mod: PN

## 2022-02-01 PROCEDURE — 97161 PT EVAL LOW COMPLEX 20 MIN: CPT | Mod: PN

## 2022-02-01 NOTE — PATIENT INSTRUCTIONS
"Home Exercise Program: 02/01/2022    DIAPHRAGMATIC BREATHING     The diaphragm is a dome shaped muscle that forms the floor of the rib cage. It is the most efficient muscle for breathing and relaxation, although most people are not used to using the diaphragm. Diaphragmatic or belly breathing is an important technique to learn because it helps settle down or relax the autonomic nervous system. The correct use of diaphragmatic breathing can help to quiet brain activity resulting in the relaxation of all the muscles and organs of the body. This is accomplished by slow rhythmic breathing concentrated in the diaphragm muscle rather than the chest.    How to do proper relaxation breathing:     Start by lying on your back or reclining in a chair in a relaxed position. Place one hand on your chest and the other on your abdomen.   Relax your jaw by placing your tongue on the roof of your mouth and keeping your teeth slightly apart.    Take a deep breath in, letting the abdomen expand and rise while you keep your upper chest, neck and shoulders relaxed.    As you breathe out, allow your abdomen and chest to fall. Exhale completely.   It doesn't matter if you breathe in/out through your nose and/or mouth. Do whichever feels comfortable.   Remember to breathe slowly.  Do not force your breathing. Do not hold your breath.   Repeat for 5 minutes every day.           Deep core/lower abdominals - tighten lower abdominals like firming a rubber band across front of hips. Can feel just inside hip bones for correct contraction. Can make "shhh" sound on exhale to feel for right muscles. 1x10 hold 5 sec        Home Exercise Program: 02/01/2022      BLADDER HEALTH      WHAT IS CONSIDERED NORMAL?   The average bladder can hold about 2 cups of urine before it needs to be emptied.   The normal range of voiding urine is 6 to 8 times during a 24 hour period, or every 3-4 hours. As we get older, our bladder capacity can get smaller " and we may need to pass urine more frequently but usually not more than every 2 hours.   Urine should flow easily without discomfort in a good, steady stream until the bladder is empty. No pushing or straining is necessary to empty the bladder.   An urge is a normal signal that you feel as the bladder stretches to fill with urine. Urges can be felt even if the bladder is not full. Urges are not commands to go to the toilet, merely a signal and can be controlled.    WHAT ARE GOOD BLADDER HABITS?   Take your time when emptying your bladder. Dont strain or push to empty your bladder. Make sure you empty your bladder completely each time you pass urine. Do not rush the process.    Dont wait too long - consistently ignoring the urge to go (waiting more than 4 hours between toileting) or urinating too infrequently may be convenient but not healthy for your bladder. You can actually overstretch your bladder beyond its normal size.    Dont go too often - avoid going to the toilet just in case (more often than every 2 hours). It is usually not necessary to go when you feel the first urge. Try to go only when your bladder is full. Dont let your bladder control your life.    DIET CAN AFFECT THE BLADDER   Maintain a healthy fluid intake. Many people decrease their intake of liquids in hopes that they will need to urinate less frequently or have less urinary leakage. While a decrease in liquid intake does result in a decrease in the volume of urine, the smaller amount of urine may be more highly concentrated. Highly concentrated, dark yellow urine is irritating to the bladder surface and may actually cause you to go to the bathroom more frequently. It also encourages the growth of bacteria, which may lead to infections resulting in incontinence.  Depending on your body size and environment, drink 4-8 cups (8 oz each) of fluid per day, spread throughout the entire day, unless otherwise advised by your  doctor.     Avoid or use the following acidic food/beverages in moderation:   Alcoholic beverages     Tomato-based products   Vinegar   Coffee (regular and decaf)   Tea (regular and decaf)   Loco   Citrus fruits and juices   Spicy foods   Caffeinated beverages   Cola   Milk   Food colorings and flavorings   Artificial sweeteners   Chocolate     Try using these dietary substitutions:   Water: grape juice, apple juice   Fruit: pears, apricots, papaya, watermelon   Coffee: KAVA®, Postum®, Bossman®, Kaffree Yvrose®   Tea: Non-citrus herbal, sun-brewed tea   Vitamin C: Calcium carbonate co-buffered with calcium ascorbate     Avoid constipation by maintaining a balanced diet of dietary fiber. Typical dietary recommendations for fiber are between 25-35 grams per day. You should discuss your fiber needs with your physician, pharmacist or nutritionist.     Stop smoking. Smoking is irritating to the bladder surface and is associated with bladder cancer. In addition, the coughing associated with smoking may lead to increased incontinent episodes.

## 2022-02-01 NOTE — PLAN OF CARE
[facility]  Pelvic Health Physical Therapy Initial Evaluation    Date: 2022   Name: Jaclyn Rausch  Clinic Number: [id number]  Therapy Diagnosis:   Encounter Diagnoses   Name Primary?    Mixed stress and urge urinary incontinence     Pelvic muscle wasting     Pelvic floor tension     Pelvic floor weakness in female     Other lack of coordination      Physician: Gomez Posey MD    Physician Orders: PT Eval and Treat   Medical Diagnosis from Referral:   N39.46 (ICD-10-CM) - Mixed stress and urge urinary incontinence   N81.84 (ICD-10-CM) - Pelvic muscle wasting       Evaluation Date: 2022  Authorization Period Expiration: 23  Plan of Care Expiration: 4/15/2022  Progress Note Due: 3/1/22  Visit # / Visits authorized:     FOTO: Issued Visit # 1    Time In: 1:05  Time Out: 1:57  Total Appointment Time (timed & untimed codes): 52 minutes    Precautions: universal    Subjective     Date of onset: 15 years ROSE MARY; 2-3 years UUI    History of current condition - Jaclyn reports: hx of ROSE MARY with cough/sneeze, valsalva since first pregnancy; started having UUI about 2 years ago - worst first thing in am but also at work if holding bladder too long.  Once starts leaking can't stop it  Doesn't leak with jogging or spinning; leaks with jump  rope, trampoline, heavy lifting, sprinting    OB/GYN History:   Had ablation and D and C 2020 ;  -; all vaginal deliveries; tear and episiotomy with first, tear with 2nd; no tear with 3rd  Sexually active? Yes  Pain with vaginal exams, intercourse or tampon use? No    Bladder/Bowel History: urinary incontinence   Frequency of urination:   Daytime: Can hold urine 4-5 hours (limits fluids due to work)           Nighttime: 1x some nights    Difficulty initiating urine stream: No   Urine stream: strong   Complete emptying: Yes   Bladder leakage: Yes   Frequency of incidents: 3-4x/week   Amount leaked (urine): few drops and small  squirt  (Has had full bladder empty 2-3 times)   Urinary Urgency: Yes  Able to delay the urge for at least 1 minute(s) if standing; can hold hours if sitting.   Frequency of bowel movements: once a day   Difficulty initiating BM: no (except for week before period)   Quality/Shape of BM: [place] Stool Chart 3-4   Does Patient Feel Empty after BM? Yes   Fiber Supplements or Laxative Use?  No   Colon leakage: No   Form of protection: panty liner   Number of pads required in 24 hours: 5-6 (change due to perspiration)    PAIN:  Not an issue now. 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     Surgical History:  Jaclyn Rausch  has a past surgical history that includes Bunionectomy (Left, 2001); Knee arthroscopy w/ meniscectomy (Left, 1/10/2020); Repair of meniscus of knee (Left, 1/10/2020); Chondroplasty of knee (Left, 1/10/2020); Thermal ablation of endometrium (N/A, 11/5/2020); and Hysteroscopy with dilation and curettage of uterus (N/A, 11/5/2020).    Medications: Jaclyn has a current medication list which includes the following prescription(s): atorvastatin, eliquis, fluticasone propionate, ketoconazole, and pantoprazole, and the following Facility-Administered Medications: electrolyte-s (ph 7.4) and lidocaine (pf) 10 mg/ml (1%).    Allergies: Review of patient's allergies indicates:  No Known Allergies     Imaging Lumbar x-ray 6/27/21:   Vertebral bodies are normal in height without evidence of fracture.     Mild retrolisthesis of L5 on S1, measuring 3-4 mm.  No definite dynamic instability.  Lordosis is maintained.     Mild intervertebral disc space narrowing at L5-S1.Multilevel degenerative facet arthropathy, most pronounced at L4-5 and  L5-S1.    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  Prior Level of Function: Independent with all ADL's, exercise 5-6x/week  Current Level of Function: mapping bathrooms, avoids social situations that involve standing, drinking alcohol; avoids exercise that causes leakage    Types of fluid intake: water - alkaline - one large a day; coffee - 4 cups  Diet: avoids red meat; limits refined carbs   Habitus:well developed, well nourished  Abuse/Neglect: No     Pts goals: stop or be able to control UUI    OBJECTIVE     See EMR under MEDIA for written consent provided 2/1/2022  Chaperone: declined    ORTHO SCREEN  Posture in sitting: sits squarely   Posture in standing: increased lordosis and anterior pelvic tilt  Pelvic alignment: no sign of deviations noted in supine   SI Joint Palpation: Denies tenderness to SI joint palpation bilaterally.  Sacral spring test: negative (Positive=NO spring)  Adductor Palpation: increased tension    Lumbar AROM: WNL    ABDOMINAL WALL ASSESSMENT  Palpation: hypotonic   Abdominal strength: Rectus abdominus: 4/5     Transverse abdominus: 4/5  Scarring: none  Pelvic Floor Muscle and Transverse Abdominus Synergy: present  Diastasis: absent      BREATHING MECHANICS ASSESSMENT   Thorax Assessment During Quiet Respiration: WNL excursion of ribcage and WNL excursion of abdominal wall  Thorax Assessment During Deep Respiration: WNL excursion of abdominal wall and Decreased excursion bilaterally of lateral ribs     VAGINAL PELVIC FLOOR EXAM    EXTERNAL ASSESSMENT  Introitus: WNL  Skin condition: WNL  Scarring: episiotomy scar noted   Sensation: WNL   Pain:  none  Voluntary contraction: nil  Voluntary  relaxation: nil  Involuntary contraction: bulge  Bearing down: visible drop  Perineal descent: absent        INTERNAL ASSESSMENT  Pain: none   Sensation: able to sense generalized pressure, unable to identify location   Vaginal vault: roomy   Muscle Bulk: hypertonus  With tension noted B levator ani mm  Muscle Power: 1/5 (only appreciated with TA engagement)  Muscle Endurance: nt   # Reps To Fatigue: nt    Fast Contractions in 10 seconds: nt     Quality of contraction: slow rise and decreased hold   Specificity: patient contracts: abdominals   Coordination: cannot isolate PFM from abdominals   Prolapse check:not assessed      Limitation/Restriction for FOTO Urinary Problem Survey    Therapist reviewed FOTO scores for Jaclyn Rausch on 2/1/2022.   FOTO documents entered into EPIC - see Media section.    Limitation Score: 47%       TREATMENT     Treatment Time In: 1:46  Treatment Time Out: 1:57  Total Treatment time (time-based codes) separate from Evaluation: 11 minutes        Neuromuscular Re-education to develop Coordination, Control, Down training and Proprioception for 11 minutes including: pelvic floor relaxation/bulging training, diaphragmatic breathing and TA activation.     Patient Education provided:   general anatomy/physiology of urinary/ bowel  system and benefits of treatment was discussed with the pt. Additionally, bladder irritants, anatomy/physiology of pelvic floor, diaphragmatic breathing, isometric abdominal exercises, kegels and behavior modifications were reviewed.     Home Exercises Provided:  Written Home Exercises Provided: yes.  Exercises were reviewed and Jaclyn was able to demonstrate them prior to the end of the session.    Jaclyn demonstrated good  understanding of the education provided.     See EMR under Patient Instructions for exercises provided 2/1/2022.    Assessment     Jaclyn is a 41 y.o. female referred to outpatient Physical Therapy with a medical diagnosis  of mixed ROSE MARY/UUI and pelvic muscle wasting. Pt presents with pelvic floor tension, poor coordination of breath with TA/PFM, weak PFM contraction with poor awareness muscle activation, postural dysfunction leading to poor core pressure management.     Pt prognosis is Good.   Pt will benefit from skilled outpatient Physical Therapy to address the deficits stated above and in the chart below, provide pt/family education, and to maximize pt's level of independence.     Plan of care discussed with patient: Yes  Pt's spiritual, cultural and educational needs considered and patient is agreeable to the plan of care and goals as stated below:       Anticipated Barriers for therapy: none    Medical Necessity is demonstrated by the following    History  Co-morbidities and personal factors that may impact the plan of care Co-morbidities:   anxiety, history of CVA and atrial septal aneurysm, clotting disorder    Personal Factors:   coping style     moderate   Examination  Body Structures and Functions, activity limitations and participation restrictions that may impact the plan of care Body Regions/Systems/Functions:  altered posture, decreased pelvic muscle strength, decreased endurance of the pelvic muscles, increased tension of the pelvic muscles, poor quality of pelvic muscle contraction, poor coordination of pelvic floor muscles during ADL's leading to urinary or fecal leakage, poor fluid intake, dysfunctional voiding and unable to co-contract or co-relax abdominal wall and pelvic floor muscles     Activity Limitations:  urgency , incontinence with ADLs and bathroom mapping    Participation Restrictions:  all ADLs/iADLs uninterrupted by urinary incontinence/urgency/frequency, social activities with friends/family and exercise restrictions due to incontinence     Activity limitations:   Learning and applying knowledge  no deficits    General Tasks and Commands  no deficits    Communication  no deficits    Mobility  lifting  and carrying objects  jumping, couch/sneeze, sprinting    Self care  toileting    Domestic Life  no deficits    Interactions/Relationships  no deficits    Life Areas  no deficits    Community and Social Life  recreation and leisure       low   Clinical Presentation stable and uncomplicated low   Decision Making/ Complexity Score: low       Goals:  Short Term Goals: 5 weeks   Pt to be edu pelvic muscle bracing and be able to consistently perform correctly and quickly to help decrease incontinence with cough/laugh/sneeze.  Pt to report a decrease in pad usage to no more than 3 a day to demonstrate improving pelvic floor function needed for continence.  Pt to be able to perform a 5  second kegel x 10 reps to demonstrate improving strength and endurance needed for continence.  Pt to demonstrate being able to correctly and consistently perform a kegel which is needed  to increase pelvic floor muscle coordination and strength needed for continence.  Pt to be able to delay the urge to urinate at least 5 minutes when standing with a strong urge to urinate in order to make it to the bathroom without leaking.  Pt to voice understanding of the role that diet plays on urinary urgency.          Long Term Goals: 10 weeks  Pt to be discharged with home plan for carry over after discharge.    Pt to be able to perform a 10 second kegel x 10 reps to demonstrate improving strength and endurance needed for continence.  Pt to report a decrease in pad usage to 1 pads a day to demonstrate improving pelvic floor muscle controls as evidenced by decreased episodes of incontinence needed to improve confidence in social situations.  Pt to be able to delay the urge to urinate at least 10 minutes when standing with a strong urge to urinate in order to make it to the bathroom without leaking.  Pt to report no longer feeling the need to urinate just in case when shopping or participating in social activities to demonstrate improving pelvic floor  and bladder control.  Pt to report elimination of incontinence with ADLs to demonstrate improved pelvic floor muscle strength and coordination  Pt to demonstrate an improved score in the FOTO  survey  to at least 38% limitation to demonstrate improving continence.    Pt to increase pelvic floor strength to at least 3/5 to demonstrate improved strength needed for continence with ADLs.         Plan     Plan of care Certification: 2/1/2022 to 4/15/2022.    Outpatient Physical Therapy 1 times weekly for 10 weeks to include the following interventions: therapeutic exercises, therapeutic activity, neuromuscular re-education, manual therapy, patient/family education, dry needling and self care/home management    Selina Stevenson, PT

## 2022-02-07 ENCOUNTER — OFFICE VISIT (OUTPATIENT)
Dept: PSYCHIATRY | Facility: CLINIC | Age: 42
End: 2022-02-07
Payer: COMMERCIAL

## 2022-02-07 DIAGNOSIS — F41.1 GAD (GENERALIZED ANXIETY DISORDER): Primary | ICD-10-CM

## 2022-02-07 PROCEDURE — 1159F PR MEDICATION LIST DOCUMENTED IN MEDICAL RECORD: ICD-10-PCS | Mod: CPTII,S$GLB,, | Performed by: SOCIAL WORKER

## 2022-02-07 PROCEDURE — 1159F MED LIST DOCD IN RCRD: CPT | Mod: CPTII,S$GLB,, | Performed by: SOCIAL WORKER

## 2022-02-07 PROCEDURE — 99999 PR PBB SHADOW E&M-EST. PATIENT-LVL II: ICD-10-PCS | Mod: PBBFAC,,, | Performed by: SOCIAL WORKER

## 2022-02-07 PROCEDURE — 90834 PSYTX W PT 45 MINUTES: CPT | Mod: S$GLB,,, | Performed by: SOCIAL WORKER

## 2022-02-07 PROCEDURE — 99999 PR PBB SHADOW E&M-EST. PATIENT-LVL II: CPT | Mod: PBBFAC,,, | Performed by: SOCIAL WORKER

## 2022-02-07 PROCEDURE — 90834 PR PSYCHOTHERAPY W/PATIENT, 45 MIN: ICD-10-PCS | Mod: S$GLB,,, | Performed by: SOCIAL WORKER

## 2022-02-07 NOTE — PROGRESS NOTES
"Individual Psychotherapy (PhD/LCSW)    2/7/2022    Site:  [facility]         Therapeutic Intervention: Met with patient.  Outpatient - Insight oriented psychotherapy 45 min - CPT code 77157 and Outpatient - Supportive psychotherapy 45 min - CPT Code 06491    Chief complaint/reason for encounter: depression and anxiety     Interval history and content of current session: Jaclyn shared that she has been doing well for the past few weeks, explaining that she has not felt much anxiety. She said that she has been thinking about how she has progressed since she first came in for therapy and feels that she has improved. She said that she recognizes how much talking about what she is feeling and going through helps her, with her saying, "Talking about everything I've been through, I just feel lighter now." Therapist validated her for her progress and for recognizing how much talking about how she feels helps and putting it into practice. We also discussed how she spoke to her daughter about the story she wrote, and her daughter said that she does not feel sad and does not think about wanting to harm herself and does not want to harm herself. 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     Treatment plan:  · Target symptoms: depression, anxiety   · Why chosen therapy is appropriate versus another modality: relevant to diagnosis, patient responds to this modality, evidence based practice  · Outcome monitoring methods: self-report  · Therapeutic intervention type: insight oriented psychotherapy, supportive psychotherapy    Risk parameters:  Patient reports no suicidal ideation  Patient reports no homicidal ideation  Patient reports no self-injurious behavior  Patient reports no violent behavior    Verbal " deficits: None    Patient's response to intervention:  The patient's response to intervention is accepting.    Progress toward goals and other mental status changes:  The patient's progress toward goals is good.    Diagnosis:   1. AGGIE (generalized anxiety disorder)         Plan:  individual psychotherapy Pt to go to ED or call 911 if symptoms worsen or if she has thoughts of harming self and/or others. Pt verbalized understanding.    Return to clinic: as scheduled    Length of Service (minutes): 45      Each patient to whom he or she provides medical services by telemedicine is: (1) informed of the relationship between the physician and patient and the respective role of any other health care provider with respect to management of the patient; and (2) notified that he or she may decline to receive medical services by telemedicine and may withdraw from such care at any time.

## 2022-02-11 ENCOUNTER — CLINICAL SUPPORT (OUTPATIENT)
Dept: REHABILITATION | Facility: HOSPITAL | Age: 42
End: 2022-02-11
Payer: COMMERCIAL

## 2022-02-11 DIAGNOSIS — N81.89 PELVIC FLOOR WEAKNESS IN FEMALE: ICD-10-CM

## 2022-02-11 DIAGNOSIS — M62.89 PELVIC FLOOR TENSION: ICD-10-CM

## 2022-02-11 DIAGNOSIS — R27.8 OTHER LACK OF COORDINATION: ICD-10-CM

## 2022-02-11 PROCEDURE — 97112 NEUROMUSCULAR REEDUCATION: CPT | Mod: PN

## 2022-02-11 PROCEDURE — 97140 MANUAL THERAPY 1/> REGIONS: CPT | Mod: PN

## 2022-02-11 PROCEDURE — 97110 THERAPEUTIC EXERCISES: CPT | Mod: PN

## 2022-02-11 NOTE — PROGRESS NOTES
"    Pelvic Health Physical Therapy   Treatment Note     Name: Jaclyn Garcia Rausch  Clinic Number: [id number]    Therapy Diagnosis:   Encounter Diagnoses   Name Primary?    Pelvic floor tension     Pelvic floor weakness in female     Other lack of coordination      Physician: Khalida Gibson PA-C    Visit Date: 2/11/2022  Physician Orders: PT Eval and Treat   Medical Diagnosis from Referral:   N39.46 (ICD-10-CM) - Mixed stress and urge urinary incontinence   N81.84 (ICD-10-CM) - Pelvic muscle wasting         Evaluation Date: 2/1/2022  Authorization Period Expiration: 1/31/23  Plan of Care Expiration: 4/15/2022  Progress Note Due: 3/1/22  Visit # / Visits authorized: 2/25     FOTO: Issued Visit # 1     Time In: 11:55  Time Out: 12:53  Total Billable Time: 58 minutes    Precautions: Standard    Subjective     Pt reports: Had an episode of fluid release during sex - not sure if it was urine or not. This has happened to her a few times. Did not happen with orgasm but during penetrative intercourse.   Notices when lifting weights that she is weaker on her R side; CVA in 2018  Has difficulty coordinating breathing with TA/PFM activation  Uses Nat for PFM strengthening but either has a hard time achieving goal with contraction or can't relax afterward.     She was compliant with home exercise program.  Response to previous treatment: Initial eval  Functional change: none reported    Pain: pain is not an issue    Constitutional Symptoms Review: The patient denies having any constitutional symptoms.     Objective   Pt verbally consents to intravaginal treatment today.  Signed consent form already on file.       Jaclyn received therapeutic exercises to develop  strength, endurance, core stabilization and coordination for 18 minutes including:   TA sets with PFM contraction x10 hold 5"  Hip adduction with ball and TA/PFM x10 hold 5"  Bridges with ball x10 hold 5"  SL bridges 1x10 B  Side plank from knees with " "adduction using ball 3x30" B    Jaclyn received the following manual therapy techniques: to develop extensibility for 15 minutes including: trigger point/myofascial release of levator ani and OI mm bilaterally      Neuromuscular Re-education to develop Coordination, Control, Down training and Proprioception for 25  minutes including: pelvic floor relaxation/bulging training, diaphragmatic breathing and TA activation.   Coordination of TA with PFM during core stabilization ex  Completely relaxing PFM following Kegel  Worked with pt's Nat inserted on relaxation of PFM following contraction. Pt unable to coordinate and tends incompletely relax after Kegel.     Home Exercises Provided and Patient Education Provided     Education provided:   - general anatomy/physiology of urinary/ bowel  system and benefits of treatment was discussed with the pt. Additionally, bladder irritants, anatomy/physiology of pelvic floor, diaphragmatic breathing, isometric abdominal exercises, kegels and behavior modifications were reviewed  -Kegels with TA and rests between reps to allow for relaxation  Hold on using Nat for now    Discussed progression of plan of care with patient; educated pt in activity modification; reviewed HEP with pt. Pt demonstrated and verbalized understanding of all instruction and was provided with a handout of HEP (see Patient Instructions).      Written Home Exercises Provided: yes.  Exercises were reviewed and Jaclyn was able to demonstrate them prior to the end of the session.  Jaclyn demonstrated good  understanding of the education provided.     See EMR under Patient Instructions for exercises provided 2/11/2022.    Assessment     Pt presents today for first treatment following eval. Pt has moderate PFM tension L>R at levator ani which was addressed with manual release. Poor recruitment of PFM - minimal improvement with addition of TA. Worked on breath with Kegel and fully relaxing PFM following " contraction. With use of Nat pt has difficulty reaching contraction and relaxation goals and tends to keep muscles tense. Recommended holding use of Nat for now and concentrate on Kegels with several breaths of relaxation between. Also began overflow strengthening exCarri Anaya Is progressing well towards her goals.   Pt prognosis is Good.     Pt will continue to benefit from skilled outpatient physical therapy to address the deficits listed in the problem list box on initial evaluation, provide pt/family education and to maximize pt's level of independence in the home and community environment.     Pt's spiritual, cultural and educational needs considered and pt agreeable to plan of care and goals.     Anticipated barriers to physical therapy: none    Goals:   Short Term Goals: 5 weeks  (progressing, not met)  Pt to be edu pelvic muscle bracing and be able to consistently perform correctly and quickly to help decrease incontinence with cough/laugh/sneeze.  Pt to report a decrease in pad usage to no more than 3 a day to demonstrate improving pelvic floor function needed for continence.  Pt to be able to perform a 5  second kegel x 10 reps to demonstrate improving strength and endurance needed for continence.  Pt to demonstrate being able to correctly and consistently perform a kegel which is needed  to increase pelvic floor muscle coordination and strength needed for continence.  Pt to be able to delay the urge to urinate at least 5 minutes when standing with a strong urge to urinate in order to make it to the bathroom without leaking.  Pt to voice understanding of the role that diet plays on urinary urgency.                     Long Term Goals: 10 weeks  Pt to be discharged with home plan for carry over after discharge.    Pt to be able to perform a 10 second kegel x 10 reps to demonstrate improving strength and endurance needed for continence.  Pt to report a decrease in pad usage to 1 pads a day to  demonstrate improving pelvic floor muscle controls as evidenced by decreased episodes of incontinence needed to improve confidence in social situations.  Pt to be able to delay the urge to urinate at least 10 minutes when standing with a strong urge to urinate in order to make it to the bathroom without leaking.  Pt to report no longer feeling the need to urinate just in case when shopping or participating in social activities to demonstrate improving pelvic floor and bladder control.  Pt to report elimination of incontinence with ADLs to demonstrate improved pelvic floor muscle strength and coordination  Pt to demonstrate an improved score in the FOTO  survey  to at least 38% limitation to demonstrate improving continence.    Pt to increase pelvic floor strength to at least 3/5 to demonstrate improved strength needed for continence with ADLs.     Plan     Plan of care Certification: 2/1/2022 to 4/15/2022.     Outpatient Physical Therapy 1 times weekly for 10 weeks to include the following interventions: therapeutic exercises, therapeutic activity, neuromuscular re-education, manual therapy, patient/family education, dry needling and self care/home management       Selina Stevenson, PT

## 2022-02-11 NOTE — PATIENT INSTRUCTIONS
Home Exercise Program: 02/11/2022    Kegels    Endurance Holds  1. Perform a long kegel (contract and LIFT the pelvic floor muscles as if you're trying to stop the stream of urine and passage of gas).    2. Make sure you're just using the internal muscles without holding your breath.  3. Let go and relax everything for 10 seconds.   4. Hold 5 seconds. Repeat 10 times, 2 sets per day.

## 2022-02-14 NOTE — PROGRESS NOTES
"Individual Psychotherapy (PhD/LCSW)    2/21/2022    Site:  [facility]         Therapeutic Intervention: Met with patient.  Outpatient - Insight oriented psychotherapy 45 min - CPT code 51618 and Outpatient - Supportive psychotherapy 45 min - CPT Code 09703    Chief complaint/reason for encounter: depression and anxiety     Interval history and content of current session: Jaclyn shared that she is doing well at this time. She explained that since our last visit, she and her children were all sick with the flu and they are now getting back to normal, but most of the time in between our last session and this, she was focused on that. We discussed how she feels that she is able to manage hear anxiety and "talk through it as it comes." Discussed that she is also able to "move on to other things" more quickly, and we discussed that this is the process of defusion: when we don't attach to our anxious thoughts, it passes much more quickly. Discussed that she is also becoming better at speaking her opinions and her emotions, with her explaining that she was able to tell her  that she needed help and asked for him to help her out more with the children while they were sick. Validated her for asserting herself and expressing when she needs help.     Treatment plan:  · Target symptoms: depression, anxiety   · Why chosen therapy is appropriate versus another modality: relevant to diagnosis, patient responds to this modality, evidence based practice  · Outcome monitoring methods: self-report  · Therapeutic intervention type: insight oriented psychotherapy, supportive psychotherapy    Risk parameters:  Patient reports no suicidal ideation  Patient reports no homicidal ideation  Patient reports no self-injurious behavior  Patient reports no violent behavior    Verbal deficits: None    Patient's response to intervention:  The patient's response to intervention is accepting.    Progress toward goals and other mental status " changes:  The patient's progress toward goals is good.    Diagnosis:     ICD-10-CM ICD-9-CM   1. AGGIE (generalized anxiety disorder)  F41.1 300.02       Plan:  individual psychotherapy and medication management by physician Pt to go to ED or call 911 if symptoms worsen or if she has thoughts of harming self and/or others. Pt verbalized understanding.    Return to clinic: as scheduled    Length of Service (minutes): 45      Each patient to whom he or she provides medical services by telemedicine is: (1) informed of the relationship between the physician and patient and the respective role of any other health care provider with respect to management of the patient; and (2) notified that he or she may decline to receive medical services by telemedicine and may withdraw from such care at any time.

## 2022-02-17 ENCOUNTER — PATIENT MESSAGE (OUTPATIENT)
Dept: DERMATOLOGY | Facility: CLINIC | Age: 42
End: 2022-02-17
Payer: COMMERCIAL

## 2022-02-21 ENCOUNTER — OFFICE VISIT (OUTPATIENT)
Dept: PSYCHIATRY | Facility: CLINIC | Age: 42
End: 2022-02-21
Payer: COMMERCIAL

## 2022-02-21 DIAGNOSIS — F41.1 GAD (GENERALIZED ANXIETY DISORDER): Primary | ICD-10-CM

## 2022-02-21 PROCEDURE — 90834 PR PSYCHOTHERAPY W/PATIENT, 45 MIN: ICD-10-PCS | Mod: S$GLB,,, | Performed by: SOCIAL WORKER

## 2022-02-21 PROCEDURE — 1159F PR MEDICATION LIST DOCUMENTED IN MEDICAL RECORD: ICD-10-PCS | Mod: CPTII,S$GLB,, | Performed by: SOCIAL WORKER

## 2022-02-21 PROCEDURE — 1159F MED LIST DOCD IN RCRD: CPT | Mod: CPTII,S$GLB,, | Performed by: SOCIAL WORKER

## 2022-02-21 PROCEDURE — 99999 PR PBB SHADOW E&M-EST. PATIENT-LVL II: ICD-10-PCS | Mod: PBBFAC,,, | Performed by: SOCIAL WORKER

## 2022-02-21 PROCEDURE — 99999 PR PBB SHADOW E&M-EST. PATIENT-LVL II: CPT | Mod: PBBFAC,,, | Performed by: SOCIAL WORKER

## 2022-02-21 PROCEDURE — 90834 PSYTX W PT 45 MINUTES: CPT | Mod: S$GLB,,, | Performed by: SOCIAL WORKER

## 2022-02-25 ENCOUNTER — CLINICAL SUPPORT (OUTPATIENT)
Dept: REHABILITATION | Facility: HOSPITAL | Age: 42
End: 2022-02-25
Payer: COMMERCIAL

## 2022-02-25 DIAGNOSIS — N81.89 PELVIC FLOOR WEAKNESS IN FEMALE: ICD-10-CM

## 2022-02-25 DIAGNOSIS — M62.89 PELVIC FLOOR TENSION: Primary | ICD-10-CM

## 2022-02-25 DIAGNOSIS — R27.8 OTHER LACK OF COORDINATION: ICD-10-CM

## 2022-02-25 PROCEDURE — 97140 MANUAL THERAPY 1/> REGIONS: CPT | Mod: PN

## 2022-02-25 PROCEDURE — 97110 THERAPEUTIC EXERCISES: CPT | Mod: PN

## 2022-02-25 PROCEDURE — 97112 NEUROMUSCULAR REEDUCATION: CPT | Mod: PN

## 2022-02-25 NOTE — PROGRESS NOTES
"    Pelvic Health Physical Therapy   Treatment Note     Name: Jaclyn Garcia [place]  Clinic Number: [id number]    Therapy Diagnosis:   Encounter Diagnoses   Name Primary?    Pelvic floor tension Yes    Pelvic floor weakness in female     Other lack of coordination      Physician: Khalida Gibson PA-C    Visit Date: 2/25/2022  Physician Orders: PT Eval and Treat   Medical Diagnosis from Referral:   N39.46 (ICD-10-CM) - Mixed stress and urge urinary incontinence   N81.84 (ICD-10-CM) - Pelvic muscle wasting         Evaluation Date: 2/1/2022  Authorization Period Expiration: 1/31/23  Plan of Care Expiration: 4/15/2022  Progress Note Due: 3/1/22  Visit # / Visits authorized: 3/25     FOTO: Issued Visit # 1     Time In: 10:04  Time Out: 10:48  Total Billable Time:44 minutes    Precautions: Standard    Subjective     Pt reports:  Had flu 2 weeks ago so didn't do exercises but was able to again last week.  Can feel there are areas of tension deep in her PF when having intercourse -  hits sore spots.  Doing better coordinating breathing during exercise.     She was compliant with home exercise program.  Response to previous treatment: No problems  Functional change: Improved awareness of tension in PF    Pain: pain is not an issue    Constitutional Symptoms Review: The patient denies having any constitutional symptoms.     Objective   Pt verbally consents to intravaginal treatment today.  Signed consent form already on file.       Jaclyn received therapeutic exercises to develop  strength, endurance, core stabilization and coordination for 8 minutes including:  Happy baby pose  Piriformis stretch B  Seated adductor stretch  Standing HS stretch  *Pt held each stretch for 1 min     Not performed:  TA sets with PFM contraction x10 hold 5"  Hip adduction with ball and TA/PFM x10 hold 5"  Bridges with ball x10 hold 5"  SL bridges 1x10 B  Side plank from knees with adduction using ball 3x30" B    Jaclyn" received the following manual therapy techniques: to develop extensibility for 25 minutes including: trigger point/myofascial release of levator ani and OI mm bilaterally      Neuromuscular Re-education to develop Coordination, Control, Down training and Proprioception for 11  minutes including: pelvic floor relaxation/bulging training, diaphragmatic breathing and TA activation.   Coordination of TA with PFM during core stabilization ex  Completely relaxing PFM following Kegel  Breathing coordination with TA during lifting activites     Home Exercises Provided and Patient Education Provided     Education provided:   - general anatomy/physiology of urinary/ bowel  system and benefits of treatment was discussed with the pt. Additionally, bladder irritants, anatomy/physiology of pelvic floor, diaphragmatic breathing, isometric abdominal exercises, kegels and behavior modifications were reviewed  -Pt shown samples of vaginal wands for self treatment    Discussed progression of plan of care with patient; educated pt in activity modification; reviewed HEP with pt. Pt demonstrated and verbalized understanding of all instruction and was provided with a handout of HEP (see Patient Instructions).      Written Home Exercises Provided: yes.  Exercises were reviewed and Jaclyn was able to demonstrate them prior to the end of the session.  Jaclyn demonstrated good  understanding of the education provided.     See EMR under Patient Instructions for exercises provided 2/11/2022.    Assessment     Pt presents with moderate PFM tension L>R at levator ani which was addressed with manual release. Worked on diaphragmatic breath during PF stretch sequence.  With use of Nat pt has difficulty reaching contraction and relaxation goals and tends to keep muscles tense. Recommended vaginal wand for self treatment. Added stretch sequence to HEP  Jaclyn Is progressing well towards her goals.   Pt prognosis is Good.     Pt will  continue to benefit from skilled outpatient physical therapy to address the deficits listed in the problem list box on initial evaluation, provide pt/family education and to maximize pt's level of independence in the home and community environment.     Pt's spiritual, cultural and educational needs considered and pt agreeable to plan of care and goals.     Anticipated barriers to physical therapy: none    Goals:   Short Term Goals: 5 weeks  (progressing, not met)  Pt to be edu pelvic muscle bracing and be able to consistently perform correctly and quickly to help decrease incontinence with cough/laugh/sneeze.  Pt to report a decrease in pad usage to no more than 3 a day to demonstrate improving pelvic floor function needed for continence.  Pt to be able to perform a 5  second kegel x 10 reps to demonstrate improving strength and endurance needed for continence.  Pt to demonstrate being able to correctly and consistently perform a kegel which is needed  to increase pelvic floor muscle coordination and strength needed for continence.  Pt to be able to delay the urge to urinate at least 5 minutes when standing with a strong urge to urinate in order to make it to the bathroom without leaking.  Pt to voice understanding of the role that diet plays on urinary urgency.                     Long Term Goals: 10 weeks  Pt to be discharged with home plan for carry over after discharge.    Pt to be able to perform a 10 second kegel x 10 reps to demonstrate improving strength and endurance needed for continence.  Pt to report a decrease in pad usage to 1 pads a day to demonstrate improving pelvic floor muscle controls as evidenced by decreased episodes of incontinence needed to improve confidence in social situations.  Pt to be able to delay the urge to urinate at least 10 minutes when standing with a strong urge to urinate in order to make it to the bathroom without leaking.  Pt to report no longer feeling the need to urinate  just in case when shopping or participating in social activities to demonstrate improving pelvic floor and bladder control.  Pt to report elimination of incontinence with ADLs to demonstrate improved pelvic floor muscle strength and coordination  Pt to demonstrate an improved score in the FOTO  survey  to at least 38% limitation to demonstrate improving continence.    Pt to increase pelvic floor strength to at least 3/5 to demonstrate improved strength needed for continence with ADLs.     Plan     Plan of care Certification: 2/1/2022 to 4/15/2022.     Outpatient Physical Therapy 1 times weekly for 10 weeks to include the following interventions: therapeutic exercises, therapeutic activity, neuromuscular re-education, manual therapy, patient/family education, dry needling and self care/home management       Selina Stevenson, PT

## 2022-03-03 ENCOUNTER — OFFICE VISIT (OUTPATIENT)
Dept: CARDIOLOGY | Facility: CLINIC | Age: 42
End: 2022-03-03
Payer: COMMERCIAL

## 2022-03-03 VITALS
OXYGEN SATURATION: 99 % | DIASTOLIC BLOOD PRESSURE: 88 MMHG | HEIGHT: 65 IN | WEIGHT: 189 LBS | BODY MASS INDEX: 31.49 KG/M2 | HEART RATE: 76 BPM | SYSTOLIC BLOOD PRESSURE: 146 MMHG

## 2022-03-03 DIAGNOSIS — E78.01 FAMILIAL HYPERCHOLESTEROLEMIA: ICD-10-CM

## 2022-03-03 DIAGNOSIS — E78.41 ELEVATED LIPOPROTEIN(A): ICD-10-CM

## 2022-03-03 DIAGNOSIS — I25.3 ATRIAL SEPTAL ANEURYSM: ICD-10-CM

## 2022-03-03 DIAGNOSIS — I63.9 ACUTE CVA (CEREBROVASCULAR ACCIDENT): ICD-10-CM

## 2022-03-03 DIAGNOSIS — F41.1 GAD (GENERALIZED ANXIETY DISORDER): ICD-10-CM

## 2022-03-03 DIAGNOSIS — I10 HYPERTENSION, UNSPECIFIED TYPE: Primary | ICD-10-CM

## 2022-03-03 PROCEDURE — 3008F PR BODY MASS INDEX (BMI) DOCUMENTED: ICD-10-PCS | Mod: CPTII,S$GLB,, | Performed by: INTERNAL MEDICINE

## 2022-03-03 PROCEDURE — 3079F PR MOST RECENT DIASTOLIC BLOOD PRESSURE 80-89 MM HG: ICD-10-PCS | Mod: CPTII,S$GLB,, | Performed by: INTERNAL MEDICINE

## 2022-03-03 PROCEDURE — 99205 OFFICE O/P NEW HI 60 MIN: CPT | Mod: S$GLB,,, | Performed by: INTERNAL MEDICINE

## 2022-03-03 PROCEDURE — 1159F MED LIST DOCD IN RCRD: CPT | Mod: CPTII,S$GLB,, | Performed by: INTERNAL MEDICINE

## 2022-03-03 PROCEDURE — 3079F DIAST BP 80-89 MM HG: CPT | Mod: CPTII,S$GLB,, | Performed by: INTERNAL MEDICINE

## 2022-03-03 PROCEDURE — 1159F PR MEDICATION LIST DOCUMENTED IN MEDICAL RECORD: ICD-10-PCS | Mod: CPTII,S$GLB,, | Performed by: INTERNAL MEDICINE

## 2022-03-03 PROCEDURE — 3008F BODY MASS INDEX DOCD: CPT | Mod: CPTII,S$GLB,, | Performed by: INTERNAL MEDICINE

## 2022-03-03 PROCEDURE — 93000 EKG 12-LEAD: ICD-10-PCS | Mod: S$GLB,,, | Performed by: INTERNAL MEDICINE

## 2022-03-03 PROCEDURE — 99205 PR OFFICE/OUTPT VISIT, NEW, LEVL V, 60-74 MIN: ICD-10-PCS | Mod: S$GLB,,, | Performed by: INTERNAL MEDICINE

## 2022-03-03 PROCEDURE — 1160F RVW MEDS BY RX/DR IN RCRD: CPT | Mod: CPTII,S$GLB,, | Performed by: INTERNAL MEDICINE

## 2022-03-03 PROCEDURE — 1160F PR REVIEW ALL MEDS BY PRESCRIBER/CLIN PHARMACIST DOCUMENTED: ICD-10-PCS | Mod: CPTII,S$GLB,, | Performed by: INTERNAL MEDICINE

## 2022-03-03 PROCEDURE — 93000 ELECTROCARDIOGRAM COMPLETE: CPT | Mod: S$GLB,,, | Performed by: INTERNAL MEDICINE

## 2022-03-03 PROCEDURE — 3077F PR MOST RECENT SYSTOLIC BLOOD PRESSURE >= 140 MM HG: ICD-10-PCS | Mod: CPTII,S$GLB,, | Performed by: INTERNAL MEDICINE

## 2022-03-03 PROCEDURE — 3077F SYST BP >= 140 MM HG: CPT | Mod: CPTII,S$GLB,, | Performed by: INTERNAL MEDICINE

## 2022-03-04 ENCOUNTER — CLINICAL SUPPORT (OUTPATIENT)
Dept: REHABILITATION | Facility: HOSPITAL | Age: 42
End: 2022-03-04
Payer: COMMERCIAL

## 2022-03-04 DIAGNOSIS — M62.89 PELVIC FLOOR TENSION: Primary | ICD-10-CM

## 2022-03-04 DIAGNOSIS — R27.8 OTHER LACK OF COORDINATION: ICD-10-CM

## 2022-03-04 DIAGNOSIS — N81.89 PELVIC FLOOR WEAKNESS IN FEMALE: ICD-10-CM

## 2022-03-04 PROCEDURE — 97110 THERAPEUTIC EXERCISES: CPT | Mod: PN

## 2022-03-04 PROCEDURE — 97140 MANUAL THERAPY 1/> REGIONS: CPT | Mod: PN

## 2022-03-04 PROCEDURE — 97112 NEUROMUSCULAR REEDUCATION: CPT | Mod: PN

## 2022-03-04 NOTE — PROGRESS NOTES
"    Pelvic Health Physical Therapy   Treatment Note     Name: Jaclyn Garcia [place]  Clinic Number: [id number]    Therapy Diagnosis:   Encounter Diagnoses   Name Primary?    Pelvic floor tension Yes    Pelvic floor weakness in female     Other lack of coordination      Physician: Khalida Gibson PA-C    Visit Date: 3/4/2022  Physician Orders: PT Eval and Treat   Medical Diagnosis from Referral:   N39.46 (ICD-10-CM) - Mixed stress and urge urinary incontinence   N81.84 (ICD-10-CM) - Pelvic muscle wasting         Evaluation Date: 2/1/2022  Authorization Period Expiration: 1/31/23  Plan of Care Expiration: 4/15/2022  Progress Note Due: 3/1/22  Visit # / Visits authorized: 4/25     FOTO: Issued Visit # 1     Time In: 1:00  Time Out: 1:54  Total Billable Time: 54 minutes    Precautions: Standard    Subjective     Pt reports:  Can tell she is leaking when she holds her breath when lifting weights; doesn't leak when she breathes.   Not leaking in the mornings as much.     She was compliant with home exercise program.  Response to previous treatment: No problems  Functional change: Improved awareness of tension in PF    Pain: pain is not an issue    Constitutional Symptoms Review: The patient denies having any constitutional symptoms.     Objective   Pt verbally consents to intravaginal treatment today.  Signed consent form already on file.     Jaclyn received therapeutic exercises to develop  strength, endurance, core stabilization and coordination for 21 minutes including:  Squats with 20#KB x10  Step up/knee lift on 6" step with 20# KB x10 B  CC Paloff Press 10# 2x10 B  CC 1/2 kneel chops 10# 2x10 B        Not performed:  Happy baby pose  Piriformis stretch B  Seated adductor stretch  Standing HS stretch  *Pt held each stretch for 1 min    TA sets with PFM contraction x10 hold 5"  Hip adduction with ball and TA/PFM x10 hold 5"  Bridges with ball x10 hold 5"  SL bridges 1x10 B  Side plank from knees with " "adduction using ball 3x30" B    Jaclyn received the following manual therapy techniques: to develop extensibility for 25 minutes including: trigger point/myofascial release of levator ani and OI mm bilaterally      Neuromuscular Re-education to develop Coordination, Control, Down training and Proprioception for 8  minutes including: pelvic floor relaxation/bulging training, diaphragmatic breathing and TA activation.   Coordination of TA with PFM during core stabilization ex  Completely relaxing PFM following Kegel  Breathing coordination with TA during lifting activites     Home Exercises Provided and Patient Education Provided     Education provided:   - general anatomy/physiology of urinary/ bowel  system and benefits of treatment was discussed with the pt. Additionally, bladder irritants, anatomy/physiology of pelvic floor, diaphragmatic breathing, isometric abdominal exercises, kegels and behavior modifications were reviewed  -breathing during lifting ex    Discussed progression of plan of care with patient; educated pt in activity modification; reviewed HEP with pt. Pt demonstrated and verbalized understanding of all instruction and was provided with a handout of HEP (see Patient Instructions).      Written Home Exercises Provided: yes.  Exercises were reviewed and Jaclyn was able to demonstrate them prior to the end of the session.  Jaclyn demonstrated good  understanding of the education provided.     See EMR under Patient Instructions for exercises provided 2/11/2022, 3/4/22    Assessment     Pt presents with moderate PFM tension L>R at levator ani which was addressed with manual release. Continued NMRE with d breathing and TA/PFM coordination. Progressed to performing lifts in gym with applied breathing principles. Pt able to complete all ex without leaking. Continues to have difficulty isolating PFM contraction. Still very weak - improved palpable PFM contraction with TA activation.   Jaclyn Is " progressing well towards her goals.   Pt prognosis is Good.     Pt will continue to benefit from skilled outpatient physical therapy to address the deficits listed in the problem list box on initial evaluation, provide pt/family education and to maximize pt's level of independence in the home and community environment.     Pt's spiritual, cultural and educational needs considered and pt agreeable to plan of care and goals.     Anticipated barriers to physical therapy: none    Goals:   Short Term Goals: 5 weeks  (progressing, not met)  Pt to be edu pelvic muscle bracing and be able to consistently perform correctly and quickly to help decrease incontinence with cough/laugh/sneeze. (met)  Pt to report a decrease in pad usage to no more than 3 a day to demonstrate improving pelvic floor function needed for continence.  Pt to be able to perform a 5  second kegel x 10 reps to demonstrate improving strength and endurance needed for continence.  Pt to demonstrate being able to correctly and consistently perform a kegel which is needed  to increase pelvic floor muscle coordination and strength needed for continence.  Pt to be able to delay the urge to urinate at least 5 minutes when standing with a strong urge to urinate in order to make it to the bathroom without leaking.  Pt to voice understanding of the role that diet plays on urinary urgency.                     Long Term Goals: 10 weeks  Pt to be discharged with home plan for carry over after discharge.    Pt to be able to perform a 10 second kegel x 10 reps to demonstrate improving strength and endurance needed for continence.  Pt to report a decrease in pad usage to 1 pads a day to demonstrate improving pelvic floor muscle controls as evidenced by decreased episodes of incontinence needed to improve confidence in social situations.  Pt to be able to delay the urge to urinate at least 10 minutes when standing with a strong urge to urinate in order to make it to the  bathroom without leaking.  Pt to report no longer feeling the need to urinate just in case when shopping or participating in social activities to demonstrate improving pelvic floor and bladder control.  Pt to report elimination of incontinence with ADLs to demonstrate improved pelvic floor muscle strength and coordination  Pt to demonstrate an improved score in the FOTO  survey  to at least 38% limitation to demonstrate improving continence.    Pt to increase pelvic floor strength to at least 3/5 to demonstrate improved strength needed for continence with ADLs.     Plan     Plan of care Certification: 2/1/2022 to 4/15/2022.     Outpatient Physical Therapy 1 times weekly for 10 weeks to include the following interventions: therapeutic exercises, therapeutic activity, neuromuscular re-education, manual therapy, patient/family education, dry needling and self care/home management       Selina Stevenson, PT

## 2022-03-04 NOTE — PROGRESS NOTES
Subjective:    Patient ID:  Jaclyn Rausch is a 41 y.o. female     Chief Complaint   Patient presents with    Establish Care       HPI:  Ms Jaclyn Rausch is a 41 y.o. female is here for initial consultation.  Patient has been diagnosed with having atrial septal aneurysm and also elevated cholesterol.  And patient is here for further evaluation.  Patient has had a stroke in 2018 and after that she had a JUAN was found to have a atrial septal aneurysm and had been treated with anticoagulation.  She also has elevated cholesterol levels and her Lp a is also elevate.  Patient at the present time is doing well breathing is good denies any shortness of breath or difficulty in breathing denies any chest pain or tightness or heaviness denies any dizziness or lightheadedness or loss of consciousness.  Patient was recently discharged honorably from the .    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 Frank; last visit 1/2018    Clotting disorder 2/3/2019    Elevated lipoprotein(a) 5/4/2018    Stroke 04/24/2018     Past Surgical History:   Procedure Laterality Date    BUNIONECTOMY Left 2001    CHONDROPLASTY OF KNEE Left 1/10/2020    Procedure: CHONDROPLASTY, KNEE;  Surgeon: Michael Galaviz II, MD;  Location: [facility] OR;  Service: Orthopedics;  Laterality: Left;    HYSTEROSCOPY WITH DILATION AND CURETTAGE OF UTERUS N/A 11/5/2020    Procedure: HYSTEROSCOPY, WITH DILATION AND CURETTAGE OF UTERUS;  Surgeon: Ap Milner MD;  Location: [facility] OR;  Service: OB/GYN;  Laterality: N/A;    KNEE ARTHROSCOPY W/ MENISCECTOMY Left 1/10/2020    Procedure: ARTHROSCOPY, KNEE, WITH MENISCECTOMY;  Surgeon: Michael Galaviz II, MD;  Location: [facility] OR;  Service: Orthopedics;  Laterality: Left;    REPAIR OF  MENISCUS OF KNEE Left 1/10/2020    Procedure: REPAIR, MENISCUS, KNEE;  Surgeon: Michael Galaviz II, MD;  Location: [facility] OR;  Service: Orthopedics;  Laterality: Left;  PARTIAL MEDIAL    THERMAL ABLATION OF ENDOMETRIUM N/A 2020    Procedure: ABLATION, ENDOMETRIUM, THERMAL;  Surgeon: Ap Milner MD;  Location: [facility] OR;  Service: OB/GYN;  Laterality: N/A;     Social History     Tobacco Use    Smoking status: Former Smoker     Packs/day: 0.25     Years: 2.00     Pack years: 0.50     Quit date: 2003     Years since quittin.1    Smokeless tobacco: Never Used   Substance Use Topics    Alcohol use: Yes     Comment: Socially    Drug use: No     Family History   Problem Relation Age of Onset    Diabetes Maternal Grandmother     Lung cancer Maternal Grandfather     Colon cancer Maternal Grandfather     Hypertension Mother     Aneurysm Mother         brain    Hearing loss Father     Hypertension Brother     Eczema Son     Breast cancer Neg Hx     Ovarian cancer Neg Hx     Melanoma Neg Hx     Psoriasis Neg Hx     Lupus Neg Hx         Review of Systems:   Constitution: Negative for diaphoresis and fever.   HEENT: Negative for nosebleeds.    Cardiovascular: Negative for chest pain       No dyspnea on exertion       No leg swelling        No palpitations  Respiratory: Negative for shortness of breath and wheezing.    Hematologic/Lymphatic: Negative for bleeding problem. Does not bruise/bleed easily.   Skin: Negative for color change and rash.   Musculoskeletal: Negative for falls and myalgias.   Gastrointestinal: Negative for hematemesis and hematochezia.   Genitourinary: Negative for hematuria.   Neurological: Negative for dizziness and light-headedness.   Psychiatric/Behavioral: Negative for altered mental status and memory loss.          Objective:        Vitals:    22 1704   BP: (!) 146/88   Pulse: 76       Lab Results   Component Value Date    WBC 8.57 10/30/2021    HGB 13.5  10/30/2021    HCT 41.0 10/30/2021     10/30/2021    CHOL 170 03/05/2021    TRIG 131 03/05/2021    HDL 55 03/05/2021    ALT 24 10/30/2021    AST 22 10/30/2021     10/30/2021    K 4.2 10/30/2021     10/30/2021    CREATININE 0.8 10/30/2021    BUN 11 10/30/2021    CO2 25 10/30/2021    TSH 1.101 10/30/2021    INR 1.0 04/24/2018    HGBA1C 5.0 10/30/2021        ECHOCARDIOGRAM RESULTS  Results for orders placed in visit on 05/27/20    Stress Echo Which stress agent will be used? Treadmill Exercise; Color Flow Doppler? Yes    Interpretation Summary  · The stress echo portion of this study is negative for myocardial ischemia.  · Negative exercise stress test by ECG criteria with good effort capacity  · There were no arrhythmias during stress.  · The test was stopped because the patient experienced fatigue.  · Normal left ventricular systolic function. The estimated ejection fraction is 65%.  · Normal LV diastolic function.  · No wall motion abnormalities.  · Normal right ventricular systolic function.  · The patient's exercise capacity was above average.        CURRENT/PREVIOUS VISIT EKG  Results for orders placed or performed during the hospital encounter of 05/16/20   EKG 12-lead    Collection Time: 05/16/20  2:25 AM    Narrative    Test Reason : M79.603,    Vent. Rate : 072 BPM     Atrial Rate : 072 BPM     P-R Int : 124 ms          QRS Dur : 078 ms      QT Int : 378 ms       P-R-T Axes : 000 104 111 degrees     QTc Int : 413 ms    Normal sinus rhythm with sinus arrhythmia  Rightward axis  Cannot rule out Anterior infarct ,age undetermined  Abnormal ECG  When compared with ECG of 03-JAN-2020 09:59,  T wave inversion now evident in Anterior leads  Confirmed by Gus ESTEVEZ, Salvatore GIRALDO ([id number]) on 5/18/2020 10:33:05 AM    Referred By: [email or web address]   SELF           Confirmed By:Salvatore Weber MD     No valid procedures specified.   No results found for this or any previous visit.      Physical Exam:  CONSTITUTIONAL:  No fever, no chills  HEENT: Normocephalic, atraumatic,pupils reactive to light                 NECK:  No JVD no carotid bruit  CVS: S1S2+, RRR, no murmurs,   LUNGS: Clear  ABDOMEN: Soft, NT, BS+  EXTREMITIES: No cyanosis, edema  : No gonzalez catheter  NEURO: AAO X 3  PSY: Normal affect      Medication List with Changes/Refills   Current Medications    ASCORBIC ACID, VITAMIN C, (VITAMIN C) 100 MG TABLET    Take 100 mg by mouth once daily.    ATORVASTATIN (LIPITOR) 40 MG TABLET    TAKE 1 TABLET(40 MG) BY MOUTH EVERY DAY    ELIQUIS 5 MG TAB    TAKE 1 TABLET BY MOUTH TWICE DAILY    KETOCONAZOLE (NIZORAL) 2 % SHAMPOO    WASH TRUNK WITH MEDICATED SHAMPOO ONCE TO TWICE A WEEK. LET SIT ON SKIN AT LEAST 5 MINUTES PRIOR TO RINSING OFF    PANTOPRAZOLE (PROTONIX) 20 MG TABLET    TAKE 1 TABLET(20 MG) BY MOUTH EVERY DAY   Discontinued Medications    FLUTICASONE PROPIONATE (FLONASE) 50 MCG/ACTUATION NASAL SPRAY    1 spray (50 mcg total) by Each Nostril route once daily.             Assessment:       1. Hypertension, unspecified type    2. Atrial septal aneurysm    3. Familial hypercholesterolemia    4. Elevated lipoprotein(a)    5. AGGIE (generalized anxiety disorder)    6. Acute CVA (cerebrovascular accident) -  lateral left frontal lobe, non-hemorrhagic         Plan:   1. Patient has history of atrial septal aneurysm and she is on Eliquis 5 mg p.o. b.i.d. continue the same.  No bleeding issues no blood in the stool urine.  2. Patient has history of hyperlipidemia with elevated LP little a and currently she is on atorvastatin 40 mg p.o. q.day.  Will check her cholesterol panel.  Discussed with patient will find a suitable medication that would bring down LP little a.  3. Patient to change her diet completely and to cut back on animal fat and try a plant based diet and see if that would actually bring down her cholesterol as well as LP little a.  4. Reviewed EKG independently patient is in normal sinus rhythm with heart rate of 81  beats per minute normal intervals and no acute ST T-wave changes.  5. Patient to continue current management and consider taking folic acid 500 mcg p.o. q.day.  6. I will see her back in the office in 6 months.        Problem List Items Addressed This Visit        Neuro    Acute CVA (cerebrovascular accident) -  lateral left frontal lobe, non-hemorrhagic       Psychiatric    AGGIE (generalized anxiety disorder)       Cardiac/Vascular    Atrial septal aneurysm    Overview     Dr. Frank; last visit 1/2018           Familial hypercholesterolemia    Elevated lipoprotein(a)      Other Visit Diagnoses     Hypertension, unspecified type    -  Primary    Relevant Orders    IN OFFICE EKG 12-LEAD (to [place])          No follow-ups on file.

## 2022-03-07 ENCOUNTER — LAB VISIT (OUTPATIENT)
Dept: LAB | Facility: HOSPITAL | Age: 42
End: 2022-03-07
Attending: INTERNAL MEDICINE
Payer: COMMERCIAL

## 2022-03-07 DIAGNOSIS — E78.01 FAMILIAL HYPERCHOLESTEROLEMIA: ICD-10-CM

## 2022-03-07 DIAGNOSIS — E78.41 ELEVATED LIPOPROTEIN(A): ICD-10-CM

## 2022-03-07 DIAGNOSIS — I25.3 ATRIAL SEPTAL ANEURYSM: ICD-10-CM

## 2022-03-07 DIAGNOSIS — F41.1 GAD (GENERALIZED ANXIETY DISORDER): ICD-10-CM

## 2022-03-07 DIAGNOSIS — I63.9 ACUTE CVA (CEREBROVASCULAR ACCIDENT): ICD-10-CM

## 2022-03-07 DIAGNOSIS — I10 HYPERTENSION, UNSPECIFIED TYPE: ICD-10-CM

## 2022-03-07 PROCEDURE — 83695 ASSAY OF LIPOPROTEIN(A): CPT | Performed by: INTERNAL MEDICINE

## 2022-03-07 PROCEDURE — 80061 LIPID PANEL: CPT | Performed by: INTERNAL MEDICINE

## 2022-03-08 ENCOUNTER — OFFICE VISIT (OUTPATIENT)
Dept: ENDOCRINOLOGY | Facility: CLINIC | Age: 42
End: 2022-03-08
Payer: COMMERCIAL

## 2022-03-08 VITALS
HEART RATE: 76 BPM | DIASTOLIC BLOOD PRESSURE: 60 MMHG | BODY MASS INDEX: 31.51 KG/M2 | TEMPERATURE: 99 F | WEIGHT: 189.38 LBS | OXYGEN SATURATION: 99 % | RESPIRATION RATE: 16 BRPM | SYSTOLIC BLOOD PRESSURE: 122 MMHG

## 2022-03-08 DIAGNOSIS — R53.83 FATIGUE, UNSPECIFIED TYPE: ICD-10-CM

## 2022-03-08 DIAGNOSIS — E66.9 CLASS 1 OBESITY WITH SERIOUS COMORBIDITY AND BODY MASS INDEX (BMI) OF 31.0 TO 31.9 IN ADULT, UNSPECIFIED OBESITY TYPE: ICD-10-CM

## 2022-03-08 PROBLEM — R73.9 HYPERGLYCEMIA: Status: RESOLVED | Noted: 2021-10-28 | Resolved: 2022-03-08

## 2022-03-08 LAB
CHOLEST SERPL-MCNC: 169 MG/DL (ref 120–199)
CHOLEST/HDLC SERPL: 3 {RATIO} (ref 2–5)
HDLC SERPL-MCNC: 57 MG/DL (ref 40–75)
HDLC SERPL: 33.7 % (ref 20–50)
LDLC SERPL CALC-MCNC: 94 MG/DL (ref 63–159)
NONHDLC SERPL-MCNC: 112 MG/DL
TRIGL SERPL-MCNC: 90 MG/DL (ref 30–150)

## 2022-03-08 PROCEDURE — 1160F RVW MEDS BY RX/DR IN RCRD: CPT | Mod: CPTII,S$GLB,, | Performed by: INTERNAL MEDICINE

## 2022-03-08 PROCEDURE — 1159F MED LIST DOCD IN RCRD: CPT | Mod: CPTII,S$GLB,, | Performed by: INTERNAL MEDICINE

## 2022-03-08 PROCEDURE — 3008F PR BODY MASS INDEX (BMI) DOCUMENTED: ICD-10-PCS | Mod: CPTII,S$GLB,, | Performed by: INTERNAL MEDICINE

## 2022-03-08 PROCEDURE — 3074F SYST BP LT 130 MM HG: CPT | Mod: CPTII,S$GLB,, | Performed by: INTERNAL MEDICINE

## 2022-03-08 PROCEDURE — 99999 PR PBB SHADOW E&M-EST. PATIENT-LVL IV: ICD-10-PCS | Mod: PBBFAC,,, | Performed by: INTERNAL MEDICINE

## 2022-03-08 PROCEDURE — 1159F PR MEDICATION LIST DOCUMENTED IN MEDICAL RECORD: ICD-10-PCS | Mod: CPTII,S$GLB,, | Performed by: INTERNAL MEDICINE

## 2022-03-08 PROCEDURE — 99213 OFFICE O/P EST LOW 20 MIN: CPT | Mod: S$GLB,,, | Performed by: INTERNAL MEDICINE

## 2022-03-08 PROCEDURE — 3078F PR MOST RECENT DIASTOLIC BLOOD PRESSURE < 80 MM HG: ICD-10-PCS | Mod: CPTII,S$GLB,, | Performed by: INTERNAL MEDICINE

## 2022-03-08 PROCEDURE — 99999 PR PBB SHADOW E&M-EST. PATIENT-LVL IV: CPT | Mod: PBBFAC,,, | Performed by: INTERNAL MEDICINE

## 2022-03-08 PROCEDURE — 3008F BODY MASS INDEX DOCD: CPT | Mod: CPTII,S$GLB,, | Performed by: INTERNAL MEDICINE

## 2022-03-08 PROCEDURE — 3078F DIAST BP <80 MM HG: CPT | Mod: CPTII,S$GLB,, | Performed by: INTERNAL MEDICINE

## 2022-03-08 PROCEDURE — 3074F PR MOST RECENT SYSTOLIC BLOOD PRESSURE < 130 MM HG: ICD-10-PCS | Mod: CPTII,S$GLB,, | Performed by: INTERNAL MEDICINE

## 2022-03-08 PROCEDURE — 99213 PR OFFICE/OUTPT VISIT, EST, LEVL III, 20-29 MIN: ICD-10-PCS | Mod: S$GLB,,, | Performed by: INTERNAL MEDICINE

## 2022-03-08 PROCEDURE — 1160F PR REVIEW ALL MEDS BY PRESCRIBER/CLIN PHARMACIST DOCUMENTED: ICD-10-PCS | Mod: CPTII,S$GLB,, | Performed by: INTERNAL MEDICINE

## 2022-03-08 NOTE — PATIENT INSTRUCTIONS
If interested in other medications, can think about   Victoza. Shot once a day  Phentermine. Pill once a day, but can cause heart/anxiety side effects

## 2022-03-08 NOTE — PROGRESS NOTES
Subjective:      Chief Complaint: Follow-up    HPI: Jaclyn Rausch is a 41 y.o. female who is here for a follow-up evaluation for fatigue, hair, weight, and a few other symptoms.  Last seen 10/28/2021.    Saw PCP.  Had labs 9/2021. Normal. Recommended additional evaluation with endocrinology.    Diet: 1200 irvin/day mostly   avoids sodas, fast food, fried food.   trying to go more to plant based diet, decreasing meat and more veggies.    Exercise: spin, yoga, etc. More weight lifting lately.    tried topiramate. Had dizziness.    Weight pretty stable.  Trying to do more weight lifting.    Today, pt reports feeling okay overall. About the same    Some symptoms feeling better with vitamin C supplement.  Also probiotic.    Some symptoms around her menstrual cycle, constipation, resolves. Not really having cycles lately after a uterine ablation    No diarrhea.  Headaches improved somewhat.  Sleep improving with melatonin as needed.    Reviewed past medical, family, social history and updated as appropriate.    Review of Systems  As above    Objective:     Vitals:    03/08/22 1132   BP: 122/60   Pulse: 76   Resp: 16   Temp: 98.6 °F (37 °C)     BP Readings from Last 5 Encounters:   03/03/22 (!) 146/88   01/31/22 121/84   01/14/22 120/82   11/01/21 124/84   10/28/21 122/72     Physical Exam  Vitals reviewed.   Constitutional:       General: She is not in acute distress.     Appearance: She is obese.   Cardiovascular:      Heart sounds: Normal heart sounds.   Pulmonary:      Effort: Pulmonary effort is normal.       Wt Readings from Last 5 Encounters:   03/08/22 1132 85.9 kg (189 lb 6 oz)   03/03/22 1704 85.7 kg (189 lb)   01/31/22 1358 86 kg (189 lb 9.6 oz)   01/14/22 0911 85.3 kg (188 lb 0.8 oz)   11/01/21 1453 85.3 kg (188 lb)     Lab Results   Component Value Date    HGBA1C 5.0 10/30/2021     Lab Results   Component Value Date    CHOL 169 03/07/2022    CHOL 170 03/05/2021    HDL 57 03/07/2022    HDL 55  03/05/2021    LDLCALC 94.0 03/07/2022    LDLCALC 88.8 03/05/2021    TRIG 90 03/07/2022    TRIG 131 03/05/2021    CHOLHDL 33.7 03/07/2022    CHOLHDL 32.4 03/05/2021     Lab Results   Component Value Date     10/30/2021    K 4.2 10/30/2021     10/30/2021    CO2 25 10/30/2021    GLU 96 10/30/2021    BUN 11 10/30/2021    CREATININE 0.8 10/30/2021    CALCIUM 9.4 10/30/2021    PROT 6.7 10/30/2021    ALBUMIN 3.9 10/30/2021    BILITOT 0.6 10/30/2021    ALKPHOS 83 10/30/2021    AST 22 10/30/2021    ALT 24 10/30/2021    ANIONGAP 10 10/30/2021    ESTGFRAFRICA >60.0 10/30/2021    EGFRNONAA >60.0 10/30/2021    TSH 1.101 10/30/2021      No results found for: MICALBCREAT    Assessment/Plan:     Fatigue  Had largely normal lab evaluation for endocrine causes.   recommend keep f/u with PCP, other providers to investigate/treat other areas      Class 1 obesity with serious comorbidity and body mass index (BMI) of 31.0 to 31.9 in adult  Body mass index is 31.51 kg/m².   complicated by HLD   no evidence of diabetes, thyroid problems, cortisol excess.   - tried topiramate, had side effects.   - reviewed potential for other medication. Most not covered.   consider Victoza. (but injection, off label use). Pt not excited about a once a day injection   only other potentially affordable medication for weight is phentermine/adipex. However can make insomnia, anxiety worse, have cardiac side effects, lean towards staying away as well. Also, calorie target seems pretty reasonable so not as sure that phentermine would help much    So at this time, recommend pt keep up her efforts on a healthy diet, increase exercise as tolerated.      20 minutes of total time spent on the encounter, which includes face to face time and non-face to face time preparing to see the patient (eg, review of tests), Obtaining and/or reviewing separately obtained history, Documenting clinical information in the electronic or other health record, Independently  interpreting results (not separately reported) and communicating results to the patient/family/caregiver, or Care coordination (not separately reported).      Follow up if symptoms worsen or fail to improve, for lab review, further monitoring.      Leo Duenas MD  Endocrinology

## 2022-03-08 NOTE — ASSESSMENT & PLAN NOTE
Had largely normal lab evaluation for endocrine causes.   recommend keep f/u with PCP, other providers to investigate/treat other areas

## 2022-03-08 NOTE — ASSESSMENT & PLAN NOTE
Body mass index is 31.51 kg/m².   complicated by HLD   no evidence of diabetes, thyroid problems, cortisol excess.   - tried topiramate, had side effects.   - reviewed potential for other medication. Most not covered.   consider Victoza. (but injection, off label use). Pt not excited about a once a day injection   only other potentially affordable medication for weight is phentermine/adipex. However can make insomnia, anxiety worse, have cardiac side effects, lean towards staying away as well. Also, calorie target seems pretty reasonable so not as sure that phentermine would help much    So at this time, recommend pt keep up her efforts on a healthy diet, increase exercise as tolerated.

## 2022-03-09 ENCOUNTER — CLINICAL SUPPORT (OUTPATIENT)
Dept: REHABILITATION | Facility: HOSPITAL | Age: 42
End: 2022-03-09
Payer: COMMERCIAL

## 2022-03-09 DIAGNOSIS — M62.89 PELVIC FLOOR TENSION: Primary | ICD-10-CM

## 2022-03-09 DIAGNOSIS — N81.89 PELVIC FLOOR WEAKNESS IN FEMALE: ICD-10-CM

## 2022-03-09 DIAGNOSIS — R27.8 OTHER LACK OF COORDINATION: ICD-10-CM

## 2022-03-09 PROCEDURE — 97140 MANUAL THERAPY 1/> REGIONS: CPT | Mod: PN

## 2022-03-09 PROCEDURE — 97112 NEUROMUSCULAR REEDUCATION: CPT | Mod: PN

## 2022-03-09 NOTE — PROGRESS NOTES
Pelvic Health Physical Therapy   Treatment Note     Name: Jaclyn Garcia Rausch  Clinic Number: [id number]    Therapy Diagnosis:   Encounter Diagnoses   Name Primary?    Pelvic floor tension Yes    Pelvic floor weakness in female     Other lack of coordination      Physician: Khalida Gibson PA-C    Visit Date: 3/9/2022  Physician Orders: PT Eval and Treat   Medical Diagnosis from Referral:   N39.46 (ICD-10-CM) - Mixed stress and urge urinary incontinence   N81.84 (ICD-10-CM) - Pelvic muscle wasting         Evaluation Date: 2/1/2022  Authorization Period Expiration: 1/31/23  Plan of Care Expiration: 4/15/2022  Progress Note Due: 3/1/22  Visit # / Visits authorized: 5/25     FOTO: Issued Visit # 1, 4     Time In: 12:55  Time Out: 1:50  Total Billable Time: 55 minutes    Precautions: Standard    Subjective     Pt reports:  Did lunges and squats at gym with awareness of breathing and didn't leak.  Did Nat and was able to release PFM.  Ordered vaginal wand  Not leaking in the mornings as much. Not wearing a pad during the day.   Has been having increased L hip/SIJ pain - not sure what she did.     She was compliant with home exercise program.  Response to previous treatment: Positive  Functional change: Improved awareness of tension in PF; decreased leaks with workouts at gym    Pain: pain is not an issue    Constitutional Symptoms Review: The patient denies having any constitutional symptoms.     Objective   Pt verbally consents to intravaginal treatment today.  Signed consent form already on file.     VAGINAL PELVIC FLOOR EXAM     EXTERNAL ASSESSMENT  Introitus: WNL  Skin condition: WNL  Scarring: episiotomy scar noted   Sensation: WNL   Pain:  none  Voluntary contraction: minimal lift  Voluntary relaxation: minimal drop   Involuntary contraction: bulge  Bearing down: visible drop  Perineal descent: absent                       INTERNAL ASSESSMENT  Pain: none   Sensation: able to sense generalized  "pressure, unable to identify location              Vaginal vault: roomy   Muscle Bulk: hypertonus  With tension noted B levator ani mm - mild on R and moderate on L  Muscle Power: 2/5   Muscle Endurance: nt   # Reps To Fatigue: nt               Fast Contractions in 10 seconds: nt                          Quality of contraction: slow rise and decreased hold   Specificity: WNL  Coordination: improved ability to perform isolated PFM contraction  Prolapse check:not assessed      Jaclyn received therapeutic exercises to develop  strength, endurance, core stabilization and coordination for 0 minutes including:  Squats with 20#KB x10  Step up/knee lift on 6" step with 20# KB x10 B  CC Paloff Press 10# 2x10 B  CC 1/2 kneel chops 10# 2x10 B      Not performed:  Happy baby pose  Piriformis stretch B  Seated adductor stretch  Standing HS stretch  *Pt held each stretch for 1 min    TA sets with PFM contraction x10 hold 5"  Hip adduction with ball and TA/PFM x10 hold 5"  Bridges with ball x10 hold 5"  SL bridges 1x10 B  Side plank from knees with adduction using ball 3x30" B    Jaclyn received the following manual therapy techniques: to develop extensibility for 28 minutes including: trigger point/myofascial release of levator ani and OI mm bilaterally  MET to correct anterior rotation of L innominate.     Neuromuscular Re-education to develop Coordination, Control, Down training and Proprioception for 27 minutes including: pelvic floor relaxation/bulging training, diaphragmatic breathing and TA activation.   Coordination of TA with PFM during core stabilization ex  Completely relaxing PFM following Kegel  Breathing coordination with TA during lifting activites     NMRE with e-stim using vaginal electrode at 40 Hz on 6sec/15sec work/rest cycle x15 Min (Intensity 25)    Home Exercises Provided and Patient Education Provided     Education provided:   - general anatomy/physiology of urinary/ bowel  system and benefits of " "treatment was discussed with the pt. Additionally, bladder irritants, anatomy/physiology of pelvic floor, diaphragmatic breathing, isometric abdominal exercises, kegels and behavior modifications were reviewed  -breathing during lifting ex  -NMRE with e-stim    Discussed progression of plan of care with patient; educated pt in activity modification; reviewed HEP with pt. Pt demonstrated and verbalized understanding of all instruction and was provided with a handout of HEP (see Patient Instructions).      Written Home Exercises Provided: pt to continue prior HEP.  Exercises were reviewed and Jaclyn was able to demonstrate them prior to the end of the session.  Jaclyn demonstrated good  understanding of the education provided.     See EMR under Patient Instructions for exercises provided 2/11/2022, 3/4/22    Assessment   Improving UI during gym activity.   Pt presents with moderate PFM tension L>R at levator ani which was addressed with manual release. Continued NMRE with d breathing and TA/PFM coordination. Trial of e-stim with vaginal electrode for muscle endurance training. Pt with improved understanding of "lift" with PFM contraction following e-stim. Tolerated without adverse effects.   Still very weak - improved palpable PFM contraction with TA activation.   Jaclyn Is progressing well towards her goals.   Pt prognosis is Good.     Pt will continue to benefit from skilled outpatient physical therapy to address the deficits listed in the problem list box on initial evaluation, provide pt/family education and to maximize pt's level of independence in the home and community environment.     Pt's spiritual, cultural and educational needs considered and pt agreeable to plan of care and goals.     Anticipated barriers to physical therapy: none    Goals:   Short Term Goals: 5 weeks  (progressing, not met)  Pt to be edu pelvic muscle bracing and be able to consistently perform correctly and quickly to help " decrease incontinence with cough/laugh/sneeze. (met)  Pt to report a decrease in pad usage to no more than 3 a day to demonstrate improving pelvic floor function needed for continence. (met)  Pt to be able to perform a 5  second kegel x 10 reps to demonstrate improving strength and endurance needed for continence. (progressing)  Pt to demonstrate being able to correctly and consistently perform a kegel which is needed  to increase pelvic floor muscle coordination and strength needed for continence.  Pt to be able to delay the urge to urinate at least 5 minutes when standing with a strong urge to urinate in order to make it to the bathroom without leaking.  Pt to voice understanding of the role that diet plays on urinary urgency. (met)                    Long Term Goals: 10 weeks (progressing, not met)  Pt to be discharged with home plan for carry over after discharge.    Pt to be able to perform a 10 second kegel x 10 reps to demonstrate improving strength and endurance needed for continence.  Pt to report a decrease in pad usage to 1 pads a day to demonstrate improving pelvic floor muscle controls as evidenced by decreased episodes of incontinence needed to improve confidence in social situations. (met)  Pt to be able to delay the urge to urinate at least 10 minutes when standing with a strong urge to urinate in order to make it to the bathroom without leaking.  Pt to report no longer feeling the need to urinate just in case when shopping or participating in social activities to demonstrate improving pelvic floor and bladder control.  Pt to report elimination of incontinence with ADLs to demonstrate improved pelvic floor muscle strength and coordination  Pt to demonstrate an improved score in the FOTO  survey  to at least 38% limitation to demonstrate improving continence.    Pt to increase pelvic floor strength to at least 3/5 to demonstrate improved strength needed for continence with ADLs.     Plan     Plan of  care Certification: 2/1/2022 to 4/15/2022.     Outpatient Physical Therapy 1 times weekly for 10 weeks to include the following interventions: therapeutic exercises, therapeutic activity, neuromuscular re-education, manual therapy, patient/family education, dry needling and self care/home management       Selina Stevenson, PT

## 2022-03-11 LAB — LPA SERPL-MCNC: 96 MG/DL (ref 0–30)

## 2022-03-16 ENCOUNTER — CLINICAL SUPPORT (OUTPATIENT)
Dept: REHABILITATION | Facility: HOSPITAL | Age: 42
End: 2022-03-16
Payer: COMMERCIAL

## 2022-03-16 ENCOUNTER — TELEPHONE (OUTPATIENT)
Dept: HEMATOLOGY/ONCOLOGY | Facility: CLINIC | Age: 42
End: 2022-03-16
Payer: COMMERCIAL

## 2022-03-16 ENCOUNTER — LAB VISIT (OUTPATIENT)
Dept: LAB | Facility: HOSPITAL | Age: 42
End: 2022-03-16
Payer: COMMERCIAL

## 2022-03-16 DIAGNOSIS — M62.89 PELVIC FLOOR TENSION: Primary | ICD-10-CM

## 2022-03-16 DIAGNOSIS — Z79.01 ANTICOAGULANT LONG-TERM USE: ICD-10-CM

## 2022-03-16 DIAGNOSIS — E61.1 IRON DEFICIENCY: ICD-10-CM

## 2022-03-16 DIAGNOSIS — R76.0 ANTICARDIOLIPIN ANTIBODY POSITIVE: ICD-10-CM

## 2022-03-16 DIAGNOSIS — R27.8 OTHER LACK OF COORDINATION: ICD-10-CM

## 2022-03-16 DIAGNOSIS — R76.0 ANTICARDIOLIPIN ANTIBODY POSITIVE: Primary | ICD-10-CM

## 2022-03-16 DIAGNOSIS — N81.89 PELVIC FLOOR WEAKNESS IN FEMALE: ICD-10-CM

## 2022-03-16 LAB
BASOPHILS # BLD AUTO: 0.05 K/UL (ref 0–0.2)
BASOPHILS NFR BLD: 0.6 % (ref 0–1.9)
DIFFERENTIAL METHOD: ABNORMAL
EOSINOPHIL # BLD AUTO: 0.2 K/UL (ref 0–0.5)
EOSINOPHIL NFR BLD: 2.9 % (ref 0–8)
ERYTHROCYTE [DISTWIDTH] IN BLOOD BY AUTOMATED COUNT: 11.9 % (ref 11.5–14.5)
HCT VFR BLD AUTO: 44 % (ref 37–48.5)
HGB BLD-MCNC: 14 G/DL (ref 12–16)
IMM GRANULOCYTES # BLD AUTO: 0.02 K/UL (ref 0–0.04)
IMM GRANULOCYTES NFR BLD AUTO: 0.3 % (ref 0–0.5)
LYMPHOCYTES # BLD AUTO: 3.1 K/UL (ref 1–4.8)
LYMPHOCYTES NFR BLD: 39.2 % (ref 18–48)
MCH RBC QN AUTO: 29.9 PG (ref 27–31)
MCHC RBC AUTO-ENTMCNC: 31.8 G/DL (ref 32–36)
MCV RBC AUTO: 94 FL (ref 82–98)
MONOCYTES # BLD AUTO: 0.6 K/UL (ref 0.3–1)
MONOCYTES NFR BLD: 7.3 % (ref 4–15)
NEUTROPHILS # BLD AUTO: 4 K/UL (ref 1.8–7.7)
NEUTROPHILS NFR BLD: 49.7 % (ref 38–73)
NRBC BLD-RTO: 0 /100 WBC
PLATELET # BLD AUTO: 358 K/UL (ref 150–450)
PMV BLD AUTO: 10 FL (ref 9.2–12.9)
RBC # BLD AUTO: 4.68 M/UL (ref 4–5.4)
WBC # BLD AUTO: 7.98 K/UL (ref 3.9–12.7)

## 2022-03-16 PROCEDURE — 80053 COMPREHEN METABOLIC PANEL: CPT | Performed by: INTERNAL MEDICINE

## 2022-03-16 PROCEDURE — 84466 ASSAY OF TRANSFERRIN: CPT | Performed by: INTERNAL MEDICINE

## 2022-03-16 PROCEDURE — 36415 COLL VENOUS BLD VENIPUNCTURE: CPT | Mod: PO | Performed by: INTERNAL MEDICINE

## 2022-03-16 PROCEDURE — 85025 COMPLETE CBC W/AUTO DIFF WBC: CPT | Performed by: INTERNAL MEDICINE

## 2022-03-16 PROCEDURE — 82728 ASSAY OF FERRITIN: CPT | Performed by: INTERNAL MEDICINE

## 2022-03-16 PROCEDURE — 97530 THERAPEUTIC ACTIVITIES: CPT | Mod: PN

## 2022-03-16 PROCEDURE — 97140 MANUAL THERAPY 1/> REGIONS: CPT | Mod: PN

## 2022-03-16 PROCEDURE — 97112 NEUROMUSCULAR REEDUCATION: CPT | Mod: PN

## 2022-03-16 NOTE — PROGRESS NOTES
"    Pelvic Health Physical Therapy   Treatment Note     Name: Jaclyn Garcia Rausch  Clinic Number: [id number]    Therapy Diagnosis:   Encounter Diagnoses   Name Primary?    Pelvic floor tension Yes    Pelvic floor weakness in female     Other lack of coordination      Physician: Khalida Gibson PA-C    Visit Date: 3/16/2022  Physician Orders: PT Eval and Treat   Medical Diagnosis from Referral:   N39.46 (ICD-10-CM) - Mixed stress and urge urinary incontinence   N81.84 (ICD-10-CM) - Pelvic muscle wasting         Evaluation Date: 2/1/2022  Authorization Period Expiration: 1/31/23  Plan of Care Expiration: 4/15/2022  Progress Note Due: 4/1/22  Visit # / Visits authorized: 6/25     FOTO: Issued Visit # 1, 4     Time In: 12:55  Time Out: 1:48  Total Billable Time: 53 minutes    Precautions: Standard    Subjective     Pt reports:  Did 45 min full body workout with Sumo squats and did 45sec jumping jacks without leaking. Being aware of not holding breath.  Got the Kegel estim  and has used it one time. Feels she has a better idea of pulling "up and in" with PFM during contraction.   Also got the [employer] vaginal wand but hasn't used it yet.   Still having some L lateral hip discomfort - better than it was last week.      She was compliant with home exercise program.  Response to previous treatment: Positive  Functional change: Improved awareness of tension in PF; did gym workout with no leaks; only wearing 1 pad a day.    Pain: pain is not an issue    Constitutional Symptoms Review: The patient denies having any constitutional symptoms.     Objective   Pt verbally consents to intravaginal treatment today.  Signed consent form already on file.       Jaclyn received the following manual therapy techniques: to develop extensibility for 15 minutes including: trigger point/myofascial release of levator ani and OI mm bilaterally  MET to correct anterior rotation of L innominate. (NP)    Neuromuscular " "Re-education to develop Coordination, Control, Down training and Proprioception for 28 minutes including: pelvic floor relaxation/bulging training, diaphragmatic breathing and TA activation.   Kegel endurance holds with intravaginal digital feedback - 10 reps holding 5"  Completely relaxing PFM following Kegel  Breathing coordination with TA during balance activites:  SLS on compliant foam  Warrior 3 position standing on compliant foam tapping chair seat    Therapeutic Activity 10 min:  Education in use of vaginal wand for TPR. Pt viewed instructional video followed by question and answers with PT.  Pt given written instructions as well.     Jaclyn received therapeutic exercises to develop  strength, endurance, core stabilization and coordination for 0 minutes including:     Not performed:  Squats with 20#KB x10  Step up/knee lift on 6" step with 20# KB x10 B  CC Paloff Press 10# 2x10 B  CC 1/2 kneel chops 10# 2x10 B   Happy baby pose  Piriformis stretch B  Seated adductor stretch  Standing HS stretch  *Pt held each stretch for 1 min  TA sets with PFM contraction x10 hold 5"  Hip adduction with ball and TA/PFM x10 hold 5"  Bridges with ball x10 hold 5"  SL bridges 1x10 B  Side plank from knees with adduction using ball 3x30" B    Home Exercises Provided and Patient Education Provided     Education provided:   - general anatomy/physiology of urinary/ bowel  system and benefits of treatment was discussed with the pt. Additionally, bladder irritants, anatomy/physiology of pelvic floor, diaphragmatic breathing, isometric abdominal exercises, kegels and behavior modifications were reviewed  -breathing during balance ex  -use of vaginal wand for TPR of PFM    Discussed progression of plan of care with patient; educated pt in activity modification; reviewed HEP with pt. Pt demonstrated and verbalized understanding of all instruction and was provided with a handout of education(see Patient Instructions).      Written " Home Exercises Provided: pt to continue prior HEP.  Exercises were reviewed and Jaclyn was able to demonstrate them prior to the end of the session.  Jaclyn demonstrated good  understanding of the education provided.     See EMR under Patient Instructions for exercises provided 2/11/2022, 3/4/22    Assessment   Able to lift at gym and do jumping jacks without leaks.   Pt presents with moderate PFM tension L>R at levator ani which was addressed with manual release. Continued NMRE with d breathing and TA/PFM coordination. Pt demonstrating improved awareness of PFM and ability to perform isolated contraction. Use of estim at home will continue to assist in this. Also instructed in use of vaginal wand for home TPR. Pt given written and video instruction and voiced understanding of all education.   Jaclyn Is progressing well towards her goals.   Pt prognosis is Good.     Pt will continue to benefit from skilled outpatient physical therapy to address the deficits listed in the problem list box on initial evaluation, provide pt/family education and to maximize pt's level of independence in the home and community environment.     Pt's spiritual, cultural and educational needs considered and pt agreeable to plan of care and goals.     Anticipated barriers to physical therapy: none    Goals:   Short Term Goals: 5 weeks  (progressing, not met)  Pt to be edu pelvic muscle bracing and be able to consistently perform correctly and quickly to help decrease incontinence with cough/laugh/sneeze. (met)  Pt to report a decrease in pad usage to no more than 3 a day to demonstrate improving pelvic floor function needed for continence. (met)  Pt to be able to perform a 5  second kegel x 10 reps to demonstrate improving strength and endurance needed for continence. (met)  Pt to demonstrate being able to correctly and consistently perform a kegel which is needed  to increase pelvic floor muscle coordination and strength needed for  continence. (progressing)  Pt to be able to delay the urge to urinate at least 5 minutes when standing with a strong urge to urinate in order to make it to the bathroom without leaking. (met)  Pt to voice understanding of the role that diet plays on urinary urgency. (met)                    Long Term Goals: 10 weeks (progressing, not met)  Pt to be discharged with home plan for carry over after discharge.    Pt to be able to perform a 10 second kegel x 10 reps to demonstrate improving strength and endurance needed for continence.  Pt to report a decrease in pad usage to 1 pads a day to demonstrate improving pelvic floor muscle controls as evidenced by decreased episodes of incontinence needed to improve confidence in social situations. (met)  Pt to be able to delay the urge to urinate at least 10 minutes when standing with a strong urge to urinate in order to make it to the bathroom without leaking.  Pt to report no longer feeling the need to urinate just in case when shopping or participating in social activities to demonstrate improving pelvic floor and bladder control.  Pt to report elimination of incontinence with ADLs to demonstrate improved pelvic floor muscle strength and coordination  Pt to demonstrate an improved score in the FOTO  survey  to at least 38% limitation to demonstrate improving continence.    Pt to increase pelvic floor strength to at least 3/5 to demonstrate improved strength needed for continence with ADLs.     Plan     Plan of care Certification: 2/1/2022 to 4/15/2022.     Outpatient Physical Therapy 1 times weekly for 10 weeks to include the following interventions: therapeutic exercises, therapeutic activity, neuromuscular re-education, manual therapy, patient/family education, dry needling and self care/home management       Selina Stevenson, PT

## 2022-03-16 NOTE — PATIENT INSTRUCTIONS
**[facility] youtube video - vaginal wand    TRIGGER POINT RELEASE WITH DILATOR / THERAWAND  1. Make sure the wand is clean, free of any visible soiling.  2. Lay back comfortably with your back well supported by several pillows and both knees bent.   3. Apply water-based lubricant (ie. Slippery stuff, coconut oil, olive oil) on the end of the wand AND vaginal opening.  4. Grab the end of the wand (with the bumps on the end) with one hand, and spread your labia with the other hand to visualize the entrance of the vagina; insert the wand.   5. Gently palpate your pelvic floor muscles with the wand for trigger points/muscle spasms.     To find the pelvic floor muscles:   - In the middle at 6 o'clock = Rectum   - Move a little to the right at 5 o'clock = Right levator ani muscle    - Move a little to the left at 7 o'clock = Left levator ani muscle    - A little more to the right at 3/4 o'clock = Right obturator internus muscle   - A little more to the left at 8/9 o'clock = Left obturator internus muscle   - If you feel sharp, stabbing pain = bone or pudendal nerve (wrong)    6. When you find a sensitive, painful spot (a knot/bump in your muscle), apply constant pressure TO YOUR TOLERANCE.   *Do not apply so much pressure that you cannot tolerate it, your muscles start tightening up, or it leaves you too painful to do again the following day.    7. You may start to feel a pulse, throbbing, or light burning sensations; keep your pressure constant!  8. Hold this until the muscle spasm has diminished and the pain has improved. It may take several minutes.  9. Focus on Diaphragmatic Breathing and DROPPING your pelvic floor muscle (releasing the tension).    10. Once finished, remove wand.  11. Wash with anti-bacterial soap and thoroughly rinse. Let air dry whenever possible. Store in a dry, clean location.   12. Perform for 5-15 minutes, as needed. Check for muscle spasms every day.    *Don't apply so  much pressure that it leaves you too sore to perform again the next day.    STOP if there is increased bleeding, an infection, or extreme pain.

## 2022-03-16 NOTE — TELEPHONE ENCOUNTER
----- Message from Yoli Martin, Patient Care Assistant sent at 3/16/2022 10:12 AM CDT -----  Regarding: Labs  Patient called in stating she would like her standing lab orders sent to [facility] lab.  # [phone]

## 2022-03-17 LAB
ALBUMIN SERPL BCP-MCNC: 4.2 G/DL (ref 3.5–5.2)
ALP SERPL-CCNC: 82 U/L (ref 55–135)
ALT SERPL W/O P-5'-P-CCNC: 27 U/L (ref 10–44)
ANION GAP SERPL CALC-SCNC: 9 MMOL/L (ref 8–16)
AST SERPL-CCNC: 26 U/L (ref 10–40)
BILIRUB SERPL-MCNC: 0.6 MG/DL (ref 0.1–1)
BUN SERPL-MCNC: 6 MG/DL (ref 6–20)
CALCIUM SERPL-MCNC: 9.4 MG/DL (ref 8.7–10.5)
CHLORIDE SERPL-SCNC: 102 MMOL/L (ref 95–110)
CO2 SERPL-SCNC: 25 MMOL/L (ref 23–29)
CREAT SERPL-MCNC: 0.8 MG/DL (ref 0.5–1.4)
EST. GFR  (AFRICAN AMERICAN): >60 ML/MIN/1.73 M^2
EST. GFR  (NON AFRICAN AMERICAN): >60 ML/MIN/1.73 M^2
FERRITIN SERPL-MCNC: 36 NG/ML (ref 20–300)
GLUCOSE SERPL-MCNC: 104 MG/DL (ref 70–110)
IRON SERPL-MCNC: 116 UG/DL (ref 30–160)
POTASSIUM SERPL-SCNC: 4 MMOL/L (ref 3.5–5.1)
PROT SERPL-MCNC: 7.1 G/DL (ref 6–8.4)
SATURATED IRON: 28 % (ref 20–50)
SODIUM SERPL-SCNC: 136 MMOL/L (ref 136–145)
TOTAL IRON BINDING CAPACITY: 419 UG/DL (ref 250–450)
TRANSFERRIN SERPL-MCNC: 283 MG/DL (ref 200–375)

## 2022-03-21 ENCOUNTER — OFFICE VISIT (OUTPATIENT)
Dept: DERMATOLOGY | Facility: CLINIC | Age: 42
End: 2022-03-21
Payer: COMMERCIAL

## 2022-03-21 ENCOUNTER — OFFICE VISIT (OUTPATIENT)
Dept: PSYCHIATRY | Facility: CLINIC | Age: 42
End: 2022-03-21
Payer: COMMERCIAL

## 2022-03-21 VITALS — HEIGHT: 65 IN | BODY MASS INDEX: 30.99 KG/M2 | WEIGHT: 186 LBS

## 2022-03-21 DIAGNOSIS — F41.1 GAD (GENERALIZED ANXIETY DISORDER): Primary | ICD-10-CM

## 2022-03-21 DIAGNOSIS — D22.9 MULTIPLE BENIGN NEVI: ICD-10-CM

## 2022-03-21 DIAGNOSIS — D48.5 NEOPLASM OF UNCERTAIN BEHAVIOR OF SKIN: Primary | ICD-10-CM

## 2022-03-21 DIAGNOSIS — L81.4 SOLAR LENTIGO: ICD-10-CM

## 2022-03-21 PROCEDURE — 1159F MED LIST DOCD IN RCRD: CPT | Mod: CPTII,S$GLB,, | Performed by: DERMATOLOGY

## 2022-03-21 PROCEDURE — 99213 PR OFFICE/OUTPT VISIT, EST, LEVL III, 20-29 MIN: ICD-10-PCS | Mod: 25,S$GLB,, | Performed by: DERMATOLOGY

## 2022-03-21 PROCEDURE — 11102 TANGNTL BX SKIN SINGLE LES: CPT | Mod: S$GLB,,, | Performed by: DERMATOLOGY

## 2022-03-21 PROCEDURE — 1159F PR MEDICATION LIST DOCUMENTED IN MEDICAL RECORD: ICD-10-PCS | Mod: CPTII,S$GLB,, | Performed by: DERMATOLOGY

## 2022-03-21 PROCEDURE — 88305 TISSUE EXAM BY PATHOLOGIST: CPT | Mod: 26,,, | Performed by: PATHOLOGY

## 2022-03-21 PROCEDURE — 1159F MED LIST DOCD IN RCRD: CPT | Mod: CPTII,S$GLB,, | Performed by: SOCIAL WORKER

## 2022-03-21 PROCEDURE — 1160F RVW MEDS BY RX/DR IN RCRD: CPT | Mod: CPTII,S$GLB,, | Performed by: DERMATOLOGY

## 2022-03-21 PROCEDURE — 88305 TISSUE EXAM BY PATHOLOGIST: CPT | Performed by: PATHOLOGY

## 2022-03-21 PROCEDURE — 99213 OFFICE O/P EST LOW 20 MIN: CPT | Mod: 25,S$GLB,, | Performed by: DERMATOLOGY

## 2022-03-21 PROCEDURE — 90834 PR PSYCHOTHERAPY W/PATIENT, 45 MIN: ICD-10-PCS | Mod: S$GLB,,, | Performed by: SOCIAL WORKER

## 2022-03-21 PROCEDURE — 1159F PR MEDICATION LIST DOCUMENTED IN MEDICAL RECORD: ICD-10-PCS | Mod: CPTII,S$GLB,, | Performed by: SOCIAL WORKER

## 2022-03-21 PROCEDURE — 99999 PR PBB SHADOW E&M-EST. PATIENT-LVL III: ICD-10-PCS | Mod: PBBFAC,,, | Performed by: DERMATOLOGY

## 2022-03-21 PROCEDURE — 11102 PR TANGENTIAL BIOPSY, SKIN, SINGLE LESION: ICD-10-PCS | Mod: S$GLB,,, | Performed by: DERMATOLOGY

## 2022-03-21 PROCEDURE — 1160F PR REVIEW ALL MEDS BY PRESCRIBER/CLIN PHARMACIST DOCUMENTED: ICD-10-PCS | Mod: CPTII,S$GLB,, | Performed by: DERMATOLOGY

## 2022-03-21 PROCEDURE — 3008F BODY MASS INDEX DOCD: CPT | Mod: CPTII,S$GLB,, | Performed by: DERMATOLOGY

## 2022-03-21 PROCEDURE — 3008F PR BODY MASS INDEX (BMI) DOCUMENTED: ICD-10-PCS | Mod: CPTII,S$GLB,, | Performed by: DERMATOLOGY

## 2022-03-21 PROCEDURE — 99999 PR PBB SHADOW E&M-EST. PATIENT-LVL III: CPT | Mod: PBBFAC,,, | Performed by: DERMATOLOGY

## 2022-03-21 PROCEDURE — 90834 PSYTX W PT 45 MINUTES: CPT | Mod: S$GLB,,, | Performed by: SOCIAL WORKER

## 2022-03-21 PROCEDURE — 88305 TISSUE EXAM BY PATHOLOGIST: ICD-10-PCS | Mod: 26,,, | Performed by: PATHOLOGY

## 2022-03-21 NOTE — PROGRESS NOTES
Individual Psychotherapy (PhD/LCSW)    3/21/2022    Site:  [facility]         Therapeutic Intervention: Met with patient.  Outpatient - Supportive psychotherapy 45 min - CPT Code 76759    Chief complaint/reason for encounter: depression and anxiety     Interval history and content of current session: Last visit with me: 02/21/22. Jaclyn shared that she has been doing well for the past month, saying that she has not had many major stressors and feels that she is continuing to manage things well. 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 Jaclyn said that she has been struggling with how to approach her regarding this and still display empathy. We discussed how she could communicate empathy as well as set clear expectations and boundaries and offer possible solutions to her coworker. We also discussed how she is somewhat worried about her 2 sisters who are going through a divorce and if they will go back to unhealthy behaviors they previously engaged in. Will discuss this more next session.     Treatment plan:  · Target symptoms: depression, anxiety   · Why chosen therapy is appropriate versus another modality: relevant to diagnosis, patient responds to this modality  · Outcome monitoring methods: self-report  · Therapeutic intervention type: supportive psychotherapy    Risk parameters:  Patient reports no suicidal ideation  Patient reports no homicidal ideation  Patient reports no self-injurious behavior  Patient reports no violent behavior    Verbal deficits: None    Patient's response to intervention:  The patient's response to intervention is accepting.    Progress toward goals and other mental status changes:  The patient's progress toward goals is good.    Diagnosis:     ICD-10-CM ICD-9-CM   1. AGGIE (generalized anxiety disorder)  F41.1 300.02       Plan:  individual psychotherapy and medication  management by physician Pt to go to ED or call 911 if symptoms worsen or if she has thoughts of harming self and/or others. Pt verbalized understanding.    Return to clinic: as scheduled    Length of Service (minutes): 45      Each patient to whom he or she provides medical services by telemedicine is: (1) informed of the relationship between the physician and patient and the respective role of any other health care provider with respect to management of the patient; and (2) notified that he or she may decline to receive medical services by telemedicine and may withdraw from such care at any time.

## 2022-03-21 NOTE — PROGRESS NOTES
[facility] Hematology/Oncology  PROGRESS NOTE      Subjective:       Patient ID:   NAME: Jaclyn Rausch : 1980     41 y.o. female    Referring Doc: Khalida Gibson (new PCP)  Other Physicians: Pearl Frank/Gertrudis Ariza (neuro); Federico Guzmán (GYN)    Chief Complaint:  Clot disorder f/u    History of Present Illness:     Patient returns today for a regularly scheduled follow-up visit.  The patient is here by herself. She is doing ok with no new issues. She is on eliquis. She denies any CP, SOB, HA's or N/V.  She denies any excessive bruising or bleeding.   GERD has been stable. No recent migraines in some time.       Discussed covid19 precautions - she had her vaccinations              ROS:   GEN: normal without any fever, night sweats or weight loss  HEENT: normal with no HA's, sore throat, stiff neck, changes in vision  CV: normal with no CP, SOB, PND, BAILEY or orthopnea  PULM: normal with no SOB, cough, hemoptysis, sputum or pleuritic pain  GI: normal with no abdominal pain, nausea, vomiting, constipation, diarrhea, melanotic stools, BRBPR, or hematemesis  : normal with no hematuria, dysuria  BREAST: normal with no mass, discharge, pain  SKIN: normal with no rash, erythema, bruising, or swelling    Allergies:  Review of patient's allergies indicates:  No Known Allergies    Medications:    Current Outpatient Medications:     ascorbic acid, vitamin C, (VITAMIN C) 100 MG tablet, Take 100 mg by mouth once daily., Disp: , Rfl:     atorvastatin (LIPITOR) 40 MG tablet, TAKE 1 TABLET(40 MG) BY MOUTH EVERY DAY, Disp: 90 tablet, Rfl: 3    ELIQUIS 5 mg Tab, TAKE 1 TABLET BY MOUTH TWICE DAILY, Disp: 60 tablet, Rfl: 11    ketoconazole (NIZORAL) 2 % shampoo, WASH TRUNK WITH MEDICATED SHAMPOO ONCE TO TWICE A WEEK. LET SIT ON SKIN AT LEAST 5 MINUTES PRIOR TO RINSING OFF (Patient not taking: Reported on 3/21/2022), Disp: 120 mL, Rfl: 5    pantoprazole (PROTONIX) 20 MG tablet, TAKE 1 TABLET(20 MG) BY MOUTH  "EVERY DAY (Patient not taking: Reported on 3/21/2022), Disp: 30 tablet, Rfl: 0  No current facility-administered medications for this visit.    Facility-Administered Medications Ordered in Other Visits:     electrolyte-S (ISOLYTE), , Intravenous, Continuous, Héctor Trujillo MD, Stopped at 01/10/20 0828    lidocaine (PF) 10 mg/ml (1%) injection 10 mg, 1 mL, Intradermal, Once, Héctor Trujillo MD    PMHx/PSHx Updates:  See patient's last visit with me on 9/13/2021  See H&P on 5/4/2018        Pathology:  Cancer Staging  No matching staging information was found for the patient.          Objective:     Vitals:  Blood pressure (!) 119/53, pulse 77, temperature 98.2 °F (36.8 °C), resp. rate 18, height 5' 5" (1.651 m), weight 83.9 kg (185 lb).    Physical Examination:   GEN: no apparent distress, comfortable; AAOx3  HEAD: atraumatic and normocephalic  EYES: no pallor, no icterus, PERRLA  ENT: OMM, no pharyngeal erythema, external ears WNL; no nasal discharge; no thrush  NECK: no masses, thyroid normal, trachea midline, no LAD/LN's, supple  CV: RRR with no murmur; normal pulse; normal S1 and S2; no pedal edema  CHEST: Normal respiratory effort; CTAB; normal breath sounds; no wheeze or crackles  ABDOM: nontender and nondistended; soft; normal bowel sounds; no rebound/guarding  MUSC/Skeletal: ROM normal; no crepitus; joints normal; no deformities or arthropathy  EXTREM: no clubbing, cyanosis, inflammation or swelling  SKIN: no rashes, lesions, ulcers, petechiae or subcutaneous nodules  : no gonzalez  NEURO: grossly intact; motor/sensory WNL; AAOx3; no tremors  PSYCH: normal mood, affect and behavior  LYMPH: normal cervical, supraclavicular, axillary and groin LN's            Labs:      Lab Results   Component Value Date    WBC 7.98 03/16/2022    HGB 14.0 03/16/2022    HCT 44.0 03/16/2022    MCV 94 03/16/2022     03/16/2022     BMP  Lab Results   Component Value Date     03/16/2022    K 4.0 03/16/2022     " 03/16/2022    CO2 25 03/16/2022    BUN 6 03/16/2022    CREATININE 0.8 03/16/2022    CALCIUM 9.4 03/16/2022    ANIONGAP 9 03/16/2022    ESTGFRAFRICA >60.0 03/16/2022    EGFRNONAA >60.0 03/16/2022     Lab Results   Component Value Date    ALT 27 03/16/2022    AST 26 03/16/2022    ALKPHOS 82 03/16/2022    BILITOT 0.6 03/16/2022     Lab Results   Component Value Date    IRON 116 03/16/2022    TIBC 419 03/16/2022    FERRITIN 36 03/16/2022           Radiology/Diagnostic Studies:    No results found.    I have reviewed all available lab results and radiology reports.    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 She has been seen by Dr Frank with cardiology and Dr Pearl Roca with neurology   - hematology was previously consulted for evaluation for hypercoag workup which I ordered while she was inpatient  - she is currently on Eliquis oral anticoagulation; neurology discontinued the aspirin  - she saw for f/u with Dr Pearl Roca with neurology as outpatient in Dec 2018  - she saw Evette NP in Oct 2018  - factor XII was elevated at 144 and  factor IX was elevated at 147, but mildly and may be reactive in nature only  - Lipoprotein-A was elevated at 103; APA IgM was elevated at 17.99  - homocysteine was WNL; ATIII was normal  - prothrombin II gene was normal, LA was negative; factor V leiden was normal  - protein C and S was adequate    3/10/2021:  - continued on eliquis with no new issues    9/13/2021:  - continued on Eliquis  - doing well  - labs are adequate    3/22/2022:  - latest labs are WNL  - continued on eliquis  - no new bleeding or excessive bruising     (2) Chronic borderline anemia in past - current hgb is within normal limits  - prior ferritin levels were low  - she has some stomach issue with OTC iron  - check up to date iron panel every 6 months  - s/p ablation and no longer on the  ICAR-C     (3) Hx/of migraine headaches     (4) Atrial septal aneurysm - followed by Dr Frank with cardiology as outpatient  - she had bubble study and JUAN while in hospital  - there is no opening or hole per patient    - she is now followed by Dr SIXTO Phillips     (5) Recent loss of pregnancy in Jan 2018 in first trimester     (6) Hypercholesterolemia - now on lipitor    (7) GYN following - Dr Federico Polanco with [facility] - heavier menstrual cycles resolved since undergoing ablation        Clotting disorder    Anticoagulant long-term use    Anticardiolipin antibody positive    Abnormal uterine bleeding (AUB)    Iron deficiency          PLAN:  1. Continue eliquis (possibly life-long), and anti-hypercholesterol meds  2. She has since been out of the   3. F/u with PCP, Neuro and cardiology  4.  RTC in  6 months with labs every 6 months     Fax note to Paige Higgins, Arielle Gillespie; Gabriel (new PCP)    Discussion:       COVID-19 Discussion:    I had long discussion with patient and any applicable family about the COVID-19 coronavirus epidemic and the recommended precautions with regard to cancer and/or hematology patients. I have re-iterated the CDC recommendations for adequate hand washing, use of hand -like products, and coughing into elbow, etc. In addition, especially for our patients who are on chemotherapy and/or our otherwise immunocompromised patients, I have recommended avoidance of crowds, including movie theaters, restaurants, churches, etc. I have recommended avoidance of any sick or symptomatic family members and/or friends. I have also recommended avoidance of any raw and unwashed food products, and general avoidance of food items that have not been prepared by themselves. The patient has been asked to call us immediately with any symptom developments, issues, questions or other general concerns.       Anticoagulation Discussion:    Discussed with patient and any applicable family  members about the benefit and/or need for anticoagulation. I communicated about the risks of bleeding while on any anticoagulation, which could be serious and/or life-threatening, and which can occur at any time, regardless of degree of the level of anticoagulation. I expressed the need for compliance with any anticoagulation regimen and that failure to do so could potential lead to excessive bleeding, and risk to health and/or life. In particular, with patients on coumadin therapy, compliance with requested blood work is absolutely essential, as coumadin levels can vary from time to time, and failure to do so could potentially place the patient at risk for bleeding and/or clotting events which could be fatal. Patients on coumadin are encouraged to call the day after they have their levels drawn, as to obtain the appropriate instructions from my staff. Patients are aware that self-regulating or self-dosing of their medications is strictly prohibited.     I have explained all of the above in detail and the patient understands all of the current recommendation(s). I have answered all of their questions to the best of my ability and to their complete satisfaction.   The patient is to continue with the current management plan.            Electronically signed by Jesus Sprague MD

## 2022-03-21 NOTE — PATIENT INSTRUCTIONS
Shave Biopsy Wound Care    Your doctor has performed a shave biopsy today.  A band aid and vaseline ointment has been placed over the site.  This should remain in place for 24 hours.  It is recommended that you keep the area dry for the first 24 hours.  After 24 hours, you may remove the band aid and wash the area with warm soap and water and apply Vaseline jelly.  Many patients prefer to use Neosporin or Bacitracin ointment.  This is acceptable; however, know that you can develop an allergy to this medication even if you have used it safely for years.  It is important to keep the area moist.  Letting it dry out and get air slows healing time, and will worsen the scar.  Band aid is optional after first 24 hours.      If you notice increasing redness, tenderness, pain, or yellow drainage at the biopsy site, please notify your doctor.  These are signs of an infection.    If your biopsy site is bleeding, apply firm pressure for 15 minutes straight.  Repeat for another 15 minutes, if it is still bleeding.   If the surgical site continues to bleed, then please contact your doctor.       [facility]  [facility] - DERMATOLOGY  [address] [place] [address], [address] [address]  Dept: [phone]

## 2022-03-21 NOTE — PROGRESS NOTES
Subjective:       Patient ID:  Jaclyn Rausch is a 41 y.o. 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     Denies Phx of NMSC  Denies Fhx of MM      Current Outpatient Medications:     ascorbic acid, vitamin C, (VITAMIN C) 100 MG tablet, Take 100 mg by mouth once daily., Disp: , Rfl:     atorvastatin (LIPITOR) 40 MG tablet, TAKE 1 TABLET(40 MG) BY MOUTH EVERY DAY, Disp: 90 tablet, Rfl: 3    ELIQUIS 5 mg Tab, TAKE 1 TABLET BY MOUTH TWICE DAILY, Disp: 60 tablet, Rfl: 11    ketoconazole (NIZORAL) 2 % shampoo, WASH TRUNK WITH MEDICATED SHAMPOO ONCE TO TWICE A WEEK. LET SIT ON SKIN AT LEAST 5 MINUTES PRIOR TO RINSING OFF (Patient not taking: Reported on 3/21/2022), Disp: 120 mL, Rfl: 5    pantoprazole (PROTONIX) 20 MG tablet, TAKE 1 TABLET(20 MG) BY MOUTH EVERY DAY (Patient not taking: Reported on 3/21/2022), Disp: 30 tablet, Rfl: 0          Review of Systems   Constitutional: Negative for fever, chills and fatigue.   Respiratory: Negative for cough and shortness of breath.    Skin: Positive for dry skin and activity-related sunscreen use. Negative for itching, rash, daily sunscreen use, recent sunburn and dry lips.        Objective:    Physical Exam   Constitutional: She appears well-developed and well-nourished. No distress.   Neurological: She is alert and oriented to person, place, and time. She is not disoriented.   Psychiatric: She has a normal mood and affect.   Skin:   Areas Examined (abnormalities noted in diagram):   Scalp / Hair Palpated and Inspected  Head / Face Inspection Performed  Neck Inspection Performed                       Diagram Legend     Erythematous scaling macule/papule c/w actinic keratosis       Vascular papule c/w angioma      Pigmented  verrucoid papule/plaque c/w seborrheic keratosis      Yellow umbilicated papule c/w sebaceous hyperplasia      Irregularly shaped tan macule c/w lentigo     1-2 mm smooth white papules consistent with Milia      Movable subcutaneous cyst with punctum c/w epidermal inclusion cyst      Subcutaneous movable cyst c/w pilar cyst      Firm pink to brown papule c/w dermatofibroma      Pedunculated fleshy papule(s) c/w skin tag(s)      Evenly pigmented macule c/w junctional nevus     Mildly variegated pigmented, slightly irregular-bordered macule c/w mildly atypical nevus      Flesh colored to evenly pigmented papule c/w intradermal nevus       Pink pearly papule/plaque c/w basal cell carcinoma      Erythematous hyperkeratotic cursted plaque c/w SCC      Surgical scar with no sign of skin cancer recurrence      Open and closed comedones      Inflammatory papules and pustules      Verrucoid papule consistent consistent with wart     Erythematous eczematous patches and plaques     Dystrophic onycholytic nail with subungual debris c/w onychomycosis     Umbilicated papule    Erythematous-base heme-crusted tan verrucoid plaque consistent with inflamed seborrheic keratosis     Erythematous Silvery Scaling Plaque c/w Psoriasis     See annotation          Assessment / Plan:      Pathology Orders:     Normal Orders This Visit    Specimen to Pathology, Dermatology     Questions:    Procedure Type: Dermatology and skin neoplasms    Number of Specimens: 1    ------------------------: -------------------------    Spec 1 Procedure: Biopsy    Spec 1 Clinical Impression: Warty hyperpigmented papule, ISK vs compound nevus vs other    Spec 1 Source: left anterior hairline    Release to patient:         Neoplasm of uncertain behavior of skin  -     Specimen to Pathology, Dermatology  Shave biopsy procedure note:    Shave biopsy performed after verbal consent including risk of infection, scar, recurrence, need for additional treatment of site.  Area prepped with alcohol, anesthetized with approximately 1.0cc of 1% lidocaine with epinephrine. Lesional tissue shaved with razor blade. Hemostasis achieved with application of aluminum chloride followed by hyfrecation. No complications. Dressing applied. Wound care explained.    Multiple benign nevi  Careful dermoscopy evaluation of nevi performed with none identified as needing biopsy today  Monitor for new mole or moles that are becoming bigger, darker, irritated, or developing irregular borders.     Solar lentigo  This is a benign hyperpigmented sun induced lesion. Daily sun protection will reduce the number of new lesions. Treatment of these benign lesions are considered cosmetic.    Patient instructed in importance in daily broad spectrum sun protection of at least spf 30. Mineral sunscreen ingredients preferred (Zinc +/- Titanium) and can be found OTC.   Recommend Elta MD for daily use on face and neck.  Patient encouraged to wear hat for all outdoor exposure.   Also discussed sun avoidance and use of protective clothing.           Follow up if symptoms worsen or fail to improve.

## 2022-03-22 ENCOUNTER — OFFICE VISIT (OUTPATIENT)
Dept: HEMATOLOGY/ONCOLOGY | Facility: CLINIC | Age: 42
End: 2022-03-22
Payer: COMMERCIAL

## 2022-03-22 VITALS
RESPIRATION RATE: 18 BRPM | DIASTOLIC BLOOD PRESSURE: 53 MMHG | SYSTOLIC BLOOD PRESSURE: 119 MMHG | HEART RATE: 77 BPM | BODY MASS INDEX: 30.82 KG/M2 | HEIGHT: 65 IN | WEIGHT: 185 LBS | TEMPERATURE: 98 F

## 2022-03-22 DIAGNOSIS — N93.9 ABNORMAL UTERINE BLEEDING (AUB): ICD-10-CM

## 2022-03-22 DIAGNOSIS — E61.1 IRON DEFICIENCY: ICD-10-CM

## 2022-03-22 DIAGNOSIS — Z79.01 ANTICOAGULANT LONG-TERM USE: ICD-10-CM

## 2022-03-22 DIAGNOSIS — R76.0 ANTICARDIOLIPIN ANTIBODY POSITIVE: ICD-10-CM

## 2022-03-22 DIAGNOSIS — D68.9 CLOTTING DISORDER: Primary | ICD-10-CM

## 2022-03-22 PROCEDURE — 1160F RVW MEDS BY RX/DR IN RCRD: CPT | Mod: S$GLB,,, | Performed by: INTERNAL MEDICINE

## 2022-03-22 PROCEDURE — 3078F PR MOST RECENT DIASTOLIC BLOOD PRESSURE < 80 MM HG: ICD-10-PCS | Mod: S$GLB,,, | Performed by: INTERNAL MEDICINE

## 2022-03-22 PROCEDURE — 1160F PR REVIEW ALL MEDS BY PRESCRIBER/CLIN PHARMACIST DOCUMENTED: ICD-10-PCS | Mod: S$GLB,,, | Performed by: INTERNAL MEDICINE

## 2022-03-22 PROCEDURE — 3074F PR MOST RECENT SYSTOLIC BLOOD PRESSURE < 130 MM HG: ICD-10-PCS | Mod: S$GLB,,, | Performed by: INTERNAL MEDICINE

## 2022-03-22 PROCEDURE — 99213 OFFICE O/P EST LOW 20 MIN: CPT | Mod: S$GLB,,, | Performed by: INTERNAL MEDICINE

## 2022-03-22 PROCEDURE — 3008F PR BODY MASS INDEX (BMI) DOCUMENTED: ICD-10-PCS | Mod: S$GLB,,, | Performed by: INTERNAL MEDICINE

## 2022-03-22 PROCEDURE — 3008F BODY MASS INDEX DOCD: CPT | Mod: S$GLB,,, | Performed by: INTERNAL MEDICINE

## 2022-03-22 PROCEDURE — 3074F SYST BP LT 130 MM HG: CPT | Mod: S$GLB,,, | Performed by: INTERNAL MEDICINE

## 2022-03-22 PROCEDURE — 3078F DIAST BP <80 MM HG: CPT | Mod: S$GLB,,, | Performed by: INTERNAL MEDICINE

## 2022-03-22 PROCEDURE — 99213 PR OFFICE/OUTPT VISIT, EST, LEVL III, 20-29 MIN: ICD-10-PCS | Mod: S$GLB,,, | Performed by: INTERNAL MEDICINE

## 2022-03-30 ENCOUNTER — PATIENT MESSAGE (OUTPATIENT)
Dept: DERMATOLOGY | Facility: CLINIC | Age: 42
End: 2022-03-30
Payer: COMMERCIAL

## 2022-03-30 LAB
FINAL PATHOLOGIC DIAGNOSIS: NORMAL
GROSS: NORMAL
Lab: NORMAL
MICROSCOPIC EXAM: NORMAL

## 2022-03-30 NOTE — PROGRESS NOTES
This lesion is benign and no further intervention is warranted. Please call us if you have any question.  Thank you!   Dr Keller     Skin, left anterior hairline, shave biopsy:   -SEBORRHEIC KERATOSIS, PIGMENTED

## 2022-04-04 NOTE — PROGRESS NOTES
"Individual Psychotherapy (PhD/LCSW)    4/11/2022    Site:  [facility]         Therapeutic Intervention: Met with patient.  Outpatient - Supportive psychotherapy 45 min - CPT Code 70725    Chief complaint/reason for encounter: depression and anxiety     Interval history and content of current session: Jaclyn shared that overall she has been doing well since our last session, with her noting, "I've been really happy with how good my anxiety's been." For most of this session, therapist provided empathic support and engaged in active listening as Jaclyn shared that she discovered that her daughter was cutting herself, spoke with her daughter about how she has been feeling, and helping her daughter get into counseling. Therapist validated Jaclyn for speaking with her daughter and not punishing her for cutting. We discussed that Jaclyn is also trying to spend more time with her daughter as well. We discussed that Jaclyn feels that she would still like to do EMDR, and we will continue to engage in preparation next session.     Treatment plan:  · Target symptoms: depression, anxiety   · Why chosen therapy is appropriate versus another modality: relevant to diagnosis, patient responds to this modality  · Outcome monitoring methods: self-report  · Therapeutic intervention type: insight oriented psychotherapy, behavior modifying psychotherapy, supportive psychotherapy    Risk parameters:  Patient reports no suicidal ideation  Patient reports no homicidal ideation  Patient reports no self-injurious behavior  Patient reports no violent behavior    Verbal deficits: None    Patient's response to intervention:  The patient's response to intervention is accepting.    Progress toward goals and other mental status changes:  The patient's progress toward goals is good.    Diagnosis:     ICD-10-CM ICD-9-CM   1. AGGIE (generalized anxiety disorder)  F41.1 300.02       Plan:  individual psychotherapy and medication management by " physician Pt to go to ED or call 911 if symptoms worsen or if she has thoughts of harming self and/or others. Pt verbalized understanding.    Return to clinic: as scheduled    Length of Service (minutes): 45      Each patient to whom he or she provides medical services by telemedicine is: (1) informed of the relationship between the physician and patient and the respective role of any other health care provider with respect to management of the patient; and (2) notified that he or she may decline to receive medical services by telemedicine and may withdraw from such care at any time.

## 2022-04-06 ENCOUNTER — CLINICAL SUPPORT (OUTPATIENT)
Dept: REHABILITATION | Facility: HOSPITAL | Age: 42
End: 2022-04-06
Payer: COMMERCIAL

## 2022-04-06 DIAGNOSIS — N81.89 PELVIC FLOOR WEAKNESS IN FEMALE: ICD-10-CM

## 2022-04-06 DIAGNOSIS — M62.89 PELVIC FLOOR TENSION: Primary | ICD-10-CM

## 2022-04-06 DIAGNOSIS — R27.8 OTHER LACK OF COORDINATION: ICD-10-CM

## 2022-04-06 PROCEDURE — 97112 NEUROMUSCULAR REEDUCATION: CPT | Mod: PN

## 2022-04-06 PROCEDURE — 97140 MANUAL THERAPY 1/> REGIONS: CPT | Mod: PN

## 2022-04-06 NOTE — PATIENT INSTRUCTIONS
What To Expect After I ntegrative Dry Needling ® Treatments           How will I feel after a session of IDN?    You may feel some soreness immediately after treatment in the area of the body you were treated. This does not always occur but should be expected and is considered normal. It can also take up to a few hours, or even until the next day, to feel an onset of soreness. The soreness may vary from person to person and based on the area of the body that was treated, but it typically feels like you had an intense workout at the gym. Soreness typically lasts 24-48 hours. Make sure to indicate to your provider at a follow-up appointment how long the soreness lasted.  Bruising from the treatment is possible, somehow uncommon, but it is not of concern. Some areas are more likely to bruise than others, including the shoulders, chest, face, and portions of the extremities. Large bruising rarely occurs, but is possible. Use ice to help decrease the bruising and if you feel concern, please call your provider.   It is common to feel tired/fatigued, energized, emotional, giggly, or out of it after treatment. This is a normal response that can last up to an hour or two after treatment. If this lasts beyond a day, contact your provider as a precaution.  There are times when treatment may actually exacerbate your symptoms. This is normal and may indicate that you need to follow up sooner with your practitioner to continue treatment. If this continues past the 24-48 hour window, keep note of it, as this can help your provider adjust your treatment plan if needed based on your report. This does not mean that FDN cannot help your condition.    What should I do after my treatment and what is recommended?    We highly recommend increasing your water intake for the next 24 hours after treatment to help avoid or reduce soreness. We also recommend soaking in a hot bath or hot tub to help relieve post treatment soreness and to  soften the symptoms associated with the treatment you received. After dry needling treatment, you may do the following based on your comfort level. Please note that if it hurts or exacerbates your symptoms, then discontinuing the activity is best.    Work out and/or stretch.  Participate in normal physical activity.  Massage the area.  Use heat or ice as preferred for post treatment soreness.  If you have prescription medicines, continue to take them as prescribed.    What should I avoid after treatment?    Unfamiliar physical activities or sports.  Doing more than you normally do.  Excessive alcohol intake.  If you are feeling light headed, or experience difficulty breathing, chest pain, or any other concerning symptoms after treatment, call us immediately. If you are unable to get a hold of us, please call your physician.

## 2022-04-06 NOTE — PROGRESS NOTES
Pelvic Health Physical Therapy   Treatment Note     Name: Jaclyn Garcia Rausch  Clinic Number: [id number]    Therapy Diagnosis:   Encounter Diagnoses   Name Primary?    Pelvic floor tension Yes    Pelvic floor weakness in female     Other lack of coordination      Physician: Khalida Gibson PA-C    Visit Date: 4/6/2022  Physician Orders: PT Eval and Treat   Medical Diagnosis from Referral:   N39.46 (ICD-10-CM) - Mixed stress and urge urinary incontinence   N81.84 (ICD-10-CM) - Pelvic muscle wasting         Evaluation Date: 2/1/2022  Authorization Period Expiration: 1/31/23  Plan of Care Expiration: 4/15/2022  Progress Note Due: 4/15/22  Visit # / Visits authorized: 8/25     FOTO: Issued Visit # 1, 4     Time In: 1:02  Time Out: 1:56  Total Billable Time: 54 minutes    Precautions: Standard    Subjective     Pt reports:  Able to lift heavier weights at gym and not leaking. Able to make it to bathroom in am without leaking.   Got the Smart[employer] estim  and has used it daily.  Also got the Intimate Gladys vaginal wand but hasn't used it yet.   Still having some L lateral hip discomfort - got on inversion table and it helped. Has to sleep on stomach or back - can't lay on left hip.      She was compliant with home exercise program.  Response to previous treatment: Positive  Functional change: Improved awareness of tension in PF; doing gym workout with no leaks; only wearing 1 pad a day.    Pain: pain is not an issue    Constitutional Symptoms Review: The patient denies having any constitutional symptoms.     Objective   Pt verbally consents to intravaginal treatment today.  Signed consent form already on file.     INTERNAL ASSESSMENT  Pain: none   Sensation: able to sense generalized pressure, unable to identify location              Vaginal vault: roomy   Muscle Bulk: hypertonus  With tension noted B levator ani mm - mild on R and moderate on L  Muscle Power: 2+/5   Muscle Endurance: 5   # Reps To Fatigue:  "7              Fast Contractions in 10 seconds: nt                          Quality of contraction: slow rise and decreased hold   Specificity: WNL  Coordination: improved ability to perform isolated PFM contraction  Prolapse check:not assessed    TTP of L piriformis and obturator internus at L post hip     Jaclyn received the following manual therapy techniques: to develop extensibility for 38 minutes includin min trigger point/myofascial release of levator ani and OI mm bilaterally     (13 min)  Following informed written consent pt received Integrative Dry Needling as follows:  R piriformis and OI with 3 .33g64ym needles  IMS applied at 2.0mA x 10 min  Pt had no adverse response to needling treatment and voiced understanding of post-needling instructions (see Pt Instruction tab).    Neuromuscular Re-education to develop Coordination, Control, Down training and Proprioception for 11 minutes including: pelvic floor relaxation/bulging training, diaphragmatic breathing and TA activation.   Kegel endurance holds with intravaginal digital feedback - 10 reps holding 5"  Completely relaxing PFM following Kegel  Breathing coordination with TA during balance activites:  SLS on compliant foam  Warrior 3 position standing on compliant foam tapping chair seat    Therapeutic Activity 0 min:  Education in use of vaginal wand for TPR. Pt viewed instructional video followed by question and answers with PT.  Pt given written instructions as well.     Jaclyn received therapeutic exercises to develop  strength, endurance, core stabilization and coordination for 5 minutes including:  Happy baby pose  Piriformis stretch B       Not performed:  Squats with 20#KB x10  Step up/knee lift on 6" step with 20# KB x10 B  CC Paloff Press 10# 2x10 B  CC 1/2 kneel chops 10# 2x10 B   Seated adductor stretch  Standing HS stretch  *Pt held each stretch for 1 min  TA sets with PFM contraction x10 hold 5"  Hip adduction with ball and TA/PFM " "x10 hold 5"  Bridges with ball x10 hold 5"  SL bridges 1x10 B  Side plank from knees with adduction using ball 3x30" B    Home Exercises Provided and Patient Education Provided     Education provided:   - general anatomy/physiology of urinary/ bowel  system and benefits of treatment was discussed with the pt. Additionally, bladder irritants, anatomy/physiology of pelvic floor, diaphragmatic breathing, isometric abdominal exercises, kegels and behavior modifications were reviewed  -risks/benefits of dry needling    Discussed progression of plan of care with patient; educated pt in activity modification; reviewed HEP with pt. Pt demonstrated and verbalized understanding of all instruction and was provided with a handout of education(see Patient Instructions).      Written Home Exercises Provided: pt to continue prior HEP.  Exercises were reviewed and Jaclyn was able to demonstrate them prior to the end of the session.  Jaclyn demonstrated good  understanding of the education provided.     See EMR under Patient Instructions for exercises provided 2/11/2022, 3/4/22    Assessment   Able to lift at gym and do jumping jacks without leaks.   Pt presents with moderate PFM tension L>R at levator ani which was addressed with manual release. Continued NMRE with d breathing and TA/PFM coordination. Slowly improving muscle strength with home e-stim unit. Addressed hip muscle guarding externally with dry needling. Pt tolerated without any adverse reaction. Given post- needling instructions in Pt Instruction AGA An[facility] Is progressing well towards her goals.   Pt prognosis is Good.     Pt will continue to benefit from skilled outpatient physical therapy to address the deficits listed in the problem list box on initial evaluation, provide pt/family education and to maximize pt's level of independence in the home and community environment.     Pt's spiritual, cultural and educational needs considered and pt agreeable to " plan of care and goals.     Anticipated barriers to physical therapy: none    Goals:   Short Term Goals: 5 weeks  (progressing, not met)  Pt to be edu pelvic muscle bracing and be able to consistently perform correctly and quickly to help decrease incontinence with cough/laugh/sneeze. (met)  Pt to report a decrease in pad usage to no more than 3 a day to demonstrate improving pelvic floor function needed for continence. (met)  Pt to be able to perform a 5  second kegel x 10 reps to demonstrate improving strength and endurance needed for continence. (met)  Pt to demonstrate being able to correctly and consistently perform a kegel which is needed  to increase pelvic floor muscle coordination and strength needed for continence. (progressing)  Pt to be able to delay the urge to urinate at least 5 minutes when standing with a strong urge to urinate in order to make it to the bathroom without leaking. (met)  Pt to voice understanding of the role that diet plays on urinary urgency. (met)                    Long Term Goals: 10 weeks (progressing, not met)  Pt to be discharged with home plan for carry over after discharge.    Pt to be able to perform a 10 second kegel x 10 reps to demonstrate improving strength and endurance needed for continence.  Pt to report a decrease in pad usage to 1 pads a day to demonstrate improving pelvic floor muscle controls as evidenced by decreased episodes of incontinence needed to improve confidence in social situations. (met)  Pt to be able to delay the urge to urinate at least 10 minutes when standing with a strong urge to urinate in order to make it to the bathroom without leaking.  Pt to report no longer feeling the need to urinate just in case when shopping or participating in social activities to demonstrate improving pelvic floor and bladder control.  Pt to report elimination of incontinence with ADLs to demonstrate improved pelvic floor muscle strength and coordination  Pt to  demonstrate an improved score in the FOTO  survey  to at least 38% limitation to demonstrate improving continence.    Pt to increase pelvic floor strength to at least 3/5 to demonstrate improved strength needed for continence with ADLs.     Plan     Plan of care Certification: 2/1/2022 to 4/15/2022.     Outpatient Physical Therapy 1 times weekly for 10 weeks to include the following interventions: therapeutic exercises, therapeutic activity, neuromuscular re-education, manual therapy, patient/family education, dry needling and self care/home management       Selina Stevenson, PT

## 2022-04-11 ENCOUNTER — OFFICE VISIT (OUTPATIENT)
Dept: PSYCHIATRY | Facility: CLINIC | Age: 42
End: 2022-04-11
Payer: COMMERCIAL

## 2022-04-11 DIAGNOSIS — F41.1 GAD (GENERALIZED ANXIETY DISORDER): Primary | ICD-10-CM

## 2022-04-11 PROCEDURE — 90834 PR PSYCHOTHERAPY W/PATIENT, 45 MIN: ICD-10-PCS | Mod: S$GLB,,, | Performed by: SOCIAL WORKER

## 2022-04-11 PROCEDURE — 99999 PR PBB SHADOW E&M-EST. PATIENT-LVL I: CPT | Mod: PBBFAC,,, | Performed by: SOCIAL WORKER

## 2022-04-11 PROCEDURE — 90834 PSYTX W PT 45 MINUTES: CPT | Mod: S$GLB,,, | Performed by: SOCIAL WORKER

## 2022-04-11 PROCEDURE — 1159F MED LIST DOCD IN RCRD: CPT | Mod: CPTII,S$GLB,, | Performed by: SOCIAL WORKER

## 2022-04-11 PROCEDURE — 99999 PR PBB SHADOW E&M-EST. PATIENT-LVL I: ICD-10-PCS | Mod: PBBFAC,,, | Performed by: SOCIAL WORKER

## 2022-04-11 PROCEDURE — 1159F PR MEDICATION LIST DOCUMENTED IN MEDICAL RECORD: ICD-10-PCS | Mod: CPTII,S$GLB,, | Performed by: SOCIAL WORKER

## 2022-04-13 ENCOUNTER — CLINICAL SUPPORT (OUTPATIENT)
Dept: REHABILITATION | Facility: HOSPITAL | Age: 42
End: 2022-04-13
Payer: COMMERCIAL

## 2022-04-13 DIAGNOSIS — N81.89 PELVIC FLOOR WEAKNESS IN FEMALE: ICD-10-CM

## 2022-04-13 DIAGNOSIS — M62.89 PELVIC FLOOR TENSION: Primary | ICD-10-CM

## 2022-04-13 DIAGNOSIS — R27.8 OTHER LACK OF COORDINATION: ICD-10-CM

## 2022-04-13 PROCEDURE — 97140 MANUAL THERAPY 1/> REGIONS: CPT | Mod: PN

## 2022-04-13 PROCEDURE — 97530 THERAPEUTIC ACTIVITIES: CPT | Mod: PN

## 2022-04-13 NOTE — PLAN OF CARE
Pelvic Health Physical Therapy   Treatment/ Updated POC Note     Name: Jaclyn Rausch  [facility] Number: [id number]    Therapy Diagnosis:   Encounter Diagnoses   Name Primary?    Pelvic floor tension Yes    Pelvic floor weakness in female     Other lack of coordination      Physician: Khalida Gibson PA-C    Visit Date: 4/13/2022  Physician Orders: PT Eval and Treat   Medical Diagnosis from Referral:   N39.46 (ICD-10-CM) - Mixed stress and urge urinary incontinence   N81.84 (ICD-10-CM) - Pelvic muscle wasting         Evaluation Date: 2/1/2022  Authorization Period Expiration: 1/31/23  Plan of Care Expiration: 4/15/2022  Progress Note Due: 4/15/22  Visit # / Visits authorized: 9/25     FOTO: Issued Visit # 1, 4     Time In: 1:00  Time Out: 1:56  Total Billable Time: 56 minutes    Precautions: Standard    Subjective     Pt reports:  + response to needling - had 4 days of relief until she did a glute workout; the tension she usually feels all the time in the L hip was gone following first needling session.       Able to lift heavier weights at gym and not leaking. Able to make it to bathroom in am without leaking.     Hasn't used Intimate Gladys vaginal wand yet.         · Frequency of urination:   Daytime: Can hold urine 4-5 hours (limits fluids due to work)                                           Nighttime: 1x some nights   · Difficulty initiating urine stream: No  · Urine stream: strong  · Complete emptying: Yes  · Bladder leakage: Yes  · Frequency of incidents: 0-1x/week (no longer leaking in am - can get to bathroom)  · Amount leaked (urine): small squirt  · Urinary Urgency: Yes  Able to delay the urge for at least  5 minute(s) if standing; can hold hours if sitting.  · Frequency of bowel movements: once a day  · Difficulty initiating BM: no (except for week before period)  · Quality/Shape of BM: [place] Stool Chart 3-4  · Does Patient Feel Empty after BM? Yes  · Fiber Supplements or Laxative Use?   No  · Colon leakage: No  · Form of protection: none      She was compliant with home exercise program.  Response to previous treatment: Positive  Functional change: Improved awareness of tension in PF; doing gym workout with no leaks; no longer using panty liner    Pain: pain is not an issue    Constitutional Symptoms Review: The patient denies having any constitutional symptoms.     Objective   Pt verbally consents to intravaginal treatment today.  Signed consent form already on file.     Therapeutic Activity 10 min:    INTERNAL ASSESSMENT  Pain: none   Sensation: able to sense generalized pressure, unable to identify location              Vaginal vault: roomy   Muscle Bulk: hypertonus  With tension noted B levator ani mm - mild on R and moderate on L  Muscle Power: 2+/5   Muscle Endurance: 5   # Reps To Fatigue: 7              Fast Contractions in 10 seconds: nt                          Quality of contraction: slow rise and decreased hold   Specificity: WNL  Coordination: improved ability to perform isolated PFM contraction  Prolapse check:not assessed    TTP of L piriformis and obturator internus at L post hip     Jaclyn received the following manual therapy techniques: to develop extensibility for 40 minutes includin min trigger point/myofascial release of levator ani and OI mm bilaterally     (15 min)  Following informed written consent pt received Integrative Dry Needling as follows:  R piriformis and OI with .2  .[id number] needles in the muscle belly and 2 .34f71aq needles at insertion at trochanter  IMS applied at 2.0mA x 10 min to all areas  Pt had no adverse response to needling treatment and voiced understanding of post-needling instructions.    Neuromuscular Re-education to develop Coordination, Control, Down training and Proprioception for 0 minutes including: pelvic floor relaxation/bulging training, diaphragmatic breathing and TA activation.   Kegel endurance holds with intravaginal digital  "feedback - 10 reps holding 5"  Completely relaxing PFM following Kegel  Breathing coordination with TA during balance activites:  SLS on compliant foam  Warrior 3 position standing on compliant foam tapping chair seat    Therapeutic Activity 0 min:  Education in use of vaginal wand for TPR. Pt viewed instructional video followed by question and answers with PT.  Pt given written instructions as well.     Jaclyn received therapeutic exercises to develop  strength, endurance, core stabilization and coordination for 8 minutes including:  CC 1/2 kneel chops 10# 2x10 B   CC SL dead lift with 15# KB and row with 10#       Not performed:  Happy baby pose  Piriformis stretch B  Squats with 20#KB x10  Step up/knee lift on 6" step with 20# KB x10 B  CC Paloff Press 10# 2x10 B  Seated adductor stretch  Standing HS stretch  *Pt held each stretch for 1 min  TA sets with PFM contraction x10 hold 5"  Hip adduction with ball and TA/PFM x10 hold 5"  Bridges with ball x10 hold 5"  SL bridges 1x10 B  Side plank from knees with adduction using ball 3x30" B    Home Exercises Provided and Patient Education Provided     Education provided:   - general anatomy/physiology of urinary/ bowel  system and benefits of treatment was discussed with the pt. Additionally, bladder irritants, anatomy/physiology of pelvic floor, diaphragmatic breathing, isometric abdominal exercises, kegels and behavior modifications were reviewed  -risks/benefits of dry needling    Discussed progression of plan of care with patient; educated pt in activity modification; reviewed HEP with pt. Pt demonstrated and verbalized understanding of all instruction and was provided with a handout of education(see Patient Instructions).      Written Home Exercises Provided: pt to continue prior HEP.  Exercises were reviewed and Jaclyn was able to demonstrate them prior to the end of the session.  Jaclyn demonstrated good  understanding of the education provided.     See " EMR under Patient Instructions for exercises provided 2/11/2022, 3/4/22    Assessment   Pt has met all STG's and continues to progress towards LTG's.   Pt presents with moderate PFM tension L>R at levator ani which was addressed with manual release. Slowly improving muscle strength with home e-stim unit. Addressing hip muscle guarding externally with dry needling with pt reporting + response. Pt tolerated without any adverse reaction.   Jaclyn Is progressing well towards her goals.   Pt prognosis is Good.     Pt will continue to benefit from skilled outpatient physical therapy to address the deficits listed in the problem list box on initial evaluation, provide pt/family education and to maximize pt's level of independence in the home and community environment.     Pt's spiritual, cultural and educational needs considered and pt agreeable to plan of care and goals.     Anticipated barriers to physical therapy: none    Goals:   Short Term Goals: 5 weeks  (progressing, not met)  Pt to be edu pelvic muscle bracing and be able to consistently perform correctly and quickly to help decrease incontinence with cough/laugh/sneeze. (met)  Pt to report a decrease in pad usage to no more than 3 a day to demonstrate improving pelvic floor function needed for continence. (met)  Pt to be able to perform a 5  second kegel x 10 reps to demonstrate improving strength and endurance needed for continence. (met)  Pt to demonstrate being able to correctly and consistently perform a kegel which is needed  to increase pelvic floor muscle coordination and strength needed for continence. (met)  Pt to be able to delay the urge to urinate at least 5 minutes when standing with a strong urge to urinate in order to make it to the bathroom without leaking. (met)  Pt to voice understanding of the role that diet plays on urinary urgency. (met)                    Long Term Goals: 10 weeks (progressing, not met)  Pt to be discharged with home plan  for carry over after discharge.    Pt to be able to perform a 10 second kegel x 10 reps to demonstrate improving strength and endurance needed for continence.  Pt to report a decrease in pad usage to 1 pads a day to demonstrate improving pelvic floor muscle controls as evidenced by decreased episodes of incontinence needed to improve confidence in social situations. (met)  Pt to be able to delay the urge to urinate at least 10 minutes when standing with a strong urge to urinate in order to make it to the bathroom without leaking.  Pt to report no longer feeling the need to urinate just in case when shopping or participating in social activities to demonstrate improving pelvic floor and bladder control.  Pt to report elimination of incontinence with ADLs to demonstrate improved pelvic floor muscle strength and coordination  Pt to demonstrate an improved score in the FOTO  survey  to at least 38% limitation to demonstrate improving continence.    Pt to increase pelvic floor strength to at least 3/5 to demonstrate improved strength needed for continence with ADLs.     Plan     Plan of care Certification: 4/13/2022 to 6/10/2022.     Outpatient Physical Therapy 1 times every other week for 8 weeks (4 visits) to include the following interventions: therapeutic exercises, therapeutic activity, neuromuscular re-education, manual therapy, patient/family education, dry needling and self care/home management       Selina Stevenson, PT

## 2022-04-13 NOTE — PROGRESS NOTES
Pelvic Health Physical Therapy   Treatment/ Updated POC Note     Name: Jaclyn Rausch  [facility] Number: [id number]    Therapy Diagnosis:   Encounter Diagnoses   Name Primary?    Pelvic floor tension Yes    Pelvic floor weakness in female     Other lack of coordination      Physician: Khalida Gibson PA-C    Visit Date: 4/13/2022  Physician Orders: PT Eval and Treat   Medical Diagnosis from Referral:   N39.46 (ICD-10-CM) - Mixed stress and urge urinary incontinence   N81.84 (ICD-10-CM) - Pelvic muscle wasting         Evaluation Date: 2/1/2022  Authorization Period Expiration: 1/31/23  Plan of Care Expiration: 4/15/2022  Progress Note Due: 4/15/22  Visit # / Visits authorized: 9/25     FOTO: Issued Visit # 1, 4     Time In: 1:00  Time Out: 1:56  Total Billable Time: 56 minutes    Precautions: Standard    Subjective     Pt reports:  + response to needling - had 4 days of relief until she did a glute workout; the tension she usually feels all the time in the L hip was gone following first needling session.     Able to lift heavier weights at gym and not leaking. Able to make it to bathroom in am without leaking.     Hasn't used Intimate Gladys vaginal wand yet.         · Frequency of urination:   Daytime: Can hold urine 4-5 hours (limits fluids due to work)                                           Nighttime: 1x some nights   · Difficulty initiating urine stream: No  · Urine stream: strong  · Complete emptying: Yes  · Bladder leakage: Yes  · Frequency of incidents: 0-1x/week (no longer leaking in am - can get to bathroom)  · Amount leaked (urine): small squirt  · Urinary Urgency: Yes  Able to delay the urge for at least  5 minute(s) if standing; can hold hours if sitting.  · Frequency of bowel movements: once a day  · Difficulty initiating BM: no (except for week before period)  · Quality/Shape of BM: [place] Stool Chart 3-4  · Does Patient Feel Empty after BM? Yes  · Fiber Supplements or Laxative Use?   No  · Colon leakage: No  · Form of protection: none      She was compliant with home exercise program.  Response to previous treatment: Positive  Functional change: Improved awareness of tension in PF; doing gym workout with no leaks; no longer using panty liner    Pain: pain is not an issue    Constitutional Symptoms Review: The patient denies having any constitutional symptoms.     Objective   Pt verbally consents to intravaginal treatment today.  Signed consent form already on file.     Therapeutic Activity 10 min:    INTERNAL ASSESSMENT  Pain: none   Sensation: able to sense generalized pressure, unable to identify location              Vaginal vault: roomy   Muscle Bulk: hypertonus  With tension noted B levator ani mm - mild on R and moderate on L  Muscle Power: 2+/5   Muscle Endurance: 5   # Reps To Fatigue: 7              Fast Contractions in 10 seconds: nt                          Quality of contraction: slow rise and decreased hold   Specificity: WNL  Coordination: improved ability to perform isolated PFM contraction  Prolapse check:not assessed    TTP of L piriformis and obturator internus at L post hip     Jaclyn received the following manual therapy techniques: to develop extensibility for 40 minutes includin min trigger point/myofascial release of levator ani and OI mm bilaterally     (15 min)  Following informed written consent pt received Integrative Dry Needling as follows:  R piriformis and OI with .2  .[id number] needles in the muscle belly and 2 .39j30ko needles at insertion at trochanter  IMS applied at 2.0mA x 10 min to all areas  Pt had no adverse response to needling treatment and voiced understanding of post-needling instructions.    Neuromuscular Re-education to develop Coordination, Control, Down training and Proprioception for 0 minutes including: pelvic floor relaxation/bulging training, diaphragmatic breathing and TA activation.   Kegel endurance holds with intravaginal digital  "feedback - 10 reps holding 5"  Completely relaxing PFM following Kegel  Breathing coordination with TA during balance activites:  SLS on compliant foam  Warrior 3 position standing on compliant foam tapping chair seat    Therapeutic Activity 0 min:  Education in use of vaginal wand for TPR. Pt viewed instructional video followed by question and answers with PT.  Pt given written instructions as well.     Jaclyn received therapeutic exercises to develop  strength, endurance, core stabilization and coordination for 6 minutes including:  CC 1/2 kneel chops 10# 2x10 B   CC SL dead lift with 15# KB and row with 10#       Not performed:  Happy baby pose  Piriformis stretch B  Squats with 20#KB x10  Step up/knee lift on 6" step with 20# KB x10 B  CC Paloff Press 10# 2x10 B  Seated adductor stretch  Standing HS stretch  *Pt held each stretch for 1 min  TA sets with PFM contraction x10 hold 5"  Hip adduction with ball and TA/PFM x10 hold 5"  Bridges with ball x10 hold 5"  SL bridges 1x10 B  Side plank from knees with adduction using ball 3x30" B    Home Exercises Provided and Patient Education Provided     Education provided:   - general anatomy/physiology of urinary/ bowel  system and benefits of treatment was discussed with the pt. Additionally, bladder irritants, anatomy/physiology of pelvic floor, diaphragmatic breathing, isometric abdominal exercises, kegels and behavior modifications were reviewed  -risks/benefits of dry needling    Discussed progression of plan of care with patient; educated pt in activity modification; reviewed HEP with pt. Pt demonstrated and verbalized understanding of all instruction and was provided with a handout of education(see Patient Instructions).      Written Home Exercises Provided: pt to continue prior HEP.  Exercises were reviewed and Jaclyn was able to demonstrate them prior to the end of the session.  Jaclyn demonstrated good  understanding of the education provided.     See " EMR under Patient Instructions for exercises provided 2/11/2022, 3/4/22    Assessment   Pt has met all STG's and continues to progress towards LTG's.   Pt presents with moderate PFM tension L>R at levator ani which was addressed with manual release. Slowly improving muscle strength with home e-stim unit. Addressing hip muscle guarding externally with dry needling with pt reporting + response. Pt tolerated without any adverse reaction.   Jaclyn Is progressing well towards her goals.   Pt prognosis is Good.     Pt will continue to benefit from skilled outpatient physical therapy to address the deficits listed in the problem list box on initial evaluation, provide pt/family education and to maximize pt's level of independence in the home and community environment.     Pt's spiritual, cultural and educational needs considered and pt agreeable to plan of care and goals.     Anticipated barriers to physical therapy: none    Goals:   Short Term Goals: 5 weeks  (progressing, not met)  Pt to be edu pelvic muscle bracing and be able to consistently perform correctly and quickly to help decrease incontinence with cough/laugh/sneeze. (met)  Pt to report a decrease in pad usage to no more than 3 a day to demonstrate improving pelvic floor function needed for continence. (met)  Pt to be able to perform a 5  second kegel x 10 reps to demonstrate improving strength and endurance needed for continence. (met)  Pt to demonstrate being able to correctly and consistently perform a kegel which is needed  to increase pelvic floor muscle coordination and strength needed for continence. (met)  Pt to be able to delay the urge to urinate at least 5 minutes when standing with a strong urge to urinate in order to make it to the bathroom without leaking. (met)  Pt to voice understanding of the role that diet plays on urinary urgency. (met)                    Long Term Goals: 10 weeks (progressing, not met)  Pt to be discharged with home plan  for carry over after discharge.    Pt to be able to perform a 10 second kegel x 10 reps to demonstrate improving strength and endurance needed for continence.  Pt to report a decrease in pad usage to 1 pads a day to demonstrate improving pelvic floor muscle controls as evidenced by decreased episodes of incontinence needed to improve confidence in social situations. (met)  Pt to be able to delay the urge to urinate at least 10 minutes when standing with a strong urge to urinate in order to make it to the bathroom without leaking.  Pt to report no longer feeling the need to urinate just in case when shopping or participating in social activities to demonstrate improving pelvic floor and bladder control.  Pt to report elimination of incontinence with ADLs to demonstrate improved pelvic floor muscle strength and coordination  Pt to demonstrate an improved score in the FOTO  survey  to at least 38% limitation to demonstrate improving continence.    Pt to increase pelvic floor strength to at least 3/5 to demonstrate improved strength needed for continence with ADLs.     Plan     Plan of care Certification: 4/13/2022 to 6/10/2022.     Outpatient Physical Therapy 1 times every other week for 8 weeks (4 visits) to include the following interventions: therapeutic exercises, therapeutic activity, neuromuscular re-education, manual therapy, patient/family education, dry needling and self care/home management       Selina Stevenson, PT

## 2022-04-25 ENCOUNTER — OFFICE VISIT (OUTPATIENT)
Dept: PSYCHIATRY | Facility: CLINIC | Age: 42
End: 2022-04-25
Payer: COMMERCIAL

## 2022-04-25 ENCOUNTER — OFFICE VISIT (OUTPATIENT)
Dept: SPINE | Facility: CLINIC | Age: 42
End: 2022-04-25
Payer: COMMERCIAL

## 2022-04-25 VITALS — HEIGHT: 65 IN | WEIGHT: 185 LBS | BODY MASS INDEX: 30.82 KG/M2

## 2022-04-25 DIAGNOSIS — F41.1 GAD (GENERALIZED ANXIETY DISORDER): Primary | ICD-10-CM

## 2022-04-25 DIAGNOSIS — M54.50 ACUTE RIGHT-SIDED LOW BACK PAIN WITHOUT SCIATICA: Primary | ICD-10-CM

## 2022-04-25 PROCEDURE — 90785 PR INTERACTIVE COMPLEXITY: ICD-10-PCS | Mod: S$GLB,,, | Performed by: SOCIAL WORKER

## 2022-04-25 PROCEDURE — 90834 PR PSYCHOTHERAPY W/PATIENT, 45 MIN: ICD-10-PCS | Mod: S$GLB,,, | Performed by: SOCIAL WORKER

## 2022-04-25 PROCEDURE — 1159F MED LIST DOCD IN RCRD: CPT | Mod: CPTII,S$GLB,, | Performed by: PHYSICAL MEDICINE & REHABILITATION

## 2022-04-25 PROCEDURE — 3008F PR BODY MASS INDEX (BMI) DOCUMENTED: ICD-10-PCS | Mod: CPTII,S$GLB,, | Performed by: PHYSICAL MEDICINE & REHABILITATION

## 2022-04-25 PROCEDURE — 99999 PR PBB SHADOW E&M-EST. PATIENT-LVL I: CPT | Mod: PBBFAC,,, | Performed by: SOCIAL WORKER

## 2022-04-25 PROCEDURE — 1160F PR REVIEW ALL MEDS BY PRESCRIBER/CLIN PHARMACIST DOCUMENTED: ICD-10-PCS | Mod: CPTII,S$GLB,, | Performed by: PHYSICAL MEDICINE & REHABILITATION

## 2022-04-25 PROCEDURE — 1159F PR MEDICATION LIST DOCUMENTED IN MEDICAL RECORD: ICD-10-PCS | Mod: CPTII,S$GLB,, | Performed by: PHYSICAL MEDICINE & REHABILITATION

## 2022-04-25 PROCEDURE — 1160F RVW MEDS BY RX/DR IN RCRD: CPT | Mod: CPTII,S$GLB,, | Performed by: PHYSICAL MEDICINE & REHABILITATION

## 2022-04-25 PROCEDURE — 99213 OFFICE O/P EST LOW 20 MIN: CPT | Mod: S$GLB,,, | Performed by: PHYSICAL MEDICINE & REHABILITATION

## 2022-04-25 PROCEDURE — 90834 PSYTX W PT 45 MINUTES: CPT | Mod: S$GLB,,, | Performed by: SOCIAL WORKER

## 2022-04-25 PROCEDURE — 3008F BODY MASS INDEX DOCD: CPT | Mod: CPTII,S$GLB,, | Performed by: PHYSICAL MEDICINE & REHABILITATION

## 2022-04-25 PROCEDURE — 99999 PR PBB SHADOW E&M-EST. PATIENT-LVL I: ICD-10-PCS | Mod: PBBFAC,,, | Performed by: SOCIAL WORKER

## 2022-04-25 PROCEDURE — 99213 PR OFFICE/OUTPT VISIT, EST, LEVL III, 20-29 MIN: ICD-10-PCS | Mod: S$GLB,,, | Performed by: PHYSICAL MEDICINE & REHABILITATION

## 2022-04-25 PROCEDURE — 90785 PSYTX COMPLEX INTERACTIVE: CPT | Mod: S$GLB,,, | Performed by: SOCIAL WORKER

## 2022-04-25 RX ORDER — METHYLPREDNISOLONE 4 MG/1
TABLET ORAL
Qty: 1 EACH | Refills: 0 | Status: SHIPPED | OUTPATIENT
Start: 2022-04-25 | End: 2022-05-16

## 2022-04-25 RX ORDER — METHOCARBAMOL 500 MG/1
500 TABLET, FILM COATED ORAL 2 TIMES DAILY PRN
Qty: 60 TABLET | Refills: 0 | Status: SHIPPED | OUTPATIENT
Start: 2022-04-25 | End: 2022-05-05

## 2022-04-25 NOTE — PROGRESS NOTES
"Individual Psychotherapy (PhD/LCSW)    4/25/2022    Site:  [facility]         Therapeutic Intervention: Met with patient.  Outpatient - Interactive psychotherapy 45 min - CPT code 88051 and Outpatient - Supportive psychotherapy 45 min - CPT Code 47524    Chief complaint/reason for encounter: depression and anxiety     Interval history and content of current session: Jaclyn shared that she has been doing well since our last session, noting that she has not experienced much anxiety. We discussed how her daughter seems to already be making improvements and is being more open. We discussed how her sister is going through a divorce, and Jaclyn said that she does worry about how her sister is doing, but she does feel that it is ultimately up to her sister to take care of herself. Discussed detachment and how recognizing what she cannot control is beneficial.     Today I introduced Jaclyn to the calm/safe place containment skill for EMDR. Jaclyn's calm [place], laying on the pier, seeing the colors of the sunset, feeling the sun and the coolness from the water, hearing the seagulls and the water lap against the rocks. Installed calm place with EMDR machine successfully; "grandma's house" cue phrase.     Treatment plan:  · Target symptoms: anxiety   · Why chosen therapy is appropriate versus another modality: relevant to diagnosis, patient responds to this modality, evidence based practice  · Outcome monitoring methods: self-report  · Therapeutic intervention type: supportive psychotherapy, interactive psychotherapy    Risk parameters:  Patient reports no suicidal ideation  Patient reports no homicidal ideation  Patient reports no self-injurious behavior  Patient reports no violent behavior    Verbal deficits: None    Patient's response to intervention:  The patient's response to intervention is accepting.    Progress toward goals and other mental status changes:  The patient's progress toward goals is " deja.    Diagnosis:     ICD-10-CM ICD-9-CM   1. AGGIE (generalized anxiety disorder)  F41.1 300.02       Plan:  individual psychotherapy and medication management by physician Pt to go to ED or call 911 if symptoms worsen or if she has thoughts of harming self and/or others. Pt verbalized understanding.    Return to clinic: as scheduled    Length of Service (minutes): 45      Each patient to whom he or she provides medical services by telemedicine is: (1) informed of the relationship between the physician and patient and the respective role of any other health care provider with respect to management of the patient; and (2) notified that he or she may decline to receive medical services by telemedicine and may withdraw from such care at any time.

## 2022-04-25 NOTE — PROGRESS NOTES
SUBJECTIVE:    Patient ID: Jaclyn Rausch is a 41 y.o. female.    Chief Complaint: Back Pain (Low back pain) and Hip Pain (R hip pain)    She presents today with a 5 day history of familiar right-sided low back pain at the lumbosacral junction without associated leg pain.  This started spontaneously about 5 days ago.  There was no injury.  I last saw her with similar complaints in May of last year.  Her symptoms resolved with a Medrol Dosepak and physical therapy.  Bowel and bladder remain intact.  She has no new or progressive problems.  She has known degenerative disc disease and facet arthropathy by x-ray.          Past Medical History:   Diagnosis Date    Anemia     slightly    Anticardiolipin antibody positive 2018    Anticoagulant long-term use     Atrial septal aneurysm     Dr. Frank; last visit 2018    Clotting disorder 2019    Elevated lipoprotein(a) 2018    Stroke 2018     Social History     Socioeconomic History    Marital status:    Tobacco Use    Smoking status: Former Smoker     Packs/day: 0.25     Years: 2.00     Pack years: 0.50     Quit date: 2003     Years since quittin.3    Smokeless tobacco: Never Used   Substance and Sexual Activity    Alcohol use: Yes     Comment: Socially    Drug use: No    Sexual activity: Yes     Partners: Male     Birth control/protection: None     Comment:      Social Determinants of Health     Financial Resource Strain: Low Risk     Difficulty of Paying Living Expenses: Not very hard   Food Insecurity: No Food Insecurity    Worried About Running Out of Food in the Last Year: Never true    Ran Out of Food in the Last Year: Never true   Transportation Needs: No Transportation Needs    Lack of Transportation (Medical): No    Lack of Transportation (Non-Medical): No   Physical Activity: Sufficiently Active    Days of Exercise per Week: 5 days    Minutes of Exercise per Session: 30 min    Stress: Stress Concern Present    Feeling of Stress : To some extent   Social Connections: Unknown    Frequency of Communication with Friends and Family: More than three times a week    Frequency of Social Gatherings with Friends and Family: Twice a week    Active Member of Clubs or Organizations: Yes    Attends Club or Organization Meetings: Never    Marital Status:    Housing Stability: Low Risk     Unable to Pay for Housing in the Last Year: No    Number of Places Lived in the Last Year: 1    Unstable Housing in the Last Year: No     Past Surgical History:   Procedure Laterality Date    BUNIONECTOMY Left 2001    CHONDROPLASTY OF KNEE Left 1/10/2020    Procedure: CHONDROPLASTY, KNEE;  Surgeon: Michael Galaviz II, MD;  Location: [facility] OR;  Service: Orthopedics;  Laterality: Left;    HYSTEROSCOPY WITH DILATION AND CURETTAGE OF UTERUS N/A 11/5/2020    Procedure: HYSTEROSCOPY, WITH DILATION AND CURETTAGE OF UTERUS;  Surgeon: Ap Milner MD;  Location: [facility];  Service: OB/GYN;  Laterality: N/A;    KNEE ARTHROSCOPY W/ MENISCECTOMY Left 1/10/2020    Procedure: ARTHROSCOPY, KNEE, WITH MENISCECTOMY;  Surgeon: Michael Galaviz II, MD;  Location: [facility] OR;  Service: Orthopedics;  Laterality: Left;    REPAIR OF MENISCUS OF KNEE Left 1/10/2020    Procedure: REPAIR, MENISCUS, KNEE;  Surgeon: Michael Galaviz II, MD;  Location: [facility] OR;  Service: Orthopedics;  Laterality: Left;  PARTIAL MEDIAL    THERMAL ABLATION OF ENDOMETRIUM N/A 11/5/2020    Procedure: ABLATION, ENDOMETRIUM, THERMAL;  Surgeon: Ap Milner MD;  Location: [facility];  Service: OB/GYN;  Laterality: N/A;     Family History   Problem Relation Age of Onset    Diabetes Maternal Grandmother     Lung cancer Maternal Grandfather     Colon cancer Maternal Grandfather     Hypertension Mother     Aneurysm Mother         brain    Hearing loss Father     Hypertension Brother     Eczema Son     Breast cancer Neg Hx     Ovarian  "cancer Neg Hx     Melanoma Neg Hx     Psoriasis Neg Hx     Lupus Neg Hx      Vitals:    04/25/22 1201   Weight: 83.9 kg (185 lb)   Height: 5' 5" (1.651 m)       Review of Systems   Constitutional: Negative for chills, diaphoresis, fatigue, fever and unexpected weight change.   HENT: Negative for trouble swallowing.    Eyes: Negative for visual disturbance.   Respiratory: Negative for shortness of breath.    Cardiovascular: Negative for chest pain.   Gastrointestinal: Negative for abdominal pain, constipation, nausea and vomiting.   Genitourinary: Negative for difficulty urinating.   Musculoskeletal: Negative for arthralgias, back pain, gait problem, joint swelling, myalgias, neck pain and neck stiffness.   Neurological: Negative for dizziness, speech difficulty, weakness, light-headedness, numbness and headaches.          Objective:      Physical Exam  Neurological:      Mental Status: She is alert and oriented to person, place, and time.      Comments: She is awake and in no acute distress  Mild tenderness to palpation right lumbar paraspinous musculature at the lumbosacral junction with no palpable masses  Forward flexion is to about 60° before she complains of pain on the right at the lumbosacral junction.  Extension likewise causes pain on the right at the lumbosacral junction  Deep tendon reflexes +2 at both knees and +1 at both ankles  Strength is normal in both lower extremities             Assessment:       1. Acute right-sided low back pain without sciatica           Plan:     she presents today with familiar right-sided low back pain without radicular leg pain.  I am going to put her on a Medrol Dosepak and add Robaxin for pain.  Check up on her in about 1 week.  Consider adding physical therapy.      Acute right-sided low back pain without sciatica    Other orders  -     methylPREDNISolone (MEDROL DOSEPACK) 4 mg tablet; use as directed  Dispense: 1 each; Refill: 0  -     methocarbamoL (ROBAXIN) 500 MG " Tab; Take 1 tablet (500 mg total) by mouth 2 (two) times daily as needed (Pain).  Dispense: 60 tablet; Refill: 0

## 2022-04-27 ENCOUNTER — CLINICAL SUPPORT (OUTPATIENT)
Dept: REHABILITATION | Facility: HOSPITAL | Age: 42
End: 2022-04-27
Payer: COMMERCIAL

## 2022-04-27 DIAGNOSIS — N81.89 PELVIC FLOOR WEAKNESS IN FEMALE: ICD-10-CM

## 2022-04-27 DIAGNOSIS — R27.8 OTHER LACK OF COORDINATION: ICD-10-CM

## 2022-04-27 DIAGNOSIS — M62.89 PELVIC FLOOR TENSION: Primary | ICD-10-CM

## 2022-04-27 PROCEDURE — 97110 THERAPEUTIC EXERCISES: CPT | Mod: PN

## 2022-04-27 PROCEDURE — 97140 MANUAL THERAPY 1/> REGIONS: CPT | Mod: PN

## 2022-04-27 NOTE — PROGRESS NOTES
Pelvic Health Physical Therapy   Treatment Note     Name: Jaclyn Rausch  Clinic Number: [id number]    Therapy Diagnosis:   Encounter Diagnoses   Name Primary?    Pelvic floor tension Yes    Pelvic floor weakness in female     Other lack of coordination      Physician: Gomez Posey MD    Visit Date: 4/27/2022  Physician Orders: PT Eval and Treat   Medical Diagnosis from Referral:   N39.46 (ICD-10-CM) - Mixed stress and urge urinary incontinence   N81.84 (ICD-10-CM) - Pelvic muscle wasting         Evaluation Date: 2/1/2022  Authorization Period Expiration: 1/31/23  Plan of Care Expiration: 4/15/2022  Progress Note Due: 4/15/22  Visit # / Visits authorized: 9/25     FOTO: Issued Visit # 1, 4, 9     Time In: 1:00  Time Out: 1:55  Total Billable Time: 55 minutes    Precautions: Standard    Subjective     Pt reports:  Felt nausea and had to throw up and did have UI when doing valsalva.  Pulled R SIJ doing posterior chain workout at gym last week (increaed weight - hip thrusters and dead lifts)  and has had increased pain in R SIJ. Saw MD Monday who dx it as joint inflammation and gave steroid pack. It feels better than it did but still hurts to lay on stomach.    + response to needling of the L hip - has had relief of hip pain.  Able to lift heavier weights at gym and not leaking.    Hasn't used Int[employer] Gladys vaginal wand yet.       She was compliant with home exercise program.  Response to previous treatment: Positive  Functional change: Improved awareness of tension in PF; doing gym workout with no leaks; no longer using panty liner    Pain: pain is not an issue    Constitutional Symptoms Review: The patient denies having any constitutional symptoms.     Objective   Pt verbally consents to intravaginal treatment today.  Signed consent form already on file.     TTP of R SIJ, sacral border,  R post hip - glute mariela     Jaclyn received the following manual therapy techniques: to develop  "extensibility for39 minutes including:     (23 min)  IASTM and STM of low lumbar paraspinals, sacral border and SIJ  STM of gluteus medius mm  Unilateral PA mobilization of R SIJ (Grade II)    (16 min)  Following informed written consent pt received Integrative Dry Needling as follows:  R gluteus medius with 2 .32o39ff needles  R SIJ and along sacral border with 5  .43o44ac needles  IMS applied at 2.0mA x 10 min to all areas  Pt had no adverse response to needling treatment and voiced understanding of post-needling instructions.    Not performed:  trigger point/myofascial release of levator ani and OI mm bilaterally    Neuromuscular Re-education to develop Coordination, Control, Down training and Proprioception for 0 minutes including: pelvic floor relaxation/bulging training, diaphragmatic breathing and TA activation.   Kegel endurance holds with intravaginal digital feedback - 10 reps holding 5"  Completely relaxing PFM following Kegel  Breathing coordination with TA during balance activites:  SLS on compliant foam  Warrior 3 position standing on compliant foam tapping chair seat    Therapeutic Activity 0 min:  Education in use of vaginal wand for TPR. Pt viewed instructional video followed by question and answers with PT.  Pt given written instructions as well.     Jaclyn received therapeutic exercises to develop  strength, endurance, core stabilization and coordination for 16 minutes including:  Cat/cow x10  Quadruped rock backs x10  Prone press ups x10  Piriformis stretch seated B  Child's pose and side stretch in child's pose x 1 min ea     Not performed:  CC 1/2 kneel chops 10# 2x10 B   CC SL dead lift with 15# KB and row with 10#  Happy baby pose  Squats with 20#KB x10  Step up/knee lift on 6" step with 20# KB x10 B  CC Paloff Press 10# 2x10 B  Seated adductor stretch  Standing HS stretch  *Pt held each stretch for 1 min  TA sets with PFM contraction x10 hold 5"  Hip adduction with ball and TA/PFM x10 " "hold 5"  Bridges with ball x10 hold 5"  SL bridges 1x10 B  Side plank from knees with adduction using ball 3x30" B    Home Exercises Provided and Patient Education Provided     Education provided:   - general anatomy/physiology of urinary/ bowel  system and benefits of treatment was discussed with the pt. Additionally, bladder irritants, anatomy/physiology of pelvic floor, diaphragmatic breathing, kegels and behavior modifications were reviewed  -low lumbar/SIJ mobility exercises    Discussed progression of plan of care with patient; educated pt in activity modification; reviewed HEP with pt. Pt demonstrated and verbalized understanding of all instruction and was provided with a handout of education(see Patient Instructions).      Written Home Exercises Provided: pt to continue prior HEP.  Exercises were reviewed and Jaclyn was able to demonstrate them prior to the end of the session.  Jaclyn demonstrated good  understanding of the education provided.     See EMR under Patient Instructions for exercises provided 2/11/2022, 3/4/22    Assessment      Pt presents with R sided SIJ pain today following increased posterior chain workouts at gym with increased resistance. Noted tenderness at R SIJ, glute med. Dry needling to affected area today as well as IASTM. Pt performed lumbar/sij mobility and stretches following manual therapy. May be compensating with stronger R side when performing eesisted ex in the gym. Jaclyn is progressing well toward her goals.   Pt prognosis is Good.     Pt will continue to benefit from skilled outpatient physical therapy to address the deficits listed in the problem list box on initial evaluation, provide pt/family education and to maximize pt's level of independence in the home and community environment.     Pt's spiritual, cultural and educational needs considered and pt agreeable to plan of care and goals.     Anticipated barriers to physical therapy: none    Goals:   Short Term " Goals: 5 weeks  (progressing, not met)  Pt to be edu pelvic muscle bracing and be able to consistently perform correctly and quickly to help decrease incontinence with cough/laugh/sneeze. (met)  Pt to report a decrease in pad usage to no more than 3 a day to demonstrate improving pelvic floor function needed for continence. (met)  Pt to be able to perform a 5  second kegel x 10 reps to demonstrate improving strength and endurance needed for continence. (met)  Pt to demonstrate being able to correctly and consistently perform a kegel which is needed  to increase pelvic floor muscle coordination and strength needed for continence. (met)  Pt to be able to delay the urge to urinate at least 5 minutes when standing with a strong urge to urinate in order to make it to the bathroom without leaking. (met)  Pt to voice understanding of the role that diet plays on urinary urgency. (met)                    Long Term Goals: 10 weeks (progressing, not met)  Pt to be discharged with home plan for carry over after discharge.    Pt to be able to perform a 10 second kegel x 10 reps to demonstrate improving strength and endurance needed for continence.  Pt to report a decrease in pad usage to 1 pads a day to demonstrate improving pelvic floor muscle controls as evidenced by decreased episodes of incontinence needed to improve confidence in social situations. (met)  Pt to be able to delay the urge to urinate at least 10 minutes when standing with a strong urge to urinate in order to make it to the bathroom without leaking.  Pt to report no longer feeling the need to urinate just in case when shopping or participating in social activities to demonstrate improving pelvic floor and bladder control.  Pt to report elimination of incontinence with ADLs to demonstrate improved pelvic floor muscle strength and coordination  Pt to demonstrate an improved score in the FOTO  survey  to at least 38% limitation to demonstrate improving continence.     Pt to increase pelvic floor strength to at least 3/5 to demonstrate improved strength needed for continence with ADLs.     Plan     Plan of care Certification: 4/13/2022 to 6/10/2022.     Outpatient Physical Therapy 1 times every other week for 8 weeks (4 visits) to include the following interventions: therapeutic exercises, therapeutic activity, neuromuscular re-education, manual therapy, patient/family education, dry needling and self care/home management       Selina Stevenson, PT

## 2022-05-02 ENCOUNTER — PATIENT MESSAGE (OUTPATIENT)
Dept: PHYSICAL MEDICINE AND REHAB | Facility: CLINIC | Age: 42
End: 2022-05-02
Payer: COMMERCIAL

## 2022-05-09 ENCOUNTER — TELEPHONE (OUTPATIENT)
Dept: PSYCHIATRY | Facility: CLINIC | Age: 42
End: 2022-05-09
Payer: COMMERCIAL

## 2022-05-09 NOTE — TELEPHONE ENCOUNTER
Appointment canceled   [facility], System Message   Sent: Mon May 09, 2022  7:10 AM   To: [facility] Clinical Support Staff    Jaclyn Rausch   MRN: [id number] : 1980   Pt Home: [phone]     Entered: [phone]          Message    Appointment canceled for Jaclyn Rausch ([id number])   Visit Type: ESTABLISHED PATIENT   Date        Time      Length    Provider                  Department   2022     9:00 AM  60 mins.   PUSH[facility] Alexander [facility]      Reason for Cancellation: Other

## 2022-05-11 ENCOUNTER — CLINICAL SUPPORT (OUTPATIENT)
Dept: REHABILITATION | Facility: HOSPITAL | Age: 42
End: 2022-05-11
Payer: COMMERCIAL

## 2022-05-11 DIAGNOSIS — M62.89 PELVIC FLOOR TENSION: Primary | ICD-10-CM

## 2022-05-11 DIAGNOSIS — R27.8 OTHER LACK OF COORDINATION: ICD-10-CM

## 2022-05-11 DIAGNOSIS — N81.89 PELVIC FLOOR WEAKNESS IN FEMALE: ICD-10-CM

## 2022-05-11 PROCEDURE — 97112 NEUROMUSCULAR REEDUCATION: CPT | Mod: PN

## 2022-05-11 PROCEDURE — 97140 MANUAL THERAPY 1/> REGIONS: CPT | Mod: PN

## 2022-05-11 NOTE — PROGRESS NOTES
Pelvic Health Physical Therapy   Treatment Note     Name: Jaclyn Garcia Rausch  Clinic Number: [id number]    Therapy Diagnosis:   Encounter Diagnoses   Name Primary?    Pelvic floor tension Yes    Pelvic floor weakness in female     Other lack of coordination      Physician: Gomez Posey MD    Visit Date: 5/11/2022  Physician Orders: PT Eval and Treat   Medical Diagnosis from Referral:   N39.46 (ICD-10-CM) - Mixed stress and urge urinary incontinence   N81.84 (ICD-10-CM) - Pelvic muscle wasting         Evaluation Date: 2/1/2022  Authorization Period Expiration: 1/31/23  Plan of Care Expiration: 6/10/2022  Progress Note Due: 5/15/22  Visit # / Visits authorized: 10/25     FOTO: Issued Visit # 1, 4, 9     Time In: 1:02   Time Out: 1:57  Total Billable Time: 55 minutes    Precautions: Standard    Subjective     Pt reports:  Able to make it to restroom in am without leaking. Able to work out (low impact) without leaking.  Did have relief of R SIJ with needling until yesterday - worked out and felt pain in R SIJ again. Did deadlifts with 70# and SL deadlifts with no weight and hip thrusters with 40#    + response to needling of the R hip - has had relief of hip pain.  Able to lift heavier weights at gym and not leaking.    Used vaginal wand once and couldn't really find teder areas..       She was compliant with home exercise program.  Response to previous treatment: Positive  Functional change: Improved awareness of tension in PF; doing gym workout with no leaks; no longer using panty liner    Pain: pain is not an issue    Constitutional Symptoms Review: The patient denies having any constitutional symptoms.     Objective   Pt verbally consents to intravaginal treatment today.  Signed consent form already on file.     TTP of R SIJ,  R post hip - glute med and R QL     Jaclyn received the following manual therapy techniques: to develop extensibility for45 minutes including:     (29 min)  trigger  "point/myofascial release of levator ani and OI mm bilaterally  Unilateral PA mobilization of R SIJ (Grade II)    (16 min)  Following informed written consent pt received Integrative Dry Needling as follows:  R gluteus medius with 2 .66f14ci needle  R QL with .58y43ll needle  R SIJ  wiiith 2  .31h29ma needles  IMS applied at 2.0mA x 10 min to all areas  Pt had no adverse response to needling treatment and voiced understanding of post-needling instructions.    Not performed:  IASTM and STM of low lumbar paraspinals, sacral border and SIJ  STM of gluteus medius mm      Neuromuscular Re-education to develop Coordination, Control, Down training and Proprioception for 10 minutes including: pelvic floor relaxation/bulging training, diaphragmatic breathing and TA activation.   Kegel endurance holds with intravaginal digital feedback - 10 reps holding 5"  Completely relaxing PFM following Kegel  Breathing coordination with TA during balance activites:  Breath and TA coordination during hip thrust and SL deadlift activity. Did SLDL with finger tap on 18" bench. Cues to engage TA, glute max.     Therapeutic Activity 0 min:  Education in use of vaginal wand for TPR. Pt viewed instructional video followed by question and answers with PT.  Pt given written instructions as well.     Jaclyn received therapeutic exercises to develop  strength, endurance, core stabilization and coordination for 0 minutes including:  Cat/cow x10  Quadruped rock backs x10  Prone press ups x10  Piriformis stretch seated B  Child's pose and side stretch in child's pose x 1 min ea     Not performed:  CC 1/2 kneel chops 10# 2x10 B   CC SL dead lift with 15# KB and row with 10#  Happy baby pose  Squats with 20#KB x10  Step up/knee lift on 6" step with 20# KB x10 B  CC Paloff Press 10# 2x10 B  Seated adductor stretch  Standing HS stretch  *Pt held each stretch for 1 min  TA sets with PFM contraction x10 hold 5"  Hip adduction with ball and TA/PFM x10 hold " "5"  Bridges with ball x10 hold 5"  SL bridges 1x10 B  Side plank from knees with adduction using ball 3x30" B    Home Exercises Provided and Patient Education Provided     Education provided:   - general anatomy/physiology of urinary/ bowel  system and benefits of treatment was discussed with the pt. Additionally, bladder irritants, anatomy/physiology of pelvic floor, diaphragmatic breathing, kegels and behavior modifications were reviewed  -low lumbar/SIJ mobility exercises    Discussed progression of plan of care with patient; educated pt in activity modification; reviewed HEP with pt. Pt demonstrated and verbalized understanding of all instruction and was provided with a handout of education(see Patient Instructions).      Written Home Exercises Provided: pt to continue prior HEP.  Exercises were reviewed and Jaclyn was able to demonstrate them prior to the end of the session.  Jaclyn demonstrated good  understanding of the education provided.     See EMR under Patient Instructions for exercises provided 2/11/2022, 3/4/22    Assessment      Pt had relief of R SIJ pain until she returned to gym workout yesterday and did deadlifts and hip thrusters. . Noted tenderness at R SIJ, glute med. Dry needling to affected area today. Pt educated on TA activation during these activities to improve lumbo-pelvic stability.  Pt performed lumbar/sij mobility and stretches following manual therapy. May be compensating with stronger R side when performing resisted ex in the gym. Jaclyn is progressing well toward her goals.   Pt prognosis is Good.     Pt will continue to benefit from skilled outpatient physical therapy to address the deficits listed in the problem list box on initial evaluation, provide pt/family education and to maximize pt's level of independence in the home and community environment.     Pt's spiritual, cultural and educational needs considered and pt agreeable to plan of care and goals.     Anticipated " barriers to physical therapy: none    Goals:   Short Term Goals: 5 weeks  (progressing, not met)  Pt to be edu pelvic muscle bracing and be able to consistently perform correctly and quickly to help decrease incontinence with cough/laugh/sneeze. (met)  Pt to report a decrease in pad usage to no more than 3 a day to demonstrate improving pelvic floor function needed for continence. (met)  Pt to be able to perform a 5  second kegel x 10 reps to demonstrate improving strength and endurance needed for continence. (met)  Pt to demonstrate being able to correctly and consistently perform a kegel which is needed  to increase pelvic floor muscle coordination and strength needed for continence. (met)  Pt to be able to delay the urge to urinate at least 5 minutes when standing with a strong urge to urinate in order to make it to the bathroom without leaking. (met)  Pt to voice understanding of the role that diet plays on urinary urgency. (met)                    Long Term Goals: 10 weeks (progressing, not met)  Pt to be discharged with home plan for carry over after discharge.    Pt to be able to perform a 10 second kegel x 10 reps to demonstrate improving strength and endurance needed for continence.  Pt to report a decrease in pad usage to 1 pads a day to demonstrate improving pelvic floor muscle controls as evidenced by decreased episodes of incontinence needed to improve confidence in social situations. (met)  Pt to be able to delay the urge to urinate at least 10 minutes when standing with a strong urge to urinate in order to make it to the bathroom without leaking.  Pt to report no longer feeling the need to urinate just in case when shopping or participating in social activities to demonstrate improving pelvic floor and bladder control.  Pt to report elimination of incontinence with ADLs to demonstrate improved pelvic floor muscle strength and coordination  Pt to demonstrate an improved score in the FOTO  survey  to at  least 38% limitation to demonstrate improving continence.    Pt to increase pelvic floor strength to at least 3/5 to demonstrate improved strength needed for continence with ADLs.     Plan     Plan of care Certification: 4/13/2022 to 6/10/2022.     Outpatient Physical Therapy 1 times every other week for 8 weeks (4 visits) to include the following interventions: therapeutic exercises, therapeutic activity, neuromuscular re-education, manual therapy, patient/family education, dry needling and self care/home management       Selina Stevenson, PT

## 2022-05-16 NOTE — PROGRESS NOTES
"Individual Psychotherapy (PhD/LCSW)    5/23/2022    Site:  [facility]         Therapeutic Intervention: Met with patient.  Outpatient - Insight oriented psychotherapy 45 min - CPT code 27128 and Outpatient - Supportive psychotherapy 45 min - CPT Code 82656    Chief complaint/reason for encounter: depression and anxiety     Interval history and content of current session: Last visit: 04/25. Jaclyn shared that overall she has been doing well, although she has noticed that her anxiety "is creeping up more, even though I don't really have anything to worry about." We discussed that some of her anxiety is coming from her son's football schedule and having to work around that/ask for time off for him as well as not knowing for sure if they can go on a 2nd vacation this summer. Provided empathic support; we discussed her reminding herself that this is temporary stress: eventually, she will have an answer (it may not be the one that she wants), but she will eventually know what their plans are. We also discussed how she feels uncomfortable receiving special accommodations to work form home, and we discussed how this stems from her childhood. Discussed how she does not like attention, which goes to core beliefs of "I shouldn't need help" and "It's not okay for me to have needs." Discussed combating core beliefs by developing positive affirmations; she is to do this for next session.     Treatment plan:  · Target symptoms: depression, anxiety   · Why chosen therapy is appropriate versus another modality: relevant to diagnosis, patient responds to this modality, evidence based practice  · Outcome monitoring methods: self-report  · Therapeutic intervention type: insight oriented psychotherapy, supportive psychotherapy    Risk parameters:  Patient reports no suicidal ideation  Patient reports no homicidal ideation  Patient reports no self-injurious behavior  Patient reports no violent behavior    Verbal deficits: " None    Patient's response to intervention:  The patient's response to intervention is accepting.    Progress toward goals and other mental status changes:  The patient's progress toward goals is good.    Diagnosis:     ICD-10-CM ICD-9-CM   1. GAGIE (generalized anxiety disorder)  F41.1 300.02       Plan:  individual psychotherapy Pt to go to ED or call 911 if symptoms worsen or if she has thoughts of harming self and/or others. Pt verbalized understanding.    Return to clinic: as scheduled    Length of Service (minutes): 45      Each patient to whom he or she provides medical services by telemedicine is: (1) informed of the relationship between the physician and patient and the respective role of any other health care provider with respect to management of the patient; and (2) notified that he or she may decline to receive medical services by telemedicine and may withdraw from such care at any time.

## 2022-05-23 ENCOUNTER — OFFICE VISIT (OUTPATIENT)
Dept: PSYCHIATRY | Facility: CLINIC | Age: 42
End: 2022-05-23
Payer: COMMERCIAL

## 2022-05-23 DIAGNOSIS — F41.1 GAD (GENERALIZED ANXIETY DISORDER): Primary | ICD-10-CM

## 2022-05-23 PROCEDURE — 90834 PR PSYCHOTHERAPY W/PATIENT, 45 MIN: ICD-10-PCS | Mod: S$GLB,,, | Performed by: SOCIAL WORKER

## 2022-05-23 PROCEDURE — 1159F MED LIST DOCD IN RCRD: CPT | Mod: CPTII,S$GLB,, | Performed by: SOCIAL WORKER

## 2022-05-23 PROCEDURE — 90834 PSYTX W PT 45 MINUTES: CPT | Mod: S$GLB,,, | Performed by: SOCIAL WORKER

## 2022-05-23 PROCEDURE — 99999 PR PBB SHADOW E&M-EST. PATIENT-LVL II: CPT | Mod: PBBFAC,,, | Performed by: SOCIAL WORKER

## 2022-05-23 PROCEDURE — 99999 PR PBB SHADOW E&M-EST. PATIENT-LVL II: ICD-10-PCS | Mod: PBBFAC,,, | Performed by: SOCIAL WORKER

## 2022-05-23 PROCEDURE — 1159F PR MEDICATION LIST DOCUMENTED IN MEDICAL RECORD: ICD-10-PCS | Mod: CPTII,S$GLB,, | Performed by: SOCIAL WORKER

## 2022-06-06 ENCOUNTER — OFFICE VISIT (OUTPATIENT)
Dept: PSYCHIATRY | Facility: CLINIC | Age: 42
End: 2022-06-06
Payer: COMMERCIAL

## 2022-06-06 VITALS
DIASTOLIC BLOOD PRESSURE: 82 MMHG | SYSTOLIC BLOOD PRESSURE: 131 MMHG | BODY MASS INDEX: 30.85 KG/M2 | HEIGHT: 65 IN | WEIGHT: 185.19 LBS | HEART RATE: 62 BPM

## 2022-06-06 DIAGNOSIS — F41.1 GAD (GENERALIZED ANXIETY DISORDER): Primary | ICD-10-CM

## 2022-06-06 PROCEDURE — 3008F PR BODY MASS INDEX (BMI) DOCUMENTED: ICD-10-PCS | Mod: CPTII,S$GLB,, | Performed by: PHYSICIAN ASSISTANT

## 2022-06-06 PROCEDURE — 99214 PR OFFICE/OUTPT VISIT, EST, LEVL IV, 30-39 MIN: ICD-10-PCS | Mod: S$GLB,,, | Performed by: PHYSICIAN ASSISTANT

## 2022-06-06 PROCEDURE — 3079F PR MOST RECENT DIASTOLIC BLOOD PRESSURE 80-89 MM HG: ICD-10-PCS | Mod: CPTII,S$GLB,, | Performed by: PHYSICIAN ASSISTANT

## 2022-06-06 PROCEDURE — 99999 PR PBB SHADOW E&M-EST. PATIENT-LVL IV: CPT | Mod: PBBFAC,,, | Performed by: PHYSICIAN ASSISTANT

## 2022-06-06 PROCEDURE — 1160F PR REVIEW ALL MEDS BY PRESCRIBER/CLIN PHARMACIST DOCUMENTED: ICD-10-PCS | Mod: CPTII,S$GLB,, | Performed by: PHYSICIAN ASSISTANT

## 2022-06-06 PROCEDURE — 3075F SYST BP GE 130 - 139MM HG: CPT | Mod: CPTII,S$GLB,, | Performed by: PHYSICIAN ASSISTANT

## 2022-06-06 PROCEDURE — 99214 OFFICE O/P EST MOD 30 MIN: CPT | Mod: S$GLB,,, | Performed by: PHYSICIAN ASSISTANT

## 2022-06-06 PROCEDURE — 3075F PR MOST RECENT SYSTOLIC BLOOD PRESS GE 130-139MM HG: ICD-10-PCS | Mod: CPTII,S$GLB,, | Performed by: PHYSICIAN ASSISTANT

## 2022-06-06 PROCEDURE — 99999 PR PBB SHADOW E&M-EST. PATIENT-LVL IV: ICD-10-PCS | Mod: PBBFAC,,, | Performed by: PHYSICIAN ASSISTANT

## 2022-06-06 PROCEDURE — 1160F RVW MEDS BY RX/DR IN RCRD: CPT | Mod: CPTII,S$GLB,, | Performed by: PHYSICIAN ASSISTANT

## 2022-06-06 PROCEDURE — 3079F DIAST BP 80-89 MM HG: CPT | Mod: CPTII,S$GLB,, | Performed by: PHYSICIAN ASSISTANT

## 2022-06-06 PROCEDURE — 3008F BODY MASS INDEX DOCD: CPT | Mod: CPTII,S$GLB,, | Performed by: PHYSICIAN ASSISTANT

## 2022-06-06 PROCEDURE — 1159F MED LIST DOCD IN RCRD: CPT | Mod: CPTII,S$GLB,, | Performed by: PHYSICIAN ASSISTANT

## 2022-06-06 PROCEDURE — 1159F PR MEDICATION LIST DOCUMENTED IN MEDICAL RECORD: ICD-10-PCS | Mod: CPTII,S$GLB,, | Performed by: PHYSICIAN ASSISTANT

## 2022-06-06 RX ORDER — PROPRANOLOL HYDROCHLORIDE 10 MG/1
10 TABLET ORAL DAILY PRN
Qty: 20 TABLET | Refills: 0 | Status: SHIPPED | OUTPATIENT
Start: 2022-06-06 | End: 2022-07-13

## 2022-06-06 NOTE — PROGRESS NOTES
"Outpatient Psychiatry Follow-Up Visit (MD/NP)    6/6/2022    Clinical Status of Patient:  Outpatient (Ambulatory)    Chief Complaint:  Jaclyn Rausch is a 41 y.o. female who presents today for follow-up of depression and anxiety.  Met with patient.      Interval History and Content of Current Session:  Jaclyn is seen today for medication follow-up.  She reports that life is pretty stable at this time.  She is working in her civilian role still.  Just states that stressors are normal life things.  She has continued to work with Catherine Washington LCSW for psychotherapy.  She does state that anxiety has been creeping back up a bit.  However, she reports that nothing crazy is going on" and states that she can manage the anxiety at this time.  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  She states she does endorse situational physical symptoms of anxiety when she does have to do public speaking.  She does use diphenhydramine p.r.n. for sleep.  She has lost 3 lb since our previous visit but she does feel that she should be losing more weight with diet and exercise.  She is going to be following up with endocrinology, discussed GLP-1 for weight loss and she will consider this.  She does state that her  is a [employer] and some of the work that he does does stress her out.  She denies suicidal or homicidal ideation. No other complaints today.       FROM PREVIOUS HPI  Jaclyn is seen today for medication follow-up.  She has titrated off all psychotropic medications and has been doing well.  She has been working in her civilian role now which has been fine, no complaints or concerns. She has also been spending time with her family.  She states that she did attempt to use the low-dose trazodone but it completely knocks her out.  She has been sleeping okay despite no sleep aid.  Her mood has been stable despite no antidepressant.  She has continued to work with Catherine Washington LCSW.  She " objectively looks well.  She denies suicidal or homicidal ideation.  Appetite is adequate.  Concentration/focus are adequate.  She would like to continue without psychotropic medicines at this time and potentially check in at six months if she has any wishes to restart medicine, discussed she can always do this earlier if requested.  She is in agreement with this and has no other complaints today.      GAD7 6/6/2022 12/6/2021 10/4/2021   1. Feeling nervous, anxious, or on edge? 1 1 1   2. Not being able to stop or control worrying? 1 1 1   3. Worrying too much about different things? 1 1 1   4. Trouble relaxing? 1 1 1   5. Being so restless that it is hard to sit still? 1 1 1   6. Becoming easily annoyed or irritable? 1 1 0   7. Feeling afraid as if something awful might happen? 1 0 1   8. If you checked off any problems, how difficult have these problems made it for you to do your work, take care of things at home, or get along with other people? 1 1 1   AGGIE-7 Score 7 6 6       PHQ9 6/6/2022   Little interest or pleasure in doing things: Several days   Feeling down, depressed or hopeless: Not at all   Trouble falling asleep, staying asleep, or sleeping too much: Several days   Feeling tired or having little energy: Several days   Poor appetite or overeating: Several days   Feeling bad about yourself- or that you are a failure or have let yourself or family down Not at all   Trouble concentrating on things, such as reading the newspaper or watching television: Several days   Moving or speaking so slowly that other people could have noticed. Or the opposite- being so fidgety or restless that you have been moving around a lot more than usual: Not at all   Thoughts that you would be better off dead or hurting yourself in some way: Not at all   If you indicated you have experienced any of the aforementioned problems, how difficult have these problems made it for you to do your work, take care of things at home or get  along with other people? Somewhat difficult   Total Score 5       Review of Systems   · PSYCHIATRIC: Pertinant items are noted in the narrative.  · RESPIRATORY: No shortness of breath.  · CARDIOVASCULAR: No tachycardia or chest pain.  · GASTROINTESTINAL: No nausea, vomiting, pain, constipation or diarrhea.    Past Medical, Family and Social History: The patient's past medical, family and social history have been reviewed and updated as appropriate within the electronic medical record - see encounter notes.    Compliance: yes    Side effects: None    Risk Parameters:  Patient reports no suicidal ideation  Patient reports no homicidal ideation  Patient reports no self-injurious behavior  Patient reports no violent behavior    Exam (detailed: at least 9 elements; comprehensive: all 15 elements)   Constitutional  Vitals:  Most recent vital signs, dated less than 90 days prior to this appointment, were reviewed.     Vitals:    06/06/22 1000   BP: 131/82   Pulse: 62        General:  unremarkable, age appropriate, casually dressed     Musculoskeletal  Muscle Strength/Tone:  no spasicity, no rigidity   Gait & Station:  non-ataxic     Psychiatric  Speech:  no latency; no press   Mood & Affect:  good  appropriate   Thought Process:  normal and logical   Associations:  intact   Thought Content:  normal, no suicidality, no homicidality, delusions, or paranoia   Insight:  intact   Judgement: behavior is adequate to circumstances   Orientation:  grossly intact   Memory: intact for content of interview   Language: grossly intact   Attention Span & Concentration:  able to focus   Fund of Knowledge:  intact and appropriate to age and level of education     Assessment and Diagnosis   Status/Progress: Based on the examination today, the patient's problem(s) is/are adequately but not ideally controlled.  New problems have not been presented today.   Co-morbidities, Diagnostic uncertainty and Lack of compliance are not complicating  management of the primary condition.       General Impression:   Generalized anxiety disorder  MDD, mild    Intervention/Counseling/Treatment Plan   · Medication Management: Continue current medications. The risks and benefits of medication were discussed with the patient.  · Counseling provided with patient as follows: importance of compliance with chosen treatment options was emphasized, risks and benefits of treatment options, including medications, were discussed with the patient, risk factor reduction, prognosis    Jaclyn is seen for medication follow-up today.      Trial inderal 10mg prn for public speaking. Medication hand out provided.     Continue with psychotherapy with Catherine Washington LCSW.    Please go to emergency department if feeling as though you are a harm to yourself or others or if you are in crisis.     Please call the clinic to report any worsening of symptoms or problems associated with medication.    Discussed with patient informed consent, risks vs. benefits, alternative treatments, side effect profile and the inherent unpredictability of individual responses to these treatments. The patient expresses understanding of the above and displays the capacity to agree with this current plan and had no other questions.    Return to Clinic: 6 months, as needed

## 2022-06-07 ENCOUNTER — PATIENT MESSAGE (OUTPATIENT)
Dept: ENDOCRINOLOGY | Facility: CLINIC | Age: 42
End: 2022-06-07
Payer: COMMERCIAL

## 2022-06-07 NOTE — TELEPHONE ENCOUNTER
From last note:  body mass index is 31.51 kg/m².   complicated by HLD   no evidence of diabetes, thyroid problems, cortisol excess.     - tried topiramate, had side effects.   - reviewed potential for other medication. Most not covered.   consider Victoza. (but injection, off label use). Pt not excited about a once a day injection   only other potentially affordable medication for weight is phentermine/adipex. However can make insomnia, anxiety worse, have cardiac side effects, lean towards staying away as well. Also, calorie target seems pretty reasonable so not as sure that phentermine would help much      victoza vs phentermine.  Most others not covered.

## 2022-06-08 ENCOUNTER — CLINICAL SUPPORT (OUTPATIENT)
Dept: REHABILITATION | Facility: HOSPITAL | Age: 42
End: 2022-06-08
Payer: COMMERCIAL

## 2022-06-08 DIAGNOSIS — R27.8 OTHER LACK OF COORDINATION: ICD-10-CM

## 2022-06-08 DIAGNOSIS — N81.89 PELVIC FLOOR WEAKNESS IN FEMALE: ICD-10-CM

## 2022-06-08 DIAGNOSIS — M62.89 PELVIC FLOOR TENSION: Primary | ICD-10-CM

## 2022-06-08 PROCEDURE — 97110 THERAPEUTIC EXERCISES: CPT | Mod: PN

## 2022-06-08 PROCEDURE — 97140 MANUAL THERAPY 1/> REGIONS: CPT | Mod: PN

## 2022-06-08 PROCEDURE — 97530 THERAPEUTIC ACTIVITIES: CPT | Mod: PN

## 2022-06-08 NOTE — PROGRESS NOTES
Pelvic Health Physical Therapy   Treatment Note     Name: Jaclyn Garcia Rausch  Clinic Number: [id number]    Therapy Diagnosis:   Encounter Diagnoses   Name Primary?    Pelvic floor tension Yes    Pelvic floor weakness in female     Other lack of coordination      Physician: Gomez Posey MD    Visit Date: 6/8/2022  Physician Orders: PT Eval and Treat   Medical Diagnosis from Referral:   N39.46 (ICD-10-CM) - Mixed stress and urge urinary incontinence   N81.84 (ICD-10-CM) - Pelvic muscle wasting         Evaluation Date: 2/1/2022  Authorization Period Expiration: 1/31/23  Plan of Care Expiration: 6/10/2022  Progress Note Due: 5/15/22  Visit # / Visits authorized: 10/25     FOTO: Issued Visit # 1, 4, 9, 10     Time In: 1:00   Time Out: 1:37  Total Billable Time: 37 minutes    Precautions: Standard    Subjective     Pt reports:  Able to make it to restroom in am without leaking. Able to work out without leaking.  Has had relief of SIJ pain since last visit and has returned to heavier l ifting..  No longer wearing a pad  Using estim unit at home 1x/week  Has vaginal wand to use for TPR when needed. .       She was compliant with home exercise program.  Response to previous treatment: Positive  Functional change: Improved awareness of tension in PF; doing gym workout with no leaks; no longer using panty liner    Pain: pain is not an issue    Constitutional Symptoms Review: The patient denies having any constitutional symptoms.     Objective   Pt verbally consents to intravaginal treatment today.  Signed consent form already on file.      Therapeutic Activity 10 min reassessment:     INTERNAL ASSESSMENT  Pain: none   Sensation: able to sense generalized pressure, unable to identify location              Vaginal vault: roomy   Muscle Bulk: mild tension noted L levator ani mm  Muscle Power: 3/5 (with TA co-contraction)  Muscle Endurance: 5   # Reps To Fatigue: 7              Fast Contractions in 10 seconds:  "nt                          Quality of contraction: slow rise   Specificity: WNL  Coordination: improved ability to perform isolated PFM contraction  Prolapse check:not assessed       Jaclyn received the following manual therapy techniques: to develop extensibility for15 minutes including:     trigger point/myofascial release of levator ani and OI mm bilaterally.     Jaclyn received therapeutic exercises to develop  strength, endurance, core stabilization and coordination for 12 minutes including:  Cat/cow x10  Quadruped rock backs x10  Prone press ups x10  Piriformis stretch seated B  Child's pose and side stretch in child's pose x 1 min ea     Not performed:  CC 1/2 kneel chops 10# 2x10 B   CC SL dead lift with 15# KB and row with 10#  Happy baby pose  Squats with 20#KB x10  Step up/knee lift on 6" step with 20# KB x10 B  CC Paloff Press 10# 2x10 B  Seated adductor stretch  Standing HS stretch  *Pt held each stretch for 1 min  TA sets with PFM contraction x10 hold 5"  Hip adduction with ball and TA/PFM x10 hold 5"  Bridges with ball x10 hold 5"  SL bridges 1x10 B  Side plank from knees with adduction using ball 3x30" B    Home Exercises Provided and Patient Education Provided     Education provided:   - general anatomy/physiology of urinary/ bowel  system and benefits of treatment was discussed with the pt. Additionally, bladder irritants, anatomy/physiology of pelvic floor, diaphragmatic breathing, kegels and behavior modifications were reviewed  -continue e-stim 2x/week for maintenance  -Use vaginal wand prn for tension    Written Home Exercises Provided: pt to continue prior HEP.  Exercises were reviewed and Jaclyn was able to demonstrate them prior to the end of the session.  Jaclyn demonstrated good  understanding of the education provided.     See EMR under Patient Instructions for exercises provided 2/11/2022, 3/4/22    Assessment      Pt has met all STG and LTG set at evaluation. She is no " longer wearing a pad for protection and is able to perform all gym ex with out UI. Pt is independent in HEP as well as with use of e-stim unit and vaginal wand for home maintenance. Pt is ready to discharge PT.   .     Pt's spiritual, cultural and educational needs considered and pt agreeable to plan of care and goals.     Anticipated barriers to physical therapy: none    Goals:   Short Term Goals: 5 weeks    Pt to be edu pelvic muscle bracing and be able to consistently perform correctly and quickly to help decrease incontinence with cough/laugh/sneeze. (met)  Pt to report a decrease in pad usage to no more than 3 a day to demonstrate improving pelvic floor function needed for continence. (met)  Pt to be able to perform a 5  second kegel x 10 reps to demonstrate improving strength and endurance needed for continence. (met)  Pt to demonstrate being able to correctly and consistently perform a kegel which is needed  to increase pelvic floor muscle coordination and strength needed for continence. (met)  Pt to be able to delay the urge to urinate at least 5 minutes when standing with a strong urge to urinate in order to make it to the bathroom without leaking. (met)  Pt to voice understanding of the role that diet plays on urinary urgency. (met)                    Long Term Goals: 10 weeks   Pt to be discharged with home plan for carry over after discharge.    Pt to be able to perform a 10 second kegel x 10 reps to demonstrate improving strength and endurance needed for continence. (met)  Pt to report a decrease in pad usage to 1 pads a day to demonstrate improving pelvic floor muscle controls as evidenced by decreased episodes of incontinence needed to improve confidence in social situations. (met)  Pt to be able to delay the urge to urinate at least 10 minutes when standing with a strong urge to urinate in order to make it to the bathroom without leaking. (met)  Pt to report no longer feeling the need to urinate just  in case when shopping or participating in social activities to demonstrate improving pelvic floor and bladder control. (met)  Pt to report elimination of incontinence with ADLs to demonstrate improved pelvic floor muscle strength and coordination (met)  Pt to demonstrate an improved score in the FOTO  survey  to at least 38% limitation to demonstrate improving continence.  (met)  Pt to increase pelvic floor strength to at least 3/5 to demonstrate improved strength needed for continence with ADLs. (met)    Plan     Discharge PT       Selina Stevenson, PT

## 2022-06-15 NOTE — PROGRESS NOTES
"Individual Psychotherapy (PhD/LCSW)    6/20/2022    Site:  [facility]         Therapeutic Intervention: Met with patient.   Outpatient - Supportive psychotherapy 45 min - CPT Code 89899    Chief complaint/reason for encounter: anxiety     Interval history and content of current session: Last visit with me: 05/23. Jaclyn shared that overall she has been doing well since our last visit approx 1 month ago. We discussed how she recently had her longterm ceremony which she had to speak at, and she experiences anxiety with public speaking as well as any time attention may be on her. She said that though she did have some anxiety, she was able to manage it and speak well at the ceremony. Discussed CBT technique of asking herself "What has hx shown me happens?" the next time she may have to speak in front of others as well as have attention on her. We discussed how she is purposefully now attempting to put herself in situations where there could be attention to decrease anxiety around this. I validated her for this, and I explained how avoidance and exposure work. She is currently playing beach volleyball and is enjoying it. We also discussed her using CBT techniques with her anxiety such as "How likely is this to happen?" and looking at how many "What ifs?" would need to happen to have the worst case scenario occur.     Treatment plan:  · Target symptoms: anxiety   · Why chosen therapy is appropriate versus another modality: relevant to diagnosis, patient responds to this modality, evidence based practice  · Outcome monitoring methods: self-report  · Therapeutic intervention type: supportive psychotherapy    Risk parameters:  Patient reports no suicidal ideation  Patient reports no homicidal ideation  Patient reports no self-injurious behavior  Patient reports no violent behavior    Verbal deficits: None    Patient's response to intervention:  The patient's response to intervention is accepting.    Progress toward goals " and other mental status changes:  The patient's progress toward goals is good.    Diagnosis:     ICD-10-CM ICD-9-CM   1. AGGIE (generalized anxiety disorder)  F41.1 300.02       Plan:  individual psychotherapy Pt to go to ED or call 911 if symptoms worsen or if she has thoughts of harming self and/or others. Pt verbalized understanding.    Return to clinic: as scheduled    Length of Service (minutes): 45      Each patient to whom he or she provides medical services by telemedicine is: (1) informed of the relationship between the physician and patient and the respective role of any other health care provider with respect to management of the patient; and (2) notified that he or she may decline to receive medical services by telemedicine and may withdraw from such care at any time.

## 2022-06-20 ENCOUNTER — OFFICE VISIT (OUTPATIENT)
Dept: PSYCHIATRY | Facility: CLINIC | Age: 42
End: 2022-06-20
Payer: COMMERCIAL

## 2022-06-20 DIAGNOSIS — F41.1 GAD (GENERALIZED ANXIETY DISORDER): Primary | ICD-10-CM

## 2022-06-20 PROCEDURE — 1159F MED LIST DOCD IN RCRD: CPT | Mod: CPTII,S$GLB,, | Performed by: SOCIAL WORKER

## 2022-06-20 PROCEDURE — 99999 PR PBB SHADOW E&M-EST. PATIENT-LVL II: ICD-10-PCS | Mod: PBBFAC,,, | Performed by: SOCIAL WORKER

## 2022-06-20 PROCEDURE — 90834 PSYTX W PT 45 MINUTES: CPT | Mod: S$GLB,,, | Performed by: SOCIAL WORKER

## 2022-06-20 PROCEDURE — 1159F PR MEDICATION LIST DOCUMENTED IN MEDICAL RECORD: ICD-10-PCS | Mod: CPTII,S$GLB,, | Performed by: SOCIAL WORKER

## 2022-06-20 PROCEDURE — 90834 PR PSYCHOTHERAPY W/PATIENT, 45 MIN: ICD-10-PCS | Mod: S$GLB,,, | Performed by: SOCIAL WORKER

## 2022-06-20 PROCEDURE — 99999 PR PBB SHADOW E&M-EST. PATIENT-LVL II: CPT | Mod: PBBFAC,,, | Performed by: SOCIAL WORKER

## 2022-07-05 NOTE — PROGRESS NOTES
Individual Psychotherapy (PhD/LCSW)    7/11/2022    Site:  [facility]         Therapeutic Intervention: Met with patient.  Outpatient - Supportive psychotherapy 45 min - CPT Code 00653    Chief complaint/reason for encounter: depression and anxiety     Interval history and content of current session: Jaclyn shared that overall she has been doing well. We did discuss how over the 4th of July weekend she was feeling more irritable due to her sister dis-inviting her from coming over as well as her sister in law and her  going off together to clean fish. We discussed her relationship with her sister in law and whether it is worth it or not to communicate to her how she feels about her behavior. Otherwise, we discussed the importance of communicating expectations to the people in our lives as without doing so, people will behave in ways that are upsetting to us but they do not know that. We will discuss DBT DEAR MAN skills in next session. Discussed how her not communicating her feelings is related to her childhood role of lost child and being more in the background.     Treatment plan:  · Target symptoms: anxiety   · Why chosen therapy is appropriate versus another modality: relevant to diagnosis, patient responds to this modality  · Outcome monitoring methods: self-report  · Therapeutic intervention type: supportive psychotherapy    Risk parameters:  Patient reports no suicidal ideation  Patient reports no homicidal ideation  Patient reports no self-injurious behavior  Patient reports no violent behavior    Verbal deficits: None    Patient's response to intervention:  The patient's response to intervention is accepting.    Progress toward goals and other mental status changes:  The patient's progress toward goals is good.    Diagnosis:     ICD-10-CM ICD-9-CM   1. AGGIE (generalized anxiety disorder)  F41.1 300.02       Plan:  individual psychotherapy Pt to go to ED or call 911 if symptoms worsen or if she has  thoughts of harming self and/or others. Pt verbalized understanding.    Return to clinic: as scheduled    Length of Service (minutes): 45      Each patient to whom he or she provides medical services by telemedicine is: (1) informed of the relationship between the physician and patient and the respective role of any other health care provider with respect to management of the patient; and (2) notified that he or she may decline to receive medical services by telemedicine and may withdraw from such care at any time.

## 2022-07-11 ENCOUNTER — OFFICE VISIT (OUTPATIENT)
Dept: PSYCHIATRY | Facility: CLINIC | Age: 42
End: 2022-07-11
Payer: COMMERCIAL

## 2022-07-11 DIAGNOSIS — F41.1 GAD (GENERALIZED ANXIETY DISORDER): Primary | ICD-10-CM

## 2022-07-11 PROCEDURE — 1159F MED LIST DOCD IN RCRD: CPT | Mod: CPTII,S$GLB,, | Performed by: SOCIAL WORKER

## 2022-07-11 PROCEDURE — 1159F PR MEDICATION LIST DOCUMENTED IN MEDICAL RECORD: ICD-10-PCS | Mod: CPTII,S$GLB,, | Performed by: SOCIAL WORKER

## 2022-07-11 PROCEDURE — 99999 PR PBB SHADOW E&M-EST. PATIENT-LVL II: ICD-10-PCS | Mod: PBBFAC,,, | Performed by: SOCIAL WORKER

## 2022-07-11 PROCEDURE — 99999 PR PBB SHADOW E&M-EST. PATIENT-LVL II: CPT | Mod: PBBFAC,,, | Performed by: SOCIAL WORKER

## 2022-07-11 PROCEDURE — 90834 PR PSYCHOTHERAPY W/PATIENT, 45 MIN: ICD-10-PCS | Mod: S$GLB,,, | Performed by: SOCIAL WORKER

## 2022-07-11 PROCEDURE — 90834 PSYTX W PT 45 MINUTES: CPT | Mod: S$GLB,,, | Performed by: SOCIAL WORKER

## 2022-07-13 ENCOUNTER — OFFICE VISIT (OUTPATIENT)
Dept: URGENT CARE | Facility: CLINIC | Age: 42
End: 2022-07-13
Payer: COMMERCIAL

## 2022-07-13 VITALS
HEART RATE: 114 BPM | OXYGEN SATURATION: 98 % | BODY MASS INDEX: 29.16 KG/M2 | WEIGHT: 175 LBS | TEMPERATURE: 102 F | SYSTOLIC BLOOD PRESSURE: 124 MMHG | DIASTOLIC BLOOD PRESSURE: 94 MMHG | RESPIRATION RATE: 12 BRPM | HEIGHT: 65 IN

## 2022-07-13 DIAGNOSIS — R11.10 VOMITING, INTRACTABILITY OF VOMITING NOT SPECIFIED, PRESENCE OF NAUSEA NOT SPECIFIED, UNSPECIFIED VOMITING TYPE: ICD-10-CM

## 2022-07-13 DIAGNOSIS — J02.9 SORE THROAT: Primary | ICD-10-CM

## 2022-07-13 DIAGNOSIS — U07.1 COVID-19 VIRUS DETECTED: ICD-10-CM

## 2022-07-13 DIAGNOSIS — R50.9 FEVER, UNSPECIFIED FEVER CAUSE: ICD-10-CM

## 2022-07-13 LAB
CTP QC/QA: YES
FLUAV AG NPH QL: NEGATIVE
FLUBV AG NPH QL: NEGATIVE
S PYO RRNA THROAT QL PROBE: NEGATIVE
SARS-COV-2 AG RESP QL IA.RAPID: POSITIVE

## 2022-07-13 PROCEDURE — 99214 OFFICE O/P EST MOD 30 MIN: CPT | Mod: S$GLB,,, | Performed by: NURSE PRACTITIONER

## 2022-07-13 PROCEDURE — 3080F DIAST BP >= 90 MM HG: CPT | Mod: CPTII,S$GLB,, | Performed by: NURSE PRACTITIONER

## 2022-07-13 PROCEDURE — 87880 STREP A ASSAY W/OPTIC: CPT | Mod: QW,,, | Performed by: NURSE PRACTITIONER

## 2022-07-13 PROCEDURE — 99214 PR OFFICE/OUTPT VISIT, EST, LEVL IV, 30-39 MIN: ICD-10-PCS | Mod: S$GLB,,, | Performed by: NURSE PRACTITIONER

## 2022-07-13 PROCEDURE — 87811 SARS CORONAVIRUS 2 ANTIGEN POCT, MANUAL READ: ICD-10-PCS | Mod: QW,S$GLB,, | Performed by: NURSE PRACTITIONER

## 2022-07-13 PROCEDURE — 3080F PR MOST RECENT DIASTOLIC BLOOD PRESSURE >= 90 MM HG: ICD-10-PCS | Mod: CPTII,S$GLB,, | Performed by: NURSE PRACTITIONER

## 2022-07-13 PROCEDURE — 87880 POCT RAPID STREP A: ICD-10-PCS | Mod: QW,,, | Performed by: NURSE PRACTITIONER

## 2022-07-13 PROCEDURE — 1160F RVW MEDS BY RX/DR IN RCRD: CPT | Mod: CPTII,S$GLB,, | Performed by: NURSE PRACTITIONER

## 2022-07-13 PROCEDURE — 1159F MED LIST DOCD IN RCRD: CPT | Mod: CPTII,S$GLB,, | Performed by: NURSE PRACTITIONER

## 2022-07-13 PROCEDURE — 87804 INFLUENZA ASSAY W/OPTIC: CPT | Mod: QW,,, | Performed by: NURSE PRACTITIONER

## 2022-07-13 PROCEDURE — 1160F PR REVIEW ALL MEDS BY PRESCRIBER/CLIN PHARMACIST DOCUMENTED: ICD-10-PCS | Mod: CPTII,S$GLB,, | Performed by: NURSE PRACTITIONER

## 2022-07-13 PROCEDURE — 3074F PR MOST RECENT SYSTOLIC BLOOD PRESSURE < 130 MM HG: ICD-10-PCS | Mod: CPTII,S$GLB,, | Performed by: NURSE PRACTITIONER

## 2022-07-13 PROCEDURE — 3008F BODY MASS INDEX DOCD: CPT | Mod: CPTII,S$GLB,, | Performed by: NURSE PRACTITIONER

## 2022-07-13 PROCEDURE — 3074F SYST BP LT 130 MM HG: CPT | Mod: CPTII,S$GLB,, | Performed by: NURSE PRACTITIONER

## 2022-07-13 PROCEDURE — 3008F PR BODY MASS INDEX (BMI) DOCUMENTED: ICD-10-PCS | Mod: CPTII,S$GLB,, | Performed by: NURSE PRACTITIONER

## 2022-07-13 PROCEDURE — 87811 SARS-COV-2 COVID19 W/OPTIC: CPT | Mod: QW,S$GLB,, | Performed by: NURSE PRACTITIONER

## 2022-07-13 PROCEDURE — 87804 POCT INFLUENZA A/B: ICD-10-PCS | Mod: 59,QW,, | Performed by: NURSE PRACTITIONER

## 2022-07-13 PROCEDURE — 1159F PR MEDICATION LIST DOCUMENTED IN MEDICAL RECORD: ICD-10-PCS | Mod: CPTII,S$GLB,, | Performed by: NURSE PRACTITIONER

## 2022-07-13 RX ORDER — ONDANSETRON 4 MG/1
4 TABLET, ORALLY DISINTEGRATING ORAL EVERY 8 HOURS PRN
Qty: 12 TABLET | Refills: 0 | Status: SHIPPED | OUTPATIENT
Start: 2022-07-13

## 2022-07-13 RX ORDER — ACETAMINOPHEN 500 MG
1000 TABLET ORAL
Status: COMPLETED | OUTPATIENT
Start: 2022-07-13 | End: 2022-07-13

## 2022-07-13 RX ORDER — BENZONATATE 200 MG/1
200 CAPSULE ORAL 3 TIMES DAILY PRN
Qty: 21 CAPSULE | Refills: 0 | Status: SHIPPED | OUTPATIENT
Start: 2022-07-13 | End: 2022-07-20

## 2022-07-13 RX ORDER — PROMETHAZINE HYDROCHLORIDE AND DEXTROMETHORPHAN HYDROBROMIDE 6.25; 15 MG/5ML; MG/5ML
5 SYRUP ORAL NIGHTLY PRN
Qty: 50 ML | Refills: 0 | Status: SHIPPED | OUTPATIENT
Start: 2022-07-13 | End: 2022-07-23

## 2022-07-13 RX ORDER — ALBUTEROL SULFATE 90 UG/1
2 AEROSOL, METERED RESPIRATORY (INHALATION) EVERY 6 HOURS PRN
Qty: 18 G | Refills: 0 | Status: SHIPPED | OUTPATIENT
Start: 2022-07-13 | End: 2023-06-07

## 2022-07-13 RX ADMIN — Medication 1000 MG: at 11:07

## 2022-07-13 NOTE — PROGRESS NOTES
"Subjective:       Patient ID: Jaclyn Rausch is a 41 y.o. female.    Vitals:  height is 5' 5" (1.651 m) and weight is 79.4 kg (175 lb). Her temperature is 101.6 °F (38.7 °C) (abnormal). Her blood pressure is 124/94 (abnormal) and her pulse is 114 (abnormal). Her respiration is 12 and oxygen saturation is 98%.     Chief Complaint: Fever    41 year old female with PMH of CVA, clotting disorder controlled with eliquis with c.o fatigue, sore throat, fever 101.4, vomiting, feeling bad, headache, eyes have been watering a ton X day.   She hasn't tried any medicine for her symptoms.  Took an at home covid test last night that was negative.       Constitution: Positive for fever.   HENT: Positive for sore throat.    Gastrointestinal: Positive for nausea and vomiting.   Neurological: Positive for headaches.       Objective:      Physical Exam   Constitutional: She is oriented to person, place, and time.   HENT:   Head: Normocephalic and atraumatic.   Ears:   Right Ear: Tympanic membrane, external ear and ear canal normal.   Left Ear: Tympanic membrane, external ear and ear canal normal.   Nose: Congestion present.   Mouth/Throat: Posterior oropharyngeal erythema present.   Eyes: Conjunctivae are normal.   Neck: Neck supple.   Cardiovascular: Normal rate, regular rhythm, normal heart sounds and normal pulses.   Pulmonary/Chest: Effort normal and breath sounds normal.   Abdominal: Normal appearance. Soft.   Musculoskeletal: Normal range of motion.         General: Normal range of motion.   Neurological: She is alert and oriented to person, place, and time.   Skin: Skin is warm and dry. Capillary refill takes 2 to 3 seconds.   Psychiatric: Her behavior is normal. Mood normal.   Nursing note and vitals reviewed.        Assessment:       1. Sore throat    2. Vomiting, intractability of vomiting not specified, presence of nausea not specified, unspecified vomiting type    3. Fever, unspecified fever cause    4. " COVID-19 virus detected        Covid antigen: Positive  Plan:         Sore throat  -     POCT rapid strep A    Vomiting, intractability of vomiting not specified, presence of nausea not specified, unspecified vomiting type  -     SARS Coronavirus 2 Antigen, POCT Manual Read  -     POCT Influenza A/B    Fever, unspecified fever cause  -     acetaminophen tablet 1,000 mg    COVID-19 virus detected  -     ondansetron (ZOFRAN-ODT) 4 MG TbDL; Take 1 tablet (4 mg total) by mouth every 8 (eight) hours as needed (nausea).  Dispense: 12 tablet; Refill: 0  -     promethazine-dextromethorphan (PROMETHAZINE-DM) 6.25-15 mg/5 mL Syrp; Take 5 mLs by mouth nightly as needed (cough).  Dispense: 50 mL; Refill: 0  -     benzonatate (TESSALON) 200 MG capsule; Take 1 capsule (200 mg total) by mouth 3 (three) times daily as needed for Cough.  Dispense: 21 capsule; Refill: 0  -     albuterol (VENTOLIN HFA) 90 mcg/actuation inhaler; Inhale 2 puffs into the lungs every 6 (six) hours as needed for Wheezing. Rescue  Dispense: 18 g; Refill: 0    Symptomatic treatment to include:    Rest, increase fluid intake to include electrolyte replacement  Tylenol as directed for fever, sore throat, body aches  Zrytec and flonase for sinus symptoms  Phenergan cough syrup at night for cough  Tessalon perles cough pills as needed day or night  Mucinex D over the counter as directed for sinus congestion.  Coricidin HBP if you have high blood pressure.  Zofran as directed for nausea/vomiting.  Warm, salt water gargles, over the counter throat lozenges or sprays as desires.   Imodium over the counter as directed for diarrhea.  ER for difficulty breathing not relieved by rest, excessive lethargy and/or change in mental status  Albuterol inhaler (if prescribed) for chest tightness, shortness or breath, wheezing, or tight/wheezing cough especially when brought on with deep breath.  Follow CDC isolation guidelines as provided  Patient Instructions   POSITIVE COVID  TEST      You have tested positive for COVID-19 today.  Please note that patients who test positive for COVID-19 are required by the CDC to undergo isolation for 5 days, then wearing a mask around others for an additional 5 days, after their symptoms first began following the new updated guidelines of 12/27/2021. This isolation starts from the day you first developed symptoms, not the day of your positive test. For example, if your symptoms began on a Monday but tested positive on the following Wednesday, your 5-day isolation begins from that Monday, not the Wednesday you tested positive.  However, if you are asymptomatic (a person who does not have any symptoms) and COVID-19 positive, your 5-day isolation begins on the day you tested positive, regardless of exposure date.  Also, per the CDC guidelines, once your 5 days have passed, symptoms have resolved or are improving, and you have not had fever greater than 100.4F in the last 24 hours without taking any fever reducers such as Tylenol (Acetaminophen) or Motrin (Ibuprofen), you may return to your normal activities including social distancing, wearing masks, and frequent handwashing - YOU DO NOT NEED ANOTHER TEST IN ORDER TO END YOUR QUARANTINE.

## 2022-07-13 NOTE — PATIENT INSTRUCTIONS
Symptomatic treatment to include:    Rest, increase fluid intake to include electrolyte replacement  Tylenol as directed for fever, sore throat, body aches  Zrytec and flonase for sinus symptoms  Phenergan cough syrup at night for cough  Tessalon perles cough pills as needed day or night  Mucinex D over the counter as directed for sinus congestion.  Coricidin HBP if you have high blood pressure.  Zofran as directed for nausea/vomiting.  Warm, salt water gargles, over the counter throat lozenges or sprays as desires.   Imodium over the counter as directed for diarrhea.  ER for difficulty breathing not relieved by rest, excessive lethargy and/or change in mental status  Albuterol inhaler (if prescribed) for chest tightness, shortness or breath, wheezing, or tight/wheezing cough especially when brought on with deep breath.  Follow CDC isolation guidelines as provided  Patient Instructions   POSITIVE COVID TEST      You have tested positive for COVID-19 today.  Please note that patients who test positive for COVID-19 are required by the CDC to undergo isolation for 5 days, then wearing a mask around others for an additional 5 days, after their symptoms first began following the new updated guidelines of 12/27/2021. This isolation starts from the day you first developed symptoms, not the day of your positive test. For example, if your symptoms began on a Monday but tested positive on the following Wednesday, your 5-day isolation begins from that Monday, not the Wednesday you tested positive.  However, if you are asymptomatic (a person who does not have any symptoms) and COVID-19 positive, your 5-day isolation begins on the day you tested positive, regardless of exposure date.  Also, per the CDC guidelines, once your 5 days have passed, symptoms have resolved or are improving, and you have not had fever greater than 100.4F in the last 24 hours without taking any fever reducers such as Tylenol (Acetaminophen) or Motrin  (Ibuprofen), you may return to your normal activities including social distancing, wearing masks, and frequent handwashing - YOU DO NOT NEED ANOTHER TEST IN ORDER TO END YOUR QUARANTINE.

## 2022-07-22 NOTE — PROGRESS NOTES
"Individual Psychotherapy (PhD/LCSW)    8/1/2022    Site:  [facility]         Therapeutic Intervention: Met with patient.   Outpatient - Supportive psychotherapy 45 min - CPT Code 28753    Chief complaint/reason for encounter: depression and anxiety     Interval history and content of current session: Jaclyn shared that overall she is doing well. She just recently had Covid which made her feel bad for about a week, but she is now overall feeling better and "back to normal." For most of this session, we discussed how Jaclyn is attempting to set more boundaries in life, particularly with her family. I engaged in active listening and provided empathic support as she shared about her and her family going on vacation and how her sister invited herself to go, and is now attempting to bring her ex  along who she does not like. We discussed how she has told her sister no repeatedly but her sister does not seem to understand; we discussed how people are generally not used to us setting boundaries after we have not done it before. Discussed other ways to set boundaries and to make it clear what behavior she expects from her sister, as well as that there are consequences if someone crosses our boundaries.     Treatment plan:  · Target symptoms: anxiety   · Why chosen therapy is appropriate versus another modality: relevant to diagnosis, patient responds to this modality  · Outcome monitoring methods: self-report  · Therapeutic intervention type: supportive psychotherapy    Risk parameters:  Patient reports no suicidal ideation  Patient reports no homicidal ideation  Patient reports no self-injurious behavior  Patient reports no violent behavior    Verbal deficits: None    Patient's response to intervention:  The patient's response to intervention is accepting.    Progress toward goals and other mental status changes:  The patient's progress toward goals is good.    Diagnosis:     ICD-10-CM ICD-9-CM   1. AGGIE " (generalized anxiety disorder)  F41.1 300.02       Plan:  individual psychotherapy and medication management by physician Pt to go to ED or call 911 if symptoms worsen or if she has thoughts of harming self and/or others. Pt verbalized understanding.    Return to clinic: as scheduled    Length of Service (minutes): 45      Each patient to whom he or she provides medical services by telemedicine is: (1) informed of the relationship between the physician and patient and the respective role of any other health care provider with respect to management of the patient; and (2) notified that he or she may decline to receive medical services by telemedicine and may withdraw from such care at any time.

## 2022-08-01 ENCOUNTER — OFFICE VISIT (OUTPATIENT)
Dept: PSYCHIATRY | Facility: CLINIC | Age: 42
End: 2022-08-01
Payer: COMMERCIAL

## 2022-08-01 DIAGNOSIS — F41.1 GAD (GENERALIZED ANXIETY DISORDER): Primary | ICD-10-CM

## 2022-08-01 PROCEDURE — 99999 PR PBB SHADOW E&M-EST. PATIENT-LVL II: CPT | Mod: PBBFAC,,, | Performed by: SOCIAL WORKER

## 2022-08-01 PROCEDURE — 90834 PSYTX W PT 45 MINUTES: CPT | Mod: S$GLB,,, | Performed by: SOCIAL WORKER

## 2022-08-01 PROCEDURE — 1159F PR MEDICATION LIST DOCUMENTED IN MEDICAL RECORD: ICD-10-PCS | Mod: CPTII,S$GLB,, | Performed by: SOCIAL WORKER

## 2022-08-01 PROCEDURE — 90834 PR PSYCHOTHERAPY W/PATIENT, 45 MIN: ICD-10-PCS | Mod: S$GLB,,, | Performed by: SOCIAL WORKER

## 2022-08-01 PROCEDURE — 99999 PR PBB SHADOW E&M-EST. PATIENT-LVL II: ICD-10-PCS | Mod: PBBFAC,,, | Performed by: SOCIAL WORKER

## 2022-08-01 PROCEDURE — 1159F MED LIST DOCD IN RCRD: CPT | Mod: CPTII,S$GLB,, | Performed by: SOCIAL WORKER

## 2022-08-08 NOTE — PROGRESS NOTES
Individual Psychotherapy (PhD/LCSW)    8/15/2022    Site:  [facility]         Therapeutic Intervention: Met with patient.   Outpatient - Supportive psychotherapy 45 min - CPT Code 54427    Chief complaint/reason for encounter: anxiety     Interval history and content of current session: Jaclyn shared that overall she has been doing well since our last visit. We discussed how she was successfully able to set the boundary of her sister not bringing her ex  on vacation with them, and though her sister may have been hurt by this, we discussed that Jaclyn did what was best for her and the rest of her family. I validated her for this; Jaclyn shared that she would like to continue to work on setting boundaries and assertive communication. Discussed how sometimes it is best to speak with people about how you felt after the fact as we can sometimes have more emotional reactions when confronting someone in the moment. We also discussed how she feels that she is getting better at recognizing her anxiety and managing it.     Treatment plan:  · Target symptoms: anxiety   · Why chosen therapy is appropriate versus another modality: relevant to diagnosis, patient responds to this modality, evidence based practice  · Outcome monitoring methods: self-report  · Therapeutic intervention type: insight oriented psychotherapy, behavior modifying psychotherapy, supportive psychotherapy    Risk parameters:  Patient reports no suicidal ideation  Patient reports no homicidal ideation  Patient reports no self-injurious behavior  Patient reports no violent behavior    Verbal deficits: None    Patient's response to intervention:  The patient's response to intervention is accepting.    Progress toward goals and other mental status changes:  The patient's progress toward goals is good.    Diagnosis:     ICD-10-CM ICD-9-CM   1. AGGIE (generalized anxiety disorder)  F41.1 300.02       Plan:  individual psychotherapy and medication  management by physician Pt to go to ED or call 911 if symptoms worsen or if she has thoughts of harming self and/or others. Pt verbalized understanding.    Return to clinic: as scheduled    Length of Service (minutes): 45      Each patient to whom he or she provides medical services by telemedicine is: (1) informed of the relationship between the physician and patient and the respective role of any other health care provider with respect to management of the patient; and (2) notified that he or she may decline to receive medical services by telemedicine and may withdraw from such care at any time.

## 2022-08-15 ENCOUNTER — OFFICE VISIT (OUTPATIENT)
Dept: PSYCHIATRY | Facility: CLINIC | Age: 42
End: 2022-08-15
Payer: COMMERCIAL

## 2022-08-15 DIAGNOSIS — F41.1 GAD (GENERALIZED ANXIETY DISORDER): Primary | ICD-10-CM

## 2022-08-15 PROCEDURE — 90834 PSYTX W PT 45 MINUTES: CPT | Mod: S$GLB,,, | Performed by: SOCIAL WORKER

## 2022-08-15 PROCEDURE — 1159F MED LIST DOCD IN RCRD: CPT | Mod: CPTII,S$GLB,, | Performed by: SOCIAL WORKER

## 2022-08-15 PROCEDURE — 99999 PR PBB SHADOW E&M-EST. PATIENT-LVL II: CPT | Mod: PBBFAC,,, | Performed by: SOCIAL WORKER

## 2022-08-15 PROCEDURE — 1159F PR MEDICATION LIST DOCUMENTED IN MEDICAL RECORD: ICD-10-PCS | Mod: CPTII,S$GLB,, | Performed by: SOCIAL WORKER

## 2022-08-15 PROCEDURE — 99999 PR PBB SHADOW E&M-EST. PATIENT-LVL II: ICD-10-PCS | Mod: PBBFAC,,, | Performed by: SOCIAL WORKER

## 2022-08-15 PROCEDURE — 90834 PR PSYCHOTHERAPY W/PATIENT, 45 MIN: ICD-10-PCS | Mod: S$GLB,,, | Performed by: SOCIAL WORKER

## 2022-08-24 NOTE — PROGRESS NOTES
"Individual Psychotherapy (PhD/LCSW)    8/29/2022    Site:  [facility]         Therapeutic Intervention: Met with patient.  Outpatient - Behavior modifying psychotherapy 45 min - CPT code 84382 and Outpatient - Supportive psychotherapy 45 min - CPT Code 18377    Chief complaint/reason for encounter: depression and anxiety     Interval history and content of current session: Jaclyn shared that she has overall been doing well since our last visit. She did share that her anxiety has been high for the past few weeks, which she attributes to still adjusting to her children being back in school and attempting to get everyone's schedules straight. We did discuss how it is more of an "overwhelmed" feeling and not that she is worried or experiencing panic. Discussed her making a schedule and to do lists; also discussed her sitting down with her family at the beginning of each week to discuss everyone's schedules for the week. Jaclyn discussed how she feels that she takes care of the family responsibilities more than her  does and she would like this to be more even. I introduced Jaclyn to DBT DEAR MAN skills and how these skills can be helpful for object effectiveness and assertive communication. Provided her with handout; she is to have a conversation with her  utilizing these skills.     Treatment plan:  Target symptoms: depression, anxiety   Why chosen therapy is appropriate versus another modality: relevant to diagnosis, patient responds to this modality, evidence based practice  Outcome monitoring methods: self-report  Therapeutic intervention type: behavior modifying psychotherapy, supportive psychotherapy    Risk parameters:  Patient reports no suicidal ideation  Patient reports no homicidal ideation  Patient reports no self-injurious behavior  Patient reports no violent behavior    Verbal deficits: None    Patient's response to intervention:  The patient's response to intervention is " accepting.    Progress toward goals and other mental status changes:  The patient's progress toward goals is good.    Diagnosis:     ICD-10-CM ICD-9-CM   1. AGGIE (generalized anxiety disorder)  F41.1 300.02       Plan:  individual psychotherapy and medication management by physician Pt to go to ED or call 911 if symptoms worsen or if she has thoughts of harming self and/or others. Pt verbalized understanding.    Return to clinic: as scheduled    Length of Service (minutes): 45      Each patient to whom he or she provides medical services by telemedicine is: (1) informed of the relationship between the physician and patient and the respective role of any other health care provider with respect to management of the patient; and (2) notified that he or she may decline to receive medical services by telemedicine and may withdraw from such care at any time.

## 2022-08-29 ENCOUNTER — OFFICE VISIT (OUTPATIENT)
Dept: PSYCHIATRY | Facility: CLINIC | Age: 42
End: 2022-08-29
Payer: COMMERCIAL

## 2022-08-29 DIAGNOSIS — F41.1 GAD (GENERALIZED ANXIETY DISORDER): Primary | ICD-10-CM

## 2022-08-29 PROCEDURE — 1159F MED LIST DOCD IN RCRD: CPT | Mod: CPTII,S$GLB,, | Performed by: SOCIAL WORKER

## 2022-08-29 PROCEDURE — 90834 PSYTX W PT 45 MINUTES: CPT | Mod: S$GLB,,, | Performed by: SOCIAL WORKER

## 2022-08-29 PROCEDURE — 99999 PR PBB SHADOW E&M-EST. PATIENT-LVL II: CPT | Mod: PBBFAC,,, | Performed by: SOCIAL WORKER

## 2022-08-29 PROCEDURE — 99999 PR PBB SHADOW E&M-EST. PATIENT-LVL II: ICD-10-PCS | Mod: PBBFAC,,, | Performed by: SOCIAL WORKER

## 2022-08-29 PROCEDURE — 1159F PR MEDICATION LIST DOCUMENTED IN MEDICAL RECORD: ICD-10-PCS | Mod: CPTII,S$GLB,, | Performed by: SOCIAL WORKER

## 2022-08-29 PROCEDURE — 90834 PR PSYCHOTHERAPY W/PATIENT, 45 MIN: ICD-10-PCS | Mod: S$GLB,,, | Performed by: SOCIAL WORKER

## 2022-09-14 ENCOUNTER — LAB VISIT (OUTPATIENT)
Dept: LAB | Facility: HOSPITAL | Age: 42
End: 2022-09-14
Payer: COMMERCIAL

## 2022-09-14 ENCOUNTER — PATIENT MESSAGE (OUTPATIENT)
Dept: ENDOCRINOLOGY | Facility: CLINIC | Age: 42
End: 2022-09-14
Payer: COMMERCIAL

## 2022-09-14 DIAGNOSIS — R76.0 ANTI-CARDIOLIPIN ANTIBODY POSITIVE: Primary | ICD-10-CM

## 2022-09-14 DIAGNOSIS — E61.1 IRON DEFICIENCY: ICD-10-CM

## 2022-09-14 DIAGNOSIS — Z79.01 LONG TERM (CURRENT) USE OF ANTICOAGULANTS: ICD-10-CM

## 2022-09-14 LAB
ALBUMIN SERPL BCP-MCNC: 3.9 G/DL (ref 3.5–5.2)
ALP SERPL-CCNC: 82 U/L (ref 55–135)
ALT SERPL W/O P-5'-P-CCNC: 22 U/L (ref 10–44)
ANION GAP SERPL CALC-SCNC: 4 MMOL/L (ref 8–16)
AST SERPL-CCNC: 25 U/L (ref 10–40)
BASOPHILS # BLD AUTO: 0.04 K/UL (ref 0–0.2)
BASOPHILS NFR BLD: 0.5 % (ref 0–1.9)
BILIRUB SERPL-MCNC: 0.4 MG/DL (ref 0.1–1)
BUN SERPL-MCNC: 16 MG/DL (ref 6–20)
CALCIUM SERPL-MCNC: 9.4 MG/DL (ref 8.7–10.5)
CHLORIDE SERPL-SCNC: 104 MMOL/L (ref 95–110)
CO2 SERPL-SCNC: 25 MMOL/L (ref 23–29)
CREAT SERPL-MCNC: 1 MG/DL (ref 0.5–1.4)
DIFFERENTIAL METHOD: ABNORMAL
EOSINOPHIL # BLD AUTO: 0.4 K/UL (ref 0–0.5)
EOSINOPHIL NFR BLD: 4.7 % (ref 0–8)
ERYTHROCYTE [DISTWIDTH] IN BLOOD BY AUTOMATED COUNT: 12.5 % (ref 11.5–14.5)
EST. GFR  (NO RACE VARIABLE): >60 ML/MIN/1.73 M^2
GLUCOSE SERPL-MCNC: 86 MG/DL (ref 70–110)
HCT VFR BLD AUTO: 42.4 % (ref 37–48.5)
HGB BLD-MCNC: 13.5 G/DL (ref 12–16)
IMM GRANULOCYTES # BLD AUTO: 0.01 K/UL (ref 0–0.04)
IMM GRANULOCYTES NFR BLD AUTO: 0.1 % (ref 0–0.5)
IRON SERPL-MCNC: 119 UG/DL (ref 30–160)
LYMPHOCYTES # BLD AUTO: 3.2 K/UL (ref 1–4.8)
LYMPHOCYTES NFR BLD: 42.6 % (ref 18–48)
MCH RBC QN AUTO: 30.8 PG (ref 27–31)
MCHC RBC AUTO-ENTMCNC: 31.8 G/DL (ref 32–36)
MCV RBC AUTO: 97 FL (ref 82–98)
MONOCYTES # BLD AUTO: 0.8 K/UL (ref 0.3–1)
MONOCYTES NFR BLD: 10.3 % (ref 4–15)
NEUTROPHILS # BLD AUTO: 3.1 K/UL (ref 1.8–7.7)
NEUTROPHILS NFR BLD: 41.8 % (ref 38–73)
NRBC BLD-RTO: 0 /100 WBC
PLATELET # BLD AUTO: 363 K/UL (ref 150–450)
PMV BLD AUTO: 10.7 FL (ref 9.2–12.9)
POTASSIUM SERPL-SCNC: 4.1 MMOL/L (ref 3.5–5.1)
PROT SERPL-MCNC: 6.7 G/DL (ref 6–8.4)
RBC # BLD AUTO: 4.39 M/UL (ref 4–5.4)
SATURATED IRON: 31 % (ref 20–50)
SODIUM SERPL-SCNC: 133 MMOL/L (ref 136–145)
TOTAL IRON BINDING CAPACITY: 383 UG/DL (ref 250–450)
TRANSFERRIN SERPL-MCNC: 259 MG/DL (ref 200–375)
WBC # BLD AUTO: 7.41 K/UL (ref 3.9–12.7)

## 2022-09-14 PROCEDURE — 84466 ASSAY OF TRANSFERRIN: CPT | Performed by: INTERNAL MEDICINE

## 2022-09-14 PROCEDURE — 36415 COLL VENOUS BLD VENIPUNCTURE: CPT | Mod: PO | Performed by: INTERNAL MEDICINE

## 2022-09-14 PROCEDURE — 85025 COMPLETE CBC W/AUTO DIFF WBC: CPT | Performed by: INTERNAL MEDICINE

## 2022-09-14 PROCEDURE — 80053 COMPREHEN METABOLIC PANEL: CPT | Performed by: INTERNAL MEDICINE

## 2022-09-25 PROBLEM — Z86.73 HISTORY OF CVA IN ADULTHOOD: Status: ACTIVE | Noted: 2018-04-24

## 2022-09-26 ENCOUNTER — OFFICE VISIT (OUTPATIENT)
Dept: HEMATOLOGY/ONCOLOGY | Facility: CLINIC | Age: 42
End: 2022-09-26
Payer: COMMERCIAL

## 2022-09-26 VITALS
BODY MASS INDEX: 29.36 KG/M2 | WEIGHT: 176.19 LBS | RESPIRATION RATE: 12 BRPM | HEART RATE: 85 BPM | HEIGHT: 65 IN | DIASTOLIC BLOOD PRESSURE: 70 MMHG | SYSTOLIC BLOOD PRESSURE: 131 MMHG | TEMPERATURE: 98 F

## 2022-09-26 DIAGNOSIS — N93.9 ABNORMAL UTERINE BLEEDING (AUB): ICD-10-CM

## 2022-09-26 DIAGNOSIS — R76.0 ANTICARDIOLIPIN ANTIBODY POSITIVE: ICD-10-CM

## 2022-09-26 DIAGNOSIS — N92.0 MENORRHAGIA WITH REGULAR CYCLE: ICD-10-CM

## 2022-09-26 DIAGNOSIS — Z79.01 ANTICOAGULANT LONG-TERM USE: ICD-10-CM

## 2022-09-26 DIAGNOSIS — D68.9 CLOTTING DISORDER: ICD-10-CM

## 2022-09-26 DIAGNOSIS — Z86.73 HISTORY OF CVA IN ADULTHOOD: ICD-10-CM

## 2022-09-26 DIAGNOSIS — E61.1 IRON DEFICIENCY: ICD-10-CM

## 2022-09-26 DIAGNOSIS — I63.81 LEFT SIDED LACUNAR INFARCTION: Primary | ICD-10-CM

## 2022-09-26 PROCEDURE — 1159F MED LIST DOCD IN RCRD: CPT | Mod: CPTII,S$GLB,, | Performed by: INTERNAL MEDICINE

## 2022-09-26 PROCEDURE — 99213 OFFICE O/P EST LOW 20 MIN: CPT | Mod: S$GLB,,, | Performed by: INTERNAL MEDICINE

## 2022-09-26 PROCEDURE — 3008F PR BODY MASS INDEX (BMI) DOCUMENTED: ICD-10-PCS | Mod: CPTII,S$GLB,, | Performed by: INTERNAL MEDICINE

## 2022-09-26 PROCEDURE — 3078F PR MOST RECENT DIASTOLIC BLOOD PRESSURE < 80 MM HG: ICD-10-PCS | Mod: CPTII,S$GLB,, | Performed by: INTERNAL MEDICINE

## 2022-09-26 PROCEDURE — 99213 PR OFFICE/OUTPT VISIT, EST, LEVL III, 20-29 MIN: ICD-10-PCS | Mod: S$GLB,,, | Performed by: INTERNAL MEDICINE

## 2022-09-26 PROCEDURE — 1159F PR MEDICATION LIST DOCUMENTED IN MEDICAL RECORD: ICD-10-PCS | Mod: CPTII,S$GLB,, | Performed by: INTERNAL MEDICINE

## 2022-09-26 PROCEDURE — 1160F PR REVIEW ALL MEDS BY PRESCRIBER/CLIN PHARMACIST DOCUMENTED: ICD-10-PCS | Mod: CPTII,S$GLB,, | Performed by: INTERNAL MEDICINE

## 2022-09-26 PROCEDURE — 3078F DIAST BP <80 MM HG: CPT | Mod: CPTII,S$GLB,, | Performed by: INTERNAL MEDICINE

## 2022-09-26 PROCEDURE — 3008F BODY MASS INDEX DOCD: CPT | Mod: CPTII,S$GLB,, | Performed by: INTERNAL MEDICINE

## 2022-09-26 PROCEDURE — 1160F RVW MEDS BY RX/DR IN RCRD: CPT | Mod: CPTII,S$GLB,, | Performed by: INTERNAL MEDICINE

## 2022-09-26 PROCEDURE — 3075F SYST BP GE 130 - 139MM HG: CPT | Mod: CPTII,S$GLB,, | Performed by: INTERNAL MEDICINE

## 2022-09-26 PROCEDURE — 3075F PR MOST RECENT SYSTOLIC BLOOD PRESS GE 130-139MM HG: ICD-10-PCS | Mod: CPTII,S$GLB,, | Performed by: INTERNAL MEDICINE

## 2022-09-26 NOTE — PROGRESS NOTES
[facility] Hematology/Oncology  PROGRESS NOTE      Subjective:       Patient ID:   NAME: Jaclyn Rausch : 1980     42 y.o. female    Referring Doc: Khalida Gibson (new PCP)  Other Physicians: Pearl Frank/Gertrudis Ariza (neuro); Federico Guzmán (GYN)    Chief Complaint:  Clot disorder f/u    History of Present Illness:     Patient returns today for a regularly scheduled follow-up visit.  The patient is here by herself. She is doing ok with no new issues.     She is on eliquis. She denies any CP, SOB, HA's or N/V.  She denies any excessive bruising or bleeding.     She saw endocrine recently about weight    GERD has been stable. No recent migraines.       Discussed covid19 precautions - she had her vaccinations              ROS:   GEN: normal without any fever, night sweats or weight loss  HEENT: normal with no HA's, sore throat, stiff neck, changes in vision  CV: normal with no CP, SOB, PND, BAILEY or orthopnea  PULM: normal with no SOB, cough, hemoptysis, sputum or pleuritic pain  GI: normal with no abdominal pain, nausea, vomiting, constipation, diarrhea, melanotic stools, BRBPR, or hematemesis  : normal with no hematuria, dysuria  BREAST: normal with no mass, discharge, pain  SKIN: normal with no rash, erythema, bruising, or swelling    Allergies:  Review of patient's allergies indicates:  No Known Allergies    Medications:    Current Outpatient Medications:     albuterol (VENTOLIN HFA) 90 mcg/actuation inhaler, Inhale 2 puffs into the lungs every 6 (six) hours as needed for Wheezing. Rescue, Disp: 18 g, Rfl: 0    ascorbic acid, vitamin C, (VITAMIN C) 100 MG tablet, Take 100 mg by mouth once daily., Disp: , Rfl:     atorvastatin (LIPITOR) 40 MG tablet, TAKE 1 TABLET(40 MG) BY MOUTH EVERY DAY, Disp: 90 tablet, Rfl: 3    ELIQUIS 5 mg Tab, TAKE 1 TABLET BY MOUTH TWICE DAILY, Disp: 60 tablet, Rfl: 11    liraglutide 0.6 mg/0.1 mL, 18 mg/3 mL, subq PNIJ (VICTOZA 2-YANETH) 0.6 mg/0.1 mL (18 mg/3 mL) PnIj  "pen, Inject 1.8 mg into the skin once daily. Start at 0.6 mg/day for 1 week, then 1.2 mg/day for 1 week, then 1.8 mg/day after that., Disp: 9 mL, Rfl: 11    ondansetron (ZOFRAN-ODT) 4 MG TbDL, Take 1 tablet (4 mg total) by mouth every 8 (eight) hours as needed (nausea)., Disp: 12 tablet, Rfl: 0    pen needle, diabetic, safety 31 gauge x 1/4" Ndle, Use once daily to inject victoza, Disp: 100 each, Rfl: 1  No current facility-administered medications for this visit.    Facility-Administered Medications Ordered in Other Visits:     electrolyte-S (ISOLYTE), , Intravenous, Continuous, Héctor Trujillo MD, Stopped at 01/10/20 0828    lidocaine (PF) 10 mg/ml (1%) injection 10 mg, 1 mL, Intradermal, Once, Héctor Trujillo MD    PMHx/PSHx Updates:  See patient's last visit with me on 3/22/2022  See H&P on 5/4/2018        Pathology:  Cancer Staging  No matching staging information was found for the patient.          Objective:     Vitals:  Blood pressure 131/70, pulse 85, temperature 98.4 °F (36.9 °C), resp. rate 12, height 5' 5" (1.651 m), weight 79.9 kg (176 lb 3.2 oz).    Physical Examination:   GEN: no apparent distress, comfortable; AAOx3  HEAD: atraumatic and normocephalic  EYES: no pallor, no icterus, PERRLA  ENT: OMM, no pharyngeal erythema, external ears WNL; no nasal discharge; no thrush  NECK: no masses, thyroid normal, trachea midline, no LAD/LN's, supple  CV: RRR with no murmur; normal pulse; normal S1 and S2; no pedal edema  CHEST: Normal respiratory effort; CTAB; normal breath sounds; no wheeze or crackles  ABDOM: nontender and nondistended; soft; normal bowel sounds; no rebound/guarding  MUSC/Skeletal: ROM normal; no crepitus; joints normal; no deformities or arthropathy  EXTREM: no clubbing, cyanosis, inflammation or swelling  SKIN: no rashes, lesions, ulcers, petechiae or subcutaneous nodules  : no gonzalez  NEURO: grossly intact; motor/sensory WNL; AAOx3; no tremors  PSYCH: normal mood, affect and " behavior  LYMPH: normal cervical, supraclavicular, axillary and groin LN's            Labs:      Lab Results   Component Value Date    WBC 7.41 09/14/2022    HGB 13.5 09/14/2022    HCT 42.4 09/14/2022    MCV 97 09/14/2022     09/14/2022     BMP  Lab Results   Component Value Date     (L) 09/14/2022    K 4.1 09/14/2022     09/14/2022    CO2 25 09/14/2022    BUN 16 09/14/2022    CREATININE 1.0 09/14/2022    CALCIUM 9.4 09/14/2022    ANIONGAP 4 (L) 09/14/2022    ESTGFRAFRICA >60.0 03/16/2022    EGFRNONAA >60.0 03/16/2022     Lab Results   Component Value Date    ALT 22 09/14/2022    AST 25 09/14/2022    ALKPHOS 82 09/14/2022    BILITOT 0.4 09/14/2022     Lab Results   Component Value Date    IRON 119 09/14/2022    TIBC 383 09/14/2022    FERRITIN 36 03/16/2022           Radiology/Diagnostic Studies:    No results found.    I have reviewed all available lab results and radiology reports.    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 She has been seen by Dr Frank with cardiology and Dr Pearl Roca with neurology   - hematology was previously consulted for evaluation for hypercoag workup which I ordered while she was inpatient  - she is currently on Eliquis oral anticoagulation; neurology discontinued the aspirin  - she saw for f/u with Dr Pearl Roca with neurology as outpatient in Dec 2018  - she saw Evette NP in Oct 2018  - factor XII was elevated at 144 and  factor IX was elevated at 147, but mildly and may be reactive in nature only  - Lipoprotein-A was elevated at 103; APA IgM was elevated at 17.99  - homocysteine was WNL; ATIII was normal  - prothrombin II gene was normal, LA was negative; factor V leiden was normal  - protein C and S was adequate    3/10/2021:  - continued on eliquis with no new issues    9/13/2021:  - continued on Eliquis  - doing well  - labs are  adequate    3/22/2022:  - latest labs are WNL  - continued on eliquis  - no new bleeding or excessive bruising    9/26/2022:  - doing well on the eliquis  - no excessive bleeding or bruising     (2) Chronic borderline anemia in past - current hgb is within normal limits  - prior ferritin levels were low  - she has some stomach issue with OTC iron  - check up to date iron panel every 6 months  - s/p ablation and no longer on the ICAR-C     (3) Hx/of migraine headaches     (4) Atrial septal aneurysm - followed by Dr Frank with cardiology as outpatient  - she had bubble study and JUAN while in hospital  - there is no opening or hole per patient    - she is now followed by Dr SIXTO Phillips     (5) Recent loss of pregnancy in Jan 2018 in first trimester     (6) Hypercholesterolemia - now on lipitor    (7) GYN following - Dr Federico Polanco with [facility] - heavier menstrual cycles resolved since undergoing ablation        Left sided lacunar infarction, chronic, identified 4/24/18    History of CVA in adulthood    Menorrhagia with regular cycle    Abnormal uterine bleeding (AUB)    Anticoagulant long-term use    Clotting disorder    Iron deficiency    Anticardiolipin antibody positive        PLAN:  1. Continue eliquis (possibly life-long)   2.  F/u with PCP, Neuro and cardiology  4.  RTC in 12 months with labs     Fax note to Paige Higgins Amy Jones, [place];      Discussion:       COVID-19 Discussion:    I had long discussion with patient and any applicable family about the COVID-19 coronavirus epidemic and the recommended precautions with regard to cancer and/or hematology patients. I have re-iterated the CDC recommendations for adequate hand washing, use of hand -like products, and coughing into elbow, etc. In addition, especially for our patients who are on chemotherapy and/or our otherwise immunocompromised patients, I have recommended avoidance of crowds, including movie theaters, restaurants, churches, etc.  I have recommended avoidance of any sick or symptomatic family members and/or friends. I have also recommended avoidance of any raw and unwashed food products, and general avoidance of food items that have not been prepared by themselves. The patient has been asked to call us immediately with any symptom developments, issues, questions or other general concerns.       Anticoagulation Discussion:    Discussed with patient and any applicable family members about the benefit and/or need for anticoagulation. I communicated about the risks of bleeding while on any anticoagulation, which could be serious and/or life-threatening, and which can occur at any time, regardless of degree of the level of anticoagulation. I expressed the need for compliance with any anticoagulation regimen and that failure to do so could potential lead to excessive bleeding, and risk to health and/or life. In particular, with patients on coumadin therapy, compliance with requested blood work is absolutely essential, as coumadin levels can vary from time to time, and failure to do so could potentially place the patient at risk for bleeding and/or clotting events which could be fatal. Patients on coumadin are encouraged to call the day after they have their levels drawn, as to obtain the appropriate instructions from my staff. Patients are aware that self-regulating or self-dosing of their medications is strictly prohibited.     I have explained all of the above in detail and the patient understands all of the current recommendation(s). I have answered all of their questions to the best of my ability and to their complete satisfaction.   The patient is to continue with the current management plan.            Electronically signed by Jesus Sprague MD

## 2022-11-08 ENCOUNTER — PATIENT MESSAGE (OUTPATIENT)
Dept: FAMILY MEDICINE | Facility: CLINIC | Age: 42
End: 2022-11-08
Payer: COMMERCIAL

## 2022-11-08 DIAGNOSIS — Z12.31 ENCOUNTER FOR SCREENING MAMMOGRAM FOR MALIGNANT NEOPLASM OF BREAST: Primary | ICD-10-CM

## 2022-11-16 ENCOUNTER — HOSPITAL ENCOUNTER (OUTPATIENT)
Dept: RADIOLOGY | Facility: CLINIC | Age: 42
Discharge: HOME | End: 2022-11-16
Attending: PHYSICIAN ASSISTANT
Payer: COMMERCIAL

## 2022-11-16 DIAGNOSIS — Z12.31 ENCOUNTER FOR SCREENING MAMMOGRAM FOR MALIGNANT NEOPLASM OF BREAST: ICD-10-CM

## 2022-11-16 PROCEDURE — 77063 BREAST TOMOSYNTHESIS BI: CPT | Mod: TC,PO

## 2022-11-16 PROCEDURE — 77063 BREAST TOMOSYNTHESIS BI: CPT | Mod: 26,,, | Performed by: RADIOLOGY

## 2022-11-16 PROCEDURE — 77063 MAMMO DIGITAL SCREENING BILAT WITH TOMO: ICD-10-PCS | Mod: 26,,, | Performed by: RADIOLOGY

## 2022-11-16 PROCEDURE — 77067 SCR MAMMO BI INCL CAD: CPT | Mod: 26,,, | Performed by: RADIOLOGY

## 2022-11-16 PROCEDURE — 77067 MAMMO DIGITAL SCREENING BILAT WITH TOMO: ICD-10-PCS | Mod: 26,,, | Performed by: RADIOLOGY

## 2022-12-09 NOTE — PROGRESS NOTES
Admitted/transferred from: Cath lab  Reason for admission/transfer: leave in swan  Patient status upon admission/transfer: stable, on Dobutamine and Lasix drip  Interventions: settled in room, hooked up swan.  Plan: monitor cardiac numbers via swan  2 RN skin assessment: completed by Kim MILNER RN and Mae MOSS RN  Result of skin assessment and interventions/actions: skin intact  Height, weight, drug calc weight: Done  Patient belongings (see Flowsheet - Adult Profile for details): remains in room with patient  MDRO education (if applicable): reason for ICU admission and plan for stay     [facility]    CHIEF COMPLAINT:   Chief Complaint   Patient presents with    Elbow Pain     ulnar nerve ultrasound     HISTORY OF PRESENT ILLNESS: Jaclyn Rausch is a 38 y.o. female who presents to me in follow-up for her left arm paresthesias.  I last saw her on 12/06/2018 for EMG/nerve conduction study.  This test was positive borderline mild left carpal tunnel syndrome and left axonal ulnar neuropathy at the elbow.  She presents today for ultrasound examination of the peripheral nerves of the left upper extremity.  She reports her symptoms started about 1 year ago insidiously.  She feels the symptoms have been stable over the past several months.  She notes numbness throughout the entire left hand.  She reports she has only occasional pain in the elbow.  She denies any significant weakness of the left arm or hand.  She feels the symptoms may be positional as she notes increased symptoms when bathing her son.  She also has increased symptoms at nighttime.  She has had no prior treatment of this issue.    Review of Systems   Constitutional: Negative for fever.   HENT: Negative for drooling.    Eyes: Negative for discharge.   Respiratory: Negative for choking.    Cardiovascular: Negative for chest pain.   Genitourinary: Negative for flank pain.   Skin: Negative for wound.   Allergic/Immunologic: Negative for immunocompromised state.   Neurological: Negative for tremors and syncope.   Psychiatric/Behavioral: Negative for behavioral problems.     Past Medical History:   Past Medical History:   Diagnosis Date    Anemia     slightly    Anticardiolipin antibody positive 5/4/2018    Atrial septal aneurysm     Dr. Frank; last visit 1/2018    Elevated lipoprotein(a) 5/4/2018    Stroke 04/24/2018       Past Surgical History:   Past Surgical History:   Procedure Laterality Date    BUNIONECTOMY Left 2001    D & C (SUCTION) N/A 2/7/2018    [facility]     TRANSESOPHAGEAL ECHOCARDIOGRAM WITH POSSIBLE CARDIOVERSION (JUAN W/ POSS CARDIOVERSION) N/A 2018    Performed by Inder Frank [facility] at [facility] C[facility]       Family History:   Family History   Problem Relation Age of Onset    Diabetes Maternal Grandmother     Lung cancer Maternal Grandfather     Colon cancer Maternal Grandfather     Hypertension Mother     Hearing loss Father     Hypertension Brother     Eczema Son     Breast cancer Neg Hx     Ovarian cancer Neg Hx     Melanoma Neg Hx     Psoriasis Neg Hx     Lupus Neg Hx        Medications:   Current Outpatient Medications on File Prior to Visit   Medication Sig Dispense Refill    apixaban 5 mg Tab Take 1 tablet (5 mg total) by mouth 2 (two) times daily. 60 tablet 11    atorvastatin (LIPITOR) 40 MG tablet Take 1 tablet (40 mg total) by mouth once daily. 30 tablet 11    escitalopram oxalate (LEXAPRO) 20 MG tablet TAKE 1 TABLET(20 MG) BY MOUTH EVERY DAY (Patient taking differently: TAKE 1/2 TABLET(20 MG) BY MOUTH EVERY DAY) 30 tablet 1    meclizine (ANTIVERT) 25 mg tablet Take 1 tablet (25 mg total) by mouth 2 (two) times daily as needed for Dizziness. 30 tablet 3     No current facility-administered medications on file prior to visit.      Allergies: Review of patient's allergies indicates:  No Known Allergies    Social History:   Social History     Socioeconomic History    Marital status:      Spouse name: None    Number of children: None    Years of education: None    Highest education level: None   Social Needs    Financial resource strain: None    Food insecurity - worry: None    Food insecurity - inability: None    Transportation needs - medical: None    Transportation needs - non-medical: None   Occupational History    None   Tobacco Use    Smoking status: Former Smoker     Packs/day: 0.25     Years: 2.00     Pack years: 0.50     Last attempt to quit: 2003     Years since quittin.0    Smokeless tobacco: Never  "Used   Substance and Sexual Activity    Alcohol use: No     Comment: Socially    Drug use: No    Sexual activity: Yes     Partners: Male     Comment:    Other Topics Concern    Are you pregnant or think you may be? Not Asked    Breast-feeding Not Asked   Social History Narrative    None     PHYSICAL EXAMINATION:   General    Vitals:    01/24/19 1313   BP: (!) 151/79   Pulse: 71   Weight: 79.4 kg (175 lb)   Height: 5' 5" (1.651 m)     Constitutional: Oriented to person, place, and time. No apparent distress. Appears well-developed and well-nourished. Pleasant.  HENT:   Head: Normocephalic and atraumatic.   Eyes: Right eye exhibits no discharge. Left eye exhibits no discharge. No scleral icterus.   Pulmonary/Chest: Effort normal. No respiratory distress.   Abdominal: There is no guarding.   Neurological: Alert and oriented to person, place, and time.   Psychiatric: Behavior is normal.   Left Hand Exam     Tenderness   The patient is experiencing no tenderness.     Range of Motion   Wrist   Extension: normal   Flexion: normal   Pronation: normal   Supination: normal     Muscle Strength   Wrist extension: 5/5   Wrist flexion: 5/5   :  5/5     Tests   Tinel's sign (median nerve): negative    Other   Erythema: absent  Scars: absent  Sensation: normal  Pulse: present      Left Elbow Exam     Tenderness   The patient is experiencing no tenderness.     Range of Motion   Extension: 0   Flexion: 140   Pronation: normal   Supination: normal     Muscle Strength   Pronation:  5/5   Supination:  5/5     Tests   Varus: negative  Valgus: negative  Tinel's sign (cubital tunnel): positive    Other   Erythema: absent  Scars: absent  Sensation: decreased (Reduced sensation light touch over the medial forearm in comparison to the right side)  Pulse: present        INSPECTION: There is no swelling, ecchymoses, erythema or gross deformity of the left upper extremity.  No gross muscular atrophy of the left " hand    Imaging  EMG/nerve conduction studie of the upper extremities from 12/06/2018:  1. There is electrodiagnostic evidence of borderline mild left median neuropathy at the wrist.  2. There is also evidence of axonal ulnar neuropathy at the left elbow.  3. There was insufficient electrodiagnostic evidence for diagnoses of peripheral polyneuropathy, myopathy, or cervical radiculopathy/brachial plexopathy of the right upper extremity.    Diagnostic Ultrasound Exam:  FINDINGS: Real time examination was performed. Selected static and dynamic images were obtained, and correlated with prior x-ray and other available imaging.   Limited diagnostic exam of the left ulnar nerve was performed today.  The images were saved will be uploaded to EMR.  The left ulnar nerve was observed in its short axis in the upper arm and followed distally into the elbow.  There was observed thickening, loss of internal architecture, and hypoechoic appearance of the ulnar nerve at the level of the cubital tunnel.  The nerve took a normal appearance as it coursed into the forearm musculature and down through the wrist.  The nerve was observed in its long axis at the level of the cubital tunnel and there was observed caliber thickening at the level of the cubital tunnel, however no compressive masses/lesions were visualized.  The ulnar nerve at the cubital tunnel was observed in short axis while dynamically flexing and extending the elbow.  The ulnar nerve did sublux anteriorly and became perched upon the medial epicondyle humerus with elbow flexion.    Data Reviewed: EMG, ultrasound    Supportive Actions: Independent visualization of images or test specimens    ASSESSMENT:   1. Pain of left upper extremity    2. Ulnar neuropathy at elbow, left      PLAN:     1. Time was spent reviewing the above diagnosis in depth with Jaclyn today, including acute management and rehabilitation.     2.  Her symptoms, physical exam, nerve conduction studies,  and today's diagnostic ultrasound exam (see report above) are all consistent with a diagnosis of left ulnar neuropathy at the elbow.  Her symptoms do not appear to be severe at this point without significant pain, or hand weakness/atrophy.  I recommended conservative treatment at this point.  We discussed be mindful of direct compression over the medial elbow.  We discussed avoidance of prolonged elbow flexion.  We issued her at ulnar night splint to wear religiously over the next several weeks to assess for any change in her symptoms.  Were also start formal occupational therapy with nerve gliding.    3.  I will plan to contact her in about 6 weeks to assess her response to conservative treatment.  If not improved, we may consider surgical referral.    The above note was completed, in part, with the aid of Dragon dictation software/hardware. Translation errors may be present.

## 2023-01-07 ENCOUNTER — PATIENT MESSAGE (OUTPATIENT)
Dept: NEUROLOGY | Facility: CLINIC | Age: 43
End: 2023-01-07
Payer: COMMERCIAL

## 2023-01-09 ENCOUNTER — OFFICE VISIT (OUTPATIENT)
Dept: CARDIOLOGY | Facility: CLINIC | Age: 43
End: 2023-01-09
Payer: COMMERCIAL

## 2023-01-09 VITALS
WEIGHT: 184.31 LBS | OXYGEN SATURATION: 98 % | HEIGHT: 65 IN | HEART RATE: 68 BPM | DIASTOLIC BLOOD PRESSURE: 62 MMHG | SYSTOLIC BLOOD PRESSURE: 122 MMHG | BODY MASS INDEX: 30.71 KG/M2

## 2023-01-09 DIAGNOSIS — I63.9 ACUTE CVA (CEREBROVASCULAR ACCIDENT): ICD-10-CM

## 2023-01-09 DIAGNOSIS — Z79.01 CHRONIC ANTICOAGULATION: ICD-10-CM

## 2023-01-09 DIAGNOSIS — I25.3 ATRIAL SEPTAL ANEURYSM: ICD-10-CM

## 2023-01-09 DIAGNOSIS — E78.41 ELEVATED LIPOPROTEIN(A): Primary | ICD-10-CM

## 2023-01-09 DIAGNOSIS — E78.01 FAMILIAL HYPERCHOLESTEROLEMIA: ICD-10-CM

## 2023-01-09 PROCEDURE — 3078F PR MOST RECENT DIASTOLIC BLOOD PRESSURE < 80 MM HG: ICD-10-PCS | Mod: CPTII,S$GLB,, | Performed by: INTERNAL MEDICINE

## 2023-01-09 PROCEDURE — 99214 PR OFFICE/OUTPT VISIT, EST, LEVL IV, 30-39 MIN: ICD-10-PCS | Mod: S$GLB,,, | Performed by: INTERNAL MEDICINE

## 2023-01-09 PROCEDURE — 3008F PR BODY MASS INDEX (BMI) DOCUMENTED: ICD-10-PCS | Mod: CPTII,S$GLB,, | Performed by: INTERNAL MEDICINE

## 2023-01-09 PROCEDURE — 1160F RVW MEDS BY RX/DR IN RCRD: CPT | Mod: CPTII,S$GLB,, | Performed by: INTERNAL MEDICINE

## 2023-01-09 PROCEDURE — 99999 PR PBB SHADOW E&M-EST. PATIENT-LVL IV: ICD-10-PCS | Mod: PBBFAC,,, | Performed by: INTERNAL MEDICINE

## 2023-01-09 PROCEDURE — 99999 PR PBB SHADOW E&M-EST. PATIENT-LVL IV: CPT | Mod: PBBFAC,,, | Performed by: INTERNAL MEDICINE

## 2023-01-09 PROCEDURE — 1159F PR MEDICATION LIST DOCUMENTED IN MEDICAL RECORD: ICD-10-PCS | Mod: CPTII,S$GLB,, | Performed by: INTERNAL MEDICINE

## 2023-01-09 PROCEDURE — 1159F MED LIST DOCD IN RCRD: CPT | Mod: CPTII,S$GLB,, | Performed by: INTERNAL MEDICINE

## 2023-01-09 PROCEDURE — 3074F SYST BP LT 130 MM HG: CPT | Mod: CPTII,S$GLB,, | Performed by: INTERNAL MEDICINE

## 2023-01-09 PROCEDURE — 3074F PR MOST RECENT SYSTOLIC BLOOD PRESSURE < 130 MM HG: ICD-10-PCS | Mod: CPTII,S$GLB,, | Performed by: INTERNAL MEDICINE

## 2023-01-09 PROCEDURE — 3008F BODY MASS INDEX DOCD: CPT | Mod: CPTII,S$GLB,, | Performed by: INTERNAL MEDICINE

## 2023-01-09 PROCEDURE — 3078F DIAST BP <80 MM HG: CPT | Mod: CPTII,S$GLB,, | Performed by: INTERNAL MEDICINE

## 2023-01-09 PROCEDURE — 1160F PR REVIEW ALL MEDS BY PRESCRIBER/CLIN PHARMACIST DOCUMENTED: ICD-10-PCS | Mod: CPTII,S$GLB,, | Performed by: INTERNAL MEDICINE

## 2023-01-09 PROCEDURE — 99214 OFFICE O/P EST MOD 30 MIN: CPT | Mod: S$GLB,,, | Performed by: INTERNAL MEDICINE

## 2023-01-09 NOTE — ASSESSMENT & PLAN NOTE
Niacin was discussed with her.  She is to start 250 b.i.d. and then increased to 500 b.i.d. if she tolerates it.  Will check her lipoprotein a levels.

## 2023-01-09 NOTE — ASSESSMENT & PLAN NOTE
Will attempt lower doses of atorvastatin of 20 mg.  She is to follow a low-cholesterol diet.  She is to have eggs whites as well as decrease the amount of shrimp intake.

## 2023-01-09 NOTE — PROGRESS NOTES
Patient ID:  Jaclyn Rausch is a 42 y.o. female who presents for follow-up of Hypertension      She had a stroke in 2017.  She was told that she had an atrial septal aneurysm.  She has been on Eliquis and Lipitor.  She would like to decrease the Lipitor from 40-20.  She exercises on regular basis she doses Spin class 3 times a week without any symptoms whatsoever.  She tried Victoza to lose weight although she had severe constipation and heartburn she could not tolerate the medication.  She has history of high lipoprotein A.  At times when she gets an shows she has some chest discomfort this is been going on for years she had a stress echocardiogram in 2020 that was negative for ischemia.  She eats eggs on daily basis.  She also eats a significant amount of shrimp.      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 Frank; last visit 1/2018    Clotting disorder 02/03/2019    Elevated lipoprotein(a) 05/04/2018    History of CVA in adulthood 4/24/2018    Stroke 04/24/2018        Past Surgical History:   Procedure Laterality Date    BUNIONECTOMY Left 2001    CHONDROPLASTY OF KNEE Left 1/10/2020    Procedure: CHONDROPLASTY, KNEE;  Surgeon: Michael Galaviz II, MD;  Location: [facility] OR;  Service: Orthopedics;  Laterality: Left;    HYSTEROSCOPY WITH DILATION AND CURETTAGE OF UTERUS N/A 11/5/2020    Procedure: HYSTEROSCOPY, WITH DILATION AND CURETTAGE OF UTERUS;  Surgeon: Ap Milner MD;  Location: [facility] OR;  Service: OB/GYN;  Laterality: N/A;    KNEE ARTHROSCOPY W/ MENISCECTOMY Left 1/10/2020    Procedure: ARTHROSCOPY, KNEE, WITH MENISCECTOMY;  Surgeon: Michael Galaviz II, MD;  Location: [facility] OR;  Service: Orthopedics;  Laterality: Left;    REPAIR OF MENISCUS OF KNEE Left 1/10/2020    Procedure: REPAIR, MENISCUS, KNEE;  Surgeon: Michael Galaviz II, MD;  Location: [facility]  "OR;  Service: Orthopedics;  Laterality: Left;  PARTIAL MEDIAL    THERMAL ABLATION OF ENDOMETRIUM N/A 11/5/2020    Procedure: ABLATION, ENDOMETRIUM, THERMAL;  Surgeon: Ap Milner MD;  Location: [facility] OR;  Service: OB/GYN;  Laterality: N/A;          Current Outpatient Medications   Medication Instructions    albuterol (VENTOLIN HFA) 90 mcg/actuation inhaler 2 puffs, Inhalation, Every 6 hours PRN, Rescue    ascorbic acid (vitamin C) (VITAMIN C) 100 mg, Oral, Daily    atorvastatin (LIPITOR) 40 MG tablet TAKE 1 TABLET(40 MG) BY MOUTH EVERY DAY    ELIQUIS 5 mg Tab TAKE 1 TABLET BY MOUTH TWICE DAILY    liraglutide 0.6 mg/0.1 mL (18 mg/3 mL) subq PNIJ (VICTOZA 2-YANETH) 1.8 mg, Subcutaneous, Daily, Start at 0.6 mg/day for 1 week, then 1.2 mg/day for 1 week, then 1.8 mg/day after that.    ondansetron (ZOFRAN-ODT) 4 mg, Oral, Every 8 hours PRN    pen needle, diabetic, safety 31 gauge x 1/4" Ndle Use once daily to inject victoza        Review of patient's allergies indicates:  No Known Allergies     Review of Systems   Cardiovascular:  Positive for chest pain. Negative for dyspnea on exertion, leg swelling and palpitations.   Respiratory:  Negative for cough and shortness of breath.    Psychiatric/Behavioral:  The patient is nervous/anxious.       Objective:     Vitals:    01/09/23 1055   BP: 122/62   BP Location: Left arm   Patient Position: Sitting   BP Method: Medium (Manual)   Pulse: 68   SpO2: 98%   Weight: 83.6 kg (184 lb 4.9 oz)   Height: 5' 5" (1.651 m)       Physical Exam  Vitals and nursing note reviewed.   Constitutional:       Appearance: She is well-developed.   HENT:      Head: Normocephalic and atraumatic.   Eyes:      Conjunctiva/sclera: Conjunctivae normal.   Cardiovascular:      Rate and Rhythm: Normal rate and regular rhythm.      Heart sounds: Normal heart sounds.   Pulmonary:      Effort: Pulmonary effort is normal.      Breath sounds: Normal breath sounds.   Abdominal:      General: Bowel sounds are " normal.      Palpations: Abdomen is soft.   Musculoskeletal:         General: Normal range of motion.   Skin:     General: Skin is warm and dry.   Neurological:      Mental Status: She is alert and oriented to person, place, and time.   Psychiatric:         Behavior: Behavior normal.         Thought Content: Thought content normal.         Judgment: Judgment normal.     CMP  Sodium   Date Value Ref Range Status   09/14/2022 133 (L) 136 - 145 mmol/L Final     Potassium   Date Value Ref Range Status   09/14/2022 4.1 3.5 - 5.1 mmol/L Final     Chloride   Date Value Ref Range Status   09/14/2022 104 95 - 110 mmol/L Final     CO2   Date Value Ref Range Status   09/14/2022 25 23 - 29 mmol/L Final     Glucose   Date Value Ref Range Status   09/14/2022 86 70 - 110 mg/dL Final     BUN   Date Value Ref Range Status   09/14/2022 16 6 - 20 mg/dL Final     Creatinine   Date Value Ref Range Status   09/14/2022 1.0 0.5 - 1.4 mg/dL Final     Calcium   Date Value Ref Range Status   09/14/2022 9.4 8.7 - 10.5 mg/dL Final     Total Protein   Date Value Ref Range Status   09/14/2022 6.7 6.0 - 8.4 g/dL Final     Albumin   Date Value Ref Range Status   09/14/2022 3.9 3.5 - 5.2 g/dL Final     Total Bilirubin   Date Value Ref Range Status   09/14/2022 0.4 0.1 - 1.0 mg/dL Final     Comment:     For infants and newborns, interpretation of results should be based  on gestational age, weight and in agreement with clinical  observations.    Premature Infant recommended reference ranges:  Up to 24 hours.............<8.0 mg/dL  Up to 48 hours............<12.0 mg/dL  3-5 days..................<15.0 mg/dL  6-29 days.................<15.0 mg/dL       Alkaline Phosphatase   Date Value Ref Range Status   09/14/2022 82 55 - 135 U/L Final     AST   Date Value Ref Range Status   09/14/2022 25 10 - 40 U/L Final     ALT   Date Value Ref Range Status   09/14/2022 22 10 - 44 U/L Final     Anion Gap   Date Value Ref Range Status   09/14/2022 4 (L) 8 - 16  mmol/L Final     eGFR if    Date Value Ref Range Status   03/16/2022 >60.0 >60 mL/min/1.73 m^2 Final     eGFR if non    Date Value Ref Range Status   03/16/2022 >60.0 >60 mL/min/1.73 m^2 Final     Comment:     Calculation used to obtain the estimated glomerular filtration  rate (eGFR) is the CKD-EPI equation.         BMP  Lab Results   Component Value Date     (L) 09/14/2022    K 4.1 09/14/2022     09/14/2022    CO2 25 09/14/2022    BUN 16 09/14/2022    CREATININE 1.0 09/14/2022    CALCIUM 9.4 09/14/2022    ANIONGAP 4 (L) 09/14/2022    ESTGFRAFRICA >60.0 03/16/2022    EGFRNONAA >60.0 03/16/2022      BNP  @LABRCNTIP(BNP,BNPTRIAGEBLO)@   Lab Results   Component Value Date    CHOL 169 03/07/2022    CHOL 170 03/05/2021    CHOL 155 11/04/2019     Lab Results   Component Value Date    HDL 57 03/07/2022    HDL 55 03/05/2021    HDL 62 11/04/2019     Lab Results   Component Value Date    LDLCALC 94.0 03/07/2022    LDLCALC 88.8 03/05/2021    LDLCALC 73.8 11/04/2019     Lab Results   Component Value Date    TRIG 90 03/07/2022    TRIG 131 03/05/2021    TRIG 96 11/04/2019     Lab Results   Component Value Date    CHOLHDL 33.7 03/07/2022    CHOLHDL 32.4 03/05/2021    CHOLHDL 40.0 11/04/2019      Lab Results   Component Value Date    TSH 1.101 10/30/2021    Q9XGEPM 93 09/07/2021    FREET4 0.97 10/30/2021     Lab Results   Component Value Date    HGBA1C 5.0 10/30/2021     Lab Results   Component Value Date    WBC 7.41 09/14/2022    HGB 13.5 09/14/2022    HCT 42.4 09/14/2022    MCV 97 09/14/2022     09/14/2022         Results for orders placed during the hospital encounter of 04/24/18    Transthoracic echo (TTE) complete    Interpretation Summary  · Left ventricle ejection fraction is normal.  · Tricuspid valve shows mild regurgitation.     No results found for this or any previous visit.         Assessment:       Elevated lipoprotein(a)  Niacin was discussed with her.  She is to  start 250 b.i.d. and then increased to 500 b.i.d. if she tolerates it.  Will check her lipoprotein a levels.    Familial hypercholesterolemia  Will attempt lower doses of atorvastatin of 20 mg.  She is to follow a low-cholesterol diet.  She is to have eggs whites as well as decrease the amount of shrimp intake.    Chronic anticoagulation  Will continue.  Due to her CVA.  Will follow her H and H    Atrial septal aneurysm  Review of the transesophageal echocardiogram does not mention atrial septal aneurysm.       Plan:       Try niacin 250 mg p.o. b.i.d. and then increased to 500 b.i.d..  Will recheck her like per protein a.  She is to follow low-cholesterol diet.  She will be seen in the office in 6 months with a lipid CMP CBC.  She is to try atorvastatin 20 mg daily.  She is to maintain an LDL cholesterol less than 100

## 2023-01-23 ENCOUNTER — OFFICE VISIT (OUTPATIENT)
Dept: NEUROLOGY | Facility: CLINIC | Age: 43
End: 2023-01-23
Payer: COMMERCIAL

## 2023-01-23 ENCOUNTER — TELEPHONE (OUTPATIENT)
Dept: NEUROLOGY | Facility: CLINIC | Age: 43
End: 2023-01-23

## 2023-01-23 ENCOUNTER — LAB VISIT (OUTPATIENT)
Dept: LAB | Facility: HOSPITAL | Age: 43
End: 2023-01-23
Attending: PSYCHIATRY & NEUROLOGY
Payer: COMMERCIAL

## 2023-01-23 VITALS
HEIGHT: 60 IN | DIASTOLIC BLOOD PRESSURE: 93 MMHG | BODY MASS INDEX: 36.66 KG/M2 | SYSTOLIC BLOOD PRESSURE: 141 MMHG | HEART RATE: 71 BPM | RESPIRATION RATE: 17 BRPM | WEIGHT: 186.75 LBS

## 2023-01-23 DIAGNOSIS — H02.403 PTOSIS OF BOTH EYELIDS: ICD-10-CM

## 2023-01-23 DIAGNOSIS — I63.89 OTHER CEREBRAL INFARCTION: Primary | ICD-10-CM

## 2023-01-23 PROCEDURE — 1160F PR REVIEW ALL MEDS BY PRESCRIBER/CLIN PHARMACIST DOCUMENTED: ICD-10-PCS | Mod: CPTII,S$GLB,, | Performed by: PSYCHIATRY & NEUROLOGY

## 2023-01-23 PROCEDURE — 99215 OFFICE O/P EST HI 40 MIN: CPT | Mod: S$GLB,,, | Performed by: PSYCHIATRY & NEUROLOGY

## 2023-01-23 PROCEDURE — 1159F PR MEDICATION LIST DOCUMENTED IN MEDICAL RECORD: ICD-10-PCS | Mod: CPTII,S$GLB,, | Performed by: PSYCHIATRY & NEUROLOGY

## 2023-01-23 PROCEDURE — 3080F PR MOST RECENT DIASTOLIC BLOOD PRESSURE >= 90 MM HG: ICD-10-PCS | Mod: CPTII,S$GLB,, | Performed by: PSYCHIATRY & NEUROLOGY

## 2023-01-23 PROCEDURE — 83519 RIA NONANTIBODY: CPT | Mod: 59 | Performed by: PSYCHIATRY & NEUROLOGY

## 2023-01-23 PROCEDURE — 3008F PR BODY MASS INDEX (BMI) DOCUMENTED: ICD-10-PCS | Mod: CPTII,S$GLB,, | Performed by: PSYCHIATRY & NEUROLOGY

## 2023-01-23 PROCEDURE — 99215 PR OFFICE/OUTPT VISIT, EST, LEVL V, 40-54 MIN: ICD-10-PCS | Mod: S$GLB,,, | Performed by: PSYCHIATRY & NEUROLOGY

## 2023-01-23 PROCEDURE — 3077F SYST BP >= 140 MM HG: CPT | Mod: CPTII,S$GLB,, | Performed by: PSYCHIATRY & NEUROLOGY

## 2023-01-23 PROCEDURE — 36415 COLL VENOUS BLD VENIPUNCTURE: CPT | Mod: PO | Performed by: PSYCHIATRY & NEUROLOGY

## 2023-01-23 PROCEDURE — 3077F PR MOST RECENT SYSTOLIC BLOOD PRESSURE >= 140 MM HG: ICD-10-PCS | Mod: CPTII,S$GLB,, | Performed by: PSYCHIATRY & NEUROLOGY

## 2023-01-23 PROCEDURE — 1159F MED LIST DOCD IN RCRD: CPT | Mod: CPTII,S$GLB,, | Performed by: PSYCHIATRY & NEUROLOGY

## 2023-01-23 PROCEDURE — 3080F DIAST BP >= 90 MM HG: CPT | Mod: CPTII,S$GLB,, | Performed by: PSYCHIATRY & NEUROLOGY

## 2023-01-23 PROCEDURE — 1160F RVW MEDS BY RX/DR IN RCRD: CPT | Mod: CPTII,S$GLB,, | Performed by: PSYCHIATRY & NEUROLOGY

## 2023-01-23 PROCEDURE — 3008F BODY MASS INDEX DOCD: CPT | Mod: CPTII,S$GLB,, | Performed by: PSYCHIATRY & NEUROLOGY

## 2023-01-23 PROCEDURE — 99999 PR PBB SHADOW E&M-EST. PATIENT-LVL IV: CPT | Mod: PBBFAC,,, | Performed by: PSYCHIATRY & NEUROLOGY

## 2023-01-23 PROCEDURE — 83519 RIA NONANTIBODY: CPT | Performed by: PSYCHIATRY & NEUROLOGY

## 2023-01-23 PROCEDURE — 99999 PR PBB SHADOW E&M-EST. PATIENT-LVL IV: ICD-10-PCS | Mod: PBBFAC,,, | Performed by: PSYCHIATRY & NEUROLOGY

## 2023-01-23 RX ORDER — DIAZEPAM 5 MG/1
5 TABLET ORAL ONCE
Qty: 2 TABLET | Refills: 0 | Status: SHIPPED | OUTPATIENT
Start: 2023-01-23 | End: 2023-06-12 | Stop reason: ALTCHOICE

## 2023-01-23 NOTE — PROGRESS NOTES
Date: 1/23/2023    Patient ID: Jaclyn Rausch is a 42 y.o. female.    Chief Complaint: Eye Droop (Eye drop in Afternoon)      History of Present Illness:  Ms. Rausch is a 42 y.o. female who presents for followup of history of stroke and new left eye drooping.    Interval history: She has intermittent left eye drooping. This occurs primarily in the evening. One day when this happened, she had taken magnesium.     No double vision. No trouble swallowing. No voice changes. No other weakness or sensory changes.     She continues on Eliquis 5 mg BID.      She has some CTS issues at night.     Her mom had an brain aneurysm rupture.     Her migraines are better now. She will see white light in the corner or fuzzy vision sometimes or have zig zag lines.      Prior history:  Stroke occurred in April 2018. This manifested as aphasia and she was found to have a 4.5 mm restricted diffusion in the left frontal lobe as well as a chronic left cerebellar stroke. Echo initially showed concern for atrial septal aneurysm but subsequent echos did not. She also had separate episode later of left arm numbness that lasted an hour concerning for TIA. Her CTA head and neck and EEG were normal. We switched from xarelto to Eliquis in June 2018. She has not had any more episodes since that time. 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 She is on Eliquis for anticoagulation/stroke prevention. She is on atorvastatin 20 mg daily.      We have felt she has CTS in the past and has had improvement with wrist splints in the past.        Allergies:  Review of patient's allergies indicates:  No Known Allergies    Current Medications:  Current Outpatient Medications   Medication Sig Dispense Refill    albuterol (VENTOLIN HFA) 90 mcg/actuation inhaler Inhale 2 puffs into the lungs every 6 (six) hours as needed for Wheezing. Rescue 18 g 0    ascorbic acid, vitamin C, (VITAMIN C)  "100 MG tablet Take 100 mg by mouth once daily.      atorvastatin (LIPITOR) 40 MG tablet TAKE 1 TABLET(40 MG) BY MOUTH EVERY DAY 90 tablet 3    ELIQUIS 5 mg Tab TAKE 1 TABLET BY MOUTH TWICE DAILY 60 tablet 11    liraglutide 0.6 mg/0.1 mL, 18 mg/3 mL, subq PNIJ (VICTOZA 2-YANETH) 0.6 mg/0.1 mL (18 mg/3 mL) PnIj pen Inject 1.8 mg into the skin once daily. Start at 0.6 mg/day for 1 week, then 1.2 mg/day for 1 week, then 1.8 mg/day after that. 9 mL 11    ondansetron (ZOFRAN-ODT) 4 MG TbDL Take 1 tablet (4 mg total) by mouth every 8 (eight) hours as needed (nausea). 12 tablet 0    pen needle, diabetic, safety 31 gauge x 1/4" Ndle Use once daily to inject victoza 100 each 1    diazePAM (VALIUM) 5 MG tablet Take 1 tablet (5 mg total) by mouth once. Take 5mg 30 minutes before the MRI. If needed, can take another 5 mg before MRI. for 1 dose 2 tablet 0     No current facility-administered medications for this visit.     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 Frank; last visit 1/2018    Clotting disorder 02/03/2019    Elevated lipoprotein(a) 05/04/2018    History of CVA in adulthood 4/24/2018    Stroke 04/24/2018       Past Surgical History:  Past Surgical History:   Procedure Laterality Date    BUNIONECTOMY Left 2001    CHONDROPLASTY OF KNEE Left 1/10/2020    Procedure: CHONDROPLASTY, KNEE;  Surgeon: Michael Galaviz II, MD;  Location: [facility];  Service: Orthopedics;  Laterality: Left;    HYSTEROSCOPY WITH DILATION AND CURETTAGE OF UTERUS N/A 11/5/2020    " Procedure: HYSTEROSCOPY, WITH DILATION AND CURETTAGE OF UTERUS;  Surgeon: Ap Milner MD;  Location: [facility] OR;  Service: OB/GYN;  Laterality: N/A;    KNEE ARTHROSCOPY W/ MENISCECTOMY Left 1/10/2020    Procedure: ARTHROSCOPY, KNEE, WITH MENISCECTOMY;  Surgeon: Michael Galaviz II, MD;  Location: [facility] OR;  Service: Orthopedics;  Laterality: Left;    REPAIR OF MENISCUS OF KNEE Left 1/10/2020    Procedure: REPAIR, MENISCUS, KNEE;  Surgeon: Michael Galaviz II, MD;  Location: [facility] OR;  Service: Orthopedics;  Laterality: Left;  PARTIAL MEDIAL    THERMAL ABLATION OF ENDOMETRIUM N/A 11/5/2020    Procedure: ABLATION, ENDOMETRIUM, THERMAL;  Surgeon: Ap Milner MD;  Location: [facility] OR;  Service: OB/GYN;  Laterality: N/A;       Family History:  family history includes Aneurysm in her mother; Colon cancer in her maternal grandfather; Diabetes in her maternal grandmother; Eczema in her son; Hearing loss in her father; Hypertension in her brother and mother; Lung cancer in her maternal grandfather.    Social History:   reports that she quit smoking about 20 years ago. She has a 0.50 pack-year smoking history. She has never used smokeless tobacco. She reports current alcohol use. She reports that she does not use drugs.    Physical Exam:  Vitals:    01/23/23 1059   BP: (!) 141/93   Pulse: 71   Resp: 17   Weight: 84.7 kg (186 lb 11.7 oz)   Height: 5' (1.524 m)   PainSc: 0-No pain     Body mass index is 36.47 kg/m².    Neurological Exam:  General: well-developed, well-nourished, no distress  Motor: moves all extremities well, no joint deformities  Eyes: no ocular hemorrhages  Peripheral: no edema noted    Neurological Exam:  Mental status: Awake, alert  Speech/Language: No dysarthria or aphasia on conversation.   Cranial nerves: Visual fields full. Pupils equal round and reactive to light, mild bilateral ptosis on sustained upgaze, extraocular movements intact, facial strength and sensation intact bilaterally, palate  elevates symmetrically and tongue mildline, hearing grossly intact bilaterally. Shoulder shrug normal bilaterally.   Motor: 5 out of 5 strength throughout the upper and lower extremities bilaterally except 4/5 in right hand. Normal bulk and tone.   Sensation: Intact to light touch and vibration bilaterally.  DTR: 3+ at the knees and biceps bilaterally.  Coordination: Finger-nose-finger testing and rapid alternating movements normal bilaterally. No tremor.   Gait: Normal gait including normal gait    Data:  I have personally reviewed other provider's notes, labs, & imaging made available to me today.     Labs:  CBC:   Lab Results   Component Value Date    WBC 7.41 09/14/2022    HGB 13.5 09/14/2022    HCT 42.4 09/14/2022     09/14/2022    MCV 97 09/14/2022    RDW 12.5 09/14/2022     BMP:   Lab Results   Component Value Date     (L) 09/14/2022    K 4.1 09/14/2022     09/14/2022    CO2 25 09/14/2022    BUN 16 09/14/2022    CREATININE 1.0 09/14/2022    GLU 86 09/14/2022    CALCIUM 9.4 09/14/2022    MG 2.2 11/04/2019     LFTS;   Lab Results   Component Value Date    PROT 6.7 09/14/2022    ALBUMIN 3.9 09/14/2022    BILITOT 0.4 09/14/2022    AST 25 09/14/2022    ALKPHOS 82 09/14/2022    ALT 22 09/14/2022     COAGS:   Lab Results   Component Value Date    INR 1.0 04/24/2018     FLP:   Lab Results   Component Value Date    CHOL 169 03/07/2022    HDL 57 03/07/2022    LDLCALC 94.0 03/07/2022    TRIG 90 03/07/2022    CHOLHDL 33.7 03/07/2022       Assessment and Plan:  Ms. Rausch is a 42 y.o. female here for followup of history of stroke and ptosis.     Will evaluate for MG vs aneurysm. Will obtain EMG of facial muscles with rep stim and will obtain MG panel bloodwork.     Will obtain MRI brain and MRA given history of stroke and family history of aneurysm. Valium called in for claustrophobia with MRI. Advised she not drive after taking.     For her h/o stroke, she should continue on Eliquis and statin for LDL  goal <70.        Other cerebral infarction  -     MRI Brain Without Contrast; Future; Expected date: 01/23/2023  -     MRA Brain; Future; Expected date: 01/23/2023    Ptosis of both eyelids  -     Myasthenia Gravis Eval With Musk Reflex; Future; Expected date: 01/23/2023  -     Cancel: EMG W/ ULTRASOUND AND NERVE CONDUCTION TEST 1 Extremity; Future  -     EMG W/ ULTRASOUND AND NERVE CONDUCTION TEST 1 Extremity; Future    Other orders  -     diazePAM (VALIUM) 5 MG tablet; Take 1 tablet (5 mg total) by mouth once. Take 5mg 30 minutes before the MRI. If needed, can take another 5 mg before MRI. for 1 dose  Dispense: 2 tablet; Refill: 0       I spent a total of 40 minutes on the day of the visit.This includes face to face time and non-face to face time preparing to see the patient (eg, review of tests), Obtaining and/or reviewing separately obtained history, Documenting clinical information in the electronic or other health record, Independently interpreting results and communicating results to the patient/family/caregiver, or Care coordination.

## 2023-01-24 ENCOUNTER — TELEPHONE (OUTPATIENT)
Dept: NEUROLOGY | Facility: CLINIC | Age: 43
End: 2023-01-24
Payer: COMMERCIAL

## 2023-01-30 LAB — MUSK ANTIBODY TEST: 0 NMOL/L (ref 0–0.02)

## 2023-02-01 LAB
ACHR BIND AB SER-SCNC: 0 NMOL/L
MG INTERPRETIVE COMMENTS: NORMAL

## 2023-02-02 ENCOUNTER — HOSPITAL ENCOUNTER (OUTPATIENT)
Dept: RADIOLOGY | Facility: HOSPITAL | Age: 43
Discharge: HOME | End: 2023-02-02
Attending: PSYCHIATRY & NEUROLOGY
Payer: COMMERCIAL

## 2023-02-02 ENCOUNTER — PATIENT MESSAGE (OUTPATIENT)
Dept: NEUROLOGY | Facility: CLINIC | Age: 43
End: 2023-02-02
Payer: COMMERCIAL

## 2023-02-02 DIAGNOSIS — I63.89 OTHER CEREBRAL INFARCTION: ICD-10-CM

## 2023-02-02 PROCEDURE — 70544 MR ANGIOGRAPHY HEAD W/O DYE: CPT | Mod: TC,59,PO

## 2023-02-02 PROCEDURE — 70551 MRI BRAIN STEM W/O DYE: CPT | Mod: TC,PO
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
## 2023-02-10 ENCOUNTER — OFFICE VISIT (OUTPATIENT)
Dept: PSYCHIATRY | Facility: CLINIC | Age: 43
End: 2023-02-10
Payer: COMMERCIAL

## 2023-02-10 DIAGNOSIS — F41.1 GAD (GENERALIZED ANXIETY DISORDER): Primary | ICD-10-CM

## 2023-02-10 PROCEDURE — 99999 PR PBB SHADOW E&M-EST. PATIENT-LVL II: CPT | Mod: PBBFAC,,, | Performed by: SOCIAL WORKER

## 2023-02-10 PROCEDURE — 1159F MED LIST DOCD IN RCRD: CPT | Mod: CPTII,S$GLB,, | Performed by: SOCIAL WORKER

## 2023-02-10 PROCEDURE — 99999 PR PBB SHADOW E&M-EST. PATIENT-LVL II: ICD-10-PCS | Mod: PBBFAC,,, | Performed by: SOCIAL WORKER

## 2023-02-10 PROCEDURE — 1159F PR MEDICATION LIST DOCUMENTED IN MEDICAL RECORD: ICD-10-PCS | Mod: CPTII,S$GLB,, | Performed by: SOCIAL WORKER

## 2023-02-10 PROCEDURE — 90834 PSYTX W PT 45 MINUTES: CPT | Mod: S$GLB,,, | Performed by: SOCIAL WORKER

## 2023-02-10 PROCEDURE — 90834 PR PSYCHOTHERAPY W/PATIENT, 45 MIN: ICD-10-PCS | Mod: S$GLB,,, | Performed by: SOCIAL WORKER

## 2023-02-22 ENCOUNTER — TELEPHONE (OUTPATIENT)
Dept: HEMATOLOGY/ONCOLOGY | Facility: CLINIC | Age: 43
End: 2023-02-22

## 2023-03-02 NOTE — PROGRESS NOTES
Individual Psychotherapy (PhD/LCSW)    3/7/2023    Site:  [facility]         Therapeutic Intervention: Met with patient.  Outpatient - Supportive psychotherapy 45 min - CPT Code 68814    Chief complaint/reason for encounter: anxiety     Interval history and content of current session: Jaclyn shared that overall she has been doing well since our last visit. We discussed how her extended family is still coping with issues among certain family members, and she is continuing to attempt to stay neutral. We did discuss that ultimately she does agree more with her sister. We discussed that she has been invited to her brother's birthday party this weekend and is unsure if she is going to go. We weighed costs and benefits of going and not going to the party and continued to discuss her dynamics with different family members. We did discuss that ultimately whatever Jaclyn decides to do, she is not responsible for the thoughts and emotions of others and is only in control of her happiness. We discussed that ultimately she cannot fix the issues between her other family members.     Treatment plan:  Target symptoms: anxiety   Why chosen therapy is appropriate versus another modality: relevant to diagnosis, patient responds to this modality  Outcome monitoring methods: self-report  Therapeutic intervention type: supportive psychotherapy    Risk parameters:  Patient reports no suicidal ideation  Patient reports no homicidal ideation  Patient reports no self-injurious behavior  Patient reports no violent behavior    Verbal deficits: None    Patient's response to intervention:  The patient's response to intervention is accepting.    Progress toward goals and other mental status changes:  The patient's progress toward goals is good.    Diagnosis:     ICD-10-CM ICD-9-CM   1. AGGIE (generalized anxiety disorder)  F41.1 300.02       Plan:  individual psychotherapy Pt to go to ED or call 911 if symptoms worsen or if she has thoughts of  harming self and/or others. Pt verbalized understanding.    Return to clinic: as scheduled    Length of Service (minutes): 45      Each patient to whom he or she provides medical services by telemedicine is: (1) informed of the relationship between the physician and patient and the respective role of any other health care provider with respect to management of the patient; and (2) notified that he or she may decline to receive medical services by telemedicine and may withdraw from such care at any time.

## 2023-03-07 ENCOUNTER — OFFICE VISIT (OUTPATIENT)
Dept: PSYCHIATRY | Facility: CLINIC | Age: 43
End: 2023-03-07
Payer: COMMERCIAL

## 2023-03-07 DIAGNOSIS — F41.1 GAD (GENERALIZED ANXIETY DISORDER): Primary | ICD-10-CM

## 2023-03-07 PROCEDURE — 99999 PR PBB SHADOW E&M-EST. PATIENT-LVL II: ICD-10-PCS | Mod: PBBFAC,,, | Performed by: SOCIAL WORKER

## 2023-03-07 PROCEDURE — 1159F MED LIST DOCD IN RCRD: CPT | Mod: CPTII,S$GLB,, | Performed by: SOCIAL WORKER

## 2023-03-07 PROCEDURE — 90834 PR PSYCHOTHERAPY W/PATIENT, 45 MIN: ICD-10-PCS | Mod: S$GLB,,, | Performed by: SOCIAL WORKER

## 2023-03-07 PROCEDURE — 1159F PR MEDICATION LIST DOCUMENTED IN MEDICAL RECORD: ICD-10-PCS | Mod: CPTII,S$GLB,, | Performed by: SOCIAL WORKER

## 2023-03-07 PROCEDURE — 90834 PSYTX W PT 45 MINUTES: CPT | Mod: S$GLB,,, | Performed by: SOCIAL WORKER

## 2023-03-07 PROCEDURE — 99999 PR PBB SHADOW E&M-EST. PATIENT-LVL II: CPT | Mod: PBBFAC,,, | Performed by: SOCIAL WORKER

## 2023-03-13 ENCOUNTER — PROCEDURE VISIT (OUTPATIENT)
Dept: NEUROLOGY | Facility: CLINIC | Age: 43
End: 2023-03-13
Payer: COMMERCIAL

## 2023-03-13 DIAGNOSIS — H02.403 PTOSIS OF BOTH EYELIDS: ICD-10-CM

## 2023-03-13 PROCEDURE — 95908 NRV CNDJ TST 3-4 STUDIES: CPT | Mod: S$GLB,,, | Performed by: PSYCHIATRY & NEUROLOGY

## 2023-03-13 PROCEDURE — 95937 PR NEUROMUSCULAR JUNCTION TEST: ICD-10-PCS | Mod: S$GLB,,, | Performed by: PSYCHIATRY & NEUROLOGY

## 2023-03-13 PROCEDURE — 95886 MUSC TEST DONE W/N TEST COMP: CPT | Mod: S$GLB,,, | Performed by: PSYCHIATRY & NEUROLOGY

## 2023-03-13 PROCEDURE — 95937 NEUROMUSCULAR JUNCTION TEST: CPT | Mod: S$GLB,,, | Performed by: PSYCHIATRY & NEUROLOGY

## 2023-03-13 PROCEDURE — 95887 MUSC TST DONE W/N TST NONEXT: CPT | Mod: 59,S$GLB,, | Performed by: PSYCHIATRY & NEUROLOGY

## 2023-03-13 PROCEDURE — 95886 PR EMG COMPLETE, W/ NERVE CONDUCTION STUDIES, 5+ MUSCLES: ICD-10-PCS | Mod: S$GLB,,, | Performed by: PSYCHIATRY & NEUROLOGY

## 2023-03-13 PROCEDURE — 95908 PR NERVE CONDUCTION STUDY; 3-4 STUDIES: ICD-10-PCS | Mod: S$GLB,,, | Performed by: PSYCHIATRY & NEUROLOGY

## 2023-03-13 PROCEDURE — 95887 PR MUSC TST DONE W/N TST NONEXT: ICD-10-PCS | Mod: 59,S$GLB,, | Performed by: PSYCHIATRY & NEUROLOGY

## 2023-03-23 ENCOUNTER — TELEPHONE (OUTPATIENT)
Dept: PSYCHIATRY | Facility: CLINIC | Age: 43
End: 2023-03-23
Payer: COMMERCIAL

## 2023-03-23 NOTE — TELEPHONE ENCOUNTER
Called patient today to inform her that I will be leaving [facility] with last day on April 5th to give her more notice and to discuss other options for psychotherapy. Jaclyn expressed understanding. Jaclyn asked about seeing a therapist in our clinic; I told her that a referral to another therapist in our clinic is certainly an option, and we can discuss this in our session on March 27th. I offered Jaclyn another visit on April 5th at 11 AM, which she took, so we will be meeting twice to work towards termination. Jaclyn had no further questions today and thanked me for calling; I acknowledged.

## 2023-03-24 NOTE — PROGRESS NOTES
"Individual Psychotherapy (PhD/LCSW)    3/27/2023    Site:  [facility]         Therapeutic Intervention: Met with patient.  Outpatient - Supportive psychotherapy 45 min - CPT Code 75864    Chief complaint/reason for encounter: anxiety     Interval history and content of current session: Jaclyn shared that she has been doing well since our last visit. She shared that she went to her brother's party, which was enjoyable. We discussed how most of the conflict has  down, although her brother and her sister are still not on speaking terms, however Jaclyn shared that she is also setting more boundaries with her siblings and is not letting herself be the "fixer" any longer. We discussed the progress that Jaclyn has made in therapy, including learning how to set boundaries, not putting other people's responsibilities upon herself, and tools to manage her anxiety. We also discussed how she feels that she has "learned more about [her[self" and how some of her behaviors do stem from what she went through in childhood. We continued to process termination of therapy; Jaclyn feels grateful for the process and feels "okay" about us terminating therapy. Jaclyn shared that at this time, she does not feel that she needs to see another therapist and continue in therapy as she feels that she is in a better place and has been managing her anxiety and boundaries well, which I agreed with. I did provide her with [facility] psychiatry resources, including other therapy providers on the [facility], as well as the contact information for the [facility] psychiatry clinics. Our last session is on 23.     Treatment plan:  Target symptoms: anxiety   Why chosen therapy is appropriate versus another modality: relevant to diagnosis, patient responds to this modality, evidence based practice  Outcome monitoring methods: self-report  Therapeutic intervention type: supportive psychotherapy    Risk parameters:  Patient reports no " suicidal ideation  Patient reports no homicidal ideation  Patient reports no self-injurious behavior  Patient reports no violent behavior    Verbal deficits: None    Patient's response to intervention:  The patient's response to intervention is accepting.    Progress toward goals and other mental status changes:  The patient's progress toward goals is good.    Diagnosis:     ICD-10-CM ICD-9-CM   1. AGGIE (generalized anxiety disorder)  F41.1 300.02       Plan:  individual psychotherapy Pt to go to ED or call 911 if symptoms worsen or if she has thoughts of harming self and/or others. Pt verbalized understanding.    Return to clinic: as scheduled    Length of Service (minutes): 45      Each patient to whom he or she provides medical services by telemedicine is: (1) informed of the relationship between the physician and patient and the respective role of any other health care provider with respect to management of the patient; and (2) notified that he or she may decline to receive medical services by telemedicine and may withdraw from such care at any time.

## 2023-03-27 ENCOUNTER — OFFICE VISIT (OUTPATIENT)
Dept: PSYCHIATRY | Facility: CLINIC | Age: 43
End: 2023-03-27
Payer: COMMERCIAL

## 2023-03-27 DIAGNOSIS — F41.1 GAD (GENERALIZED ANXIETY DISORDER): ICD-10-CM

## 2023-03-27 PROCEDURE — 90834 PR PSYCHOTHERAPY W/PATIENT, 45 MIN: ICD-10-PCS | Mod: S$GLB,,, | Performed by: SOCIAL WORKER

## 2023-03-27 PROCEDURE — 90834 PSYTX W PT 45 MINUTES: CPT | Mod: S$GLB,,, | Performed by: SOCIAL WORKER

## 2023-04-03 NOTE — PROCEDURES
[facility]  [address] [facility] [address]  [place] [address]  [phone]      Full Name: Jaclyn Rausch Gender: Female  Patient ID: [id number] YOB: 1980      Visit Date: 3/13/2023 9:53 AM  Age: 42 Years  Examining Physician: Krysten Bailey D.O., ABPN, AOBNP, ABEM   Referring Physician: Pearl Roca M.D.   Technologist: EZEQUIEL Rubio   Height: 5 feet 5 inch  History: Patient with history of CVA in 2018 now complaining of eylid ptosis towards the end of the day.  She is taking Eliquis 5mg twice a day.  Evaluate the right upper extremity and face with RNS.      Sensory NCS      Nerve / Sites Rec. Site Onset Lat Peak Lat NP Amp Segments Distance Velocity     ms ms µV  cm m/s   R Median - Digit II (Antidromic)      Wrist Dig II 2.44 3.35 33.1 Wrist - Dig II 13 53      Ref.   ?3.40 ?20.0 Ref.  ?50   R Ulnar - Digit V (Antidromic)      Wrist Dig V 2.48 3.08 22.1 Wrist - Dig V 11 44      Ref.   ?3.10 ?15.0 Ref.  ?50       Motor NCS      Nerve / Sites Muscle Latency Ref. Amplitude Ref. Amp % Duration Segments Distance Lat Diff Ref. Velocity Ref. Temp.     ms ms mV mV % ms  cm ms ms m/s m/s °C   R Median - APB      Wrist APB 2.92 ?3.90 9.1 ?6.0 100 6.25 Wrist - APB            Elbow APB 6.85  8.1  88.7 6.21 Elbow - Wrist 24.5 3.94  62 ?50    R Ulnar - ADM      Wrist ADM 2.79 ?3.10 8.8 ?7.0 100 4.27 Wrist - ADM      31.4      B.Elbow ADM 6.27  8.5  97.4 4.27 B.Elbow - Wrist 20 3.48  57 ?50 31.4      A.Elbow ADM 8.19  7.8  88.6 4.94 A.Elbow - B.Elbow 10 1.92  52  31.4       F  Wave      Nerve Fmin Ref.    ms ms   R Median - APB 25.57 ?31.00   R Ulnar - ADM 25.78 ?32.00       Rep Stim      Anatomy / Train Rate Ampl. Ampl 4-1 Fac Ampl    Hz mV % %   R Ulnar - ADM   Baseline @1Hz 1 7.9 0.6 100   Baseline @3Hz 3 7.9 -2 99.7   Post Exercise @0:00 3 7.8 1.4 98.3   @ 0:30 3 7.8 -1.7 98.3   @ 1:00 3 8.2 -3.8 103   @ 2:00 3 8.9 -3 112   @ 3:00 3 8.4 1.3 106   @ 4:00 3  8.5 -1 107   @ 5:00 3 8.2 -0.5 104   10 3 8.5 -2.9 107   R Facial - Nasalis   Baseline @1Hz 1 1.7 1.8 100   Baseline @3Hz 3 1.6 -5.5 91   Post Exercise @0:00 3 1.7 -5 97.1   @ 0:30 3 1.7 1.2 102   @ 1:00 3 2.0 -3.6 114   @ 2:00 3 1.8 -2.1 104   @ 3:00 3 1.3 7.8 75.7   @ 4:00 3 1.4 -0.2 83.5   @ 5:00 3 1.4 -9 83.9       EMG Summary Table     Spontaneous Recruitment Activation Duration Amplitude Polyphasia Comment   Muscle Ins Act Fib Fasc Pattern - - - - -   R. First dorsal interosseous Normal 0 0 Normal Normal Normal Normal Normal Normal   R. Abductor pollicis brevis Normal 0 0 Normal Normal Normal Normal Normal Normal   R. Pronator teres Normal 0 0 Normal Normal Normal Normal Normal Normal   R. Biceps brachii Normal 0 0 Normal Normal Normal Normal Normal Normal   R. Triceps brachii Normal 0 0 Normal Normal Normal Normal Normal Normal   R. Deltoid Normal 0 0 Normal Normal Normal Normal Normal Normal   R. Cervical paraspinals Normal 0 0      Normal   R. Orbicularis oculi Normal 0 0 Normal Normal Normal Normal Normal Normal         Jaclyn Rausch [id number] 3/13/2023 9:53 AM     7 of 7    Summary:  Nerve conduction studies were performed on the right upper extremity and right face.  Right median and ulnar sensory responses were normal in amplitude and latency.  Right median and ulnar motor responses were normal in amplitude, latency and velocity.  It is noted there is borderline slowing of the velocity across the right elbow.  Right median and ulnar minimal F-wave latency is normal.  Repetitive nerve stimulation was performed on the right ulnar and facial nerves.  There is no evidence of abnormal decrement pre or post exercise with 3 hertz stimulation.  Needle EMG was performed in the right upper extremity and orbicularis oculi as well as the cervical paraspinal muscles.  No active denervation was noted in any muscle tested.  Motor unit morphology and recruitment patterns were normal in every muscle  tested.    Impression:  This is a normal EMG of the right upper extremity and right face.  There is no evidence of peripheral neuropathy, focal neuropathy, plexopathy, radiculopathy or disorder of the neuromuscular junction.      Thank you for referring to the [facility] in [place]. Please feel free to contact the clinic if you have any further questions regarding this study or report.       _____________________________  Krysten Bailey D.O., ABPN, AOBNP, [facility]     Jaclyn Rausch [id number] 3/13/2023 9:53 AM     7 of 7

## 2023-04-05 ENCOUNTER — OFFICE VISIT (OUTPATIENT)
Dept: PSYCHIATRY | Facility: CLINIC | Age: 43
End: 2023-04-05
Payer: COMMERCIAL

## 2023-04-05 DIAGNOSIS — F41.1 GAD (GENERALIZED ANXIETY DISORDER): Primary | ICD-10-CM

## 2023-04-05 PROCEDURE — 90834 PR PSYCHOTHERAPY W/PATIENT, 45 MIN: ICD-10-PCS | Mod: S$GLB,,, | Performed by: SOCIAL WORKER

## 2023-04-05 PROCEDURE — 90834 PSYTX W PT 45 MINUTES: CPT | Mod: S$GLB,,, | Performed by: SOCIAL WORKER

## 2023-04-22 DIAGNOSIS — E78.01 FAMILIAL HYPERCHOLESTEROLEMIA: ICD-10-CM

## 2023-04-23 NOTE — TELEPHONE ENCOUNTER
Refill Routing Note   Refill Routing Note   Medication(s) are not appropriate for processing by [facility] for the following reason(s):      Non-participating provider    [facility] action(s):  Route          Medication reconciliation completed: No     Appointments  past 12m or future 3m with PCP    Date Provider   Last Visit   7/6/2020 Khalida Gibson PA-C   Next Visit   Visit date not found Khalida Gibson PA-C   ED visits in past 90 days: 0        Note composed:9:10 AM 04/23/2023

## 2023-04-24 RX ORDER — ATORVASTATIN CALCIUM 40 MG/1
TABLET, FILM COATED ORAL
Qty: 90 TABLET | Refills: 0 | Status: SHIPPED | OUTPATIENT
Start: 2023-04-24 | End: 2023-06-09

## 2023-06-02 ENCOUNTER — TELEPHONE (OUTPATIENT)
Dept: CARDIOLOGY | Facility: CLINIC | Age: 43
End: 2023-06-02
Payer: COMMERCIAL

## 2023-06-05 ENCOUNTER — LAB VISIT (OUTPATIENT)
Dept: LAB | Facility: HOSPITAL | Age: 43
End: 2023-06-05
Attending: INTERNAL MEDICINE
Payer: COMMERCIAL

## 2023-06-05 DIAGNOSIS — Z79.01 CHRONIC ANTICOAGULATION: ICD-10-CM

## 2023-06-05 DIAGNOSIS — I63.9 ACUTE CVA (CEREBROVASCULAR ACCIDENT): ICD-10-CM

## 2023-06-05 DIAGNOSIS — I25.3 ATRIAL SEPTAL ANEURYSM: ICD-10-CM

## 2023-06-05 DIAGNOSIS — E78.01 FAMILIAL HYPERCHOLESTEROLEMIA: ICD-10-CM

## 2023-06-05 DIAGNOSIS — E78.41 ELEVATED LIPOPROTEIN(A): ICD-10-CM

## 2023-06-05 LAB
ALBUMIN SERPL BCP-MCNC: 3.8 G/DL (ref 3.5–5.2)
ALP SERPL-CCNC: 70 U/L (ref 55–135)
ALT SERPL W/O P-5'-P-CCNC: 16 U/L (ref 10–44)
ANION GAP SERPL CALC-SCNC: 7 MMOL/L (ref 8–16)
AST SERPL-CCNC: 21 U/L (ref 10–40)
BILIRUB SERPL-MCNC: 0.4 MG/DL (ref 0.1–1)
BUN SERPL-MCNC: 11 MG/DL (ref 6–20)
CALCIUM SERPL-MCNC: 9 MG/DL (ref 8.7–10.5)
CHLORIDE SERPL-SCNC: 104 MMOL/L (ref 95–110)
CHOLEST SERPL-MCNC: 193 MG/DL (ref 120–199)
CHOLEST/HDLC SERPL: 3.1 {RATIO} (ref 2–5)
CO2 SERPL-SCNC: 26 MMOL/L (ref 23–29)
CREAT SERPL-MCNC: 0.9 MG/DL (ref 0.5–1.4)
ERYTHROCYTE [DISTWIDTH] IN BLOOD BY AUTOMATED COUNT: 12.9 % (ref 11.5–14.5)
EST. GFR  (NO RACE VARIABLE): >60 ML/MIN/1.73 M^2
GLUCOSE SERPL-MCNC: 89 MG/DL (ref 70–110)
HCT VFR BLD AUTO: 44 % (ref 37–48.5)
HDLC SERPL-MCNC: 63 MG/DL (ref 40–75)
HDLC SERPL: 32.6 % (ref 20–50)
HGB BLD-MCNC: 13.9 G/DL (ref 12–16)
LDLC SERPL CALC-MCNC: 104.6 MG/DL (ref 63–159)
MCH RBC QN AUTO: 30.5 PG (ref 27–31)
MCHC RBC AUTO-ENTMCNC: 31.6 G/DL (ref 32–36)
MCV RBC AUTO: 97 FL (ref 82–98)
NONHDLC SERPL-MCNC: 130 MG/DL
PLATELET # BLD AUTO: 298 K/UL (ref 150–450)
PMV BLD AUTO: 9.9 FL (ref 9.2–12.9)
POTASSIUM SERPL-SCNC: 4.2 MMOL/L (ref 3.5–5.1)
PROT SERPL-MCNC: 6.6 G/DL (ref 6–8.4)
RBC # BLD AUTO: 4.55 M/UL (ref 4–5.4)
SODIUM SERPL-SCNC: 137 MMOL/L (ref 136–145)
TRIGL SERPL-MCNC: 127 MG/DL (ref 30–150)
TSH SERPL DL<=0.005 MIU/L-ACNC: 1.55 UIU/ML (ref 0.4–4)
WBC # BLD AUTO: 8.53 K/UL (ref 3.9–12.7)

## 2023-06-05 PROCEDURE — 83695 ASSAY OF LIPOPROTEIN(A): CPT | Performed by: INTERNAL MEDICINE

## 2023-06-05 PROCEDURE — 80061 LIPID PANEL: CPT | Performed by: INTERNAL MEDICINE

## 2023-06-05 PROCEDURE — 80053 COMPREHEN METABOLIC PANEL: CPT | Performed by: INTERNAL MEDICINE

## 2023-06-05 PROCEDURE — 84443 ASSAY THYROID STIM HORMONE: CPT | Performed by: INTERNAL MEDICINE

## 2023-06-05 PROCEDURE — 85027 COMPLETE CBC AUTOMATED: CPT | Performed by: INTERNAL MEDICINE

## 2023-06-05 PROCEDURE — 36415 COLL VENOUS BLD VENIPUNCTURE: CPT | Mod: PO | Performed by: INTERNAL MEDICINE

## 2023-06-07 ENCOUNTER — OFFICE VISIT (OUTPATIENT)
Dept: CARDIOLOGY | Facility: CLINIC | Age: 43
End: 2023-06-07
Payer: COMMERCIAL

## 2023-06-07 VITALS
BODY MASS INDEX: 35.93 KG/M2 | HEIGHT: 60 IN | SYSTOLIC BLOOD PRESSURE: 138 MMHG | HEART RATE: 80 BPM | DIASTOLIC BLOOD PRESSURE: 100 MMHG | WEIGHT: 183 LBS

## 2023-06-07 DIAGNOSIS — E78.01 FAMILIAL HYPERCHOLESTEROLEMIA: ICD-10-CM

## 2023-06-07 DIAGNOSIS — Z79.01 CHRONIC ANTICOAGULATION: Primary | ICD-10-CM

## 2023-06-07 DIAGNOSIS — R00.2 PALPITATIONS: ICD-10-CM

## 2023-06-07 DIAGNOSIS — I10 HYPERTENSION, UNSPECIFIED TYPE: ICD-10-CM

## 2023-06-07 DIAGNOSIS — E78.41 ELEVATED LIPOPROTEIN(A): ICD-10-CM

## 2023-06-07 DIAGNOSIS — F41.9 ANXIETY: ICD-10-CM

## 2023-06-07 DIAGNOSIS — D68.9 CLOTTING DISORDER: ICD-10-CM

## 2023-06-07 DIAGNOSIS — I25.3 ATRIAL SEPTAL ANEURYSM: ICD-10-CM

## 2023-06-07 PROCEDURE — 3075F PR MOST RECENT SYSTOLIC BLOOD PRESS GE 130-139MM HG: ICD-10-PCS | Mod: CPTII,S$GLB,, | Performed by: INTERNAL MEDICINE

## 2023-06-07 PROCEDURE — 3008F PR BODY MASS INDEX (BMI) DOCUMENTED: ICD-10-PCS | Mod: CPTII,S$GLB,, | Performed by: INTERNAL MEDICINE

## 2023-06-07 PROCEDURE — 1160F RVW MEDS BY RX/DR IN RCRD: CPT | Mod: CPTII,S$GLB,, | Performed by: INTERNAL MEDICINE

## 2023-06-07 PROCEDURE — 99214 OFFICE O/P EST MOD 30 MIN: CPT | Mod: S$GLB,,, | Performed by: INTERNAL MEDICINE

## 2023-06-07 PROCEDURE — 1159F PR MEDICATION LIST DOCUMENTED IN MEDICAL RECORD: ICD-10-PCS | Mod: CPTII,S$GLB,, | Performed by: INTERNAL MEDICINE

## 2023-06-07 PROCEDURE — 3080F PR MOST RECENT DIASTOLIC BLOOD PRESSURE >= 90 MM HG: ICD-10-PCS | Mod: CPTII,S$GLB,, | Performed by: INTERNAL MEDICINE

## 2023-06-07 PROCEDURE — 99999 PR PBB SHADOW E&M-EST. PATIENT-LVL III: ICD-10-PCS | Mod: PBBFAC,,, | Performed by: INTERNAL MEDICINE

## 2023-06-07 PROCEDURE — 1160F PR REVIEW ALL MEDS BY PRESCRIBER/CLIN PHARMACIST DOCUMENTED: ICD-10-PCS | Mod: CPTII,S$GLB,, | Performed by: INTERNAL MEDICINE

## 2023-06-07 PROCEDURE — 93000 ELECTROCARDIOGRAM COMPLETE: CPT | Mod: S$GLB,,, | Performed by: INTERNAL MEDICINE

## 2023-06-07 PROCEDURE — 93000 EKG 12-LEAD: ICD-10-PCS | Mod: S$GLB,,, | Performed by: INTERNAL MEDICINE

## 2023-06-07 PROCEDURE — 3008F BODY MASS INDEX DOCD: CPT | Mod: CPTII,S$GLB,, | Performed by: INTERNAL MEDICINE

## 2023-06-07 PROCEDURE — 3075F SYST BP GE 130 - 139MM HG: CPT | Mod: CPTII,S$GLB,, | Performed by: INTERNAL MEDICINE

## 2023-06-07 PROCEDURE — 99999 PR PBB SHADOW E&M-EST. PATIENT-LVL III: CPT | Mod: PBBFAC,,, | Performed by: INTERNAL MEDICINE

## 2023-06-07 PROCEDURE — 99214 PR OFFICE/OUTPT VISIT, EST, LEVL IV, 30-39 MIN: ICD-10-PCS | Mod: S$GLB,,, | Performed by: INTERNAL MEDICINE

## 2023-06-07 PROCEDURE — 3080F DIAST BP >= 90 MM HG: CPT | Mod: CPTII,S$GLB,, | Performed by: INTERNAL MEDICINE

## 2023-06-07 PROCEDURE — 1159F MED LIST DOCD IN RCRD: CPT | Mod: CPTII,S$GLB,, | Performed by: INTERNAL MEDICINE

## 2023-06-07 RX ORDER — METOPROLOL SUCCINATE 25 MG/1
25 TABLET, EXTENDED RELEASE ORAL DAILY
Qty: 30 TABLET | Refills: 11 | Status: SHIPPED | OUTPATIENT
Start: 2023-06-07 | End: 2023-09-11

## 2023-06-07 NOTE — PROGRESS NOTES
Patient ID:  Jaclyn Rausch is a 42 y.o. female who presents for follow-up of Palpitations      She is having a lot of anxiety problems.  For the past 4 weeks she noticed that her heart begins to palpitate, beating fast whenever she is under stress.  She has a new job.  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  She denies any chest pains any shortness of breath she drinks coffee in the morning.  She goes to spin class and goes to the gym without any symptoms whatsoever.  The EKG showed sinus rhythm rate of 81 her blood pressure taking by me was 128/90.  She is taking a multivitamin that has 100 mg of niacin.  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 Frank; last visit 1/2018    Clotting disorder 02/03/2019    Elevated lipoprotein(a) 05/04/2018    History of CVA in adulthood 4/24/2018    Stroke 04/24/2018        Past Surgical History:   Procedure Laterality Date    BUNIONECTOMY Left 2001    CHONDROPLASTY OF KNEE Left 1/10/2020    Procedure: CHONDROPLASTY, KNEE;  Surgeon: Michael Galaviz II, MD;  Location: [facility] OR;  Service: Orthopedics;  Laterality: Left;    HYSTEROSCOPY WITH DILATION AND CURETTAGE OF UTERUS N/A 11/5/2020    Procedure: HYSTEROSCOPY, WITH DILATION AND CURETTAGE OF UTERUS;  Surgeon: Ap Milner MD;  Location: [facility] OR;  Service: OB/GYN;  Laterality: N/A;    KNEE ARTHROSCOPY W/ MENISCECTOMY Left 1/10/2020    Procedure: ARTHROSCOPY, KNEE, WITH MENISCECTOMY;  Surgeon: Michael Galaviz II, MD;  Location: [facility] OR;  Service: Orthopedics;  Laterality: Left;    REPAIR OF MENISCUS OF KNEE Left 1/10/2020    Procedure: REPAIR, MENISCUS, KNEE;  Surgeon: Michael Galaviz II, MD;  Location: [facility] OR;  Service: Orthopedics;  Laterality: Left;  PARTIAL MEDIAL    THERMAL ABLATION OF ENDOMETRIUM N/A  "11/5/2020    Procedure: ABLATION, ENDOMETRIUM, THERMAL;  Surgeon: Ap Milner MD;  Location: [facility];  Service: OB/GYN;  Laterality: N/A;          Current Outpatient Medications   Medication Instructions    ascorbic acid (vitamin C) (VITAMIN C) 100 mg, Oral, Daily    atorvastatin (LIPITOR) 40 MG tablet TAKE 1 TABLET(40 MG) BY MOUTH EVERY DAY    diazePAM (VALIUM) 5 mg, Oral, Once, Take 5mg 30 minutes before the MRI. If needed, can take another 5 mg before MRI.    ELIQUIS 5 mg Tab TAKE 1 TABLET BY MOUTH TWICE DAILY    metoprolol succinate (TOPROL-XL) 25 mg, Oral, Daily    ondansetron (ZOFRAN-ODT) 4 mg, Oral, Every 8 hours PRN    pen needle, diabetic, safety 31 gauge x 1/4" Ndle Use once daily to inject victoza        Review of patient's allergies indicates:  No Known Allergies     Review of Systems   Cardiovascular:  Positive for palpitations. Negative for chest pain and dyspnea on exertion.   Psychiatric/Behavioral:  The patient is nervous/anxious.       Objective:     Vitals:    06/07/23 1256   BP: (!) 138/100   BP Location: Right arm   Patient Position: Sitting   Pulse: 80   Weight: 83 kg (182 lb 15.7 oz)   Height: 5' (1.524 m)       Physical Exam  Vitals and nursing note reviewed.   Constitutional:       Appearance: She is well-developed.   HENT:      Head: Normocephalic and atraumatic.   Eyes:      Conjunctiva/sclera: Conjunctivae normal.   Cardiovascular:      Rate and Rhythm: Normal rate and regular rhythm.      Heart sounds: Normal heart sounds.   Pulmonary:      Effort: Pulmonary effort is normal.      Breath sounds: Normal breath sounds.   Abdominal:      General: Bowel sounds are normal.      Palpations: Abdomen is soft.   Musculoskeletal:         General: Normal range of motion.   Skin:     General: Skin is warm and dry.   Neurological:      Mental Status: She is alert and oriented to person, place, and time.   Psychiatric:         Behavior: Behavior normal.         Thought Content: Thought content " normal.         Judgment: Judgment normal.     CMP  Sodium   Date Value Ref Range Status   06/05/2023 137 136 - 145 mmol/L Final     Potassium   Date Value Ref Range Status   06/05/2023 4.2 3.5 - 5.1 mmol/L Final     Chloride   Date Value Ref Range Status   06/05/2023 104 95 - 110 mmol/L Final     CO2   Date Value Ref Range Status   06/05/2023 26 23 - 29 mmol/L Final     Glucose   Date Value Ref Range Status   06/05/2023 89 70 - 110 mg/dL Final     BUN   Date Value Ref Range Status   06/05/2023 11 6 - 20 mg/dL Final     Creatinine   Date Value Ref Range Status   06/05/2023 0.9 0.5 - 1.4 mg/dL Final     Calcium   Date Value Ref Range Status   06/05/2023 9.0 8.7 - 10.5 mg/dL Final     Total Protein   Date Value Ref Range Status   06/05/2023 6.6 6.0 - 8.4 g/dL Final     Albumin   Date Value Ref Range Status   06/05/2023 3.8 3.5 - 5.2 g/dL Final     Total Bilirubin   Date Value Ref Range Status   06/05/2023 0.4 0.1 - 1.0 mg/dL Final     Comment:     For infants and newborns, interpretation of results should be based  on gestational age, weight and in agreement with clinical  observations.    Premature Infant recommended reference ranges:  Up to 24 hours.............<8.0 mg/dL  Up to 48 hours............<12.0 mg/dL  3-5 days..................<15.0 mg/dL  6-29 days.................<15.0 mg/dL       Alkaline Phosphatase   Date Value Ref Range Status   06/05/2023 70 55 - 135 U/L Final     AST   Date Value Ref Range Status   06/05/2023 21 10 - 40 U/L Final     ALT   Date Value Ref Range Status   06/05/2023 16 10 - 44 U/L Final     Anion Gap   Date Value Ref Range Status   06/05/2023 7 (L) 8 - 16 mmol/L Final     eGFR if    Date Value Ref Range Status   03/16/2022 >60.0 >60 mL/min/1.73 m^2 Final     eGFR if non    Date Value Ref Range Status   03/16/2022 >60.0 >60 mL/min/1.73 m^2 Final     Comment:     Calculation used to obtain the estimated glomerular filtration  rate (eGFR) is the CKD-EPI  equation.         BMP  Lab Results   Component Value Date     06/05/2023    K 4.2 06/05/2023     06/05/2023    CO2 26 06/05/2023    BUN 11 06/05/2023    CREATININE 0.9 06/05/2023    CALCIUM 9.0 06/05/2023    ANIONGAP 7 (L) 06/05/2023    ESTGFRAFRICA >60.0 03/16/2022    EGFRNONAA >60.0 03/16/2022      BNP  @LABRCNTIP(BNP,BNPTRIAGEBLO)@   Lab Results   Component Value Date    CHOL 193 06/05/2023    CHOL 169 03/07/2022    CHOL 170 03/05/2021     Lab Results   Component Value Date    HDL 63 06/05/2023    HDL 57 03/07/2022    HDL 55 03/05/2021     Lab Results   Component Value Date    LDLCALC 104.6 06/05/2023    LDLCALC 94.0 03/07/2022    LDLCALC 88.8 03/05/2021     Lab Results   Component Value Date    TRIG 127 06/05/2023    TRIG 90 03/07/2022    TRIG 131 03/05/2021     Lab Results   Component Value Date    CHOLHDL 32.6 06/05/2023    CHOLHDL 33.7 03/07/2022    CHOLHDL 32.4 03/05/2021      Lab Results   Component Value Date    TSH 1.553 06/05/2023    U6WIEPX 93 09/07/2021    FREET4 0.97 10/30/2021     Lab Results   Component Value Date    HGBA1C 5.0 10/30/2021     Lab Results   Component Value Date    WBC 8.53 06/05/2023    HGB 13.9 06/05/2023    HCT 44.0 06/05/2023    MCV 97 06/05/2023     06/05/2023         Results for orders placed during the hospital encounter of 04/24/18    Transthoracic echo (TTE) complete    Interpretation Summary  · Left ventricle ejection fraction is normal.  · Tricuspid valve shows mild regurgitation.     No results found for this or any previous visit.         Assessment:       Atrial septal aneurysm  On chronic anticoagulation    Elevated lipoprotein(a)  She is on 100 mg of niacin.  Today's results are pending    Familial hypercholesterolemia  On 40 mg of atorvastatin    Clotting disorder  Followed by Hematology    Chronic anticoagulation  On Eliquis for clotting disorders and previous TIAs    Anxiety  Will start her on low-dose beta-blockers and see if her anxiety  improves       Plan:       Initiate therapy with Toprol XL 12.5 mg daily and increased to 25 mg if she continues to have palpitations.  He will be seen in the office in 3 months.  She might require higher doses of niacin depending upon her lipoprotein a.

## 2023-06-07 NOTE — ASSESSMENT & PLAN NOTE
She is complaining of palpitations when she is anxious will go ahead and start her on low doses beta-blocker

## 2023-06-08 LAB — LPA SERPL-MCNC: 90 MG/DL (ref 0–30)

## 2023-06-09 ENCOUNTER — OFFICE VISIT (OUTPATIENT)
Dept: FAMILY MEDICINE | Facility: CLINIC | Age: 43
End: 2023-06-09
Payer: COMMERCIAL

## 2023-06-09 VITALS
SYSTOLIC BLOOD PRESSURE: 140 MMHG | RESPIRATION RATE: 18 BRPM | HEART RATE: 75 BPM | BODY MASS INDEX: 36.66 KG/M2 | DIASTOLIC BLOOD PRESSURE: 86 MMHG | OXYGEN SATURATION: 98 % | HEIGHT: 60 IN | WEIGHT: 186.75 LBS

## 2023-06-09 DIAGNOSIS — E78.01 FAMILIAL HYPERCHOLESTEROLEMIA: ICD-10-CM

## 2023-06-09 DIAGNOSIS — Z00.00 ROUTINE GENERAL MEDICAL EXAMINATION AT A HEALTH CARE FACILITY: Primary | ICD-10-CM

## 2023-06-09 DIAGNOSIS — E66.01 SEVERE OBESITY (BMI 35.0-39.9) WITH COMORBIDITY: ICD-10-CM

## 2023-06-09 PROBLEM — Z86.73 HISTORY OF CVA IN ADULTHOOD: Status: RESOLVED | Noted: 2018-04-24 | Resolved: 2023-06-09

## 2023-06-09 PROBLEM — G56.03 BILATERAL CARPAL TUNNEL SYNDROME: Status: RESOLVED | Noted: 2018-08-21 | Resolved: 2023-06-09

## 2023-06-09 PROCEDURE — 3077F SYST BP >= 140 MM HG: CPT | Mod: CPTII,S$GLB,, | Performed by: STUDENT IN AN ORGANIZED HEALTH CARE EDUCATION/TRAINING PROGRAM

## 2023-06-09 PROCEDURE — 1160F RVW MEDS BY RX/DR IN RCRD: CPT | Mod: CPTII,S$GLB,, | Performed by: STUDENT IN AN ORGANIZED HEALTH CARE EDUCATION/TRAINING PROGRAM

## 2023-06-09 PROCEDURE — 3008F BODY MASS INDEX DOCD: CPT | Mod: CPTII,S$GLB,, | Performed by: STUDENT IN AN ORGANIZED HEALTH CARE EDUCATION/TRAINING PROGRAM

## 2023-06-09 PROCEDURE — 3079F DIAST BP 80-89 MM HG: CPT | Mod: CPTII,S$GLB,, | Performed by: STUDENT IN AN ORGANIZED HEALTH CARE EDUCATION/TRAINING PROGRAM

## 2023-06-09 PROCEDURE — 3077F PR MOST RECENT SYSTOLIC BLOOD PRESSURE >= 140 MM HG: ICD-10-PCS | Mod: CPTII,S$GLB,, | Performed by: STUDENT IN AN ORGANIZED HEALTH CARE EDUCATION/TRAINING PROGRAM

## 2023-06-09 PROCEDURE — 99999 PR PBB SHADOW E&M-EST. PATIENT-LVL IV: CPT | Mod: PBBFAC,,, | Performed by: STUDENT IN AN ORGANIZED HEALTH CARE EDUCATION/TRAINING PROGRAM

## 2023-06-09 PROCEDURE — 99999 PR PBB SHADOW E&M-EST. PATIENT-LVL IV: ICD-10-PCS | Mod: PBBFAC,,, | Performed by: STUDENT IN AN ORGANIZED HEALTH CARE EDUCATION/TRAINING PROGRAM

## 2023-06-09 PROCEDURE — 1159F PR MEDICATION LIST DOCUMENTED IN MEDICAL RECORD: ICD-10-PCS | Mod: CPTII,S$GLB,, | Performed by: STUDENT IN AN ORGANIZED HEALTH CARE EDUCATION/TRAINING PROGRAM

## 2023-06-09 PROCEDURE — 1159F MED LIST DOCD IN RCRD: CPT | Mod: CPTII,S$GLB,, | Performed by: STUDENT IN AN ORGANIZED HEALTH CARE EDUCATION/TRAINING PROGRAM

## 2023-06-09 PROCEDURE — 3008F PR BODY MASS INDEX (BMI) DOCUMENTED: ICD-10-PCS | Mod: CPTII,S$GLB,, | Performed by: STUDENT IN AN ORGANIZED HEALTH CARE EDUCATION/TRAINING PROGRAM

## 2023-06-09 PROCEDURE — 3079F PR MOST RECENT DIASTOLIC BLOOD PRESSURE 80-89 MM HG: ICD-10-PCS | Mod: CPTII,S$GLB,, | Performed by: STUDENT IN AN ORGANIZED HEALTH CARE EDUCATION/TRAINING PROGRAM

## 2023-06-09 PROCEDURE — 1160F PR REVIEW ALL MEDS BY PRESCRIBER/CLIN PHARMACIST DOCUMENTED: ICD-10-PCS | Mod: CPTII,S$GLB,, | Performed by: STUDENT IN AN ORGANIZED HEALTH CARE EDUCATION/TRAINING PROGRAM

## 2023-06-09 PROCEDURE — 99396 PREV VISIT EST AGE 40-64: CPT | Mod: S$GLB,,, | Performed by: STUDENT IN AN ORGANIZED HEALTH CARE EDUCATION/TRAINING PROGRAM

## 2023-06-09 PROCEDURE — 99396 PR PREVENTIVE VISIT,EST,40-64: ICD-10-PCS | Mod: S$GLB,,, | Performed by: STUDENT IN AN ORGANIZED HEALTH CARE EDUCATION/TRAINING PROGRAM

## 2023-06-09 RX ORDER — ATORVASTATIN CALCIUM 20 MG/1
20 TABLET, FILM COATED ORAL DAILY
Qty: 90 TABLET | Refills: 3 | Status: SHIPPED | OUTPATIENT
Start: 2023-06-09

## 2023-06-09 NOTE — PROGRESS NOTES
[facility] MEDICINE CLINIC NOTE    Patient Name: Jaclyn Rausch  YOB: 1980    PRESENTING HISTORY     History of Present Illness:  Ms. Jaclyn Rausch is a 42 y.o. female here to establish care.     Stroke 2018 due to clotting disorder  This was miscarriag, D and C. CVA 2 months later.   Found to have multiple potential issues.   Lifelong AC.     Has now had endometrial ablation due to menorrhagia, now under control.     Has been having palpitations. Started on metoprolol.     Elevated BPs at office visits, improved at home.     ROS      OBJECTIVE:   Vital Signs:  Vitals:    06/09/23 1544 06/09/23 1556   BP: (!) 150/86 (!) 140/86   Pulse: 75    Resp: 18    SpO2: 98%    Weight: 84.7 kg (186 lb 11.7 oz)    Height: 5' (1.524 m)           Physical Exam: Normal, no change.     Physical Exam    ASSESSMENT & PLAN:     Routine general medical examination at a health care facility    Familial hypercholesterolemia  -     atorvastatin (LIPITOR) 20 MG tablet; Take 1 tablet (20 mg total) by mouth once daily.  Dispense: 90 tablet; Refill: 3    Severe obesity (BMI 35.0-39.9) with comorbidity  Weight is grossly stable to improved since 2020.        Jarod Walker MD   Internal Medicine

## 2023-06-12 PROBLEM — E66.01 SEVERE OBESITY (BMI 35.0-39.9) WITH COMORBIDITY: Status: ACTIVE | Noted: 2023-06-12

## 2023-09-11 ENCOUNTER — OFFICE VISIT (OUTPATIENT)
Dept: CARDIOLOGY | Facility: CLINIC | Age: 43
End: 2023-09-11
Payer: COMMERCIAL

## 2023-09-11 ENCOUNTER — LAB VISIT (OUTPATIENT)
Dept: LAB | Facility: HOSPITAL | Age: 43
End: 2023-09-11
Attending: INTERNAL MEDICINE
Payer: COMMERCIAL

## 2023-09-11 VITALS
HEART RATE: 78 BPM | HEIGHT: 60 IN | BODY MASS INDEX: 36.5 KG/M2 | SYSTOLIC BLOOD PRESSURE: 120 MMHG | OXYGEN SATURATION: 100 % | DIASTOLIC BLOOD PRESSURE: 70 MMHG | WEIGHT: 185.94 LBS

## 2023-09-11 DIAGNOSIS — F41.9 ANXIETY: ICD-10-CM

## 2023-09-11 DIAGNOSIS — I25.3 ATRIAL SEPTAL ANEURYSM: ICD-10-CM

## 2023-09-11 DIAGNOSIS — I63.9 ACUTE CVA (CEREBROVASCULAR ACCIDENT): ICD-10-CM

## 2023-09-11 DIAGNOSIS — E78.01 FAMILIAL HYPERCHOLESTEROLEMIA: ICD-10-CM

## 2023-09-11 DIAGNOSIS — I25.3 ATRIAL SEPTAL ANEURYSM: Primary | ICD-10-CM

## 2023-09-11 DIAGNOSIS — E78.41 ELEVATED LIPOPROTEIN(A): ICD-10-CM

## 2023-09-11 DIAGNOSIS — Z79.01 CHRONIC ANTICOAGULATION: ICD-10-CM

## 2023-09-11 DIAGNOSIS — D68.9 CLOTTING DISORDER: ICD-10-CM

## 2023-09-11 LAB
CHOLEST SERPL-MCNC: 205 MG/DL (ref 120–199)
CHOLEST/HDLC SERPL: 3.1 {RATIO} (ref 2–5)
HDLC SERPL-MCNC: 67 MG/DL (ref 40–75)
HDLC SERPL: 32.7 % (ref 20–50)
LDLC SERPL CALC-MCNC: 123 MG/DL (ref 63–159)
NONHDLC SERPL-MCNC: 138 MG/DL
TRIGL SERPL-MCNC: 75 MG/DL (ref 30–150)

## 2023-09-11 PROCEDURE — 1160F PR REVIEW ALL MEDS BY PRESCRIBER/CLIN PHARMACIST DOCUMENTED: ICD-10-PCS | Mod: CPTII,S$GLB,, | Performed by: INTERNAL MEDICINE

## 2023-09-11 PROCEDURE — 80061 LIPID PANEL: CPT | Performed by: INTERNAL MEDICINE

## 2023-09-11 PROCEDURE — 99214 OFFICE O/P EST MOD 30 MIN: CPT | Mod: S$GLB,,, | Performed by: INTERNAL MEDICINE

## 2023-09-11 PROCEDURE — 3074F PR MOST RECENT SYSTOLIC BLOOD PRESSURE < 130 MM HG: ICD-10-PCS | Mod: CPTII,S$GLB,, | Performed by: INTERNAL MEDICINE

## 2023-09-11 PROCEDURE — 3074F SYST BP LT 130 MM HG: CPT | Mod: CPTII,S$GLB,, | Performed by: INTERNAL MEDICINE

## 2023-09-11 PROCEDURE — 1159F MED LIST DOCD IN RCRD: CPT | Mod: CPTII,S$GLB,, | Performed by: INTERNAL MEDICINE

## 2023-09-11 PROCEDURE — 99999 PR PBB SHADOW E&M-EST. PATIENT-LVL IV: CPT | Mod: PBBFAC,,, | Performed by: INTERNAL MEDICINE

## 2023-09-11 PROCEDURE — 3078F PR MOST RECENT DIASTOLIC BLOOD PRESSURE < 80 MM HG: ICD-10-PCS | Mod: CPTII,S$GLB,, | Performed by: INTERNAL MEDICINE

## 2023-09-11 PROCEDURE — 99999 PR PBB SHADOW E&M-EST. PATIENT-LVL IV: ICD-10-PCS | Mod: PBBFAC,,, | Performed by: INTERNAL MEDICINE

## 2023-09-11 PROCEDURE — 1159F PR MEDICATION LIST DOCUMENTED IN MEDICAL RECORD: ICD-10-PCS | Mod: CPTII,S$GLB,, | Performed by: INTERNAL MEDICINE

## 2023-09-11 PROCEDURE — 99214 PR OFFICE/OUTPT VISIT, EST, LEVL IV, 30-39 MIN: ICD-10-PCS | Mod: S$GLB,,, | Performed by: INTERNAL MEDICINE

## 2023-09-11 PROCEDURE — 36415 COLL VENOUS BLD VENIPUNCTURE: CPT | Performed by: INTERNAL MEDICINE

## 2023-09-11 PROCEDURE — 3008F BODY MASS INDEX DOCD: CPT | Mod: CPTII,S$GLB,, | Performed by: INTERNAL MEDICINE

## 2023-09-11 PROCEDURE — 3078F DIAST BP <80 MM HG: CPT | Mod: CPTII,S$GLB,, | Performed by: INTERNAL MEDICINE

## 2023-09-11 PROCEDURE — 3008F PR BODY MASS INDEX (BMI) DOCUMENTED: ICD-10-PCS | Mod: CPTII,S$GLB,, | Performed by: INTERNAL MEDICINE

## 2023-09-11 PROCEDURE — 1160F RVW MEDS BY RX/DR IN RCRD: CPT | Mod: CPTII,S$GLB,, | Performed by: INTERNAL MEDICINE

## 2023-09-11 NOTE — PROGRESS NOTES
Patient ID:  Jaclyn Rausch is a 42 y.o. female who presents for follow-up of Palpitations (With anxiety) and chronic anticoagulation      She took metoprolol it helps her with her heart rate but it made her very short of breath a precipitated her asthma.  She feels that her a arrhythmias are secondary to anxiety p.r.n. when she gets anxious her heart rate increases.  She has been on sertraline that helps with her anxiety but has made her drowsy and decreases her libido she denies any chest pains.  Her cholesterol is elevated she is reluctant to take a statin.  Will obtain a coronary calcium score that would help us to determine if she needs to be on medications she exercises on regular basis with no symptoms        Past Medical History:   Diagnosis Date    Anemia     slightly    Anticardiolipin antibody positive 05/04/2018    Anticoagulant long-term use     Atrial septal aneurysm     Dr. Frank; last visit 1/2018    Clotting disorder 02/03/2019    Elevated lipoprotein(a) 05/04/2018    History of CVA in adulthood 4/24/2018    Stroke 04/24/2018        Past Surgical History:   Procedure Laterality Date    BUNIONECTOMY Left 2001    CHONDROPLASTY OF KNEE Left 1/10/2020    Procedure: CHONDROPLASTY, KNEE;  Surgeon: Michael Galaviz II, MD;  Location: [facility] OR;  Service: Orthopedics;  Laterality: Left;    HYSTEROSCOPY WITH DILATION AND CURETTAGE OF UTERUS N/A 11/5/2020    Procedure: HYSTEROSCOPY, WITH DILATION AND CURETTAGE OF UTERUS;  Surgeon: Ap Milner MD;  Location: [facility] OR;  Service: OB/GYN;  Laterality: N/A;    KNEE ARTHROSCOPY W/ MENISCECTOMY Left 1/10/2020    Procedure: ARTHROSCOPY, KNEE, WITH MENISCECTOMY;  Surgeon: Michael Galaviz II, MD;  Location: [facility] OR;  Service: Orthopedics;  Laterality: Left;    REPAIR OF MENISCUS OF KNEE Left 1/10/2020    Procedure: REPAIR, MENISCUS, KNEE;  Surgeon: Michael Galaviz II, MD;  Location: [facility] OR;  Service: Orthopedics;  Laterality: Left;  PARTIAL  "MEDIAL    THERMAL ABLATION OF ENDOMETRIUM N/A 11/5/2020    Procedure: ABLATION, ENDOMETRIUM, THERMAL;  Surgeon: Ap Milner MD;  Location: [facility];  Service: OB/GYN;  Laterality: N/A;          Current Outpatient Medications   Medication Instructions    ascorbic acid (vitamin C) (VITAMIN C) 100 mg, Oral, Daily    atorvastatin (LIPITOR) 20 mg, Oral, Daily    ELIQUIS 5 mg Tab TAKE 1 TABLET BY MOUTH TWICE DAILY    ondansetron (ZOFRAN-ODT) 4 mg, Oral, Every 8 hours PRN    pen needle, diabetic, safety 31 gauge x 1/4" Ndle Use once daily to inject victoza        Review of patient's allergies indicates:  No Known Allergies     Review of Systems   Cardiovascular:  Positive for chest pain. Negative for dyspnea on exertion.   Respiratory:  Negative for cough.    Psychiatric/Behavioral:  The patient is nervous/anxious.         Objective:     Vitals:    09/11/23 0910   BP: 120/70   BP Location: Left arm   Patient Position: Sitting   BP Method: Medium (Manual)   Pulse: 78   SpO2: 100%   Weight: 84.4 kg (185 lb 15.3 oz)   Height: 5' (1.524 m)       Physical Exam  Vitals and nursing note reviewed.   Constitutional:       Appearance: She is well-developed.   HENT:      Head: Normocephalic and atraumatic.   Eyes:      Conjunctiva/sclera: Conjunctivae normal.   Cardiovascular:      Rate and Rhythm: Normal rate and regular rhythm.      Heart sounds: Normal heart sounds.   Pulmonary:      Effort: Pulmonary effort is normal.      Breath sounds: Normal breath sounds.   Abdominal:      General: Bowel sounds are normal.      Palpations: Abdomen is soft.   Musculoskeletal:         General: Normal range of motion.   Skin:     General: Skin is warm and dry.   Neurological:      Mental Status: She is alert and oriented to person, place, and time.   Psychiatric:         Behavior: Behavior normal.         Thought Content: Thought content normal.         Judgment: Judgment normal.       CMP  Sodium   Date Value Ref Range Status "   06/05/2023 137 136 - 145 mmol/L Final     Potassium   Date Value Ref Range Status   06/05/2023 4.2 3.5 - 5.1 mmol/L Final     Chloride   Date Value Ref Range Status   06/05/2023 104 95 - 110 mmol/L Final     CO2   Date Value Ref Range Status   06/05/2023 26 23 - 29 mmol/L Final     Glucose   Date Value Ref Range Status   06/05/2023 89 70 - 110 mg/dL Final     BUN   Date Value Ref Range Status   06/05/2023 11 6 - 20 mg/dL Final     Creatinine   Date Value Ref Range Status   06/05/2023 0.9 0.5 - 1.4 mg/dL Final     Calcium   Date Value Ref Range Status   06/05/2023 9.0 8.7 - 10.5 mg/dL Final     Total Protein   Date Value Ref Range Status   06/05/2023 6.6 6.0 - 8.4 g/dL Final     Albumin   Date Value Ref Range Status   06/05/2023 3.8 3.5 - 5.2 g/dL Final     Total Bilirubin   Date Value Ref Range Status   06/05/2023 0.4 0.1 - 1.0 mg/dL Final     Comment:     For infants and newborns, interpretation of results should be based  on gestational age, weight and in agreement with clinical  observations.    Premature Infant recommended reference ranges:  Up to 24 hours.............<8.0 mg/dL  Up to 48 hours............<12.0 mg/dL  3-5 days..................<15.0 mg/dL  6-29 days.................<15.0 mg/dL       Alkaline Phosphatase   Date Value Ref Range Status   06/05/2023 70 55 - 135 U/L Final     AST   Date Value Ref Range Status   06/05/2023 21 10 - 40 U/L Final     ALT   Date Value Ref Range Status   06/05/2023 16 10 - 44 U/L Final     Anion Gap   Date Value Ref Range Status   06/05/2023 7 (L) 8 - 16 mmol/L Final     eGFR if    Date Value Ref Range Status   03/16/2022 >60.0 >60 mL/min/1.73 m^2 Final     eGFR if non    Date Value Ref Range Status   03/16/2022 >60.0 >60 mL/min/1.73 m^2 Final     Comment:     Calculation used to obtain the estimated glomerular filtration  rate (eGFR) is the CKD-EPI equation.         BMP  Lab Results   Component Value Date     06/05/2023    K 4.2  06/05/2023     06/05/2023    CO2 26 06/05/2023    BUN 11 06/05/2023    CREATININE 0.9 06/05/2023    CALCIUM 9.0 06/05/2023    ANIONGAP 7 (L) 06/05/2023    ESTGFRAFRICA >60.0 03/16/2022    EGFRNONAA >60.0 03/16/2022      BNP  @LABRCNTIP(BNP,BNPTRIAGEBLO)@   Lab Results   Component Value Date    CHOL 205 (H) 09/11/2023    CHOL 193 06/05/2023    CHOL 169 03/07/2022     Lab Results   Component Value Date    HDL 67 09/11/2023    HDL 63 06/05/2023    HDL 57 03/07/2022     Lab Results   Component Value Date    LDLCALC 123.0 09/11/2023    LDLCALC 104.6 06/05/2023    LDLCALC 94.0 03/07/2022     Lab Results   Component Value Date    TRIG 75 09/11/2023    TRIG 127 06/05/2023    TRIG 90 03/07/2022     Lab Results   Component Value Date    CHOLHDL 32.7 09/11/2023    CHOLHDL 32.6 06/05/2023    CHOLHDL 33.7 03/07/2022      Lab Results   Component Value Date    TSH 1.553 06/05/2023    C4MARUL 93 09/07/2021    FREET4 0.97 10/30/2021     Lab Results   Component Value Date    HGBA1C 5.0 10/30/2021     Lab Results   Component Value Date    WBC 8.53 06/05/2023    HGB 13.9 06/05/2023    HCT 44.0 06/05/2023    MCV 97 06/05/2023     06/05/2023         Results for orders placed during the hospital encounter of 04/24/18    Transthoracic echo (TTE) complete    Interpretation Summary  · Left ventricle ejection fraction is normal.  · Tricuspid valve shows mild regurgitation.     No results found for this or any previous visit.         Assessment:       Anxiety  On sertraline    Atrial septal aneurysm  Will obtain an echocardiogram    Familial hypercholesterolemia  Would prefer not to take statin    Elevated lipoprotein(a)  She is to increase the doses of niacin    Chronic anticoagulation  On chronic anticoagulation for clotting disorder     Plan:           Obtain an echocardiogram to follow-up her intra-atrial aneurysm.  Obtain an event monitor to rule out any significant arrhythmias as a cause of her palpitations.  Obtain a  coronary calcium score to help us guide with her cholesterol.  She will be seen in the office in approximately 1 months with a lipid profile

## 2023-09-12 ENCOUNTER — HOSPITAL ENCOUNTER (OUTPATIENT)
Dept: CARDIOLOGY | Facility: HOSPITAL | Age: 43
Discharge: HOME | End: 2023-09-12
Attending: INTERNAL MEDICINE
Payer: COMMERCIAL

## 2023-09-12 ENCOUNTER — TELEPHONE (OUTPATIENT)
Dept: HEMATOLOGY/ONCOLOGY | Facility: CLINIC | Age: 43
End: 2023-09-12

## 2023-09-12 VITALS — HEIGHT: 60 IN | BODY MASS INDEX: 36.53 KG/M2 | WEIGHT: 186.06 LBS

## 2023-09-12 DIAGNOSIS — E78.01 FAMILIAL HYPERCHOLESTEROLEMIA: ICD-10-CM

## 2023-09-12 DIAGNOSIS — E61.1 IRON DEFICIENCY: Primary | ICD-10-CM

## 2023-09-12 DIAGNOSIS — I25.3 ATRIAL SEPTAL ANEURYSM: ICD-10-CM

## 2023-09-12 DIAGNOSIS — I63.9 ACUTE CVA (CEREBROVASCULAR ACCIDENT): ICD-10-CM

## 2023-09-12 DIAGNOSIS — D68.9 CLOTTING DISORDER: ICD-10-CM

## 2023-09-12 DIAGNOSIS — R76.0 ANTICARDIOLIPIN ANTIBODY POSITIVE: ICD-10-CM

## 2023-09-12 PROCEDURE — 93306 TTE W/DOPPLER COMPLETE: CPT

## 2023-09-12 PROCEDURE — 93306 TTE W/DOPPLER COMPLETE: CPT | Mod: 26,,, | Performed by: INTERNAL MEDICINE

## 2023-09-12 PROCEDURE — 93306 ECHO (CUPID ONLY): ICD-10-PCS | Mod: 26,,, | Performed by: INTERNAL MEDICINE

## 2023-09-14 LAB
AORTIC ROOT ANNULUS: 2.9 CM
AORTIC VALVE CUSP SEPERATION: 1.9 CM
AV INDEX (PROSTH): 0.88
AV MEAN GRADIENT: 5 MMHG
AV PEAK GRADIENT: 10 MMHG
AV VALVE AREA BY VELOCITY RATIO: 2.88 CM²
AV VALVE AREA: 2.76 CM²
AV VELOCITY RATIO: 0.92
BSA FOR ECHO PROCEDURE: 1.89 M2
CV ECHO LV RWT: 0.35 CM
DOP CALC AO PEAK VEL: 1.55 M/S
DOP CALC AO VTI: 28.6 CM
DOP CALC LVOT AREA: 3.1 CM2
DOP CALC LVOT DIAMETER: 2 CM
DOP CALC LVOT PEAK VEL: 1.42 M/S
DOP CALC LVOT STROKE VOLUME: 78.81 CM3
DOP CALC MV VTI: 18.9 CM
DOP CALCLVOT PEAK VEL VTI: 25.1 CM
E WAVE DECELERATION TIME: 190 MSEC
E/A RATIO: 1.06
E/E' RATIO: 7.67 M/S
ECHO LV POSTERIOR WALL: 0.82 CM (ref 0.6–1.1)
EJECTION FRACTION: 65 %
FRACTIONAL SHORTENING: 34 % (ref 28–44)
INTERVENTRICULAR SEPTUM: 0.82 CM (ref 0.6–1.1)
IVC DIAMETER: 1.79 CM
IVRT: 77 MSEC
LEFT ATRIUM SIZE: 3.8 CM
LEFT ATRIUM VOLUME INDEX MOD: 21.6 ML/M2
LEFT ATRIUM VOLUME MOD: 39.1 CM3
LEFT INTERNAL DIMENSION IN SYSTOLE: 3.06 CM (ref 2.1–4)
LEFT VENTRICLE DIASTOLIC VOLUME INDEX: 55.8 ML/M2
LEFT VENTRICLE DIASTOLIC VOLUME: 101 ML
LEFT VENTRICLE MASS INDEX: 69 G/M2
LEFT VENTRICLE SYSTOLIC VOLUME INDEX: 20.3 ML/M2
LEFT VENTRICLE SYSTOLIC VOLUME: 36.7 ML
LEFT VENTRICULAR INTERNAL DIMENSION IN DIASTOLE: 4.67 CM (ref 3.5–6)
LEFT VENTRICULAR MASS: 124.89 G
LV LATERAL E/E' RATIO: 6.13 M/S
LV SEPTAL E/E' RATIO: 10.22 M/S
LVOT MG: 4 MMHG
LVOT MV: 0.9 CM/S
MV MEAN GRADIENT: 2 MMHG
MV PEAK A VEL: 0.87 M/S
MV PEAK E VEL: 0.92 M/S
MV PEAK GRADIENT: 4 MMHG
MV VALVE AREA BY CONTINUITY EQUATION: 4.17 CM2
PISA TR MAX VEL: 2.44 M/S
PV MV: 0.74 M/S
PV PEAK GRADIENT: 4 MMHG
PV PEAK VELOCITY: 1.03 M/S
RA PRESSURE ESTIMATED: 3 MMHG
RIGHT VENTRICULAR END-DIASTOLIC DIMENSION: 2.17 CM
RV TB RVSP: 5 MMHG
RV TISSUE DOPPLER FREE WALL SYSTOLIC VELOCITY 1 (APICAL 4 CHAMBER VIEW): 14.5 CM/S
TDI LATERAL: 0.15 M/S
TDI SEPTAL: 0.09 M/S
TDI: 0.12 M/S
TR MAX PG: 24 MMHG
TRICUSPID ANNULAR PLANE SYSTOLIC EXCURSION: 2.87 CM
TV REST PULMONARY ARTERY PRESSURE: 27 MMHG
Z-SCORE OF LEFT VENTRICULAR DIMENSION IN END DIASTOLE: -0.7
Z-SCORE OF LEFT VENTRICULAR DIMENSION IN END SYSTOLE: -0.09

## 2023-09-18 ENCOUNTER — TELEPHONE (OUTPATIENT)
Dept: CARDIOLOGY | Facility: CLINIC | Age: 43
End: 2023-09-18
Payer: COMMERCIAL

## 2023-09-18 ENCOUNTER — HOSPITAL ENCOUNTER (OUTPATIENT)
Dept: RADIOLOGY | Facility: HOSPITAL | Age: 43
Discharge: HOME | End: 2023-09-18
Attending: INTERNAL MEDICINE
Payer: COMMERCIAL

## 2023-09-18 ENCOUNTER — TELEPHONE (OUTPATIENT)
Dept: HEMATOLOGY/ONCOLOGY | Facility: CLINIC | Age: 43
End: 2023-09-18

## 2023-09-18 ENCOUNTER — LAB VISIT (OUTPATIENT)
Dept: LAB | Facility: HOSPITAL | Age: 43
End: 2023-09-18
Payer: COMMERCIAL

## 2023-09-18 DIAGNOSIS — I25.3 ATRIAL SEPTAL ANEURYSM: ICD-10-CM

## 2023-09-18 DIAGNOSIS — E61.1 IRON DEFICIENCY: ICD-10-CM

## 2023-09-18 DIAGNOSIS — D68.9 CLOTTING DISORDER: ICD-10-CM

## 2023-09-18 DIAGNOSIS — E78.01 FAMILIAL HYPERCHOLESTEROLEMIA: ICD-10-CM

## 2023-09-18 DIAGNOSIS — R76.0 ANTICARDIOLIPIN ANTIBODY POSITIVE: ICD-10-CM

## 2023-09-18 DIAGNOSIS — I63.9 ACUTE CVA (CEREBROVASCULAR ACCIDENT): ICD-10-CM

## 2023-09-18 LAB
BASOPHILS # BLD AUTO: 0.05 K/UL (ref 0–0.2)
BASOPHILS NFR BLD: 0.6 % (ref 0–1.9)
DIFFERENTIAL METHOD: NORMAL
EOSINOPHIL # BLD AUTO: 0.4 K/UL (ref 0–0.5)
EOSINOPHIL NFR BLD: 4.5 % (ref 0–8)
ERYTHROCYTE [DISTWIDTH] IN BLOOD BY AUTOMATED COUNT: 12.3 % (ref 11.5–14.5)
HCT VFR BLD AUTO: 43.9 % (ref 37–48.5)
HGB BLD-MCNC: 14.3 G/DL (ref 12–16)
IMM GRANULOCYTES # BLD AUTO: 0.02 K/UL (ref 0–0.04)
IMM GRANULOCYTES NFR BLD AUTO: 0.2 % (ref 0–0.5)
LYMPHOCYTES # BLD AUTO: 2.9 K/UL (ref 1–4.8)
LYMPHOCYTES NFR BLD: 33.6 % (ref 18–48)
MCH RBC QN AUTO: 31 PG (ref 27–31)
MCHC RBC AUTO-ENTMCNC: 32.6 G/DL (ref 32–36)
MCV RBC AUTO: 95 FL (ref 82–98)
MONOCYTES # BLD AUTO: 0.7 K/UL (ref 0.3–1)
MONOCYTES NFR BLD: 8.5 % (ref 4–15)
NEUTROPHILS # BLD AUTO: 4.5 K/UL (ref 1.8–7.7)
NEUTROPHILS NFR BLD: 52.6 % (ref 38–73)
NRBC BLD-RTO: 0 /100 WBC
PLATELET # BLD AUTO: 316 K/UL (ref 150–450)
PMV BLD AUTO: 9.9 FL (ref 9.2–12.9)
RBC # BLD AUTO: 4.62 M/UL (ref 4–5.4)
WBC # BLD AUTO: 8.63 K/UL (ref 3.9–12.7)

## 2023-09-18 PROCEDURE — 80053 COMPREHEN METABOLIC PANEL: CPT | Performed by: INTERNAL MEDICINE

## 2023-09-18 PROCEDURE — 85025 COMPLETE CBC W/AUTO DIFF WBC: CPT | Performed by: INTERNAL MEDICINE

## 2023-09-18 PROCEDURE — 84466 ASSAY OF TRANSFERRIN: CPT | Performed by: INTERNAL MEDICINE

## 2023-09-18 PROCEDURE — 82728 ASSAY OF FERRITIN: CPT | Performed by: INTERNAL MEDICINE

## 2023-09-18 PROCEDURE — 36415 COLL VENOUS BLD VENIPUNCTURE: CPT | Mod: PO | Performed by: INTERNAL MEDICINE

## 2023-09-18 PROCEDURE — 83540 ASSAY OF IRON: CPT | Performed by: INTERNAL MEDICINE

## 2023-09-18 NOTE — TELEPHONE ENCOUNTER
----- Message from Jeanna Zee, Patient Care Assistant sent at 9/18/2023  3:22 PM CDT -----  Contact: self  Pt is calling to speak w/a  nurse in regards to her cardiac scoring today [phone]  thanks

## 2023-09-19 LAB
ALBUMIN SERPL BCP-MCNC: 4 G/DL (ref 3.5–5.2)
ALP SERPL-CCNC: 74 U/L (ref 55–135)
ALT SERPL W/O P-5'-P-CCNC: 17 U/L (ref 10–44)
ANION GAP SERPL CALC-SCNC: 7 MMOL/L (ref 8–16)
AST SERPL-CCNC: 21 U/L (ref 10–40)
BILIRUB SERPL-MCNC: 0.6 MG/DL (ref 0.1–1)
BUN SERPL-MCNC: 12 MG/DL (ref 6–20)
CALCIUM SERPL-MCNC: 9.7 MG/DL (ref 8.7–10.5)
CHLORIDE SERPL-SCNC: 105 MMOL/L (ref 95–110)
CO2 SERPL-SCNC: 25 MMOL/L (ref 23–29)
CREAT SERPL-MCNC: 0.9 MG/DL (ref 0.5–1.4)
EST. GFR  (NO RACE VARIABLE): >60 ML/MIN/1.73 M^2
FERRITIN SERPL-MCNC: 51 NG/ML (ref 20–300)
GLUCOSE SERPL-MCNC: 59 MG/DL (ref 70–110)
IRON SERPL-MCNC: 168 UG/DL (ref 30–160)
POTASSIUM SERPL-SCNC: 4.1 MMOL/L (ref 3.5–5.1)
PROT SERPL-MCNC: 6.9 G/DL (ref 6–8.4)
SATURATED IRON: 43 % (ref 20–50)
SODIUM SERPL-SCNC: 137 MMOL/L (ref 136–145)
TOTAL IRON BINDING CAPACITY: 394 UG/DL (ref 250–450)
TRANSFERRIN SERPL-MCNC: 266 MG/DL (ref 200–375)

## 2023-09-21 NOTE — PROGRESS NOTES
"   [facility] Hematology/Oncology  PROGRESS NOTE      Subjective:       Patient ID:   NAME: Jaclyn Rausch : 1980     43 y.o. female    Referring Doc: Aaron (ol[place] PCP)  Other Physicians: Mahsa (olivia), Pearl Roca/Gertrudis Ariza (neuro); Federico Guzmán (GYN)    Chief Complaint:  Clot disorder f/u    History of Present Illness:     Patient returns today for a regularly scheduled follow-up visit.  The patient is here by herself. She is doing ok with no new issues.     She is on eliquis. She denies any CP, SOB, HA's or N/V.  She denies any excessive bruising or bleeding.     She now sees Dr Walker as PCP and Dr Bragg with cardiology      Discussed covid19 precautions - she had her vaccinations              ROS:   GEN: normal without any fever, night sweats or weight loss  HEENT: normal with no HA's, sore throat, stiff neck, changes in vision  CV: normal with no CP, SOB, PND, BAILEY or orthopnea  PULM: normal with no SOB, cough, hemoptysis, sputum or pleuritic pain  GI: normal with no abdominal pain, nausea, vomiting, constipation, diarrhea, melanotic stools, BRBPR, or hematemesis  : normal with no hematuria, dysuria  BREAST: normal with no mass, discharge, pain  SKIN: normal with no rash, erythema, bruising, or swelling    Allergies:  Review of patient's allergies indicates:  No Known Allergies    Medications:    Current Outpatient Medications:     ascorbic acid, vitamin C, (VITAMIN C) 100 MG tablet, Take 100 mg by mouth once daily., Disp: , Rfl:     atorvastatin (LIPITOR) 20 MG tablet, Take 1 tablet (20 mg total) by mouth once daily., Disp: 90 tablet, Rfl: 3    ELIQUIS 5 mg Tab, TAKE 1 TABLET BY MOUTH TWICE DAILY, Disp: 60 tablet, Rfl: 11    ondansetron (ZOFRAN-ODT) 4 MG TbDL, Take 1 tablet (4 mg total) by mouth every 8 (eight) hours as needed (nausea)., Disp: 12 tablet, Rfl: 0    pen needle, diabetic, safety 31 gauge x 1/4" Ndle, Use once daily to inject victoza, Disp: 100 each, Rfl: 1  No current " facility-administered medications for this visit.    Facility-Administered Medications Ordered in Other Visits:     electrolyte-S (ISOLYTE), , Intravenous, Continuous, Héctor Trujillo MD, Stopped at 01/10/20 0828    lidocaine (PF) 10 mg/ml (1%) injection 10 mg, 1 mL, Intradermal, Once, Héctor Trujillo MD    PMHx/PSHx Updates:  See patient's last visit with me on 9/26/2022  See H&P on 5/4/2018        Pathology:  Cancer Staging  No matching staging information was found for the patient.          Objective:     Vitals:  Blood pressure 129/89, pulse 89, temperature 98.2 °F (36.8 °C), resp. rate 16, height 5' (1.524 m), weight 84.6 kg (186 lb 8 oz).    Physical Examination:   GEN: no apparent distress, comfortable; AAOx3  HEAD: atraumatic and normocephalic  EYES: no pallor, no icterus, PERRLA  ENT: OMM, no pharyngeal erythema, external ears WNL; no nasal discharge; no thrush  NECK: no masses, thyroid normal, trachea midline, no LAD/LN's, supple  CV: RRR with no murmur; normal pulse; normal S1 and S2; no pedal edema  CHEST: Normal respiratory effort; CTAB; normal breath sounds; no wheeze or crackles  ABDOM: nontender and nondistended; soft; normal bowel sounds; no rebound/guarding  MUSC/Skeletal: ROM normal; no crepitus; joints normal; no deformities or arthropathy  EXTREM: no clubbing, cyanosis, inflammation or swelling  SKIN: no rashes, lesions, ulcers, petechiae or subcutaneous nodules  : no gonzalez  NEURO: grossly intact; motor/sensory WNL; AAOx3; no tremors  PSYCH: normal mood, affect and behavior  LYMPH: normal cervical, supraclavicular, axillary and groin LN's            Labs:      Lab Results   Component Value Date    WBC 8.63 09/18/2023    HGB 14.3 09/18/2023    HCT 43.9 09/18/2023    MCV 95 09/18/2023     09/18/2023     BMP  Lab Results   Component Value Date     09/18/2023    K 4.1 09/18/2023     09/18/2023    CO2 25 09/18/2023    BUN 12 09/18/2023    CREATININE 0.9 09/18/2023    CALCIUM  9.7 09/18/2023    ANIONGAP 7 (L) 09/18/2023    ESTGFRAFRICA >60.0 03/16/2022    EGFRNONAA >60.0 03/16/2022     Lab Results   Component Value Date    ALT 17 09/18/2023    AST 21 09/18/2023    ALKPHOS 74 09/18/2023    BILITOT 0.6 09/18/2023     Lab Results   Component Value Date    IRON 168 (H) 09/18/2023    TIBC 394 09/18/2023    FERRITIN 51 09/18/2023           Radiology/Diagnostic Studies:    No results found.    I have reviewed all available lab results and radiology reports.    Assessment/Plan:   (1) 42 yo female with prior acute onset aphasia and left facial droop with MRI showing lateral left frontal lobe acute nonhemorrhagic infarction. She has been seen by Dr Frank with cardiology and Dr Pearl Roca with neurology   - hematology was previously consulted for evaluation for hypercoag workup which I ordered while she was inpatient  - she is currently on Eliquis oral anticoagulation; neurology discontinued the aspirin  - she saw for f/u with Dr Pearl Roca with neurology as outpatient in Dec 2018  - she saw Evette NP in Oct 2018  - factor XII was elevated at 144 and  factor IX was elevated at 147, but mildly and may be reactive in nature only  - Lipoprotein-A was elevated at 103; APA IgM was elevated at 17.99  - homocysteine was WNL; ATIII was normal  - prothrombin II gene was normal, LA was negative; factor V leiden was normal  - protein C and S was adequate    3/10/2021:  - continued on eliquis with no new issues    9/13/2021:  - continued on Eliquis  - doing well  - labs are adequate    3/22/2022:  - latest labs are WNL  - continued on eliquis  - no new bleeding or excessive bruising    9/26/2022:  - doing well on the eliquis  - no excessive bleeding or bruising     9/25/2023:  - doing well on the eliquis  - no excessive bleeding or bruising      (2) Chronic borderline anemia in past - current hgb is within normal limits  - prior ferritin levels were low  - she has some stomach issue with OTC iron  - check up  to date iron panel every 6 months  - s/p ablation and no longer on the ICAR-C     (3) Hx/of migraine headaches     (4) Atrial septal aneurysm - followed by Dr Frank with cardiology as outpatient  - she had bubble study and JUAN while in hospital  - there is no opening or hole per patient    - she is now followed by Dr SIXTO Phillips     (5) Recent loss of pregnancy in Jan 2018 in first trimester     (6) Hypercholesterolemia - now on lipitor    (7) GYN following - Dr Federico Polanco with [facility] - heavier menstrual cycles resolved since undergoing ablation        Clotting disorder    Menorrhagia with regular cycle    Chronic anticoagulation    Iron deficiency    Anticardiolipin antibody positive          PLAN:  1. Continue eliquis (possibly life-long)   2.  F/u with PCP, Neuro and cardiology  4.  RTC in 12 months with labs     Fax note to Mahsa Walker Amy Jones, [place];      Discussion:       COVID-19 Discussion:    I had long discussion with patient and any applicable family about the COVID-19 coronavirus epidemic and the recommended precautions with regard to cancer and/or hematology patients. I have re-iterated the CDC recommendations for adequate hand washing, use of hand -like products, and coughing into elbow, etc. In addition, especially for our patients who are on chemotherapy and/or our otherwise immunocompromised patients, I have recommended avoidance of crowds, including movie theaters, restaurants, churches, etc. I have recommended avoidance of any sick or symptomatic family members and/or friends. I have also recommended avoidance of any raw and unwashed food products, and general avoidance of food items that have not been prepared by themselves. The patient has been asked to call us immediately with any symptom developments, issues, questions or other general concerns.       Anticoagulation Discussion:    Discussed with patient and any applicable family members about the benefit and/or need  for anticoagulation. I communicated about the risks of bleeding while on any anticoagulation, which could be serious and/or life-threatening, and which can occur at any time, regardless of degree of the level of anticoagulation. I expressed the need for compliance with any anticoagulation regimen and that failure to do so could potential lead to excessive bleeding, and risk to health and/or life. In particular, with patients on coumadin therapy, compliance with requested blood work is absolutely essential, as coumadin levels can vary from time to time, and failure to do so could potentially place the patient at risk for bleeding and/or clotting events which could be fatal. Patients on coumadin are encouraged to call the day after they have their levels drawn, as to obtain the appropriate instructions from my staff. Patients are aware that self-regulating or self-dosing of their medications is strictly prohibited.     I have explained all of the above in detail and the patient understands all of the current recommendation(s). I have answered all of their questions to the best of my ability and to their complete satisfaction.   The patient is to continue with the current management plan.            Electronically signed by Jesus Sprague MD

## 2023-09-25 ENCOUNTER — OFFICE VISIT (OUTPATIENT)
Dept: HEMATOLOGY/ONCOLOGY | Facility: CLINIC | Age: 43
End: 2023-09-25
Payer: COMMERCIAL

## 2023-09-25 VITALS
WEIGHT: 186.5 LBS | RESPIRATION RATE: 16 BRPM | BODY MASS INDEX: 36.61 KG/M2 | HEART RATE: 89 BPM | HEIGHT: 60 IN | TEMPERATURE: 98 F | DIASTOLIC BLOOD PRESSURE: 89 MMHG | SYSTOLIC BLOOD PRESSURE: 129 MMHG

## 2023-09-25 DIAGNOSIS — E61.1 IRON DEFICIENCY: ICD-10-CM

## 2023-09-25 DIAGNOSIS — R76.0 ANTICARDIOLIPIN ANTIBODY POSITIVE: ICD-10-CM

## 2023-09-25 DIAGNOSIS — Z79.01 CHRONIC ANTICOAGULATION: ICD-10-CM

## 2023-09-25 DIAGNOSIS — N92.0 MENORRHAGIA WITH REGULAR CYCLE: ICD-10-CM

## 2023-09-25 DIAGNOSIS — D68.9 CLOTTING DISORDER: Primary | ICD-10-CM

## 2023-09-25 PROCEDURE — 1159F PR MEDICATION LIST DOCUMENTED IN MEDICAL RECORD: ICD-10-PCS | Mod: CPTII,S$GLB,, | Performed by: INTERNAL MEDICINE

## 2023-09-25 PROCEDURE — 3074F SYST BP LT 130 MM HG: CPT | Mod: CPTII,S$GLB,, | Performed by: INTERNAL MEDICINE

## 2023-09-25 PROCEDURE — 1160F PR REVIEW ALL MEDS BY PRESCRIBER/CLIN PHARMACIST DOCUMENTED: ICD-10-PCS | Mod: CPTII,S$GLB,, | Performed by: INTERNAL MEDICINE

## 2023-09-25 PROCEDURE — 99213 PR OFFICE/OUTPT VISIT, EST, LEVL III, 20-29 MIN: ICD-10-PCS | Mod: S$GLB,,, | Performed by: INTERNAL MEDICINE

## 2023-09-25 PROCEDURE — 3079F PR MOST RECENT DIASTOLIC BLOOD PRESSURE 80-89 MM HG: ICD-10-PCS | Mod: CPTII,S$GLB,, | Performed by: INTERNAL MEDICINE

## 2023-09-25 PROCEDURE — 3079F DIAST BP 80-89 MM HG: CPT | Mod: CPTII,S$GLB,, | Performed by: INTERNAL MEDICINE

## 2023-09-25 PROCEDURE — 3008F BODY MASS INDEX DOCD: CPT | Mod: CPTII,S$GLB,, | Performed by: INTERNAL MEDICINE

## 2023-09-25 PROCEDURE — 1160F RVW MEDS BY RX/DR IN RCRD: CPT | Mod: CPTII,S$GLB,, | Performed by: INTERNAL MEDICINE

## 2023-09-25 PROCEDURE — 3008F PR BODY MASS INDEX (BMI) DOCUMENTED: ICD-10-PCS | Mod: CPTII,S$GLB,, | Performed by: INTERNAL MEDICINE

## 2023-09-25 PROCEDURE — 1159F MED LIST DOCD IN RCRD: CPT | Mod: CPTII,S$GLB,, | Performed by: INTERNAL MEDICINE

## 2023-09-25 PROCEDURE — 99213 OFFICE O/P EST LOW 20 MIN: CPT | Mod: S$GLB,,, | Performed by: INTERNAL MEDICINE

## 2023-09-25 PROCEDURE — 3074F PR MOST RECENT SYSTOLIC BLOOD PRESSURE < 130 MM HG: ICD-10-PCS | Mod: CPTII,S$GLB,, | Performed by: INTERNAL MEDICINE

## 2023-09-27 ENCOUNTER — PATIENT MESSAGE (OUTPATIENT)
Dept: INTERNAL MEDICINE | Facility: CLINIC | Age: 43
End: 2023-09-27
Payer: COMMERCIAL

## 2023-10-02 ENCOUNTER — OFFICE VISIT (OUTPATIENT)
Dept: CARDIOLOGY | Facility: CLINIC | Age: 43
End: 2023-10-02
Payer: COMMERCIAL

## 2023-10-02 VITALS
BODY MASS INDEX: 36.55 KG/M2 | HEIGHT: 60 IN | OXYGEN SATURATION: 100 % | DIASTOLIC BLOOD PRESSURE: 70 MMHG | SYSTOLIC BLOOD PRESSURE: 122 MMHG | WEIGHT: 186.19 LBS | HEART RATE: 101 BPM

## 2023-10-02 DIAGNOSIS — Z79.01 CHRONIC ANTICOAGULATION: ICD-10-CM

## 2023-10-02 DIAGNOSIS — I25.3 ATRIAL SEPTAL ANEURYSM: Primary | ICD-10-CM

## 2023-10-02 DIAGNOSIS — I63.9 ACUTE CVA (CEREBROVASCULAR ACCIDENT): ICD-10-CM

## 2023-10-02 DIAGNOSIS — R00.2 PALPITATIONS: ICD-10-CM

## 2023-10-02 PROCEDURE — 3074F SYST BP LT 130 MM HG: CPT | Mod: CPTII,S$GLB,, | Performed by: INTERNAL MEDICINE

## 2023-10-02 PROCEDURE — 99999 PR PBB SHADOW E&M-EST. PATIENT-LVL III: CPT | Mod: PBBFAC,,, | Performed by: INTERNAL MEDICINE

## 2023-10-02 PROCEDURE — 1160F PR REVIEW ALL MEDS BY PRESCRIBER/CLIN PHARMACIST DOCUMENTED: ICD-10-PCS | Mod: CPTII,S$GLB,, | Performed by: INTERNAL MEDICINE

## 2023-10-02 PROCEDURE — 3074F PR MOST RECENT SYSTOLIC BLOOD PRESSURE < 130 MM HG: ICD-10-PCS | Mod: CPTII,S$GLB,, | Performed by: INTERNAL MEDICINE

## 2023-10-02 PROCEDURE — 3078F DIAST BP <80 MM HG: CPT | Mod: CPTII,S$GLB,, | Performed by: INTERNAL MEDICINE

## 2023-10-02 PROCEDURE — 1159F PR MEDICATION LIST DOCUMENTED IN MEDICAL RECORD: ICD-10-PCS | Mod: CPTII,S$GLB,, | Performed by: INTERNAL MEDICINE

## 2023-10-02 PROCEDURE — 99999 PR PBB SHADOW E&M-EST. PATIENT-LVL III: ICD-10-PCS | Mod: PBBFAC,,, | Performed by: INTERNAL MEDICINE

## 2023-10-02 PROCEDURE — 3008F PR BODY MASS INDEX (BMI) DOCUMENTED: ICD-10-PCS | Mod: CPTII,S$GLB,, | Performed by: INTERNAL MEDICINE

## 2023-10-02 PROCEDURE — 1159F MED LIST DOCD IN RCRD: CPT | Mod: CPTII,S$GLB,, | Performed by: INTERNAL MEDICINE

## 2023-10-02 PROCEDURE — 99212 OFFICE O/P EST SF 10 MIN: CPT | Mod: S$GLB,,, | Performed by: INTERNAL MEDICINE

## 2023-10-02 PROCEDURE — 1160F RVW MEDS BY RX/DR IN RCRD: CPT | Mod: CPTII,S$GLB,, | Performed by: INTERNAL MEDICINE

## 2023-10-02 PROCEDURE — 3008F BODY MASS INDEX DOCD: CPT | Mod: CPTII,S$GLB,, | Performed by: INTERNAL MEDICINE

## 2023-10-02 PROCEDURE — 3078F PR MOST RECENT DIASTOLIC BLOOD PRESSURE < 80 MM HG: ICD-10-PCS | Mod: CPTII,S$GLB,, | Performed by: INTERNAL MEDICINE

## 2023-10-02 PROCEDURE — 99212 PR OFFICE/OUTPT VISIT, EST, LEVL II, 10-19 MIN: ICD-10-PCS | Mod: S$GLB,,, | Performed by: INTERNAL MEDICINE

## 2023-10-02 NOTE — PROGRESS NOTES
"     Patient ID:  Jaclyn Rausch is a 43 y.o. female who presents for follow-up of Results (Event monitor, echo, & labs) and Atrial Septal Defect      HPI    Past Medical History:   Diagnosis Date    Anemia     slightly    Anticardiolipin antibody positive 05/04/2018    Anticoagulant long-term use     Atrial septal aneurysm     Dr. Frank; last visit 1/2018    Clotting disorder 02/03/2019    Elevated lipoprotein(a) 05/04/2018    History of CVA in adulthood 4/24/2018    Stroke 04/24/2018        Past Surgical History:   Procedure Laterality Date    BUNIONECTOMY Left 2001    CHONDROPLASTY OF KNEE Left 1/10/2020    Procedure: CHONDROPLASTY, KNEE;  Surgeon: Michael Galaviz II, MD;  Location: [facility] OR;  Service: Orthopedics;  Laterality: Left;    HYSTEROSCOPY WITH DILATION AND CURETTAGE OF UTERUS N/A 11/5/2020    Procedure: HYSTEROSCOPY, WITH DILATION AND CURETTAGE OF UTERUS;  Surgeon: Ap Milner MD;  Location: [facility] OR;  Service: OB/GYN;  Laterality: N/A;    KNEE ARTHROSCOPY W/ MENISCECTOMY Left 1/10/2020    Procedure: ARTHROSCOPY, KNEE, WITH MENISCECTOMY;  Surgeon: Michael Galaviz II, MD;  Location: [facility] OR;  Service: Orthopedics;  Laterality: Left;    REPAIR OF MENISCUS OF KNEE Left 1/10/2020    Procedure: REPAIR, MENISCUS, KNEE;  Surgeon: Michael Galaviz II, MD;  Location: [facility] OR;  Service: Orthopedics;  Laterality: Left;  PARTIAL MEDIAL    THERMAL ABLATION OF ENDOMETRIUM N/A 11/5/2020    Procedure: ABLATION, ENDOMETRIUM, THERMAL;  Surgeon: Ap Milner MD;  Location: [facility] OR;  Service: OB/GYN;  Laterality: N/A;          Current Outpatient Medications   Medication Instructions    ascorbic acid (vitamin C) (VITAMIN C) 100 mg, Oral, Daily    atorvastatin (LIPITOR) 20 mg, Oral, Daily    ELIQUIS 5 mg Tab TAKE 1 TABLET BY MOUTH TWICE DAILY    ondansetron (ZOFRAN-ODT) 4 mg, Oral, Every 8 hours PRN    pen needle, diabetic, safety 31 gauge x 1/4" Ndle Use once daily to inject victoza    "     Review of patient's allergies indicates:  No Known Allergies     Review of Systems   Cardiovascular:  Negative for chest pain, leg swelling and palpitations.   Psychiatric/Behavioral:  The patient is nervous/anxious.         Objective:     Vitals:    10/02/23 1642   BP: 122/70   BP Location: Left arm   Patient Position: Sitting   BP Method: Medium (Manual)   Pulse: 101   SpO2: 100%   Weight: 84.5 kg (186 lb 2.9 oz)   Height: 5' (1.524 m)       Physical Exam  Vitals and nursing note reviewed.   Constitutional:       Appearance: She is well-developed.   HENT:      Head: Normocephalic and atraumatic.   Eyes:      Conjunctiva/sclera: Conjunctivae normal.   Cardiovascular:      Rate and Rhythm: Normal rate and regular rhythm.      Heart sounds: Normal heart sounds.   Pulmonary:      Effort: Pulmonary effort is normal.      Breath sounds: Normal breath sounds.   Abdominal:      General: Bowel sounds are normal.      Palpations: Abdomen is soft.   Musculoskeletal:         General: Normal range of motion.   Skin:     General: Skin is warm and dry.   Neurological:      Mental Status: She is alert and oriented to person, place, and time.   Psychiatric:         Behavior: Behavior normal.         Thought Content: Thought content normal.         Judgment: Judgment normal.     CMP  Sodium   Date Value Ref Range Status   09/18/2023 137 136 - 145 mmol/L Final     Potassium   Date Value Ref Range Status   09/18/2023 4.1 3.5 - 5.1 mmol/L Final     Chloride   Date Value Ref Range Status   09/18/2023 105 95 - 110 mmol/L Final     CO2   Date Value Ref Range Status   09/18/2023 25 23 - 29 mmol/L Final     Glucose   Date Value Ref Range Status   09/18/2023 59 (L) 70 - 110 mg/dL Final     BUN   Date Value Ref Range Status   09/18/2023 12 6 - 20 mg/dL Final     Creatinine   Date Value Ref Range Status   09/18/2023 0.9 0.5 - 1.4 mg/dL Final     Calcium   Date Value Ref Range Status   09/18/2023 9.7 8.7 - 10.5 mg/dL Final     Total  Protein   Date Value Ref Range Status   09/18/2023 6.9 6.0 - 8.4 g/dL Final     Albumin   Date Value Ref Range Status   09/18/2023 4.0 3.5 - 5.2 g/dL Final     Total Bilirubin   Date Value Ref Range Status   09/18/2023 0.6 0.1 - 1.0 mg/dL Final     Comment:     For infants and newborns, interpretation of results should be based  on gestational age, weight and in agreement with clinical  observations.    Premature Infant recommended reference ranges:  Up to 24 hours.............<8.0 mg/dL  Up to 48 hours............<12.0 mg/dL  3-5 days..................<15.0 mg/dL  6-29 days.................<15.0 mg/dL       Alkaline Phosphatase   Date Value Ref Range Status   09/18/2023 74 55 - 135 U/L Final     AST   Date Value Ref Range Status   09/18/2023 21 10 - 40 U/L Final     ALT   Date Value Ref Range Status   09/18/2023 17 10 - 44 U/L Final     Anion Gap   Date Value Ref Range Status   09/18/2023 7 (L) 8 - 16 mmol/L Final     eGFR if    Date Value Ref Range Status   03/16/2022 >60.0 >60 mL/min/1.73 m^2 Final     eGFR if non    Date Value Ref Range Status   03/16/2022 >60.0 >60 mL/min/1.73 m^2 Final     Comment:     Calculation used to obtain the estimated glomerular filtration  rate (eGFR) is the CKD-EPI equation.         BMP  Lab Results   Component Value Date     09/18/2023    K 4.1 09/18/2023     09/18/2023    CO2 25 09/18/2023    BUN 12 09/18/2023    CREATININE 0.9 09/18/2023    CALCIUM 9.7 09/18/2023    ANIONGAP 7 (L) 09/18/2023    ESTGFRAFRICA >60.0 03/16/2022    EGFRNONAA >60.0 03/16/2022      BNP  @LABRCNTIP(BNP,BNPTRIAGEBLO)@   Lab Results   Component Value Date    CHOL 205 (H) 09/11/2023    CHOL 193 06/05/2023    CHOL 169 03/07/2022     Lab Results   Component Value Date    HDL 67 09/11/2023    HDL 63 06/05/2023    HDL 57 03/07/2022     Lab Results   Component Value Date    LDLCALC 123.0 09/11/2023    LDLCALC 104.6 06/05/2023    LDLCALC 94.0 03/07/2022     Lab Results    Component Value Date    TRIG 75 09/11/2023    TRIG 127 06/05/2023    TRIG 90 03/07/2022     Lab Results   Component Value Date    CHOLHDL 32.7 09/11/2023    CHOLHDL 32.6 06/05/2023    CHOLHDL 33.7 03/07/2022      Lab Results   Component Value Date    TSH 1.553 06/05/2023    G3LEXKE 93 09/07/2021    FREET4 0.97 10/30/2021     Lab Results   Component Value Date    HGBA1C 5.0 10/30/2021     Lab Results   Component Value Date    WBC 8.63 09/18/2023    HGB 14.3 09/18/2023    HCT 43.9 09/18/2023    MCV 95 09/18/2023     09/18/2023         Results for orders placed during the hospital encounter of 09/12/23    Echo    Interpretation Summary    Left Ventricle: The left ventricle is normal in size. Normal wall thickness. Normal wall motion. There is normal systolic function. Ejection fraction by visual approximation is 65%. There is normal diastolic function.    Right Ventricle: Normal right ventricular cavity size. Wall thickness is normal. Right ventricle wall motion  is normal. Systolic function is normal.    Tricuspid Valve: There is mild regurgitation.    IVC/SVC: Normal venous pressure at 3 mmHg.    The estimated pulmonary artery systolic pressure is 27 mmHg.     No results found for this or any previous visit.      Predominant underlying rhythm was Sinus Rhythm.    Patient had a min HR of 48 bpm, max HR of 174 bpm, and avg HR of 78 bpm.    Isolated SVEs were rare (<1.0%), and no SVE Couplets or SVE Triplets were present.    Isolated VEs were rare (<1.0%), and no VE Couplets or VE Triplets were present.    The patient complained of 3 episodes. The symptom(s) included chest pain and palpitations. The corresponding rhythm to the patient reported event was normal sinus rhythm with a normal [place] interval.    The patient had sinus tachycardia with a rate of 102 bpm.    There were 4 auto-triggered events corresponding with normal sinus and sinus tachycardia rhythm with a rate of 102 bpm.    See final report       Assessment:       No problem-specific Assessment & Plan notes found for this encounter.       Plan:         Return in 6 months

## 2023-10-16 ENCOUNTER — PATIENT MESSAGE (OUTPATIENT)
Dept: INTERNAL MEDICINE | Facility: CLINIC | Age: 43
End: 2023-10-16
Payer: COMMERCIAL

## 2023-12-01 DIAGNOSIS — Z12.31 OTHER SCREENING MAMMOGRAM: ICD-10-CM

## 2023-12-26 ENCOUNTER — HOSPITAL ENCOUNTER (OUTPATIENT)
Dept: RADIOLOGY | Facility: HOSPITAL | Age: 43
Discharge: HOME | End: 2023-12-26
Attending: STUDENT IN AN ORGANIZED HEALTH CARE EDUCATION/TRAINING PROGRAM
Payer: COMMERCIAL

## 2023-12-26 DIAGNOSIS — Z12.31 OTHER SCREENING MAMMOGRAM: ICD-10-CM

## 2023-12-26 PROCEDURE — 77067 SCR MAMMO BI INCL CAD: CPT | Mod: TC

## 2023-12-26 PROCEDURE — 77063 BREAST TOMOSYNTHESIS BI: CPT | Mod: 26,,, | Performed by: RADIOLOGY

## 2023-12-26 PROCEDURE — 77067 MAMMO DIGITAL SCREENING BILAT WITH TOMO: ICD-10-PCS | Mod: 26,,, | Performed by: RADIOLOGY

## 2023-12-26 PROCEDURE — 77067 SCR MAMMO BI INCL CAD: CPT | Mod: 26,,, | Performed by: RADIOLOGY

## 2023-12-26 PROCEDURE — 77063 MAMMO DIGITAL SCREENING BILAT WITH TOMO: ICD-10-PCS | Mod: 26,,, | Performed by: RADIOLOGY

## 2024-02-21 ENCOUNTER — PATIENT MESSAGE (OUTPATIENT)
Dept: DERMATOLOGY | Facility: CLINIC | Age: 44
End: 2024-02-21
Payer: COMMERCIAL

## 2024-02-23 ENCOUNTER — OFFICE VISIT (OUTPATIENT)
Dept: DERMATOLOGY | Facility: CLINIC | Age: 44
End: 2024-02-23
Payer: COMMERCIAL

## 2024-02-23 VITALS — HEIGHT: 60 IN | BODY MASS INDEX: 36.58 KG/M2 | WEIGHT: 186.31 LBS

## 2024-02-23 DIAGNOSIS — L82.1 SEBORRHEIC KERATOSES: Primary | ICD-10-CM

## 2024-02-23 PROCEDURE — 99212 OFFICE O/P EST SF 10 MIN: CPT | Mod: S$GLB,,, | Performed by: DERMATOLOGY

## 2024-02-23 PROCEDURE — 1160F RVW MEDS BY RX/DR IN RCRD: CPT | Mod: CPTII,S$GLB,, | Performed by: DERMATOLOGY

## 2024-02-23 PROCEDURE — 3008F BODY MASS INDEX DOCD: CPT | Mod: CPTII,S$GLB,, | Performed by: DERMATOLOGY

## 2024-02-23 PROCEDURE — 1159F MED LIST DOCD IN RCRD: CPT | Mod: CPTII,S$GLB,, | Performed by: DERMATOLOGY

## 2024-02-23 NOTE — PROGRESS NOTES
Subjective:      Patient ID:  Jaclyn Rausch is a 43 y.o. female who presents for   Chief Complaint   Patient presents with    Mole     Left hairline     LOV 03/21/2022 RAMON MCCOLLUM    Patient is coming in today to check a mole in left hair line.     Last Path  Skin, left anterior hairline, shave biopsy:  -SEBORRHEIC KERATOSIS, PIGMENTED    Derm Hx  Denies Phx NMSC  Denies Fhx MM        Review of Systems   Constitutional:  Negative for fever, chills and fatigue.   Respiratory:  Negative for cough and shortness of breath.    Skin:  Positive for daily sunscreen use and activity-related sunscreen use. Negative for itching, rash, dry skin, recent sunburn and dry lips.   Hematologic/Lymphatic: Bruises/bleeds easily (eliquis).       Objective:   Physical Exam   Constitutional: She appears well-developed and well-nourished. No distress.   Neurological: She is alert and oriented to person, place, and time. She is not disoriented.   Psychiatric: She has a normal mood and affect.   Skin:   Areas Examined (abnormalities noted in diagram):   Scalp / Hair Palpated and Inspected  Head / Face Inspection Performed  Neck Inspection Performed                     Diagram Legend     Erythematous scaling macule/papule c/w actinic keratosis       Vascular papule c/w angioma      Pigmented verrucoid papule/plaque c/w seborrheic keratosis      Yellow umbilicated papule c/w sebaceous hyperplasia      Irregularly shaped tan macule c/w lentigo     1-2 mm smooth white papules consistent with Milia      Movable subcutaneous cyst with punctum c/w epidermal inclusion cyst      Subcutaneous movable cyst c/w pilar cyst      Firm pink to brown papule c/w dermatofibroma      Pedunculated fleshy papule(s) c/w skin tag(s)      Evenly pigmented macule c/w junctional nevus     Mildly variegated pigmented, slightly irregular-bordered macule c/w mildly atypical nevus      Flesh colored to evenly pigmented papule c/w intradermal nevus       Pink  pearly papule/plaque c/w basal cell carcinoma      Erythematous hyperkeratotic cursted plaque c/w SCC      Surgical scar with no sign of skin cancer recurrence      Open and closed comedones      Inflammatory papules and pustules      Verrucoid papule consistent consistent with wart     Erythematous eczematous patches and plaques     Dystrophic onycholytic nail with subungual debris c/w onychomycosis     Umbilicated papule    Erythematous-base heme-crusted tan verrucoid plaque consistent with inflamed seborrheic keratosis     Erythematous Silvery Scaling Plaque c/w Psoriasis     See annotation      Assessment / Plan:        Seborrheic keratoses    Biopsy proven, left ant hairline  Size increase noted, still benign appearing SK  Reassurance           No follow-ups on file.

## 2024-04-11 ENCOUNTER — OFFICE VISIT (OUTPATIENT)
Dept: URGENT CARE | Facility: CLINIC | Age: 44
End: 2024-04-11
Payer: COMMERCIAL

## 2024-04-11 VITALS
WEIGHT: 186 LBS | HEIGHT: 60 IN | RESPIRATION RATE: 16 BRPM | BODY MASS INDEX: 36.52 KG/M2 | SYSTOLIC BLOOD PRESSURE: 133 MMHG | HEART RATE: 90 BPM | TEMPERATURE: 98 F | OXYGEN SATURATION: 98 % | DIASTOLIC BLOOD PRESSURE: 81 MMHG

## 2024-04-11 DIAGNOSIS — J20.9 ACUTE BRONCHITIS, UNSPECIFIED ORGANISM: Primary | ICD-10-CM

## 2024-04-11 DIAGNOSIS — R05.9 COUGH, UNSPECIFIED TYPE: ICD-10-CM

## 2024-04-11 PROCEDURE — 99214 OFFICE O/P EST MOD 30 MIN: CPT | Mod: 25,S$GLB,, | Performed by: PHYSICIAN ASSISTANT

## 2024-04-11 PROCEDURE — 96372 THER/PROPH/DIAG INJ SC/IM: CPT | Mod: S$GLB,,, | Performed by: EMERGENCY MEDICINE

## 2024-04-11 PROCEDURE — 71046 X-RAY EXAM CHEST 2 VIEWS: CPT | Mod: S$GLB,,, | Performed by: RADIOLOGY

## 2024-04-11 RX ORDER — DOXYCYCLINE HYCLATE 100 MG
100 TABLET ORAL 2 TIMES DAILY
Qty: 20 TABLET | Refills: 0 | Status: SHIPPED | OUTPATIENT
Start: 2024-04-11 | End: 2024-04-21

## 2024-04-11 RX ORDER — ALBUTEROL SULFATE 90 UG/1
2 AEROSOL, METERED RESPIRATORY (INHALATION) EVERY 6 HOURS PRN
Qty: 18 G | Refills: 0 | Status: SHIPPED | OUTPATIENT
Start: 2024-04-11 | End: 2025-04-11

## 2024-04-11 RX ORDER — DEXAMETHASONE SODIUM PHOSPHATE 4 MG/ML
8 INJECTION, SOLUTION INTRA-ARTICULAR; INTRALESIONAL; INTRAMUSCULAR; INTRAVENOUS; SOFT TISSUE
Status: COMPLETED | OUTPATIENT
Start: 2024-04-11 | End: 2024-04-11

## 2024-04-11 RX ORDER — BENZONATATE 100 MG/1
100 CAPSULE ORAL 3 TIMES DAILY PRN
Qty: 21 CAPSULE | Refills: 0 | Status: SHIPPED | OUTPATIENT
Start: 2024-04-11 | End: 2024-04-21

## 2024-04-11 RX ADMIN — DEXAMETHASONE SODIUM PHOSPHATE 8 MG: 4 INJECTION, SOLUTION INTRA-ARTICULAR; INTRALESIONAL; INTRAMUSCULAR; INTRAVENOUS; SOFT TISSUE at 10:04

## 2024-04-11 NOTE — PROGRESS NOTES
Subjective:      Patient ID: Jaclyn Rausch is a 43 y.o. female.    Vitals:  height is 5' (1.524 m) and weight is 84.4 kg (186 lb). Her oral temperature is 97.9 °F (36.6 °C). Her blood pressure is 133/81 and her pulse is 90. Her respiration is 16 and oxygen saturation is 98%.     Chief Complaint: Cough    Patient is a 43 year old female who presents with productive cough X 3 weeks.  Patient has tried OTC cold/cough meds, with no relief. She has PMH significant for AGGIE, GERD, anxiety and hypercholesterolemia. She denied fever. She reports mild congestion and sore throat. Does not smoke. Denied history of lung problems.         Constitution: Negative for chills and fever.   HENT:  Positive for congestion. Negative for sore throat.    Respiratory:  Positive for cough.    Gastrointestinal:  Negative for abdominal pain, nausea, vomiting and diarrhea.      Objective:     Physical Exam   Constitutional: She appears well-developed.   HENT:   Head: Normocephalic and atraumatic.   Ears:   Right Ear: Tympanic membrane, external ear and ear canal normal.   Left Ear: Tympanic membrane, external ear and ear canal normal.   Nose: Nose normal. Right sinus exhibits no maxillary sinus tenderness and no frontal sinus tenderness. Left sinus exhibits no maxillary sinus tenderness and no frontal sinus tenderness.   Mouth/Throat: Uvula is midline, oropharynx is clear and moist and mucous membranes are normal. Mucous membranes are moist. No oropharyngeal exudate or posterior oropharyngeal erythema. Oropharynx is clear.   Eyes: Conjunctivae are normal.   Cardiovascular: Normal rate and normal heart sounds.   Pulmonary/Chest: Effort normal and breath sounds normal. No stridor. No respiratory distress. She has no decreased breath sounds.   Abdominal: Normal appearance.   Neurological: She is alert.   Nursing note and vitals reviewed.      Assessment:     1. Acute bronchitis, unspecified organism    2. Cough, unspecified type         Plan:       Acute bronchitis, unspecified organism  -     doxycycline (VIBRA-TABS) 100 MG tablet; Take 1 tablet (100 mg total) by mouth 2 (two) times daily. for 10 days  Dispense: 20 tablet; Refill: 0  -     benzonatate (TESSALON) 100 MG capsule; Take 1 capsule (100 mg total) by mouth 3 (three) times daily as needed for Cough.  Dispense: 21 capsule; Refill: 0  -     albuterol (VENTOLIN HFA) 90 mcg/actuation inhaler; Inhale 2 puffs into the lungs every 6 (six) hours as needed for Wheezing. Rescue  Dispense: 18 g; Refill: 0  -     dexAMETHasone injection 8 mg    Cough, unspecified type  -     XR CHEST PA AND LATERAL; Future; Expected date: 04/11/2024          Medical Decision Making:   History:   Old Medical Records: I decided to obtain old medical records.  Clinical Tests:   Radiological Study: Ordered and Reviewed  Urgent Care Management:  Urgent evaluation of a well appearing 43 year old female who presents with cough for three weeks. Vital signs are stable. Clear and equal breath sounds bilaterally. Oxygen saturation is stable. CXR showed no pneumonia. No sign of otitis media. Denied GI complaints. Symptoms have been present for 3 weeks. Productive cough. Reports associated wheezing. Will treat for acute bronchitis. Discussed results with patient. Return precautions given. Based on my clinical evaluation, I do not appreciate any immediate, emergent, or life threatening condition or etiology that warrants additional workup today and feel that the patient can be discharged with close follow up care.  Patient is to follow up with their primary care provider. All questions answered.

## 2024-04-16 NOTE — PROCEDURES
Large Joint Injection  Date/Time: 3/8/2018 2:30 PM  Performed by: GATO BELLO  Authorized by: GATO BELLO     Consent Done?:  Yes (Verbal)  Indications:  Pain  Timeout: Prior to procedure the correct patient, procedure, and site was verified      Location:  Knee  Site:  R knee  Prep: Patient was prepped and draped in usual sterile fashion    Ultrasonic Guidance for needle placement: No  Needle size:  22 G  Approach:  Anterolateral  Medications:  40 mg triamcinolone acetonide 40 mg/mL  Patient tolerance:  Patient tolerated the procedure well with no immediate complications           no

## 2024-05-28 ENCOUNTER — TELEPHONE (OUTPATIENT)
Dept: FAMILY MEDICINE | Facility: CLINIC | Age: 44
End: 2024-05-28
Payer: COMMERCIAL

## 2024-05-28 NOTE — TELEPHONE ENCOUNTER
Sent email regarding schedule change   Dapsone Pregnancy And Lactation Text: This medication is Pregnancy Category C and is not considered safe during pregnancy or breast feeding.

## 2024-06-07 ENCOUNTER — OFFICE VISIT (OUTPATIENT)
Dept: FAMILY MEDICINE | Facility: CLINIC | Age: 44
End: 2024-06-07
Payer: COMMERCIAL

## 2024-06-07 VITALS
WEIGHT: 186.5 LBS | RESPIRATION RATE: 16 BRPM | BODY MASS INDEX: 36.61 KG/M2 | SYSTOLIC BLOOD PRESSURE: 130 MMHG | DIASTOLIC BLOOD PRESSURE: 80 MMHG | HEIGHT: 60 IN | OXYGEN SATURATION: 98 % | HEART RATE: 83 BPM

## 2024-06-07 DIAGNOSIS — D68.9 CLOTTING DISORDER: ICD-10-CM

## 2024-06-07 DIAGNOSIS — E78.01 FAMILIAL HYPERCHOLESTEROLEMIA: ICD-10-CM

## 2024-06-07 DIAGNOSIS — E66.01 SEVERE OBESITY (BMI 35.0-39.9) WITH COMORBIDITY: ICD-10-CM

## 2024-06-07 DIAGNOSIS — Z00.00 ROUTINE GENERAL MEDICAL EXAMINATION AT A HEALTH CARE FACILITY: Primary | ICD-10-CM

## 2024-06-07 PROCEDURE — 3008F BODY MASS INDEX DOCD: CPT | Mod: CPTII,S$GLB,, | Performed by: STUDENT IN AN ORGANIZED HEALTH CARE EDUCATION/TRAINING PROGRAM

## 2024-06-07 PROCEDURE — 3079F DIAST BP 80-89 MM HG: CPT | Mod: CPTII,S$GLB,, | Performed by: STUDENT IN AN ORGANIZED HEALTH CARE EDUCATION/TRAINING PROGRAM

## 2024-06-07 PROCEDURE — 3075F SYST BP GE 130 - 139MM HG: CPT | Mod: CPTII,S$GLB,, | Performed by: STUDENT IN AN ORGANIZED HEALTH CARE EDUCATION/TRAINING PROGRAM

## 2024-06-07 PROCEDURE — 99396 PREV VISIT EST AGE 40-64: CPT | Mod: S$GLB,,, | Performed by: STUDENT IN AN ORGANIZED HEALTH CARE EDUCATION/TRAINING PROGRAM

## 2024-06-07 PROCEDURE — 1159F MED LIST DOCD IN RCRD: CPT | Mod: CPTII,S$GLB,, | Performed by: STUDENT IN AN ORGANIZED HEALTH CARE EDUCATION/TRAINING PROGRAM

## 2024-06-07 PROCEDURE — 1160F RVW MEDS BY RX/DR IN RCRD: CPT | Mod: CPTII,S$GLB,, | Performed by: STUDENT IN AN ORGANIZED HEALTH CARE EDUCATION/TRAINING PROGRAM

## 2024-06-07 PROCEDURE — 99999 PR PBB SHADOW E&M-EST. PATIENT-LVL IV: CPT | Mod: PBBFAC,,, | Performed by: STUDENT IN AN ORGANIZED HEALTH CARE EDUCATION/TRAINING PROGRAM

## 2024-06-07 NOTE — PATIENT INSTRUCTIONS
Carlos Anaya,     If you are due for any health screening(s) below please notify me so we can arrange them to be ordered and scheduled to maintain your health. Most healthy patients complete it. Don't lose out on improving your health.     All of your core healthy metrics are met.

## 2024-09-09 ENCOUNTER — TELEPHONE (OUTPATIENT)
Facility: CLINIC | Age: 44
End: 2024-09-09
Payer: COMMERCIAL

## 2024-09-09 DIAGNOSIS — E61.1 IRON DEFICIENCY: ICD-10-CM

## 2024-09-09 DIAGNOSIS — Z86.73 HISTORY OF CVA IN ADULTHOOD: Primary | ICD-10-CM

## 2024-09-16 ENCOUNTER — TELEPHONE (OUTPATIENT)
Facility: CLINIC | Age: 44
End: 2024-09-16
Payer: COMMERCIAL

## 2024-09-16 NOTE — TELEPHONE ENCOUNTER
I spoke with pt and reminded her to have labs done prior to appt on 9/23/24 pt verbalized understanding.   
---

## 2024-09-18 ENCOUNTER — LAB VISIT (OUTPATIENT)
Dept: LAB | Facility: HOSPITAL | Age: 44
End: 2024-09-18
Attending: INTERNAL MEDICINE
Payer: COMMERCIAL

## 2024-09-18 DIAGNOSIS — Z86.73 HISTORY OF CVA IN ADULTHOOD: ICD-10-CM

## 2024-09-18 DIAGNOSIS — E61.1 IRON DEFICIENCY: ICD-10-CM

## 2024-09-18 LAB
ALBUMIN SERPL BCP-MCNC: 4 G/DL (ref 3.5–5.2)
ALP SERPL-CCNC: 83 U/L (ref 55–135)
ALT SERPL W/O P-5'-P-CCNC: 13 U/L (ref 10–44)
ANION GAP SERPL CALC-SCNC: 8 MMOL/L (ref 8–16)
AST SERPL-CCNC: 19 U/L (ref 10–40)
BASOPHILS # BLD AUTO: 0.04 K/UL (ref 0–0.2)
BASOPHILS NFR BLD: 0.5 % (ref 0–1.9)
BILIRUB SERPL-MCNC: 0.4 MG/DL (ref 0.1–1)
BUN SERPL-MCNC: 20 MG/DL (ref 6–20)
CALCIUM SERPL-MCNC: 8.9 MG/DL (ref 8.7–10.5)
CHLORIDE SERPL-SCNC: 107 MMOL/L (ref 95–110)
CO2 SERPL-SCNC: 22 MMOL/L (ref 23–29)
CREAT SERPL-MCNC: 0.8 MG/DL (ref 0.5–1.4)
DIFFERENTIAL METHOD BLD: NORMAL
EOSINOPHIL # BLD AUTO: 0.2 K/UL (ref 0–0.5)
EOSINOPHIL NFR BLD: 2.3 % (ref 0–8)
ERYTHROCYTE [DISTWIDTH] IN BLOOD BY AUTOMATED COUNT: 12.2 % (ref 11.5–14.5)
EST. GFR  (NO RACE VARIABLE): >60 ML/MIN/1.73 M^2
FERRITIN SERPL-MCNC: 74 NG/ML (ref 20–300)
GLUCOSE SERPL-MCNC: 90 MG/DL (ref 70–110)
HCT VFR BLD AUTO: 42.8 % (ref 37–48.5)
HGB BLD-MCNC: 14 G/DL (ref 12–16)
IMM GRANULOCYTES # BLD AUTO: 0.02 K/UL (ref 0–0.04)
IMM GRANULOCYTES NFR BLD AUTO: 0.3 % (ref 0–0.5)
IRON SERPL-MCNC: 88 UG/DL (ref 30–160)
LYMPHOCYTES # BLD AUTO: 3.4 K/UL (ref 1–4.8)
LYMPHOCYTES NFR BLD: 43.1 % (ref 18–48)
MCH RBC QN AUTO: 30.2 PG (ref 27–31)
MCHC RBC AUTO-ENTMCNC: 32.7 G/DL (ref 32–36)
MCV RBC AUTO: 92 FL (ref 82–98)
MONOCYTES # BLD AUTO: 0.8 K/UL (ref 0.3–1)
MONOCYTES NFR BLD: 9.4 % (ref 4–15)
NEUTROPHILS # BLD AUTO: 3.5 K/UL (ref 1.8–7.7)
NEUTROPHILS NFR BLD: 44.4 % (ref 38–73)
NRBC BLD-RTO: 0 /100 WBC
PLATELET # BLD AUTO: 348 K/UL (ref 150–450)
PMV BLD AUTO: 9.7 FL (ref 9.2–12.9)
POTASSIUM SERPL-SCNC: 4 MMOL/L (ref 3.5–5.1)
PROT SERPL-MCNC: 6.9 G/DL (ref 6–8.4)
RBC # BLD AUTO: 4.63 M/UL (ref 4–5.4)
SATURATED IRON: 22 % (ref 20–50)
SODIUM SERPL-SCNC: 137 MMOL/L (ref 136–145)
TOTAL IRON BINDING CAPACITY: 407 UG/DL (ref 250–450)
TRANSFERRIN SERPL-MCNC: 275 MG/DL (ref 200–375)
WBC # BLD AUTO: 7.94 K/UL (ref 3.9–12.7)

## 2024-09-18 PROCEDURE — 85025 COMPLETE CBC W/AUTO DIFF WBC: CPT | Performed by: INTERNAL MEDICINE

## 2024-09-18 PROCEDURE — 36415 COLL VENOUS BLD VENIPUNCTURE: CPT | Mod: PO | Performed by: INTERNAL MEDICINE

## 2024-09-18 PROCEDURE — 80053 COMPREHEN METABOLIC PANEL: CPT | Performed by: INTERNAL MEDICINE

## 2024-09-18 PROCEDURE — 82728 ASSAY OF FERRITIN: CPT | Performed by: INTERNAL MEDICINE

## 2024-09-18 PROCEDURE — 83540 ASSAY OF IRON: CPT | Performed by: INTERNAL MEDICINE

## 2024-09-20 NOTE — PROGRESS NOTES
[facility] Hematology/Oncology  PROGRESS NOTE      Subjective:       Patient ID:   NAME: Jaclyn Rausch : 1980     43 y.o. female    Referring Doc: Aaron (new PCP)  Other Physicians: Mahsa (new), Pearl Roca/Gertrudis Ariza (neuro); Federico Guzmán (GYN)    Chief Complaint:  Clot disorder f/u    History of Present Illness:     Patient returns today for a regularly scheduled follow-up visit.  The patient is here by herself. She is doing ok with no new issues.     She is on eliquis. She denies any CP, SOB, HA's or N/V.  She denies any excessive bruising or bleeding.     She has not seen cardiology in a while      Discussed covid19 precautions - she had her vaccinations              ROS:   GEN: normal without any fever, night sweats or weight loss  HEENT: normal with no HA's, sore throat, stiff neck, changes in vision  CV: normal with no CP, SOB, PND, BAILEY or orthopnea  PULM: normal with no SOB, cough, hemoptysis, sputum or pleuritic pain  GI: normal with no abdominal pain, nausea, vomiting, constipation, diarrhea, melanotic stools, BRBPR, or hematemesis  : normal with no hematuria, dysuria  BREAST: normal with no mass, discharge, pain  SKIN: normal with no rash, erythema, bruising, or swelling    Allergies:  Review of patient's allergies indicates:  No Known Allergies    Medications:    Current Outpatient Medications:     albuterol (VENTOLIN HFA) 90 mcg/actuation inhaler, Inhale 2 puffs into the lungs every 6 (six) hours as needed for Wheezing. Rescue, Disp: 18 g, Rfl: 0    atorvastatin (LIPITOR) 20 MG tablet, Take 1 tablet (20 mg total) by mouth once daily., Disp: 90 tablet, Rfl: 3    ELIQUIS 5 mg Tab, TAKE 1 TABLET BY MOUTH TWICE DAILY, Disp: 60 tablet, Rfl: 11    ascorbic acid, vitamin C, (VITAMIN C) 100 MG tablet, Take 100 mg by mouth once daily. (Patient not taking: Reported on 2024), Disp: , Rfl:     ondansetron (ZOFRAN-ODT) 4 MG TbDL, Take 1 tablet (4 mg total) by mouth every 8 (eight)  "hours as needed (nausea). (Patient not taking: Reported on 6/7/2024), Disp: 12 tablet, Rfl: 0    pen needle, diabetic, safety 31 gauge x 1/4" Ndle, Use once daily to inject victoza (Patient not taking: Reported on 6/7/2024), Disp: 100 each, Rfl: 1  No current facility-administered medications for this visit.    Facility-Administered Medications Ordered in Other Visits:     electrolyte-S (ISOLYTE), , Intravenous, Continuous, Héctor Trujillo MD, Stopped at 01/10/20 0828    lidocaine (PF) 10 mg/ml (1%) injection 10 mg, 1 mL, Intradermal, Once, Héctor Trujillo MD    PMHx/PSHx Updates:  See patient's last visit with me on 9/25/2023  See H&P on 5/4/2018        Pathology:  Cancer Staging  No matching staging information was found for the patient.          Objective:     Vitals:  Blood pressure 127/86, pulse 96, temperature 98.1 °F (36.7 °C), resp. rate 18, height 5' 5" (1.651 m), weight 84.1 kg (185 lb 8 oz).    Physical Examination:   GEN: no apparent distress, comfortable; AAOx3  HEAD: atraumatic and normocephalic  EYES: no pallor, no icterus, PERRLA  ENT: OMM, no pharyngeal erythema, external ears WNL; no nasal discharge; no thrush  NECK: no masses, thyroid normal, trachea midline, no LAD/LN's, supple  CV: RRR with no murmur; normal pulse; normal S1 and S2; no pedal edema  CHEST: Normal respiratory effort; CTAB; normal breath sounds; no wheeze or crackles  ABDOM: nontender and nondistended; soft; normal bowel sounds; no rebound/guarding  MUSC/Skeletal: ROM normal; no crepitus; joints normal; no deformities or arthropathy  EXTREM: no clubbing, cyanosis, inflammation or swelling  SKIN: no rashes, lesions, ulcers, petechiae or subcutaneous nodules  : no gonzalez  NEURO: grossly intact; motor/sensory WNL; AAOx3; no tremors  PSYCH: normal mood, affect and behavior  LYMPH: normal cervical, supraclavicular, axillary and groin LN's            Labs:      Lab Results   Component Value Date    WBC 7.94 09/18/2024    HGB 14.0 " 09/18/2024    HCT 42.8 09/18/2024    MCV 92 09/18/2024     09/18/2024     BMP  Lab Results   Component Value Date     09/18/2024    K 4.0 09/18/2024     09/18/2024    CO2 22 (L) 09/18/2024    BUN 20 09/18/2024    CREATININE 0.8 09/18/2024    CALCIUM 8.9 09/18/2024    ANIONGAP 8 09/18/2024    ESTGFRAFRICA >60.0 03/16/2022    EGFRNONAA >60.0 03/16/2022     Lab Results   Component Value Date    ALT 13 09/18/2024    AST 19 09/18/2024    ALKPHOS 83 09/18/2024    BILITOT 0.4 09/18/2024     Lab Results   Component Value Date    IRON 88 09/18/2024    TIBC 407 09/18/2024    FERRITIN 74 09/18/2024           Radiology/Diagnostic Studies:    No results found.    I have reviewed all available lab results and radiology reports.    Assessment/Plan:   (1) 44 yo female with prior acute onset aphasia and left facial droop with MRI showing lateral left frontal lobe acute nonhemorrhagic infarction. She has been seen by Dr Frank with cardiology and Dr Pearl Roca with neurology   - hematology was previously consulted for evaluation for hypercoag workup which I ordered while she was inpatient  - she is currently on Eliquis oral anticoagulation; neurology discontinued the aspirin  - she saw for f/u with Dr Pearl Roca with neurology as outpatient in Dec 2018  - she saw Evette NP in Oct 2018  - factor XII was elevated at 144 and  factor IX was elevated at 147, but mildly and may be reactive in nature only  - Lipoprotein-A was elevated at 103; APA IgM was elevated at 17.99  - homocysteine was WNL; ATIII was normal  - prothrombin II gene was normal, LA was negative; factor V leiden was normal  - protein C and S was adequate    3/10/2021:  - continued on eliquis with no new issues    9/13/2021:  - continued on Eliquis  - doing well  - labs are adequate    3/22/2022:  - latest labs are WNL  - continued on eliquis  - no new bleeding or excessive bruising    9/26/2022:  - doing well on the eliquis  - no excessive bleeding or  bruising     9/25/2023:  - doing well on the eliquis  - no excessive bleeding or bruising    9/23/224:  - continued on eliquis  - no excessive bleeding or bruising  - labs adequate      (2) Chronic borderline anemia in past - current hgb is within normal limits  - prior ferritin levels were low  - she has some stomach issue with OTC iron  - check up to date iron panel every 6 months  - s/p ablation and no longer on the ICAR-C     (3) Hx/of migraine headaches     (4) Atrial septal aneurysm - followed by Dr Frank with cardiology as outpatient  - she had bubble study and JUAN while in hospital  - there is no opening or hole per patient    - she is now followed by Dr SIXTO Phillips     (5) Recent loss of pregnancy in Jan 2018 in first trimester     (6) Hypercholesterolemia - now on lipitor    (7) [facility] - heavier menstrual cycles resolved since undergoing ablation        Abnormal uterine bleeding (AUB)    Menorrhagia with regular cycle    Chronic anticoagulation    Clotting disorder    Iron deficiency    Anticardiolipin antibody positive          PLAN:  1. Continue eliquis (possibly life-long)   2.  F/u with PCP, Neuro and cardiology  3. Refer to rheumatology for autoimmune workup  4. Refer to Dr Figueroa with cardiology  5.  RTC in 12 months with labs     Fax note to Mahsa Walker/[place]; Pearl Doss Kuebel;      Discussion:       COVID-19 Discussion:    I had long discussion with patient and any applicable family about the COVID-19 coronavirus epidemic and the recommended precautions with regard to cancer and/or hematology patients. I have re-iterated the CDC recommendations for adequate hand washing, use of hand -like products, and coughing into elbow, etc. In addition, especially for our patients who are on chemotherapy and/or our otherwise immunocompromised patients, I have recommended avoidance of crowds, including movie theaters, restaurants, churches, etc. I  have recommended avoidance of any sick or symptomatic family members and/or friends. I have also recommended avoidance of any raw and unwashed food products, and general avoidance of food items that have not been prepared by themselves. The patient has been asked to call us immediately with any symptom developments, issues, questions or other general concerns.       Anticoagulation Discussion:    Discussed with patient and any applicable family members about the benefit and/or need for anticoagulation. I communicated about the risks of bleeding while on any anticoagulation, which could be serious and/or life-threatening, and which can occur at any time, regardless of degree of the level of anticoagulation. I expressed the need for compliance with any anticoagulation regimen and that failure to do so could potential lead to excessive bleeding, and risk to health and/or life. In particular, with patients on coumadin therapy, compliance with requested blood work is absolutely essential, as coumadin levels can vary from time to time, and failure to do so could potentially place the patient at risk for bleeding and/or clotting events which could be fatal. Patients on coumadin are encouraged to call the day after they have their levels drawn, as to obtain the appropriate instructions from my staff. Patients are aware that self-regulating or self-dosing of their medications is strictly prohibited.     I have explained all of the above in detail and the patient understands all of the current recommendation(s). I have answered all of their questions to the best of my ability and to their complete satisfaction.   The patient is to continue with the current management plan.            Electronically signed by Jesus Sprague MD                       Answers submitted by the patient for this visit:  Review of Systems Questionnaire (Submitted on 9/23/2024)  appetite change : No  unexpected weight change: No  mouth sores:  No  visual disturbance: No  cough: No  shortness of breath: No  chest pain: No  abdominal pain: No  diarrhea: No  frequency: No  back pain: No  rash: No  headaches: No  adenopathy: No  nervous/ anxious: No

## 2024-09-23 ENCOUNTER — OFFICE VISIT (OUTPATIENT)
Facility: CLINIC | Age: 44
End: 2024-09-23
Payer: COMMERCIAL

## 2024-09-23 VITALS
SYSTOLIC BLOOD PRESSURE: 127 MMHG | DIASTOLIC BLOOD PRESSURE: 86 MMHG | HEART RATE: 96 BPM | TEMPERATURE: 98 F | BODY MASS INDEX: 30.91 KG/M2 | RESPIRATION RATE: 18 BRPM | WEIGHT: 185.5 LBS | HEIGHT: 65 IN

## 2024-09-23 DIAGNOSIS — D68.9 CLOTTING DISORDER: ICD-10-CM

## 2024-09-23 DIAGNOSIS — Z79.01 CHRONIC ANTICOAGULATION: ICD-10-CM

## 2024-09-23 DIAGNOSIS — N93.9 ABNORMAL UTERINE BLEEDING (AUB): Primary | ICD-10-CM

## 2024-09-23 DIAGNOSIS — N92.0 MENORRHAGIA WITH REGULAR CYCLE: ICD-10-CM

## 2024-09-23 DIAGNOSIS — E61.1 IRON DEFICIENCY: ICD-10-CM

## 2024-09-23 DIAGNOSIS — R76.0 ANTICARDIOLIPIN ANTIBODY POSITIVE: ICD-10-CM

## 2024-09-23 PROCEDURE — 3008F BODY MASS INDEX DOCD: CPT | Mod: CPTII,S$GLB,, | Performed by: INTERNAL MEDICINE

## 2024-09-23 PROCEDURE — G2211 COMPLEX E/M VISIT ADD ON: HCPCS | Mod: S$GLB,,, | Performed by: INTERNAL MEDICINE

## 2024-09-23 PROCEDURE — 3079F DIAST BP 80-89 MM HG: CPT | Mod: CPTII,S$GLB,, | Performed by: INTERNAL MEDICINE

## 2024-09-23 PROCEDURE — 99214 OFFICE O/P EST MOD 30 MIN: CPT | Mod: S$GLB,,, | Performed by: INTERNAL MEDICINE

## 2024-09-23 PROCEDURE — 99999 PR PBB SHADOW E&M-EST. PATIENT-LVL IV: CPT | Mod: PBBFAC,,, | Performed by: INTERNAL MEDICINE

## 2024-09-23 PROCEDURE — 3074F SYST BP LT 130 MM HG: CPT | Mod: CPTII,S$GLB,, | Performed by: INTERNAL MEDICINE

## 2024-09-23 PROCEDURE — 1159F MED LIST DOCD IN RCRD: CPT | Mod: CPTII,S$GLB,, | Performed by: INTERNAL MEDICINE

## 2024-09-23 PROCEDURE — 1160F RVW MEDS BY RX/DR IN RCRD: CPT | Mod: CPTII,S$GLB,, | Performed by: INTERNAL MEDICINE

## 2024-10-08 ENCOUNTER — OFFICE VISIT (OUTPATIENT)
Dept: CARDIOLOGY | Facility: CLINIC | Age: 44
End: 2024-10-08
Payer: COMMERCIAL

## 2024-10-08 VITALS
WEIGHT: 187.38 LBS | HEIGHT: 65 IN | DIASTOLIC BLOOD PRESSURE: 89 MMHG | BODY MASS INDEX: 31.22 KG/M2 | SYSTOLIC BLOOD PRESSURE: 131 MMHG | HEART RATE: 77 BPM

## 2024-10-08 DIAGNOSIS — E78.01 FAMILIAL HYPERCHOLESTEROLEMIA: ICD-10-CM

## 2024-10-08 DIAGNOSIS — D68.9 CLOTTING DISORDER: ICD-10-CM

## 2024-10-08 DIAGNOSIS — Z79.01 CHRONIC ANTICOAGULATION: ICD-10-CM

## 2024-10-08 DIAGNOSIS — E78.5 DYSLIPIDEMIA: Primary | ICD-10-CM

## 2024-10-08 DIAGNOSIS — Z86.73 H/O: CVA (CEREBROVASCULAR ACCIDENT): ICD-10-CM

## 2024-10-08 DIAGNOSIS — I25.3 ATRIAL SEPTAL ANEURYSM: ICD-10-CM

## 2024-10-08 PROCEDURE — 99204 OFFICE O/P NEW MOD 45 MIN: CPT | Mod: S$GLB,,, | Performed by: INTERNAL MEDICINE

## 2024-10-08 PROCEDURE — 3079F DIAST BP 80-89 MM HG: CPT | Mod: CPTII,S$GLB,, | Performed by: INTERNAL MEDICINE

## 2024-10-08 PROCEDURE — 1160F RVW MEDS BY RX/DR IN RCRD: CPT | Mod: CPTII,S$GLB,, | Performed by: INTERNAL MEDICINE

## 2024-10-08 PROCEDURE — 1159F MED LIST DOCD IN RCRD: CPT | Mod: CPTII,S$GLB,, | Performed by: INTERNAL MEDICINE

## 2024-10-08 PROCEDURE — 3075F SYST BP GE 130 - 139MM HG: CPT | Mod: CPTII,S$GLB,, | Performed by: INTERNAL MEDICINE

## 2024-10-08 PROCEDURE — 99999 PR PBB SHADOW E&M-EST. PATIENT-LVL III: CPT | Mod: PBBFAC,,, | Performed by: INTERNAL MEDICINE

## 2024-10-08 PROCEDURE — 3008F BODY MASS INDEX DOCD: CPT | Mod: CPTII,S$GLB,, | Performed by: INTERNAL MEDICINE

## 2024-10-08 RX ORDER — ATORVASTATIN CALCIUM 20 MG/1
20 TABLET, FILM COATED ORAL NIGHTLY
Qty: 90 TABLET | Refills: 2 | Status: SHIPPED | OUTPATIENT
Start: 2024-10-08

## 2024-10-08 NOTE — PROGRESS NOTES
Subjective:    Patient ID:  Jaclyn Rausch is a 44 y.o. female patient here for evaluation Hyperlipidemia and Medication Refill      History of Present Illness:     44 yr old Female came here for routine clinic follow up.  This patient is new to me.  She was following up with Dr. Alford in the past.  Patient with past medical history of atrial septal aneurysm, anticardiolipin antibody, history of CVA, on long-term anticoagulation, dyslipidemia. Denies  Any anginal symptoms.  Currently asymptomatic.  Blood pressure usually normal.  She is taking statin regularly.  She could not do CT calcium score last year due to elevated heart rate.  No recent strokes.       Review of patient's allergies indicates:  No Known Allergies    Past Medical History:   Diagnosis Date    Anemia     slightly    Anticardiolipin antibody positive 05/04/2018    Anticoagulant long-term use     Atrial septal aneurysm     Dr. Frank; last visit 1/2018    Clotting disorder 02/03/2019    Elevated lipoprotein(a) 05/04/2018    History of CVA in adulthood 4/24/2018    Stroke 04/24/2018     Past Surgical History:   Procedure Laterality Date    BUNIONECTOMY Left 2001    CHONDROPLASTY OF KNEE Left 1/10/2020    Procedure: CHONDROPLASTY, KNEE;  Surgeon: Michael Galaviz II, MD;  Location: [facility] OR;  Service: Orthopedics;  Laterality: Left;    HYSTEROSCOPY WITH DILATION AND CURETTAGE OF UTERUS N/A 11/5/2020    Procedure: HYSTEROSCOPY, WITH DILATION AND CURETTAGE OF UTERUS;  Surgeon: Ap Milner MD;  Location: [facility] OR;  Service: OB/GYN;  Laterality: N/A;    KNEE ARTHROSCOPY W/ MENISCECTOMY Left 1/10/2020    Procedure: ARTHROSCOPY, KNEE, WITH MENISCECTOMY;  Surgeon: Michael Galaviz II, MD;  Location: [facility] OR;  Service: Orthopedics;  Laterality: Left;    REPAIR OF MENISCUS OF KNEE Left 1/10/2020    Procedure: REPAIR, MENISCUS, KNEE;  Surgeon: Michael Galaviz II, MD;  Location: [facility] OR;  Service: Orthopedics;  Laterality: Left;  PARTIAL  MEDIAL    THERMAL ABLATION OF ENDOMETRIUM N/A 2020    Procedure: ABLATION, ENDOMETRIUM, THERMAL;  Surgeon: Ap Milner MD;  Location: [facility] OR;  Service: OB/GYN;  Laterality: N/A;     Social History     Tobacco Use    Smoking status: Former     Current packs/day: 0.00     Average packs/day: 0.3 packs/day for 2.0 years (0.5 ttl pk-yrs)     Types: Cigarettes     Start date: 2001     Quit date: 2003     Years since quittin.7    Smokeless tobacco: Never   Substance Use Topics    Alcohol use: Yes     Comment: Socially    Drug use: No        Review of Systems   Negative except as mentioned in HPI         Objective        Vitals:    10/08/24 1449   BP: 131/89   Pulse: 77       LIPIDS - LAST 2   Lab Results   Component Value Date    CHOL 205 (H) 2023    CHOL 193 2023    HDL 67 2023    HDL 63 2023    LDLCALC 123.0 2023    LDLCALC 104.6 2023    TRIG 75 2023    TRIG 127 2023    CHOLHDL 32.7 2023    CHOLHDL 32.6 2023       CBC - LAST 2  Lab Results   Component Value Date    WBC 7.94 2024    WBC 8.63 2023    RBC 4.63 2024    RBC 4.62 2023    HGB 14.0 2024    HGB 14.3 2023    HCT 42.8 2024    HCT 43.9 2023    MCV 92 2024    MCV 95 2023    MCH 30.2 2024    MCH 31.0 2023    MCHC 32.7 2024    MCHC 32.6 2023    RDW 12.2 2024    RDW 12.3 2023     2024     2023    MPV 9.7 2024    MPV 9.9 2023    GRAN 3.5 2024    GRAN 44.4 2024    LYMPH 3.4 2024    LYMPH 43.1 2024    MONO 0.8 2024    MONO 9.4 2024    BASO 0.04 2024    BASO 0.05 2023    NRBC 0 2024    NRBC 0 2023       CHEMISTRY & LIVER FUNCTION - LAST 2  Lab Results   Component Value Date     2024     2023    K 4.0 2024    K 4.1 2023     2024     2023     "CO2 22 (L) 09/18/2024    CO2 25 09/18/2023    ANIONGAP 8 09/18/2024    ANIONGAP 7 (L) 09/18/2023    BUN 20 09/18/2024    BUN 12 09/18/2023    CREATININE 0.8 09/18/2024    CREATININE 0.9 09/18/2023    GLU 90 09/18/2024    GLU 59 (L) 09/18/2023    CALCIUM 8.9 09/18/2024    CALCIUM 9.7 09/18/2023    MG 2.2 11/04/2019    ALBUMIN 4.0 09/18/2024    ALBUMIN 4.0 09/18/2023    PROT 6.9 09/18/2024    PROT 6.9 09/18/2023    ALKPHOS 83 09/18/2024    ALKPHOS 74 09/18/2023    ALT 13 09/18/2024    ALT 17 09/18/2023    AST 19 09/18/2024    AST 21 09/18/2023    BILITOT 0.4 09/18/2024    BILITOT 0.6 09/18/2023        CARDIAC PROFILE - LAST 2  No results found for: "BNP", "CPK", "CPKMB", "LDH", "TROPONINI", "TROPONINIHS"     COAGULATION - LAST 2  Lab Results   Component Value Date    INR 1.0 04/24/2018    APTT 25.8 04/24/2018       ENDOCRINE & PSA - LAST 2  Lab Results   Component Value Date    HGBA1C 5.0 10/30/2021    TSH 1.553 06/05/2023    TSH 1.101 10/30/2021        ECHOCARDIOGRAM RESULTS  Results for orders placed during the hospital encounter of 09/12/23    Echo    Interpretation Summary    Left Ventricle: The left ventricle is normal in size. Normal wall thickness. Normal wall motion. There is normal systolic function. Ejection fraction by visual approximation is 65%. There is normal diastolic function.    Right Ventricle: Normal right ventricular cavity size. Wall thickness is normal. Right ventricle wall motion  is normal. Systolic function is normal.    Tricuspid Valve: There is mild regurgitation.    IVC/SVC: Normal venous pressure at 3 mmHg.    The estimated pulmonary artery systolic pressure is 27 mmHg.      CURRENT/PREVIOUS VISIT EKG  Results for orders placed or performed in visit on 06/07/23   IN OFFICE EKG 12-LEAD (to Clarissa)    Collection Time: 06/07/23  1:04 PM    Narrative    Test Reason : Z79.01,I25.3,I10,    Vent. Rate : 081 BPM     Atrial Rate : 081 BPM     P-R Int : 146 ms          QRS Dur : 080 ms      QT Int : " "398 ms       P-R-T Axes : 060 057 051 degrees     QTc Int : 462 ms    Normal sinus rhythm  Possible Left atrial enlargement  Borderline Abnormal ECG  When compared with ECG of 03-MAR-2022 17:07,  No significant change was found  Confirmed by Yonatan Alford MD ([id number]) on 7/31/2023 11:34:23 AM    Referred By:             Confirmed By:Yonatan Alford MD     No valid procedures specified.   No results found for this or any previous visit.    No valid procedures specified.          PREVIOUS STRESS TEST              PREVIOUS ANGIOGRAM        PHYSICAL EXAM    CONSTITUTIONAL: Well built, well nourished in no apparent distress  HEENT: No pallor  NECK: no JVD  LUNGS: CTA b/l  HEART: regular rate and rhythm, S1, S2 normal, no murmur   ABDOMEN:  Nondistended  EXTREMITIES: No edema  NEURO: AAO X 3   Psych:  Normal affect    I HAVE REVIEWED :    The vital signs, nurses notes, and all the pertinent radiology and labs.        Current Outpatient Medications   Medication Instructions    albuterol (VENTOLIN HFA) 90 mcg/actuation inhaler 2 puffs, Inhalation, Every 6 hours PRN, Rescue    ascorbic acid (vitamin C) (VITAMIN C) 100 mg, Daily    atorvastatin (LIPITOR) 20 mg, Oral, Nightly    ELIQUIS 5 mg Tab TAKE 1 TABLET BY MOUTH TWICE DAILY    ondansetron (ZOFRAN-ODT) 4 mg, Oral, Every 8 hours PRN    pen needle, diabetic, safety 31 gauge x 1/4" Ndle Use once daily to inject victoza        ECG reviewed by me:  Normal sinus rhythm  Assessment & Plan     History of CVA   History of atrial septal aneurysm  Anticardiolipin antibody on long-term anticoagulation follows up with Hematology  Dyslipidemia    Asymptomatic currently  Unremarkable event monitor last year  Preserved ejection fraction on prior echo    Plan:  Continue atorvastatin and Eliquis  Hematology follow up  Repeat lipid profile  Home BP monitoring    Follow up in about 6 months (around 4/8/2025).         "

## 2024-10-10 LAB
OHS QRS DURATION: 76 MS
OHS QTC CALCULATION: 426 MS

## 2025-01-06 ENCOUNTER — PATIENT OUTREACH (OUTPATIENT)
Dept: ADMINISTRATIVE | Facility: HOSPITAL | Age: 45
End: 2025-01-06
Payer: COMMERCIAL

## 2025-01-06 ENCOUNTER — PATIENT MESSAGE (OUTPATIENT)
Dept: ADMINISTRATIVE | Facility: HOSPITAL | Age: 45
End: 2025-01-06
Payer: COMMERCIAL

## 2025-01-06 DIAGNOSIS — Z12.31 OTHER SCREENING MAMMOGRAM: ICD-10-CM

## 2025-01-29 ENCOUNTER — HOSPITAL ENCOUNTER (OUTPATIENT)
Dept: RADIOLOGY | Facility: HOSPITAL | Age: 45
Discharge: HOME | End: 2025-01-29
Attending: STUDENT IN AN ORGANIZED HEALTH CARE EDUCATION/TRAINING PROGRAM
Payer: COMMERCIAL

## 2025-01-29 DIAGNOSIS — Z12.31 OTHER SCREENING MAMMOGRAM: ICD-10-CM

## 2025-01-29 PROCEDURE — 77063 BREAST TOMOSYNTHESIS BI: CPT | Mod: 26,,, | Performed by: RADIOLOGY

## 2025-01-29 PROCEDURE — 77067 SCR MAMMO BI INCL CAD: CPT | Mod: 26,,, | Performed by: RADIOLOGY

## 2025-01-29 PROCEDURE — 77067 SCR MAMMO BI INCL CAD: CPT | Mod: TC

## 2025-02-09 DIAGNOSIS — D68.9 CLOTTING DISORDER: ICD-10-CM

## 2025-03-17 DIAGNOSIS — M25.511 BILATERAL SHOULDER PAIN, UNSPECIFIED CHRONICITY: Primary | ICD-10-CM

## 2025-03-17 DIAGNOSIS — M25.512 BILATERAL SHOULDER PAIN, UNSPECIFIED CHRONICITY: Primary | ICD-10-CM

## 2025-03-24 ENCOUNTER — HOSPITAL ENCOUNTER (OUTPATIENT)
Dept: RADIOLOGY | Facility: HOSPITAL | Age: 45
Discharge: HOME | End: 2025-03-24
Attending: ORTHOPAEDIC SURGERY
Payer: COMMERCIAL

## 2025-03-24 ENCOUNTER — OFFICE VISIT (OUTPATIENT)
Dept: ORTHOPEDICS | Facility: CLINIC | Age: 45
End: 2025-03-24
Payer: COMMERCIAL

## 2025-03-24 VITALS — RESPIRATION RATE: 16 BRPM

## 2025-03-24 DIAGNOSIS — M25.512 BILATERAL SHOULDER PAIN, UNSPECIFIED CHRONICITY: ICD-10-CM

## 2025-03-24 DIAGNOSIS — M25.512 BILATERAL SHOULDER PAIN, UNSPECIFIED CHRONICITY: Primary | ICD-10-CM

## 2025-03-24 DIAGNOSIS — M75.100 ROTATOR CUFF SYNDROME, UNSPECIFIED LATERALITY: ICD-10-CM

## 2025-03-24 DIAGNOSIS — M25.511 BILATERAL SHOULDER PAIN, UNSPECIFIED CHRONICITY: Primary | ICD-10-CM

## 2025-03-24 DIAGNOSIS — M25.511 BILATERAL SHOULDER PAIN, UNSPECIFIED CHRONICITY: ICD-10-CM

## 2025-03-24 PROCEDURE — 1160F RVW MEDS BY RX/DR IN RCRD: CPT | Mod: CPTII,S$GLB,, | Performed by: ORTHOPAEDIC SURGERY

## 2025-03-24 PROCEDURE — 73030 X-RAY EXAM OF SHOULDER: CPT | Mod: TC,50,PO

## 2025-03-24 PROCEDURE — 1159F MED LIST DOCD IN RCRD: CPT | Mod: CPTII,S$GLB,, | Performed by: ORTHOPAEDIC SURGERY

## 2025-03-24 PROCEDURE — 73030 X-RAY EXAM OF SHOULDER: CPT | Mod: 26,,, | Performed by: RADIOLOGY

## 2025-03-24 PROCEDURE — 99204 OFFICE O/P NEW MOD 45 MIN: CPT | Mod: S$GLB,,, | Performed by: ORTHOPAEDIC SURGERY

## 2025-03-24 PROCEDURE — 99999 PR PBB SHADOW E&M-EST. PATIENT-LVL III: CPT | Mod: PBBFAC,,, | Performed by: ORTHOPAEDIC SURGERY

## 2025-03-24 NOTE — PROGRESS NOTES
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 Frank; last visit 1/2018    Clotting disorder 02/03/2019    Elevated lipoprotein(a) 05/04/2018    History of CVA in adulthood 4/24/2018    Stroke 04/24/2018       Past Surgical History:   Procedure Laterality Date    BUNIONECTOMY Left 2001    CHONDROPLASTY OF KNEE Left 1/10/2020    Procedure: CHONDROPLASTY, KNEE;  Surgeon: Michael Galaviz II, MD;  Location: [facility] OR;  Service: Orthopedics;  Laterality: Left;    HYSTEROSCOPY WITH DILATION AND CURETTAGE OF UTERUS N/A 11/5/2020    Procedure: HYSTEROSCOPY, WITH DILATION AND CURETTAGE OF UTERUS;  Surgeon: Ap Milner MD;  Location: [facility] OR;  Service: OB/GYN;  Laterality: N/A;    KNEE ARTHROSCOPY W/ MENISCECTOMY Left 1/10/2020    Procedure: ARTHROSCOPY, KNEE, WITH MENISCECTOMY;  Surgeon: Michael Galaviz II, MD;  Location: [facility] OR;  Service: Orthopedics;  Laterality: Left;    REPAIR OF MENISCUS OF KNEE Left 1/10/2020    Procedure: REPAIR, MENISCUS, KNEE;  Surgeon: Michael Galaviz II, MD;  Location: [facility] OR;  Service: Orthopedics;  Laterality: Left;  PARTIAL MEDIAL    THERMAL ABLATION OF ENDOMETRIUM N/A 11/5/2020    Procedure: ABLATION, ENDOMETRIUM, THERMAL;  Surgeon: Ap Milner MD;  Location: [facility] OR;  Service: OB/GYN;  Laterality: N/A;       Current Outpatient Medications   Medication Sig    albuterol (VENTOLIN HFA) 90 mcg/actuation inhaler Inhale 2 puffs into the lungs every 6 (six) hours as needed for Wheezing. Rescue    apixaban (ELIQUIS) 5 mg Tab Take 1 tablet (5 mg total) by mouth 2 (two) times daily.    ascorbic acid, vitamin C, (VITAMIN C) 100 MG tablet Take 100 mg by mouth once daily. (Patient not taking: Reported on 6/7/2024)    atorvastatin (LIPITOR) 20 MG tablet Take 1 tablet (20 mg total) by mouth every evening.    ondansetron (ZOFRAN-ODT) 4 MG TbDL Take 1 tablet (4  "mg total) by mouth every 8 (eight) hours as needed (nausea). (Patient not taking: Reported on 6/7/2024)    pen needle, diabetic, safety 31 gauge x 1/4" Ndle Use once daily to inject victoza (Patient not taking: Reported on 6/7/2024)     No current facility-administered medications for this visit.     Facility-Administered Medications Ordered in Other Visits   Medication    electrolyte-S (ISOLYTE)    lidocaine (PF) 10 mg/ml (1%) injection 10 mg       Review of patient's allergies indicates:  No Known Allergies    Family History   Problem Relation Name Age of Onset    Diabetes Maternal Grandmother      Lung cancer Maternal Grandfather      Colon cancer Maternal Grandfather      Hypertension Mother      Aneurysm Mother          brain    Hearing loss Father      Hypertension Brother      Eczema Son      Breast cancer Neg Hx      Ovarian cancer Neg Hx      Melanoma Neg Hx      Psoriasis Neg Hx      Lupus Neg Hx         Social History[1]    Chief Complaint:   Chief Complaint   Patient presents with    Left Shoulder - Pain    Right Shoulder - Pain       History of present illness:  44-year-old right-hand-dominant female seen for bilateral shoulder pain.  The right shoulder is bothering her for years and she was diagnosed with some shoulder tendinitis.  The left shoulder started hurting back in October.  No injury or trauma.  Patient was in the [employer] previously.  Patient had a stroke and is on blood thinners so can not take NSAIDs.  Patient gets a sharp pain with occasional movements.  Pain with doing a push-up or lifting things above her head.    Prior treatment:  Topicals        Review of Systems:    Constitution: Negative for chills, fever, and sweats.  Negative for unexplained weight loss.    HENT:  Negative for headaches and blurry vision.    Cardiovascular:Negative for chest pain or irregular heart beat. Negative for hypertension.    Respiratory:  Negative for cough and shortness of breath.    Gastrointestinal: " Negative for abdominal pain, heartburn, melena, nausea, and vomitting.    Genitourinary:  Negative bladder incontinence and dysuria.    Musculoskeletal:  See HPI    Neurological: Negative for numbness.    Psychiatric/Behavioral: Negative for depression.  The patient is not nervous/anxious.      Endocrine: Negative for polyuria    Hematologic/Lymphatic: Negative for bleeding problem.  Does not bruise/bleed easily.    Skin: Negative for poor would healing and rash      Physical Examination:    Vital Signs:    Vitals:    03/24/25 0822   Resp: 16       There is no height or weight on file to calculate BMI.    This a well-developed, well nourished patient in no acute distress.  They are alert and oriented and cooperative to examination.  Pt. walks without an antalgic gait.      Examination of the left shoulder shows no rashes or erythema. There are no masses, ecchymosis, or atrophy. The patient has full range of motion in forward flexion, external rotation, and internal rotation to the mid T-spine. The patient has positive Francis Neer and Gallia's test.. - Speeds test. Nontender to palpation over a.c. joint. Normal stability anteriorly, posteriorly, and negative sulcus sign. Passive range of motion: Forward flexion of 180°, external rotation at 90° of 90°, internal rotation of 50°, and external rotation at 0° of 50°. 2+ radial pulse. Intact axillary, radial, median and ulnar sensation. 5 out of 5 resisted forward flexion, external rotation, and negative lift off test.      X-rays:  X-rays of both shoulders are ordered and reviewed which show no atypical findings         Assessment:  Possible labral tear versus tendinitis    Plan:  I reviewed the findings with her today.  Recommended some formal physical therapy for treatment of both of her shoulders.  Talked about possible injection if not improving.  Talked about getting an MR arthrogram if still getting the sharp pains despite physical therapy.            All  previous pertinent notes including ER visits, physical therapy visits, other orthopedic visits as well as other care for the same musculoskeletal problem were reviewed.  All pertinent lab values and previous imaging was reviewed pertinent to the current visit.    This note was created using [employer] voice recognition software that occasionally misinterpreted phrases or words.    Consult note is delivered via Epic messaging service.           [1]   Social History  Socioeconomic History    Marital status:    Tobacco Use    Smoking status: Former     Current packs/day: 0.00     Average packs/day: 0.3 packs/day for 2.0 years (0.5 ttl pk-yrs)     Types: Cigarettes     Start date: 2001     Quit date: 2003     Years since quittin.2    Smokeless tobacco: Never   Substance and Sexual Activity    Alcohol use: Yes     Comment: Socially    Drug use: No    Sexual activity: Yes     Partners: Male     Birth control/protection: None     Comment:      Social Drivers of Health     Financial Resource Strain: Low Risk  (2024)    Overall Financial Resource Strain (CARDIA)     Difficulty of Paying Living Expenses: Not very hard   Food Insecurity: No Food Insecurity (2024)    Hunger Vital Sign     Worried About Running Out of Food in the Last Year: Never true     Ran Out of Food in the Last Year: Never true   Transportation Needs: No Transportation Needs (10/28/2021)    PRAPARE - Transportation     Lack of Transportation (Medical): No     Lack of Transportation (Non-Medical): No   Physical Activity: Sufficiently Active (2024)    Exercise Vital Sign     Days of Exercise per Week: 5 days     Minutes of Exercise per Session: 50 min   Stress: Stress Concern Present (2024)    [employer] - Occupational Stress Questionnaire     Feeling of Stress : To some extent   Housing Stability: Low Risk  (10/28/2021)    Housing Stability Vital Sign     Unable to Pay for Housing in the  Last Year: No     Number of Places Lived in the Last Year: 1     Unstable Housing in the Last Year: No

## 2025-03-27 ENCOUNTER — CLINICAL SUPPORT (OUTPATIENT)
Dept: REHABILITATION | Facility: HOSPITAL | Age: 45
End: 2025-03-27
Attending: ORTHOPAEDIC SURGERY
Payer: COMMERCIAL

## 2025-03-27 DIAGNOSIS — M25.512 BILATERAL SHOULDER PAIN, UNSPECIFIED CHRONICITY: ICD-10-CM

## 2025-03-27 DIAGNOSIS — M25.511 BILATERAL SHOULDER PAIN, UNSPECIFIED CHRONICITY: ICD-10-CM

## 2025-03-27 DIAGNOSIS — M75.100 ROTATOR CUFF SYNDROME, UNSPECIFIED LATERALITY: ICD-10-CM

## 2025-03-27 DIAGNOSIS — M53.84 DECREASED RANGE OF MOTION OF THORACIC SPINE: Primary | ICD-10-CM

## 2025-03-27 DIAGNOSIS — R29.898 UPPER EXTREMITY WEAKNESS: ICD-10-CM

## 2025-03-27 PROCEDURE — 97530 THERAPEUTIC ACTIVITIES: CPT | Mod: PN

## 2025-03-27 PROCEDURE — 97162 PT EVAL MOD COMPLEX 30 MIN: CPT | Mod: PN

## 2025-03-27 NOTE — PROGRESS NOTES
Outpatient Rehab    Physical Therapy Evaluation    Patient Name: Jaclyn Rausch  MRN: [id number]  YOB: 1980  Encounter Date: 3/27/2025    Therapy Diagnosis:   Encounter Diagnoses   Name Primary?    Bilateral shoulder pain, unspecified chronicity     Rotator cuff syndrome, unspecified laterality     Decreased range of motion of thoracic spine Yes    Upper extremity weakness      Physician: Seng Fan,*    Physician Orders: Eval and Treat  Medical Diagnosis: Bilateral shoulder pain, unspecified chronicity  Rotator cuff syndrome, unspecified laterality    Visit # / Visits Authorized:  1 / 1  Insurance Authorization Period: 3/24/2025 to 3/24/2026  Date of Evaluation: 3/27/2025  Plan of Care Certification: 3/27/2025 to 05/25/2025     Time In: 1500   Time Out: 1545  Total Time: 45   Total Billable Time: 45    Intake Outcome Measure for FOTO Survey    Therapist reviewed FOTO scores for Jaclyn Rausch on 3/27/2025.   FOTO report - see Media section or FOTO account episode details.     Intake Score: 56%         Subjective   History of Present Illness  Jaclyn is a 44 y.o. female who reports to physical therapy with a chief concern of Bilateral Shoulder Pain.     The patient reports a medical diagnosis of Bilateral shoulder pain, Rotator Cuff Syndrome.    Diagnostic tests related to this condition: X-ray.   X-Ray Details: FINDINGS:  No acute fracture or dislocation.  No peritendinous soft tissue calcifications.    Dominant Hand: Right  History of Present Condition/Illness: Jaclyn reports a chronic history of Right shoulder pain and a more recent onset of Left shoulder pain. Her symptoms feel like they are deep in the joint on both sides. She reports she is very active working out at a local crossfit gym 4-5 days per week. She can perform most movements, however does have to modify overhead exercises as well as pushups due to her shoulder pain. At this time her Left  shoulder hurts worse than her Right. She denies neck pain or numbness/tingling. 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     Activities of Daily Living  Social history was obtained from Patient.    General Prior Level of Function Comments: Chronic intermittent R shoulder pain  General Current Level of Function Comments: bilateral shoulder pain with certain reaching and overhead movements as well as exercises like pushups  Patient Roles: Caregiver for child  Patient Responsibilities: Community mobility, Laundry, Personal ADL, Driving, Home management, Health management, Meal prep    Previously independent with activities of daily living? Yes     Currently independent with activities of daily living? Yes          Previously independent with instrumental activities of daily living? Yes     Currently independent with instrumental activities of daily living? Yes              Pain     Patient reports a current pain level of 2/10. Pain at best is reported as 2/10. Pain at worst is reported as 7/10.   Location: Pt states feels deep in the joint where she can't touch  Clinical Progression (since onset): Worsening  Pain Qualities: Sharp, Aching  Pain-Relieving Factors: Activity modification, Rest  Pain-Aggravating Factors: Lifting, Exercise, Reaching         Treatment History  Treatments  Previously Received Treatments: No  Currently Receiving Treatments: No    Living Arrangements  Living Situation  Housing: Home independently  Living Arrangements: Spouse/significant other, Children  Support Systems: Spouse/significant other, Children        Employment  Patient does not report that: Does the patient's condition impact their ability to work?  Employment Status: Employed full-time   Desk job       Past Medical History/Physical Systems Review:   Jaclyn Rausch  has a past medical history of Anemia, Anticardiolipin antibody positive, Anticoagulant long-term use, Atrial septal aneurysm, Clotting  disorder, Elevated lipoprotein(a), History of CVA in adulthood, and Stroke.    Jaclyn Rausch  has a past surgical history that includes Bunionectomy (Left, 2001); Knee arthroscopy w/ meniscectomy (Left, 1/10/2020); Repair of meniscus of knee (Left, 1/10/2020); Chondroplasty of knee (Left, 1/10/2020); Thermal ablation of endometrium (N/A, 11/5/2020); and Hysteroscopy with dilation and curettage of uterus (N/A, 11/5/2020).    Jaclyn has a current medication list which includes the following prescription(s): albuterol, apixaban, ascorbic acid (vitamin c), atorvastatin, ondansetron, and pen needle, diabetic, safety, and the following Facility-Administered Medications: electrolyte-s (ph 7.4) and lidocaine (pf) 10 mg/ml (1%).    Review of patient's allergies indicates:  No Known Allergies     Objective   Posture  Patient presents with a Forward head position.       Bilateral scapulae are: Depressed and Downwardly Rotated              Subcranial Range of Motion   Active Restricted? Passive Restricted? Pain   Flexion         Protraction         Retraction           WNL: Flexion and Extension         Shoulder Range of Motion  Right Shoulder   Active (deg) Passive (deg) Pain   Flexion 160       Extension         Scaption         ABduction         ADduction         Horizontal ABduction         Horizontal ADduction         External Rotation (Shoulder ABducted 0 degrees) 70       External Rotation (Shoulder ABducted 45 degrees)         External Rotation (Shoulder ABducted 90 degrees)         Internal Rotation (Shoulder ABducted 0 degrees)         Internal Rotation (Shoulder ABducted 45 degrees)         Internal Rotation (Shoulder ABducted 90 degrees)           Left Shoulder   Active (deg) Passive (deg) Pain   Flexion 160       Extension         Scaption         ABduction         ADduction         Horizontal ABduction         Horizontal ADduction         External Rotation (Shoulder ABducted 0 degrees) 70        External Rotation (Shoulder ABducted 45 degrees)         External Rotation (Shoulder ABducted 90 degrees)         Internal Rotation (Shoulder ABducted 0 degrees)         Internal Rotation (Shoulder ABducted 45 degrees)         Internal Rotation (Shoulder ABducted 90 degrees)             Shoulder, Elbow, or Forearm Range of Motion Details: Functional Internal rotation: T7 bilaterally          Shoulder Strength - Planes of Motion   Right Strength Right Pain Left Strength Left  Pain   Flexion 5   4 Yes   Extension           ABduction 5   4 Yes   ADduction           Horizontal ABduction           Horizontal ADduction           Internal Rotation 0° 5   5     Internal Rotation 90°           External Rotation 0° 5   4 Yes   External Rotation 90°               Shoulder Strength - Scapular Stabilizing Muscles   Right Strength Right Pain Left Strength Left  Pain   Lower Trapezius 4-   3-     Middle Trapezius 4-   3-     Rhomboids             Shoulder Strength Details  Serratus Anterior: 4/5 bilaterally no pain               Cervical Screen  Cervical Range of Motion  Less than 60 degrees rotation to involved side? No  Flexion WNL? Yes  Extension WNL? Yes  Thoracic Range of Motion             Shoulder Special Tests  Impingement Tests  Positive: Left Francis-Alcon and Left Infraspinatus Muscle  Negative: Right Francis-Alcon and Right Infraspinatus Muscle           Shoulder Joint Mobility  Right Shoulder Mobility  Normal: Posterior Capsule Mobility and Inferior Capsule Mobility  Left Shoulder Mobility  Normal: Posterior Capsule Mobility and Inferior Capsule Mobility                        Treatment:  Therapeutic Activity  TA 1: Supine thoracic foam roll x2mins  TA 2: ER isometric walkout with RTB x10  TA 3: Prone T x10    Time Entry(in minutes):  PT Evaluation (Moderate) Time Entry: 35  Therapeutic Activity Time Entry: 10    Assessment & Plan   Assessment  Jaclyn presents with a condition of Low complexity.    Presentation of Symptoms: Stable  Will Comorbidities Impact Care: Yes       Functional Limitations: Activity tolerance, Functional mobility, Range of motion, Participating in sports, Participating in leisure activities  Impairments: Activity intolerance, Impaired physical strength, Pain with functional activity  Personal Factors Affecting Prognosis: Pain    Patient Goal for Therapy (PT): To decrease her shoulder pain and return to prior gym activity without increase in symptoms.  Prognosis: Good  Assessment Details: Jaclyn presents to PT evaluation with reports of chronic Right shoulder pain and a more recent onset of Left shoulder pain. She demonstrates decreased thoracic mobility as well as upper extremity weakness. Her symptoms and deficits are limiting her ability to perform overhead lifting, reaching activities, quick sudden movements, and recreation/leisure activities.     Plan  From a physical therapy perspective, the patient would benefit from: Skilled Rehab Services    Planned therapy interventions include: Therapeutic exercise, Therapeutic activities, Neuromuscular re-education, and Manual therapy.            Visit Frequency: 2 times Per Week for 8 Weeks.       This plan was discussed with Patient.   Discussion participants: Agreed Upon Plan of Care  Plan details: Focus on improving thoracic mobility, rotator cuff strength/endurance, and parascapular strength/endurance to offload shoulder joint and return to PLOF.          Patient's spiritual, cultural, and educational needs considered and patient agreeable to plan of care and goals.     Education  Education was done with Patient. The patient's learning style includes Listening. The patient Verbalizes understanding.         Home Exercise Program to be performed twice daily        Goals:   Active       Long Term Goals        In 8 weeks        Start:  03/28/25       Pt will improve FOTO score by >/= 10 pts to demonstrate improved functional  mobility.  Pt will be IND with final HEP to maintain/improve strength and mobility gained in PT.   Pt will report bilateral shoulder pain improved by >/= 100% with gym exercises to demonstrate improved condition.   Pt will improve MMT of upper extremity strength deficits to >/= 5/ 5 to improve tolerance for gym routine.   Pt goal: Pt will report confidence in managing her condition upon discharge from PT.               Short Term Goals        In 4 weeks        Start:  03/28/25    Expected End:  04/25/25         Pt will be IND with initial HEP to manage symptoms outside of PT.   Pt will report bilateral pain improved by >/= 50% with overhead lifting to demonstrate improved condition.   Pt will improve MMT of upper extremity strength deficits by >/= 1/5 to improve tolerance for progressing rehab.                    Patrice Sanches, PT, DPT  Board Certified Clinical Specialist in Orthopedic Physical Therapy  Board Certified Clinical Specialist in Sports Physical Therapy

## 2025-03-28 PROBLEM — R29.898 UPPER EXTREMITY WEAKNESS: Status: ACTIVE | Noted: 2025-03-28

## 2025-03-28 PROBLEM — M53.84 DECREASED RANGE OF MOTION OF THORACIC SPINE: Status: ACTIVE | Noted: 2025-03-28

## 2025-03-31 ENCOUNTER — CLINICAL SUPPORT (OUTPATIENT)
Dept: REHABILITATION | Facility: HOSPITAL | Age: 45
End: 2025-03-31
Payer: COMMERCIAL

## 2025-03-31 DIAGNOSIS — M53.84 DECREASED RANGE OF MOTION OF THORACIC SPINE: Primary | ICD-10-CM

## 2025-03-31 DIAGNOSIS — R29.898 UPPER EXTREMITY WEAKNESS: ICD-10-CM

## 2025-03-31 PROCEDURE — 97140 MANUAL THERAPY 1/> REGIONS: CPT | Mod: PN

## 2025-03-31 PROCEDURE — 97110 THERAPEUTIC EXERCISES: CPT | Mod: PN

## 2025-03-31 PROCEDURE — 97530 THERAPEUTIC ACTIVITIES: CPT | Mod: PN

## 2025-03-31 PROCEDURE — 97112 NEUROMUSCULAR REEDUCATION: CPT | Mod: PN

## 2025-03-31 NOTE — PROGRESS NOTES
Outpatient Rehab    Physical Therapy Visit    Patient Name: Jaclyn Rausch  MRN: [id number]  YOB: 1980  Encounter Date: 3/31/2025    Therapy Diagnosis:   Encounter Diagnoses   Name Primary?    Decreased range of motion of thoracic spine Yes    Upper extremity weakness      Physician: Seng Fan,*    Physician Orders: Eval and Treat  Medical Diagnosis: Bilateral shoulder pain, unspecified chronicity  Rotator cuff syndrome, unspecified laterality    Visit # / Visits Authorized:  1 / 10  Insurance Authorization Period: 3/27/2025 to 12/31/2025  Date of Evaluation: 03/27/2025  Plan of Care Certification: 03/27/2025 to 05/25/2025     PT/PTA:     Number of PTA visits since last PT visit:   Time In:     Time Out:    Total Time:     Total Billable Time:      FOTO:  Intake Score:  %  Survey Score 1:  %  Survey Score 2:  %         Subjective   Shoulders are doing well today, no pain with laundry or mulching over the weekend..  Pain reported as 1/10.      Objective            Treatment:  Therapeutic Exercise  TE 1: Seated thoracic extension x30  TE 2: Open books x20 each side  Manual Therapy  MT 1: Posterior and inferior glenohumeral joint mobilizations bilaterally grade III  Balance/Neuromuscular Re-Education  NMR 1: Rhythmic stabilization in supine at 90 degrees flexion 3x30s eachs side  NMR 2: ER/IR isometric walkouts 3x10 with 5s hold RTB/GTB respectively  Therapeutic Activity  TA 1: Wall slides 3x10  TA 2: Touchdown level II 3x10  TA 3: Prone T 3x10 each side  TA 4: UBE 4'fwd/4'bwd level 3.5    Time Entry(in minutes):  Manual Therapy Time Entry: 8    Assessment & Plan   Assessment:         Patient will continue to benefit from skilled outpatient physical therapy to address the deficits listed in the problem list box on initial evaluation, provide pt/family education and to maximize pt's level of independence in the home and community environment.     Patient's spiritual, cultural, and  educational needs considered and patient agreeable to plan of care and goals.           Plan:      Goals:   Active       Long Term Goals        In 8 weeks        Start:  03/28/25       Pt will improve FOTO score by >/= 10 pts to demonstrate improved functional mobility.  Pt will be IND with final HEP to maintain/improve strength and mobility gained in PT.   Pt will report bilateral shoulder pain improved by >/= 100% with gym exercises to demonstrate improved condition.   Pt will improve MMT of upper extremity strength deficits to >/= 5/ 5 to improve tolerance for gym routine.   Pt goal: Pt will report confidence in managing her condition upon discharge from PT.               Short Term Goals        In 4 weeks        Start:  03/28/25    Expected End:  04/25/25         Pt will be IND with initial HEP to manage symptoms outside of PT.   Pt will report bilateral pain improved by >/= 50% with overhead lifting to demonstrate improved condition.   Pt will improve MMT of upper extremity strength deficits by >/= 1/5 to improve tolerance for progressing rehab.                    Patrice Sanches, PT, DPT

## 2025-04-03 ENCOUNTER — CLINICAL SUPPORT (OUTPATIENT)
Dept: REHABILITATION | Facility: HOSPITAL | Age: 45
End: 2025-04-03
Payer: COMMERCIAL

## 2025-04-03 DIAGNOSIS — M53.84 DECREASED RANGE OF MOTION OF THORACIC SPINE: Primary | ICD-10-CM

## 2025-04-03 DIAGNOSIS — R29.898 UPPER EXTREMITY WEAKNESS: ICD-10-CM

## 2025-04-03 PROCEDURE — 97530 THERAPEUTIC ACTIVITIES: CPT | Mod: PN

## 2025-04-03 PROCEDURE — 97140 MANUAL THERAPY 1/> REGIONS: CPT | Mod: PN

## 2025-04-03 PROCEDURE — 97110 THERAPEUTIC EXERCISES: CPT | Mod: PN

## 2025-04-03 PROCEDURE — 97112 NEUROMUSCULAR REEDUCATION: CPT | Mod: PN

## 2025-04-03 NOTE — PROGRESS NOTES
Outpatient Rehab    Physical Therapy Visit    Patient Name: Jaclyn Rausch  MRN: [id number]  YOB: 1980  Encounter Date: 4/3/2025    Therapy Diagnosis:   Encounter Diagnoses   Name Primary?    Decreased range of motion of thoracic spine Yes    Upper extremity weakness      Physician: Seng Fan,*    Physician Orders: Eval and Treat  Medical Diagnosis: Bilateral shoulder pain, unspecified chronicity  Rotator cuff syndrome, unspecified laterality    Visit # / Visits Authorized:  2 / 10  Insurance Authorization Period: 3/27/2025 to 12/31/2025  Date of Evaluation: 03/27/2025  Plan of Care Certification: 03/27/2025 to 05/25/2025     PT/PTA:     Number of PTA visits since last PT visit:   Time In: 1500   Time Out: 1556  Total Time: 56   Total Billable Time: 54    FOTO:  Intake Score:  %  Survey Score 1:  %  Survey Score 2:  %         Subjective   Doing well today, can feel it if she abducts her L arm.         Objective            PT extender available to assist with treatment as needed     Treatment:  Therapeutic Exercise  TE 1: Seated thoracic extension x30  TE 2: Open books x20 each side  Manual Therapy  MT 1: Posterior and inferior glenohumeral joint mobilizations bilaterally grade III  Balance/Neuromuscular Re-Education  NMR 1: Rhythmic stabilization in supine at 90 degrees flexion 3x30s eachs side  NMR 2: ER/IR isometric walkouts 3x10 with 5s hold RTB/GTB respectively  Therapeutic Activity  TA 1: Wall slides 3x10  TA 2: Touchdown level II 3x10  TA 3: Prone T 3x10 each side  TA 4: UBE 4'fwd/4'bwd level 3.5    Time Entry(in minutes):  Manual Therapy Time Entry: 8  Neuromuscular Re-Education Time Entry: 15  Therapeutic Activity Time Entry: 23  Therapeutic Exercise Time Entry: 8    Assessment & Plan   Assessment: Jaclyn tolerated treatment well. She fatigues with parascapular exercises. WIll progress as appropriate.  Evaluation/Treatment Tolerance: Patient tolerated treatment  well    Patient will continue to benefit from skilled outpatient physical therapy to address the deficits listed in the problem list box on initial evaluation, provide pt/family education and to maximize pt's level of independence in the home and community environment.     Patient's spiritual, cultural, and educational needs considered and patient agreeable to plan of care and goals.           Plan: Continue POC with focus on improving thoracic mobility and shoulder strength/endurance.    Goals:   Active       Long Term Goals        In 8 weeks  (Progressing)       Start:  03/28/25       Pt will improve FOTO score by >/= 10 pts to demonstrate improved functional mobility.  Pt will be IND with final HEP to maintain/improve strength and mobility gained in PT.   Pt will report bilateral shoulder pain improved by >/= 100% with gym exercises to demonstrate improved condition.   Pt will improve MMT of upper extremity strength deficits to >/= 5/ 5 to improve tolerance for gym routine.   Pt goal: Pt will report confidence in managing her condition upon discharge from PT.               Short Term Goals        In 4 weeks  (Progressing)       Start:  03/28/25    Expected End:  04/25/25         Pt will be IND with initial HEP to manage symptoms outside of PT.   Pt will report bilateral pain improved by >/= 50% with overhead lifting to demonstrate improved condition.   Pt will improve MMT of upper extremity strength deficits by >/= 1/5 to improve tolerance for progressing rehab.                    Patrice Sanches, PT, DPT  Board Certified Clinical Specialist in Orthopedic Physical Therapy  Board Certified Clinical Specialist in Sports Physical Therapy

## 2025-04-07 ENCOUNTER — CLINICAL SUPPORT (OUTPATIENT)
Dept: REHABILITATION | Facility: HOSPITAL | Age: 45
End: 2025-04-07
Payer: COMMERCIAL

## 2025-04-07 DIAGNOSIS — R29.898 UPPER EXTREMITY WEAKNESS: ICD-10-CM

## 2025-04-07 DIAGNOSIS — M53.84 DECREASED RANGE OF MOTION OF THORACIC SPINE: Primary | ICD-10-CM

## 2025-04-07 PROCEDURE — 97530 THERAPEUTIC ACTIVITIES: CPT | Mod: PN

## 2025-04-07 PROCEDURE — 97112 NEUROMUSCULAR REEDUCATION: CPT | Mod: PN

## 2025-04-07 PROCEDURE — 97110 THERAPEUTIC EXERCISES: CPT | Mod: PN

## 2025-04-07 PROCEDURE — 97140 MANUAL THERAPY 1/> REGIONS: CPT | Mod: PN

## 2025-04-07 NOTE — PROGRESS NOTES
Outpatient Rehab    Physical Therapy Visit    Patient Name: Jaclyn Rausch  MRN: [id number]  YOB: 1980  Encounter Date: 4/7/2025    Therapy Diagnosis:   Encounter Diagnoses   Name Primary?    Decreased range of motion of thoracic spine Yes    Upper extremity weakness      Physician: Seng Fan,*    Physician Orders: Eval and Treat  Medical Diagnosis: Bilateral shoulder pain, unspecified chronicity  Rotator cuff syndrome, unspecified laterality    Visit # / Visits Authorized:  3 / 10  Insurance Authorization Period: 3/27/2025 to 12/31/2025  Date of Evaluation: 03/27/2025  Plan of Care Certification: 03/27/2025 to 05/25/2025     PT/PTA:     Number of PTA visits since last PT visit:   Time In: 1500   Time Out: 1545  Total Time: 45   Total Billable Time: 45    FOTO:  Intake Score:  %  Survey Score 1:  %  Survey Score 2:  %         Subjective   Doing well today, was able to perform olympic lifts in gym without symptoms. Has to leave a little early today..         Objective            PT extender available to assist with treatment as needed     Treatment:  Therapeutic Exercise  TE 1: Seated thoracic extension x30  TE 2: Open books x20 each side  Manual Therapy  MT 1: Posterior and inferior glenohumeral joint mobilizations bilaterally grade III  Balance/Neuromuscular Re-Education  NMR 1: Rhythmic stabilization in supine at 90 degrees flexion 3x30s eachs side  NMR 2: ER/IR isometric walkouts 3x10 with 5s hold RTB/GTB respectively  Therapeutic Activity  TA 1: Wall slides 3x10  TA 2: Touchdown level II 3x10  TA 3: Prone T 3x10 each side  TA 4: .  TA 5: Serratus landmines with purple dowel 3x10 each side    Time Entry(in minutes):  Manual Therapy Time Entry: 8  Neuromuscular Re-Education Time Entry: 10  Therapeutic Activity Time Entry: 19  Therapeutic Exercise Time Entry: 8    Assessment & Plan   Assessment: Jaclyn continues to tolerate treatment well. Reports good training effect with  rotator cuff and parascapular exercises. Will progress as tolerated.  Evaluation/Treatment Tolerance: Patient tolerated treatment well    Patient will continue to benefit from skilled outpatient physical therapy to address the deficits listed in the problem list box on initial evaluation, provide pt/family education and to maximize pt's level of independence in the home and community environment.     Patient's spiritual, cultural, and educational needs considered and patient agreeable to plan of care and goals.           Plan: Continue POC with focus on improving thoracic mobility and shoulder strength/endurance.    Goals:   Active       Long Term Goals        In 8 weeks  (Progressing)       Start:  03/28/25       Pt will improve FOTO score by >/= 10 pts to demonstrate improved functional mobility.  Pt will be IND with final HEP to maintain/improve strength and mobility gained in PT.   Pt will report bilateral shoulder pain improved by >/= 100% with gym exercises to demonstrate improved condition.   Pt will improve MMT of upper extremity strength deficits to >/= 5/ 5 to improve tolerance for gym routine.   Pt goal: Pt will report confidence in managing her condition upon discharge from PT.               Short Term Goals        In 4 weeks  (Progressing)       Start:  03/28/25    Expected End:  04/25/25         Pt will be IND with initial HEP to manage symptoms outside of PT.   Pt will report bilateral pain improved by >/= 50% with overhead lifting to demonstrate improved condition.   Pt will improve MMT of upper extremity strength deficits by >/= 1/5 to improve tolerance for progressing rehab.                    Patrice Sanches, PT, DPT  Board Certified Clinical Specialist in Orthopedic Physical Therapy  Board Certified Clinical Specialist in Sports Physical Therapy

## 2025-04-10 ENCOUNTER — CLINICAL SUPPORT (OUTPATIENT)
Dept: REHABILITATION | Facility: HOSPITAL | Age: 45
End: 2025-04-10
Payer: COMMERCIAL

## 2025-04-10 DIAGNOSIS — R29.898 UPPER EXTREMITY WEAKNESS: ICD-10-CM

## 2025-04-10 DIAGNOSIS — M53.84 DECREASED RANGE OF MOTION OF THORACIC SPINE: Primary | ICD-10-CM

## 2025-04-10 PROCEDURE — 97112 NEUROMUSCULAR REEDUCATION: CPT | Mod: PN

## 2025-04-10 PROCEDURE — 97140 MANUAL THERAPY 1/> REGIONS: CPT | Mod: PN

## 2025-04-10 PROCEDURE — 97110 THERAPEUTIC EXERCISES: CPT | Mod: PN

## 2025-04-10 PROCEDURE — 97530 THERAPEUTIC ACTIVITIES: CPT | Mod: PN

## 2025-04-10 NOTE — PROGRESS NOTES
Outpatient Rehab    Physical Therapy Visit    Patient Name: Jaclyn Rausch  MRN: [id number]  YOB: 1980  Encounter Date: 4/10/2025    Therapy Diagnosis:   Encounter Diagnoses   Name Primary?    Decreased range of motion of thoracic spine Yes    Upper extremity weakness        Physician: Seng Fan,*    Physician Orders: Eval and Treat  Medical Diagnosis: Bilateral shoulder pain, unspecified chronicity  Rotator cuff syndrome, unspecified laterality    Visit # / Visits Authorized:  4 / 10  Insurance Authorization Period: 3/27/2025 to 12/31/2025  Date of Evaluation: 03/27/2025  Plan of Care Certification: 03/27/2025 to 05/25/2025     PT/PTA:     Number of PTA visits since last PT visit:   Time In: 1503   Time Out: 1554  Total Time: 51   Total Billable Time: 51    FOTO:  Intake Score:  %  Survey Score 1:  %  Survey Score 2:  %         Subjective   Sore, but shoulders are doing well overall.  Pain reported as 1/10.      Objective            PT extender available to assist with treatment as needed     Treatment:  Therapeutic Exercise  TE 1: UBE 4'fwd/4'bwd  Manual Therapy  MT 1: Posterior and inferior glenohumeral joint mobilizations bilaterally grade III  Balance/Neuromuscular Re-Education  NMR 1: Rhythmic stabilization in supine at 90 degrees flexion 3x30s eachs side  NMR 2: ER/IR isometric walkouts 3x10 with 5s hold RTB/GTB respectively  Therapeutic Activity  TA 1: Wall slides 3x10  TA 2: Touchdown level II 3x10  TA 3: Prone T 3x10 each side 1# dumbbell  TA 4: Prone Y 3x10 each side with 1# dumbbell    Time Entry(in minutes):  Manual Therapy Time Entry: 8  Neuromuscular Re-Education Time Entry: 12  Therapeutic Activity Time Entry: 23  Therapeutic Exercise Time Entry: 8    Assessment & Plan   Assessment: Jaclyn tolerated treatment well. Increased volume of parascapular exercises with Prone Y, pt reports good training effect. WIll progress as appropriate.  Evaluation/Treatment  Tolerance: Patient tolerated treatment well    Patient will continue to benefit from skilled outpatient physical therapy to address the deficits listed in the problem list box on initial evaluation, provide pt/family education and to maximize pt's level of independence in the home and community environment.     Patient's spiritual, cultural, and educational needs considered and patient agreeable to plan of care and goals.           Plan: Continue POC with focus on improving thoracic mobility and shoulder strength/endurance.    Goals:   Active       Long Term Goals        In 8 weeks  (Progressing)       Start:  03/28/25       Pt will improve FOTO score by >/= 10 pts to demonstrate improved functional mobility.  Pt will be IND with final HEP to maintain/improve strength and mobility gained in PT.   Pt will report bilateral shoulder pain improved by >/= 100% with gym exercises to demonstrate improved condition.   Pt will improve MMT of upper extremity strength deficits to >/= 5/ 5 to improve tolerance for gym routine.   Pt goal: Pt will report confidence in managing her condition upon discharge from PT.               Short Term Goals        In 4 weeks  (Progressing)       Start:  03/28/25    Expected End:  04/25/25         Pt will be IND with initial HEP to manage symptoms outside of PT.   Pt will report bilateral pain improved by >/= 50% with overhead lifting to demonstrate improved condition.   Pt will improve MMT of upper extremity strength deficits by >/= 1/5 to improve tolerance for progressing rehab.                    Patrice Sanches, PT, DPT  Board Certified Clinical Specialist in Orthopedic Physical Therapy  Board Certified Clinical Specialist in Sports Physical Therapy

## 2025-04-25 ENCOUNTER — CLINICAL SUPPORT (OUTPATIENT)
Dept: REHABILITATION | Facility: HOSPITAL | Age: 45
End: 2025-04-25
Payer: COMMERCIAL

## 2025-04-25 DIAGNOSIS — R29.898 UPPER EXTREMITY WEAKNESS: ICD-10-CM

## 2025-04-25 DIAGNOSIS — M53.84 DECREASED RANGE OF MOTION OF THORACIC SPINE: Primary | ICD-10-CM

## 2025-04-25 PROCEDURE — 97110 THERAPEUTIC EXERCISES: CPT | Mod: PN

## 2025-04-25 PROCEDURE — 97112 NEUROMUSCULAR REEDUCATION: CPT | Mod: PN

## 2025-04-25 PROCEDURE — 97530 THERAPEUTIC ACTIVITIES: CPT | Mod: PN

## 2025-04-25 NOTE — PROGRESS NOTES
Outpatient Rehab    Physical Therapy Progress Note    Patient Name: Jaclyn Rausch  MRN: [id number]  YOB: 1980  Encounter Date: 4/25/2025    Therapy Diagnosis:   Encounter Diagnoses   Name Primary?    Decreased range of motion of thoracic spine Yes    Upper extremity weakness      Physician: Seng Fan,*    Physician Orders: Eval and Treat  Medical Diagnosis: Bilateral shoulder pain, unspecified chronicity  Rotator cuff syndrome, unspecified laterality    Visit # / Visits Authorized:  5 / 10  Insurance Authorization Period: 3/27/2025 to 12/31/2025  Date of Evaluation: 03/27/2025  Plan of Care Certification: 03/27/2025 to 05/25/2025     PT/PTA:     Number of PTA visits since last PT visit:   Time In: 1000   Time Out: 1054  Total Time: 54   Total Billable Time: 54    FOTO:  Intake Score:  %  Survey Score 1:  %  Survey Score 2:  %         Subjective   Its been feeling a lot better. The only problem she has is being externally rotated to 90 at 90 degree of abduction..  Pain reported as 1/10.      Objective      Shoulder Range of Motion  Right Shoulder   Active (deg) Passive (deg) Pain   Flexion 180       Extension         Scaption         ABduction         ADduction         Horizontal ABduction         Horizontal ADduction         External Rotation (Shoulder ABducted 0 degrees)         External Rotation (Shoulder ABducted 45 degrees)         External Rotation (Shoulder ABducted 90 degrees)         Internal Rotation (Shoulder ABducted 0 degrees)         Internal Rotation (Shoulder ABducted 45 degrees)         Internal Rotation (Shoulder ABducted 90 degrees)           Left Shoulder   Active (deg) Passive (deg) Pain   Flexion 170       Extension         Scaption         ABduction         ADduction         Horizontal ABduction         Horizontal ADduction         External Rotation (Shoulder ABducted 0 degrees)         External Rotation (Shoulder ABducted 45 degrees)         External  Rotation (Shoulder ABducted 90 degrees)         Internal Rotation (Shoulder ABducted 0 degrees)         Internal Rotation (Shoulder ABducted 45 degrees)         Internal Rotation (Shoulder ABducted 90 degrees)                         Treatment:  Therapeutic Exercise  TE 1: UBE 4'fwd/4'bwd  Manual Therapy  MT 1: Posterior and inferior glenohumeral joint mobilizations bilaterally grade III  Balance/Neuromuscular Re-Education  NMR 1: Rhythmic stabilization in supine at 90 degrees flexion 3x30s eachs side  NMR 2: ER/IR isometric walkouts 3x10 with 5s hold RTB/GTB respectively  Therapeutic Activity  TA 1: Wall slides 3x10  TA 2: Touchdown level II 3x10  TA 3: Prone T 3x10 each side 1# dumbbell  TA 4: Prone Y 3x10 each side with 1# dumbbell  TA 5: Serratus landmines with purple dowel 3x10 each side    Time Entry(in minutes):  Manual Therapy Time Entry: 8  Neuromuscular Re-Education Time Entry: 12  Therapeutic Activity Time Entry: 23  Therapeutic Exercise Time Entry: 8    Assessment & Plan   Assessment: Jaclyn presented with less pain today than at initial evaluation. Pain only with abd and ER. She tolerated interventions well with no increase in pain.  Evaluation/Treatment Tolerance: Patient tolerated treatment well    Patient will continue to benefit from skilled outpatient physical therapy to address the deficits listed in the problem list box on initial evaluation, provide pt/family education and to maximize pt's level of independence in the home and community environment.     Patient's spiritual, cultural, and educational needs considered and patient agreeable to plan of care and goals.           Plan: Continue POC with focus on improving thoracic mobility and shoulder strength/endurance.    Goals:   Active       Long Term Goals        In 8 weeks  (Progressing)       Start:  03/28/25       Pt will improve FOTO score by >/= 10 pts to demonstrate improved functional mobility.  Pt will be IND with final HEP to  maintain/improve strength and mobility gained in PT.   Pt will report bilateral shoulder pain improved by >/= 100% with gym exercises to demonstrate improved condition.   Pt will improve MMT of upper extremity strength deficits to >/= 5/ 5 to improve tolerance for gym routine.   Pt goal: Pt will report confidence in managing her condition upon discharge from PT.               Short Term Goals        In 4 weeks  (Progressing)       Start:  03/28/25    Expected End:  04/25/25         Pt will be IND with initial HEP to manage symptoms outside of PT.   Pt will report bilateral pain improved by >/= 50% with overhead lifting to demonstrate improved condition.   Pt will improve MMT of upper extremity strength deficits by >/= 1/5 to improve tolerance for progressing rehab.                    Chani Machuca, PT, DPT  Board Certified Clinical Specialist in Orthopedic Physical Therapy

## 2025-05-01 ENCOUNTER — CLINICAL SUPPORT (OUTPATIENT)
Dept: REHABILITATION | Facility: HOSPITAL | Age: 45
End: 2025-05-01
Payer: COMMERCIAL

## 2025-05-01 DIAGNOSIS — R29.898 UPPER EXTREMITY WEAKNESS: ICD-10-CM

## 2025-05-01 DIAGNOSIS — M53.84 DECREASED RANGE OF MOTION OF THORACIC SPINE: Primary | ICD-10-CM

## 2025-05-01 PROCEDURE — 97112 NEUROMUSCULAR REEDUCATION: CPT | Mod: PN

## 2025-05-01 PROCEDURE — 97110 THERAPEUTIC EXERCISES: CPT | Mod: PN

## 2025-05-01 PROCEDURE — 97140 MANUAL THERAPY 1/> REGIONS: CPT | Mod: PN

## 2025-05-01 PROCEDURE — 97530 THERAPEUTIC ACTIVITIES: CPT | Mod: PN

## 2025-05-01 NOTE — PROGRESS NOTES
"Outpatient Rehab    Physical Therapy Progress Note    Patient Name: Jaclyn Rausch  MRN: [id number]  YOB: 1980  Encounter Date: 5/1/2025    Therapy Diagnosis:   Encounter Diagnoses   Name Primary?    Decreased range of motion of thoracic spine Yes    Upper extremity weakness      Physician: Seng Fan,*    Physician Orders: Eval and Treat  Medical Diagnosis: Bilateral shoulder pain, unspecified chronicity  Rotator cuff syndrome, unspecified laterality    Visit # / Visits Authorized:  6 / 10  Insurance Authorization Period: 3/27/2025 to 12/31/2025  Date of Evaluation: 03/27/2025  Plan of Care Certification: 03/27/2025 to 05/25/2025     PT/PTA:     Number of PTA visits since last PT visit:   Time In: 1500   Time Out: 1554  Total Time: 54   Total Billable Time: 54    FOTO:  Intake Score:  %  Survey Score 1:  %  Survey Score 2:  %         Subjective   Feeling okay but she had trouble stabilizing a 35# dumbell overhead. It wasn't painful though..  Pain reported as 1/10.      Objective           Treatment:  Therapeutic Exercise  TE 1: UBE 4'fwd/4'bwd level 3  TE 2: Quadruped thoracic rotation x 20 bilateral  Manual Therapy  MT 1: Posterior and inferior glenohumeral joint mobilizations bilaterally grade III  Balance/Neuromuscular Re-Education  NMR 1: Rhythmic stabilization in supine at 90 and 120 degrees flexion 3x30s eachs side  NMR 2: IR/ER at 90 degrees of flexion (genie) yellow band 2 x 15 each and bilateral  NMR 3: yellow body blade 3 x 30" bilateral, elbow at side  Therapeutic Activity  TA 1: Wall slides 3x10 yellow band  TA 2: Touchdown level II 3x10  TA 3: Prone T 3x10 each side 2# dumbbell  TA 4: Prone Y 3x10 each side with 2# dumbbell  TA 5: Serratus landmines with blue dowel 3x10 each side    Time Entry(in minutes):  Manual Therapy Time Entry: 8  Neuromuscular Re-Education Time Entry: 16  Therapeutic Activity Time Entry: 23  Therapeutic Exercise Time Entry: 8    Assessment & " Plan   Assessment: Jaclyn presented with less pain today. Due to recent reports of difficulty with overhead stability, we progressed RTC interventions to shoulder height and initiated body blade for stability. Will continue to progress as tolerated.  Evaluation/Treatment Tolerance: Patient tolerated treatment well    Patient will continue to benefit from skilled outpatient physical therapy to address the deficits listed in the problem list box on initial evaluation, provide pt/family education and to maximize pt's level of independence in the home and community environment.     Patient's spiritual, cultural, and educational needs considered and patient agreeable to plan of care and goals.           Plan: Continue POC with focus on improving thoracic mobility and shoulder strength/endurance.    Goals:   Active       Long Term Goals        In 8 weeks  (Progressing)       Start:  03/28/25       Pt will improve FOTO score by >/= 10 pts to demonstrate improved functional mobility.  Pt will be IND with final HEP to maintain/improve strength and mobility gained in PT.   Pt will report bilateral shoulder pain improved by >/= 100% with gym exercises to demonstrate improved condition.   Pt will improve MMT of upper extremity strength deficits to >/= 5/ 5 to improve tolerance for gym routine.   Pt goal: Pt will report confidence in managing her condition upon discharge from PT.               Short Term Goals        In 4 weeks  (Progressing)       Start:  03/28/25    Expected End:  04/25/25         Pt will be IND with initial HEP to manage symptoms outside of PT.   Pt will report bilateral pain improved by >/= 50% with overhead lifting to demonstrate improved condition.   Pt will improve MMT of upper extremity strength deficits by >/= 1/5 to improve tolerance for progressing rehab.                    Chani Machuca, PT, DPT  Board Certified Clinical Specialist in Orthopedic Physical Therapy

## 2025-05-19 ENCOUNTER — OFFICE VISIT (OUTPATIENT)
Dept: ORTHOPEDICS | Facility: CLINIC | Age: 45
End: 2025-05-19
Payer: COMMERCIAL

## 2025-05-19 VITALS — HEIGHT: 65 IN | WEIGHT: 187.38 LBS | BODY MASS INDEX: 31.22 KG/M2

## 2025-05-19 DIAGNOSIS — S43.432D GLENOID LABRAL TEAR, LEFT, SUBSEQUENT ENCOUNTER: Primary | ICD-10-CM

## 2025-05-19 PROCEDURE — 1159F MED LIST DOCD IN RCRD: CPT | Mod: CPTII,S$GLB,, | Performed by: ORTHOPAEDIC SURGERY

## 2025-05-19 PROCEDURE — 1160F RVW MEDS BY RX/DR IN RCRD: CPT | Mod: CPTII,S$GLB,, | Performed by: ORTHOPAEDIC SURGERY

## 2025-05-19 PROCEDURE — 3008F BODY MASS INDEX DOCD: CPT | Mod: CPTII,S$GLB,, | Performed by: ORTHOPAEDIC SURGERY

## 2025-05-19 PROCEDURE — 99214 OFFICE O/P EST MOD 30 MIN: CPT | Mod: S$GLB,,, | Performed by: ORTHOPAEDIC SURGERY

## 2025-05-19 PROCEDURE — 99999 PR PBB SHADOW E&M-EST. PATIENT-LVL III: CPT | Mod: PBBFAC,,, | Performed by: ORTHOPAEDIC SURGERY

## 2025-05-19 NOTE — PROGRESS NOTES
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 Frank; last visit 1/2018    Clotting disorder 02/03/2019    Elevated lipoprotein(a) 05/04/2018    History of CVA in adulthood 4/24/2018    Stroke 04/24/2018       Past Surgical History:   Procedure Laterality Date    BUNIONECTOMY Left 2001    CHONDROPLASTY OF KNEE Left 1/10/2020    Procedure: CHONDROPLASTY, KNEE;  Surgeon: Michael Galaviz II, MD;  Location: [facility] OR;  Service: Orthopedics;  Laterality: Left;    HYSTEROSCOPY WITH DILATION AND CURETTAGE OF UTERUS N/A 11/5/2020    Procedure: HYSTEROSCOPY, WITH DILATION AND CURETTAGE OF UTERUS;  Surgeon: Ap Milner MD;  Location: [facility] OR;  Service: OB/GYN;  Laterality: N/A;    KNEE ARTHROSCOPY W/ MENISCECTOMY Left 1/10/2020    Procedure: ARTHROSCOPY, KNEE, WITH MENISCECTOMY;  Surgeon: Michael Galaviz II, MD;  Location: [facility] OR;  Service: Orthopedics;  Laterality: Left;    REPAIR OF MENISCUS OF KNEE Left 1/10/2020    Procedure: REPAIR, MENISCUS, KNEE;  Surgeon: Michael Galaviz II, MD;  Location: [facility] OR;  Service: Orthopedics;  Laterality: Left;  PARTIAL MEDIAL    THERMAL ABLATION OF ENDOMETRIUM N/A 11/5/2020    Procedure: ABLATION, ENDOMETRIUM, THERMAL;  Surgeon: Ap Milner MD;  Location: [facility] OR;  Service: OB/GYN;  Laterality: N/A;       Current Outpatient Medications   Medication Sig    albuterol (VENTOLIN HFA) 90 mcg/actuation inhaler Inhale 2 puffs into the lungs every 6 (six) hours as needed for Wheezing. Rescue    apixaban (ELIQUIS) 5 mg Tab Take 1 tablet (5 mg total) by mouth 2 (two) times daily.    ascorbic acid, vitamin C, (VITAMIN C) 100 MG tablet Take 100 mg by mouth once daily. (Patient not taking: Reported on 6/7/2024)    atorvastatin (LIPITOR) 20 MG tablet Take 1 tablet (20 mg total) by mouth every evening.    ondansetron (ZOFRAN-ODT) 4 MG TbDL Take 1 tablet (4  mg total) by mouth every 8 (eight) hours as needed (nausea). (Patient not taking: Reported on 6/7/2024)     No current facility-administered medications for this visit.     Facility-Administered Medications Ordered in Other Visits   Medication    electrolyte-S (ISOLYTE)    lidocaine (PF) 10 mg/ml (1%) injection 10 mg       Review of patient's allergies indicates:  No Known Allergies    Family History   Problem Relation Name Age of Onset    Diabetes Maternal Grandmother      Lung cancer Maternal Grandfather      Colon cancer Maternal Grandfather      Hypertension Mother      Aneurysm Mother          brain    Hearing loss Father      Hypertension Brother      Eczema Son      Breast cancer Neg Hx      Ovarian cancer Neg Hx      Melanoma Neg Hx      Psoriasis Neg Hx      Lupus Neg Hx         Social History[1]    Chief Complaint:   Chief Complaint   Patient presents with    Left Shoulder - Pain    Right Shoulder - Pain       History of present illness:  44-year-old right-hand-dominant female seen for bilateral shoulder pain.  The right shoulder has bothering her for years and she was diagnosed with some shoulder tendinitis.  The left shoulder started hurting back in October 2024.  No injury or trauma.  Patient was in the [employer] previously.  Patient had a stroke and is on blood thinners so can not take NSAIDs.  Patient gets a sharp pain with occasional movements.  Pain with doing a push-up or lifting things above her head.  We got her into some physical therapy starting back in March without any real improvement.  Left slightly worse than the right.  Pain today is a 2/10.    Prior treatment:  Topicals        Review of Systems:  Musculoskeletal:  See HPI    Physical Examination:    Vital Signs:    There were no vitals filed for this visit.      Body mass index is 31.18 kg/m².    This a well-developed, well nourished patient in no acute distress.  They are alert and oriented and cooperative to examination.  Pt. walks without  an antalgic gait.      Examination of the left shoulder shows no rashes or erythema. There are no masses, ecchymosis, or atrophy. The patient has full range of motion in forward flexion, external rotation, and internal rotation to the mid T-spine. The patient has positive Francis Neer and Marmaduke's test.. - Speeds test. Nontender to palpation over a.c. joint. Normal stability anteriorly, posteriorly, and negative sulcus sign. Passive range of motion: Forward flexion of 180°, external rotation at 90° of 90°, internal rotation of 50°, and external rotation at 0° of 50°. 2+ radial pulse. Intact axillary, radial, median and ulnar sensation. 5 out of 5 resisted forward flexion, external rotation, and negative lift off test.      X-rays:  X-rays of both shoulders are reviewed which show no atypical findings         Assessment:  Possible left labral tear versus tendinitis    Plan:  I reviewed the findings with her today.  Talked about getting an MR arthrogram since she still getting the sharp pains despite physical therapy.  Follow up after the MRI is completed.            All previous pertinent notes including ER visits, physical therapy visits, other orthopedic visits as well as other care for the same musculoskeletal problem were reviewed.  All pertinent lab values and previous imaging was reviewed pertinent to the current visit.    This note was created using [employer] voice recognition software that occasionally misinterpreted phrases or words.    Consult note is delivered via Epic messaging service.             [1]   Social History  Socioeconomic History    Marital status:    Tobacco Use    Smoking status: Former     Current packs/day: 0.00     Average packs/day: 0.3 packs/day for 2.0 years (0.5 ttl pk-yrs)     Types: Cigarettes     Start date: 2001     Quit date: 2003     Years since quittin.3    Smokeless tobacco: Never   Substance and Sexual Activity    Alcohol use: Yes     Comment: Socially     Drug use: No    Sexual activity: Yes     Partners: Male     Birth control/protection: None     Comment:      Social Drivers of Health     Financial Resource Strain: Low Risk  (9/23/2024)    Overall Financial Resource Strain (CARDIA)     Difficulty of Paying Living Expenses: Not very hard   Food Insecurity: No Food Insecurity (9/23/2024)    Hunger Vital Sign     Worried About Running Out of Food in the Last Year: Never true     Ran Out of Food in the Last Year: Never true   Transportation Needs: No Transportation Needs (10/28/2021)    PRAPARE - Transportation     Lack of Transportation (Medical): No     Lack of Transportation (Non-Medical): No   Physical Activity: Sufficiently Active (9/23/2024)    Exercise Vital Sign     Days of Exercise per Week: 5 days     Minutes of Exercise per Session: 50 min   Stress: Stress Concern Present (9/23/2024)    [employer] [place] [employer] - Occupational Stress Questionnaire     Feeling of Stress : To some extent   Housing Stability: Low Risk  (10/28/2021)    Housing Stability Vital Sign     Unable to Pay for Housing in the Last Year: No     Number of Places Lived in the Last Year: 1     Unstable Housing in the Last Year: No

## 2025-05-26 ENCOUNTER — HOSPITAL ENCOUNTER (OUTPATIENT)
Dept: RADIOLOGY | Facility: HOSPITAL | Age: 45
Discharge: HOME | End: 2025-05-26
Attending: ORTHOPAEDIC SURGERY
Payer: COMMERCIAL

## 2025-05-26 ENCOUNTER — RESULTS FOLLOW-UP (OUTPATIENT)
Dept: ORTHOPEDICS | Facility: CLINIC | Age: 45
End: 2025-05-26

## 2025-05-26 DIAGNOSIS — S43.432D GLENOID LABRAL TEAR, LEFT, SUBSEQUENT ENCOUNTER: ICD-10-CM

## 2025-05-26 PROCEDURE — 77002 NEEDLE LOCALIZATION BY XRAY: CPT | Mod: TC | Performed by: RADIOLOGY

## 2025-05-26 PROCEDURE — 23350 INJECTION FOR SHOULDER X-RAY: CPT | Mod: LT | Performed by: RADIOLOGY

## 2025-05-26 PROCEDURE — 25500020 PHARM REV CODE 255: Performed by: ORTHOPAEDIC SURGERY

## 2025-05-26 PROCEDURE — A4215 STERILE NEEDLE: HCPCS

## 2025-05-26 PROCEDURE — 73222 MRI JOINT UPR EXTREM W/DYE: CPT | Mod: 26,LT,, | Performed by: RADIOLOGY

## 2025-05-26 PROCEDURE — 73222 MRI JOINT UPR EXTREM W/DYE: CPT | Mod: TC,LT

## 2025-05-26 PROCEDURE — 63600175 PHARM REV CODE 636 W HCPCS: Performed by: ORTHOPAEDIC SURGERY

## 2025-05-26 PROCEDURE — A9585 GADOBUTROL INJECTION: HCPCS | Performed by: ORTHOPAEDIC SURGERY

## 2025-05-26 RX ORDER — GADOBUTROL 604.72 MG/ML
7.5 INJECTION INTRAVENOUS
Status: COMPLETED | OUTPATIENT
Start: 2025-05-26 | End: 2025-05-26

## 2025-05-26 RX ORDER — LIDOCAINE HYDROCHLORIDE 10 MG/ML
5 INJECTION, SOLUTION INFILTRATION; PERINEURAL ONCE
Status: COMPLETED | OUTPATIENT
Start: 2025-05-26 | End: 2025-05-26

## 2025-05-26 RX ADMIN — LIDOCAINE HYDROCHLORIDE 5 ML: 10 INJECTION, SOLUTION EPIDURAL; INFILTRATION; INTRACAUDAL at 10:05

## 2025-05-26 RX ADMIN — IOHEXOL 10 ML: 300 INJECTION, SOLUTION INTRAVENOUS at 10:05

## 2025-05-26 RX ADMIN — GADOBUTROL 1.25 ML: 604.72 INJECTION INTRAVENOUS at 10:05

## 2025-06-12 ENCOUNTER — OFFICE VISIT (OUTPATIENT)
Dept: ORTHOPEDICS | Facility: CLINIC | Age: 45
End: 2025-06-12
Payer: COMMERCIAL

## 2025-06-12 VITALS — WEIGHT: 187.38 LBS | HEIGHT: 65 IN | BODY MASS INDEX: 31.22 KG/M2

## 2025-06-12 DIAGNOSIS — M25.512 BILATERAL SHOULDER PAIN, UNSPECIFIED CHRONICITY: Primary | ICD-10-CM

## 2025-06-12 DIAGNOSIS — M75.100 ROTATOR CUFF SYNDROME, UNSPECIFIED LATERALITY: ICD-10-CM

## 2025-06-12 DIAGNOSIS — M75.100 ROTATOR CUFF SYNDROME, UNSPECIFIED LATERALITY: Primary | ICD-10-CM

## 2025-06-12 DIAGNOSIS — M25.511 BILATERAL SHOULDER PAIN, UNSPECIFIED CHRONICITY: Primary | ICD-10-CM

## 2025-06-12 PROCEDURE — 99999 PR PBB SHADOW E&M-EST. PATIENT-LVL III: CPT | Mod: PBBFAC,,, | Performed by: ORTHOPAEDIC SURGERY

## 2025-06-12 PROCEDURE — 1160F RVW MEDS BY RX/DR IN RCRD: CPT | Mod: CPTII,S$GLB,, | Performed by: ORTHOPAEDIC SURGERY

## 2025-06-12 PROCEDURE — 1159F MED LIST DOCD IN RCRD: CPT | Mod: CPTII,S$GLB,, | Performed by: ORTHOPAEDIC SURGERY

## 2025-06-12 PROCEDURE — 99214 OFFICE O/P EST MOD 30 MIN: CPT | Mod: S$GLB,,, | Performed by: ORTHOPAEDIC SURGERY

## 2025-06-12 PROCEDURE — 3008F BODY MASS INDEX DOCD: CPT | Mod: CPTII,S$GLB,, | Performed by: ORTHOPAEDIC SURGERY

## 2025-06-12 NOTE — PROGRESS NOTES
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 Frank; last visit 1/2018    Clotting disorder 02/03/2019    Elevated lipoprotein(a) 05/04/2018    History of CVA in adulthood 4/24/2018    Stroke 04/24/2018       Past Surgical History:   Procedure Laterality Date    BUNIONECTOMY Left 2001    CHONDROPLASTY OF KNEE Left 1/10/2020    Procedure: CHONDROPLASTY, KNEE;  Surgeon: Michael Galaviz II, MD;  Location: [facility] OR;  Service: Orthopedics;  Laterality: Left;    HYSTEROSCOPY WITH DILATION AND CURETTAGE OF UTERUS N/A 11/5/2020    Procedure: HYSTEROSCOPY, WITH DILATION AND CURETTAGE OF UTERUS;  Surgeon: Ap Milner MD;  Location: [facility] OR;  Service: OB/GYN;  Laterality: N/A;    KNEE ARTHROSCOPY W/ MENISCECTOMY Left 1/10/2020    Procedure: ARTHROSCOPY, KNEE, WITH MENISCECTOMY;  Surgeon: Michael Galaviz II, MD;  Location: [facility] OR;  Service: Orthopedics;  Laterality: Left;    REPAIR OF MENISCUS OF KNEE Left 1/10/2020    Procedure: REPAIR, MENISCUS, KNEE;  Surgeon: Michael Galaviz II, MD;  Location: [facility] OR;  Service: Orthopedics;  Laterality: Left;  PARTIAL MEDIAL    THERMAL ABLATION OF ENDOMETRIUM N/A 11/5/2020    Procedure: ABLATION, ENDOMETRIUM, THERMAL;  Surgeon: Ap Milner MD;  Location: [facility] OR;  Service: OB/GYN;  Laterality: N/A;       Current Outpatient Medications   Medication Sig    albuterol (VENTOLIN HFA) 90 mcg/actuation inhaler Inhale 2 puffs into the lungs every 6 (six) hours as needed for Wheezing. Rescue    apixaban (ELIQUIS) 5 mg Tab Take 1 tablet (5 mg total) by mouth 2 (two) times daily.    ascorbic acid, vitamin C, (VITAMIN C) 100 MG tablet Take 100 mg by mouth once daily.    atorvastatin (LIPITOR) 20 MG tablet Take 1 tablet (20 mg total) by mouth every evening.    ondansetron (ZOFRAN-ODT) 4 MG TbDL Take 1 tablet (4 mg total) by mouth every 8 (eight) hours as  needed (nausea). (Patient not taking: Reported on 5/29/2025)     No current facility-administered medications for this visit.     Facility-Administered Medications Ordered in Other Visits   Medication    electrolyte-S (ISOLYTE)    lidocaine (PF) 10 mg/ml (1%) injection 10 mg       Review of patient's allergies indicates:  No Known Allergies    Family History   Problem Relation Name Age of Onset    Diabetes Maternal Grandmother      Lung cancer Maternal Grandfather      Colon cancer Maternal Grandfather      Hypertension Mother      Aneurysm Mother          brain    Hearing loss Father      Hypertension Brother      Eczema Son      Breast cancer Neg Hx      Ovarian cancer Neg Hx      Melanoma Neg Hx      Psoriasis Neg Hx      Lupus Neg Hx         Social History[1]    Chief Complaint:   Chief Complaint   Patient presents with    Results     MRI review- L shoulder        History of present illness:  44-year-old right-hand-dominant female seen for bilateral shoulder pain.  The right shoulder has bothering her for years and she was diagnosed with some shoulder tendinitis.  The left shoulder started hurting back in October 2024.  No injury or trauma.  Patient was in the [employer] previously.  Patient had a stroke and is on blood thinners so can not take NSAIDs.  Patient gets a sharp pain with occasional movements.  Pain with doing a push-up or lifting things above her head.  We got her into some physical therapy starting back in March without any real improvement.  Left slightly worse than the right.  Pain today is a 2/10.    Prior treatment:  Topicals        Review of Systems:  Musculoskeletal:  See HPI    Physical Examination:    Vital Signs:    There were no vitals filed for this visit.      Body mass index is 31.18 kg/m².    This a well-developed, well nourished patient in no acute distress.  They are alert and oriented and cooperative to examination.  Pt. walks without an antalgic gait.      Examination of the left  shoulder shows no rashes or erythema. There are no masses, ecchymosis, or atrophy. The patient has full range of motion in forward flexion, external rotation, and internal rotation to the mid T-spine. The patient has positive Francis Neer and Nuckolls's test.. - Speeds test. Nontender to palpation over a.c. joint. Normal stability anteriorly, posteriorly, and negative sulcus sign. Passive range of motion: Forward flexion of 180°, external rotation at 90° of 90°, internal rotation of 50°, and external rotation at 0° of 50°. 2+ radial pulse. Intact axillary, radial, median and ulnar sensation. 5 out of 5 resisted forward flexion, external rotation, and negative lift off test.      X-rays:  X-rays of both shoulders are reviewed which show no atypical findings    MR arthrogram of the left shoulder is reviewed and interpreted:  Negative for labral or rotator cuff tear     Assessment:  Left shoulder tendinitis    Plan:  I reviewed the findings with her today.  I do not see anything torn that would require surgery at this time.  Recommended continuing maximization of physical therapy.  Referral to PM&R for nonsurgical management            All previous pertinent notes including ER visits, physical therapy visits, other orthopedic visits as well as other care for the same musculoskeletal problem were reviewed.  All pertinent lab values and previous imaging was reviewed pertinent to the current visit.    This note was created using [employer] voice recognition software that occasionally misinterpreted phrases or words.    Consult note is delivered via Epic messaging service.               [1]   Social History  Socioeconomic History    Marital status:    Tobacco Use    Smoking status: Former     Current packs/day: 0.00     Average packs/day: 0.3 packs/day for 2.0 years (0.5 ttl pk-yrs)     Types: Cigarettes     Start date: 2001     Quit date: 2003     Years since quittin.4    Smokeless tobacco: Never   Substance  and Sexual Activity    Alcohol use: Yes     Comment: Socially    Drug use: No    Sexual activity: Yes     Partners: Male     Birth control/protection: None     Comment:      Social Drivers of Health     Financial Resource Strain: Low Risk  (9/23/2024)    Overall Financial Resource Strain (CARDIA)     Difficulty of Paying Living Expenses: Not very hard   Food Insecurity: No Food Insecurity (9/23/2024)    Hunger Vital Sign     Worried About Running Out of Food in the Last Year: Never true     Ran Out of Food in the Last Year: Never true   Transportation Needs: No Transportation Needs (10/28/2021)    PRAPARE - Transportation     Lack of Transportation (Medical): No     Lack of Transportation (Non-Medical): No   Physical Activity: Sufficiently Active (9/23/2024)    Exercise Vital Sign     Days of Exercise per Week: 5 days     Minutes of Exercise per Session: 50 min   Stress: Stress Concern Present (9/23/2024)    [employer] [place] [employer] - Occupational Stress Questionnaire     Feeling of Stress : To some extent   Housing Stability: Low Risk  (10/28/2021)    Housing Stability Vital Sign     Unable to Pay for Housing in the Last Year: No     Number of Places Lived in the Last Year: 1     Unstable Housing in the Last Year: No

## 2025-07-21 ENCOUNTER — OFFICE VISIT (OUTPATIENT)
Dept: CARDIOLOGY | Facility: CLINIC | Age: 45
End: 2025-07-21
Payer: COMMERCIAL

## 2025-07-21 VITALS
OXYGEN SATURATION: 97 % | BODY MASS INDEX: 29.68 KG/M2 | HEIGHT: 65 IN | SYSTOLIC BLOOD PRESSURE: 114 MMHG | HEART RATE: 70 BPM | WEIGHT: 178.13 LBS | DIASTOLIC BLOOD PRESSURE: 64 MMHG

## 2025-07-21 DIAGNOSIS — Z78.9 STATIN INTOLERANCE: ICD-10-CM

## 2025-07-21 DIAGNOSIS — Z86.73 HISTORY OF STROKE: Primary | ICD-10-CM

## 2025-07-21 DIAGNOSIS — E78.41 ELEVATED LIPOPROTEIN A LEVEL: ICD-10-CM

## 2025-07-21 DIAGNOSIS — E78.49 FAMILIAL HYPERLIPIDEMIA: ICD-10-CM

## 2025-07-21 DIAGNOSIS — I25.3 ATRIAL SEPTAL ANEURYSM: ICD-10-CM

## 2025-07-21 DIAGNOSIS — Z13.6 ENCOUNTER FOR SCREENING FOR CARDIOVASCULAR DISORDERS: ICD-10-CM

## 2025-07-21 PROBLEM — R00.2 PALPITATIONS: Status: RESOLVED | Noted: 2023-06-07 | Resolved: 2025-07-21

## 2025-07-21 PROBLEM — E66.01 SEVERE OBESITY (BMI 35.0-39.9) WITH COMORBIDITY: Status: RESOLVED | Noted: 2023-06-12 | Resolved: 2025-07-21

## 2025-07-21 PROCEDURE — 1159F MED LIST DOCD IN RCRD: CPT | Mod: CPTII,S$GLB,, | Performed by: INTERNAL MEDICINE

## 2025-07-21 PROCEDURE — 99999 PR PBB SHADOW E&M-EST. PATIENT-LVL III: CPT | Mod: PBBFAC,,, | Performed by: INTERNAL MEDICINE

## 2025-07-21 PROCEDURE — 99213 OFFICE O/P EST LOW 20 MIN: CPT | Mod: S$GLB,,, | Performed by: INTERNAL MEDICINE

## 2025-07-21 PROCEDURE — 3074F SYST BP LT 130 MM HG: CPT | Mod: CPTII,S$GLB,, | Performed by: INTERNAL MEDICINE

## 2025-07-21 PROCEDURE — 3008F BODY MASS INDEX DOCD: CPT | Mod: CPTII,S$GLB,, | Performed by: INTERNAL MEDICINE

## 2025-07-21 PROCEDURE — 3078F DIAST BP <80 MM HG: CPT | Mod: CPTII,S$GLB,, | Performed by: INTERNAL MEDICINE

## 2025-07-21 RX ORDER — EVOLOCUMAB 140 MG/ML
140 INJECTION, SOLUTION SUBCUTANEOUS
Qty: 6 ML | Refills: 3 | Status: ACTIVE | OUTPATIENT
Start: 2025-07-21 | End: 2026-07-21

## 2025-07-21 RX ORDER — METOPROLOL TARTRATE 25 MG/1
25 TABLET, FILM COATED ORAL ONCE
Qty: 2 TABLET | Refills: 0 | Status: SHIPPED | OUTPATIENT
Start: 2025-07-21 | End: 2025-07-21

## 2025-07-21 NOTE — PROGRESS NOTES
[facility] [place] [place]    Subjective:     Patient ID:  Jaclyn Rausch is a 44 y.o. female patient here for evaluation Hyperlipidemia (Annual )      HPI:  44-year-old female with history of CVA x2.  Started on anticoagulation after the 2nd CVA.  No CVA since then.  History of atrial septal aneurysm with negative bubble study in the past.  Also has history of familial hyperlipidemia with elevated LPA levels as well as hypercoagulable disorder, history of recurrent miscarriages, APLA syndrome.    Review of Systems   All other systems reviewed and are negative.       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 Frank; last visit 1/2018    Clotting disorder 02/03/2019    Elevated lipoprotein(a) 05/04/2018    History of CVA in adulthood 4/24/2018    Stroke 04/24/2018       Past Surgical History:   Procedure Laterality Date    BUNIONECTOMY Left 2001    CHONDROPLASTY OF KNEE Left 1/10/2020    Procedure: CHONDROPLASTY, KNEE;  Surgeon: Michael aGlaviz II, MD;  Location: [facility] OR;  Service: Orthopedics;  Laterality: Left;    HYSTEROSCOPY WITH DILATION AND CURETTAGE OF UTERUS N/A 11/5/2020    Procedure: HYSTEROSCOPY, WITH DILATION AND CURETTAGE OF UTERUS;  Surgeon: Ap Milner MD;  Location: [facility] OR;  Service: OB/GYN;  Laterality: N/A;    KNEE ARTHROSCOPY W/ MENISCECTOMY Left 1/10/2020    Procedure: ARTHROSCOPY, KNEE, WITH MENISCECTOMY;  Surgeon: Michael Galaviz II, MD;  Location: [facility] OR;  Service: Orthopedics;  Laterality: Left;    REPAIR OF MENISCUS OF KNEE Left 1/10/2020    Procedure: REPAIR, MENISCUS, KNEE;  Surgeon: Michael Galaviz II, MD;  Location: [facility] OR;  Service: Orthopedics;  Laterality: Left;  PARTIAL MEDIAL    THERMAL ABLATION OF ENDOMETRIUM N/A 11/5/2020    Procedure: ABLATION, ENDOMETRIUM, THERMAL;  Surgeon:  Ap Milner MD;  Location: [facility] OR;  Service: OB/GYN;  Laterality: N/A;       Family History   Problem Relation Name Age of Onset    Diabetes Maternal Grandmother      Lung cancer Maternal Grandfather      Colon cancer Maternal Grandfather      Hypertension Mother      Aneurysm Mother          brain    Hearing loss Father      Hypertension Brother      Eczema Son      Breast cancer Neg Hx      Ovarian cancer Neg Hx      Melanoma Neg Hx      Psoriasis Neg Hx      Lupus Neg Hx         Social History     Socioeconomic History    Marital status:    Tobacco Use    Smoking status: Former     Current packs/day: 0.00     Average packs/day: 0.3 packs/day for 2.0 years (0.5 ttl pk-yrs)     Types: Cigarettes     Start date: 2001     Quit date: 2003     Years since quittin.5    Smokeless tobacco: Never   Substance and Sexual Activity    Alcohol use: Yes     Comment: Socially    Drug use: No    Sexual activity: Yes     Partners: Male     Birth control/protection: None     Comment:      Social Drivers of Health     Financial Resource Strain: Low Risk  (2024)    Overall Financial Resource Strain (CARDIA)     Difficulty of Paying Living Expenses: Not very hard   Food Insecurity: No Food Insecurity (2024)    Hunger Vital Sign     Worried About Running Out of Food in the Last Year: Never true     Ran Out of Food in the Last Year: Never true   Transportation Needs: No Transportation Needs (10/28/2021)    PRAPARE - Transportation     Lack of Transportation (Medical): No     Lack of Transportation (Non-Medical): No   Physical Activity: Sufficiently Active (2024)    Exercise Vital Sign     Days of Exercise per Week: 5 days     Minutes of Exercise per Session: 50 min   Stress: Stress Concern Present (2024)    [employer] [place] [employer] - Occupational Stress Questionnaire     Feeling of Stress : To some extent   Housing Stability: Low Risk  (10/28/2021)    Housing Stability  Vital Sign     Unable to Pay for Housing in the Last Year: No     Number of Places Lived in the Last Year: 1     Unstable Housing in the Last Year: No       Current Medications[1]    Review of patient's allergies indicates:  No Known Allergies      Objective:        Vitals:    07/21/25 0925   BP: 114/64   Pulse: 70       Physical Exam  Vitals reviewed.   Constitutional:       Appearance: Normal appearance.   Cardiovascular:      Rate and Rhythm: Normal rate and regular rhythm.      Pulses: Normal pulses.      Heart sounds: Normal heart sounds. No murmur heard.     No gallop.   Pulmonary:      Effort: Pulmonary effort is normal.   Skin:     General: Skin is warm.   Neurological:      General: No focal deficit present.      Mental Status: She is alert and oriented to person, place, and time.         LIPIDS - LAST 2   Lab Results   Component Value Date    CHOL 205 (H) 09/11/2023    CHOL 193 06/05/2023    HDL 67 09/11/2023    HDL 63 06/05/2023    LDLCALC 123.0 09/11/2023    LDLCALC 104.6 06/05/2023    TRIG 75 09/11/2023    TRIG 127 06/05/2023    CHOLHDL 32.7 09/11/2023    CHOLHDL 32.6 06/05/2023       CBC - LAST 2  Lab Results   Component Value Date    WBC 7.94 09/18/2024    WBC 8.63 09/18/2023    RBC 4.63 09/18/2024    RBC 4.62 09/18/2023    HGB 14.0 09/18/2024    HGB 14.3 09/18/2023    HCT 42.8 09/18/2024    HCT 43.9 09/18/2023    MCV 92 09/18/2024    MCV 95 09/18/2023    MCH 30.2 09/18/2024    MCH 31.0 09/18/2023    MCHC 32.7 09/18/2024    MCHC 32.6 09/18/2023    RDW 12.2 09/18/2024    RDW 12.3 09/18/2023     09/18/2024     09/18/2023    MPV 9.7 09/18/2024    MPV 9.9 09/18/2023    GRAN 3.5 09/18/2024    GRAN 44.4 09/18/2024    LYMPH 3.4 09/18/2024    LYMPH 43.1 09/18/2024    MONO 0.8 09/18/2024    MONO 9.4 09/18/2024    BASO 0.04 09/18/2024    BASO 0.05 09/18/2023    NRBC 0 09/18/2024    NRBC 0 09/18/2023       CHEMISTRY & LIVER FUNCTION - LAST 2  Lab Results   Component Value Date     09/18/2024  "    09/18/2023    K 4.0 09/18/2024    K 4.1 09/18/2023     09/18/2024     09/18/2023    CO2 22 (L) 09/18/2024    CO2 25 09/18/2023    ANIONGAP 8 09/18/2024    ANIONGAP 7 (L) 09/18/2023    BUN 20 09/18/2024    BUN 12 09/18/2023    CREATININE 0.8 09/18/2024    CREATININE 0.9 09/18/2023    GLU 90 09/18/2024    GLU 59 (L) 09/18/2023    CALCIUM 8.9 09/18/2024    CALCIUM 9.7 09/18/2023    MG 2.2 11/04/2019    ALBUMIN 4.0 09/18/2024    ALBUMIN 4.0 09/18/2023    PROT 6.9 09/18/2024    PROT 6.9 09/18/2023    ALKPHOS 83 09/18/2024    ALKPHOS 74 09/18/2023    ALT 13 09/18/2024    ALT 17 09/18/2023    AST 19 09/18/2024    AST 21 09/18/2023    BILITOT 0.4 09/18/2024    BILITOT 0.6 09/18/2023        CARDIAC PROFILE - LAST 2  No results found for: "BNP", "CPK", "CPKMB", "LDH", "TROPONINI"     COAGULATION - LAST 2  Lab Results   Component Value Date    INR 1.0 04/24/2018    APTT 25.8 04/24/2018       ENDOCRINE & PSA - LAST 2  Lab Results   Component Value Date    HGBA1C 5.0 10/30/2021    TSH 1.553 06/05/2023    TSH 1.101 10/30/2021        ECHOCARDIOGRAM RESULTS  Results for orders placed during the hospital encounter of 09/12/23    Echo    Interpretation Summary    Left Ventricle: The left ventricle is normal in size. Normal wall thickness. Normal wall motion. There is normal systolic function. Ejection fraction by visual approximation is 65%. There is normal diastolic function.    Right Ventricle: Normal right ventricular cavity size. Wall thickness is normal. Right ventricle wall motion  is normal. Systolic function is normal.    Tricuspid Valve: There is mild regurgitation.    IVC/SVC: Normal venous pressure at 3 mmHg.    The estimated pulmonary artery systolic pressure is 27 mmHg.      CURRENT/PREVIOUS VISIT EKG  Results for orders placed or performed in visit on 10/08/24   IN OFFICE EKG 12-LEAD (to Lynn)    Collection Time: 10/08/24  3:00 PM   Result Value Ref Range    QRS Duration 76 ms    OHS QTC " Calculation 426 ms    Narrative    Test Reason : E78.5,    Vent. Rate : 078 BPM     Atrial Rate : 078 BPM     P-R Int : 148 ms          QRS Dur : 076 ms      QT Int : 374 ms       P-R-T Axes : 053 042 050 degrees     QTc Int : 426 ms    Normal sinus rhythm  Normal ECG  Confirmed by Carolina ESTEVEZ, Pravin Thomas ([id number]) on 10/24/2024 7:01:06 PM    Referred By: DIAN CARVALHO           Confirmed By:Pravin Figueroa MD     No valid procedures specified.   No results found for this or any previous visit.    No valid procedures specified.        Assessment:       1. History of stroke    2. Atrial septal aneurysm    3. Elevated lipoprotein A level    4. Familial hyperlipidemia    5. Statin intolerance    6. Encounter for screening for cardiovascular disorders           Plan:       History of stroke  -     IN OFFICE EKG 12-LEAD (to Muse)  -     evolocumab (REPATHA SURECLICK) 140 mg/mL PnIj; Inject 1 mL (140 mg total) into the skin every 14 (fourteen) days.  Dispense: 6 mL; Refill: 3    Atrial septal aneurysm  -     IN OFFICE EKG 12-LEAD (to Muse)    Elevated lipoprotein A level  -     evolocumab (REPATHA SURECLICK) 140 mg/mL PnIj; Inject 1 mL (140 mg total) into the skin every 14 (fourteen) days.  Dispense: 6 mL; Refill: 3    Familial hyperlipidemia  -     evolocumab (REPATHA SURECLICK) 140 mg/mL PnIj; Inject 1 mL (140 mg total) into the skin every 14 (fourteen) days.  Dispense: 6 mL; Refill: 3    Statin intolerance  -     evolocumab (REPATHA SURECLICK) 140 mg/mL PnIj; Inject 1 mL (140 mg total) into the skin every 14 (fourteen) days.  Dispense: 6 mL; Refill: 3    Encounter for screening for cardiovascular disorders  -     CT Cardiac Scoring; Future; Expected date: 07/21/2025    Other orders  -     metoprolol tartrate (LOPRESSOR) 25 MG tablet; Take 1 tablet (25 mg total) by mouth once. Take one tab on the morning of CT scan and take other to the CT scan for 1 dose  Dispense: 2 tablet; Refill: 0      Patient does have a history  of atrial septal aneurysm which might have contributed to her strokes.  There is no definite PFO visualized or the bubble studies.  Atrial septal aneurysms can cause strokes without a PFO in them especially in the presence of hypercoagulable disorder, a blood clot can form directly on the aneurysm which can embolized to the brain and cause a stroke.  No changes in RA and RV seen concerning for any interatrial shunting.  Will continue with current management of anticoagulation of the patient in conjunction with Hematology advice.  Choice of anticoagulants will be defer to Hematology.  If patient has a another CVA while on appropriate anticoagulation, will consider repeat more aggressive bubble study with JUAN for further evaluation.    For hyperlipidemia, patient reports having some issues with statins including myalgias and cramps.  Will try to switch her to PCSK9 inhibitors.  Will make it once a month if she tolerates them well.  Will need a follow-up lipid panel in about 2-3 months after starting the Repatha.    Follow up in about 8 weeks (around 9/15/2025) for f/u dyslipidemia.          Kurt Montano MD  [facility] [facility] [place]       [1]   Current Outpatient Medications   Medication Sig Dispense Refill    apixaban (ELIQUIS) 5 mg Tab Take 1 tablet (5 mg total) by mouth 2 (two) times daily. 60 tablet 11    atorvastatin (LIPITOR) 20 MG tablet Take 1 tablet (20 mg total) by mouth every evening. 90 tablet 2    ondansetron (ZOFRAN-ODT) 4 MG TbDL Take 1 tablet (4 mg total) by mouth every 8 (eight) hours as needed (nausea). 12 tablet 0    albuterol (VENTOLIN HFA) 90 mcg/actuation inhaler Inhale 2 puffs into the lungs every 6 (six) hours as needed for Wheezing. Rescue 18 g 0    ascorbic acid, vitamin C, (VITAMIN C) 100 MG tablet Take 100 mg by mouth once daily. (Patient not taking: Reported on 7/21/2025)      evolocumab (REPATHA SURECLICK) 140 mg/mL PnIj Inject 1 mL (140 mg  total) into the skin every 14 (fourteen) days. 6 mL 3    metoprolol tartrate (LOPRESSOR) 25 MG tablet Take 1 tablet (25 mg total) by mouth once. Take one tab on the morning of CT scan and take other to the CT scan for 1 dose 2 tablet 0     No current facility-administered medications for this visit.     Facility-Administered Medications Ordered in Other Visits   Medication Dose Route Frequency Provider Last Rate Last Admin    electrolyte-S (ISOLYTE)   Intravenous Continuous Héctor Trujillo MD   Stopped at 01/10/20 0828    lidocaine (PF) 10 mg/ml (1%) injection 10 mg  1 mL Intradermal Once Héctor Trujillo MD

## 2025-07-24 ENCOUNTER — HOSPITAL ENCOUNTER (OUTPATIENT)
Dept: RADIOLOGY | Facility: HOSPITAL | Age: 45
Discharge: HOME | End: 2025-07-24
Attending: INTERNAL MEDICINE
Payer: COMMERCIAL

## 2025-07-24 DIAGNOSIS — Z13.6 ENCOUNTER FOR SCREENING FOR CARDIOVASCULAR DISORDERS: ICD-10-CM

## 2025-07-24 PROCEDURE — 75571 CT HRT W/O DYE W/CA TEST: CPT | Mod: TC

## 2025-07-24 PROCEDURE — 75571 CT HRT W/O DYE W/CA TEST: CPT | Mod: 26,,, | Performed by: RADIOLOGY

## 2025-07-25 LAB
OHS QRS DURATION: 76 MS
OHS QTC CALCULATION: 464 MS

## 2025-08-06 ENCOUNTER — OFFICE VISIT (OUTPATIENT)
Dept: OBSTETRICS AND GYNECOLOGY | Facility: CLINIC | Age: 45
End: 2025-08-06
Payer: COMMERCIAL

## 2025-08-06 VITALS
WEIGHT: 180.75 LBS | SYSTOLIC BLOOD PRESSURE: 138 MMHG | DIASTOLIC BLOOD PRESSURE: 90 MMHG | HEIGHT: 65 IN | BODY MASS INDEX: 30.12 KG/M2

## 2025-08-06 DIAGNOSIS — R76.0 ANTICARDIOLIPIN ANTIBODY POSITIVE: ICD-10-CM

## 2025-08-06 DIAGNOSIS — Z12.31 VISIT FOR SCREENING MAMMOGRAM: ICD-10-CM

## 2025-08-06 DIAGNOSIS — Z01.419 WOMEN'S ANNUAL ROUTINE GYNECOLOGICAL EXAMINATION: Primary | ICD-10-CM

## 2025-08-06 DIAGNOSIS — Z12.4 PAP SMEAR FOR CERVICAL CANCER SCREENING: ICD-10-CM

## 2025-08-06 DIAGNOSIS — R68.82 DECREASED LIBIDO: ICD-10-CM

## 2025-08-06 PROCEDURE — 87624 HPV HI-RISK TYP POOLED RSLT: CPT | Performed by: OBSTETRICS & GYNECOLOGY

## 2025-08-06 PROCEDURE — 99999 PR PBB SHADOW E&M-EST. PATIENT-LVL III: CPT | Mod: PBBFAC,,, | Performed by: OBSTETRICS & GYNECOLOGY

## 2025-08-06 NOTE — PROGRESS NOTES
"Jaclyn Rausch is a 44 y.o. female  who presents for annual exam.  S/P endometrial ablation 2020 and is not having any bleeding.  She notes some cyclic premenstrual breast tenderness but does not see any bleeding.  Denies hot flashes / sweats.  Reports decreased libido for which she would like evaluation.  She has a history of CVA as well as and MIRTA and is on long term anticoagulation with Eliqis.      Blood pressure (!) 138/90, height 5' 5" (1.651 m), weight 82 kg (180 lb 12.4 oz).    2020 Pap: Negative, HPV: Negative    25 Mammogram: Negative, TC 11.67%    Past Medical History:   Diagnosis Date    Anemia     slightly    Anticardiolipin antibody positive 2018    Anticoagulant long-term use     Atrial septal aneurysm     Dr. Frank; last visit 2018    Clotting disorder 2019    Elevated lipoprotein(a) 2018    History of CVA in adulthood 2018    Stroke 2018       Past Surgical History:   Procedure Laterality Date    BUNIONECTOMY Left     CHONDROPLASTY OF KNEE Left 1/10/2020    Procedure: CHONDROPLASTY, KNEE;  Surgeon: Michael Galaviz II, MD;  Location: [facility] OR;  Service: Orthopedics;  Laterality: Left;    HYSTEROSCOPY WITH DILATION AND CURETTAGE OF UTERUS N/A 2020    Procedure: HYSTEROSCOPY, WITH DILATION AND CURETTAGE OF UTERUS;  Surgeon: Ap Milner MD;  Location: [facility] OR;  Service: OB/GYN;  Laterality: N/A;    KNEE ARTHROSCOPY W/ MENISCECTOMY Left 1/10/2020    Procedure: ARTHROSCOPY, KNEE, WITH MENISCECTOMY;  Surgeon: Michael Galaviz II, MD;  Location: [facility] OR;  Service: Orthopedics;  Laterality: Left;    REPAIR OF MENISCUS OF KNEE Left 1/10/2020    Procedure: REPAIR, MENISCUS, KNEE;  Surgeon: Michael Galaviz II, MD;  Location: [facility] OR;  Service: Orthopedics;  Laterality: Left;  PARTIAL MEDIAL    THERMAL ABLATION OF ENDOMETRIUM N/A 2020    Procedure: ABLATION, ENDOMETRIUM, THERMAL;  Surgeon: Ap Milner MD;  " Location: [facility] OR;  Service: OB/GYN;  Laterality: N/A;       OB History          5    Para   3    Term   3       0    AB   1    Living   3         SAB   1    IAB   0    Ectopic   0    Multiple   0    Live Births   3                 ROS:  GENERAL: Feeling well overall.   SKIN: Denies rash or lesions.   HEAD: Denies head injury or headache.   NODES: Denies enlarged lymph nodes.   CHEST: Denies chest pain or shortness of breath.   CARDIOVASCULAR: Denies palpitations or left sided chest pain.   ABDOMEN: No abdominal pain, nausea, vomiting or rectal bleeding.   URINARY: No dysuria or hematuria.  REPRODUCTIVE: See HPI.   BREASTS: Denies pain, lumps, or nipple discharge.   HEMATOLOGIC: No easy bruisability or excessive bleeding.   MUSCULOSKELETAL: Denies joint pain or swelling.   NEUROLOGIC: Denies syncope or weakness.   PSYCHIATRIC: Reports decreased libido.    PE:   (chaperone present during entire exam)  APPEARANCE: Well nourished, well developed, in no acute distress.  BREASTS: Symmetrical, no skin changes or visible lesions. No palpable masses, nipple discharge or adenopathy bilaterally.  ABDOMEN: Soft. No tenderness or masses.   VULVA: No lesions. Normal female genitalia.  URETHRAL MEATUS: Normal size and location, no lesions, no prolapse.  URETHRA: No masses, tenderness, prolapse or scarring.  VAGINA: Moist and well rugated, no abnormal discharge, no significant cystocele or rectocele.  CERVIX: No lesions and discharge. PAP done.  UTERUS: Normal size, regular shape, mobile, non-tender, bladder base nontender.  ADNEXA: No masses, tenderness or CDS nodularity.  ANUS PERINEUM: Normal.      Diagnosis:  1. Women's annual routine gynecological examination    2. Pap smear for cervical cancer screening    3. Visit for screening mammogram    4. Decreased libido    5. Anticardiolipin antibody positive          PLAN:    Orders Placed This Encounter    Ambulatory referral/consult to [facility]  [facility]    Liquid-Based Pap Smear, Screening       Patient was counseled today on the need for annual gyn exams.  We discuss libido as a multifactorial issue - she would like [facility].    Follow-up in 1 year.    This note was created with voice recognition software.  Grammatical, syntax and spelling errors may be inevitable.

## 2025-08-07 ENCOUNTER — PATIENT MESSAGE (OUTPATIENT)
Dept: HEMATOLOGY/ONCOLOGY | Facility: CLINIC | Age: 45
End: 2025-08-07
Payer: COMMERCIAL

## 2025-08-08 ENCOUNTER — CLINICAL SUPPORT (OUTPATIENT)
Dept: OBSTETRICS AND GYNECOLOGY | Facility: CLINIC | Age: 45
End: 2025-08-08
Payer: COMMERCIAL

## 2025-08-08 DIAGNOSIS — R68.82 DECREASED LIBIDO: ICD-10-CM

## 2025-08-08 DIAGNOSIS — N95.1 MENOPAUSAL SYMPTOMS: Primary | ICD-10-CM

## 2025-08-12 ENCOUNTER — PATIENT MESSAGE (OUTPATIENT)
Dept: OBSTETRICS AND GYNECOLOGY | Facility: CLINIC | Age: 45
End: 2025-08-12
Payer: COMMERCIAL

## 2025-08-20 ENCOUNTER — PATIENT MESSAGE (OUTPATIENT)
Dept: ADMINISTRATIVE | Facility: HOSPITAL | Age: 45
End: 2025-08-20
Payer: COMMERCIAL

## (undated) DEVICE — SUT ETHILON 3-0 PS2 18 BLK

## (undated) DEVICE — PACK BASIC

## (undated) DEVICE — SEE MEDLINE ITEM 146313

## (undated) DEVICE — SKINMARKER W/RULER DEVON

## (undated) DEVICE — CUBE COLD THERAPY POLAR CARE

## (undated) DEVICE — UNDERGLOVES BIOGEL PI SIZE 8

## (undated) DEVICE — GLOVE SURG ULTRA TOUCH 8.5

## (undated) DEVICE — PAD CAST SPECIALIST STRL 6

## (undated) DEVICE — SEE MEDLINE ITEM 157117

## (undated) DEVICE — CONTAINER SPECIMEN STRL 4OZ

## (undated) DEVICE — SEE MEDLINE ITEM 146231

## (undated) DEVICE — SEE MEDLINE ITEM 152622

## (undated) DEVICE — NDL BOX COUNTER

## (undated) DEVICE — APPLICATOR CHLORAPREP ORN 26ML

## (undated) DEVICE — DRESSING DERMACEA SPNG 10S

## (undated) DEVICE — SEE MEDLINE ITEM 157171

## (undated) DEVICE — SLEEVE SCD EXPRESS CALF MEDIUM

## (undated) DEVICE — JELLY SURGILUBE 5GR

## (undated) DEVICE — PACK FLUENT DISPOSABLE

## (undated) DEVICE — Device

## (undated) DEVICE — BLADE SURG CARBON STEEL SZ11

## (undated) DEVICE — COVER SURG LIGHT HANDLE

## (undated) DEVICE — SPONGE SUPER KERLIX 6X6.75IN

## (undated) DEVICE — STRAP OR TABLE 5IN X 72IN

## (undated) DEVICE — SOL IRR NACL .9% 3000ML

## (undated) DEVICE — DRESSING XEROFORM FOIL PK 1X8

## (undated) DEVICE — SEE MEDLINE ITEM 152487

## (undated) DEVICE — GLOVE BIOGEL SKINSENSE PI 7.0

## (undated) DEVICE — SPONGE GAUZE 16PLY 4X4

## (undated) DEVICE — ALCOHOL 70% ISOP RUBBING 4OZ

## (undated) DEVICE — SEE MEDLINE ITEM 157118

## (undated) DEVICE — UNDERGLOVES BIOGEL PI SZ 7 LF

## (undated) DEVICE — TUBING ARTHROSCOPY

## (undated) DEVICE — PAD PERINEAL SUPINE

## (undated) DEVICE — GLOVE SURG ULTRA TOUCH 7

## (undated) DEVICE — BANDAGE ESMARK 6X12

## (undated) DEVICE — DRAPE STERI INSTRUMENT 1018

## (undated) DEVICE — CUTTER MENISCUS AGGRESSIVE 4.0

## (undated) DEVICE — MANIFOLD 4 PORT

## (undated) DEVICE — UNDERGLOVES BIOGEL PI SIZE 7.5

## (undated) DEVICE — DEVICE ABLATION NOVASURE DISP

## (undated) DEVICE — SEE MEDLINE ITEM 152530

## (undated) DEVICE — DRAPE STERI U-SHAPED 47X51IN

## (undated) DEVICE — PROBE ABLATION RF ARTHSCP 3.5

## (undated) DEVICE — SEAL LENS SCOPE MYOSURE

## (undated) DEVICE — DRAPE PLASTIC U 60X72

## (undated) DEVICE — NDL SPINAL 18GX3.5 SPINOCAN

## (undated) DEVICE — GLOVE SURG ULTRA TOUCH 8

## (undated) DEVICE — TOURNIQUET SB QC DP 34X4IN

## (undated) DEVICE — SOL 9P NACL IRR PIC IL

## (undated) DEVICE — MAT QUICK 40X30 FLOOR FLUID LF

## (undated) DEVICE — SYS LABEL CORRECT MED

## (undated) DEVICE — SET BASIN 48X48IN 6000ML RING

## (undated) DEVICE — PADDING CAST SPECIALIST 6X4YD